# Patient Record
Sex: FEMALE | Race: BLACK OR AFRICAN AMERICAN | NOT HISPANIC OR LATINO | Employment: UNEMPLOYED | URBAN - METROPOLITAN AREA
[De-identification: names, ages, dates, MRNs, and addresses within clinical notes are randomized per-mention and may not be internally consistent; named-entity substitution may affect disease eponyms.]

---

## 2020-06-06 ENCOUNTER — HOSPITAL ENCOUNTER (EMERGENCY)
Facility: HOSPITAL | Age: 55
Discharge: HOME | End: 2020-06-06
Attending: EMERGENCY MEDICINE
Payer: COMMERCIAL

## 2020-06-06 VITALS
OXYGEN SATURATION: 98 % | TEMPERATURE: 98 F | BODY MASS INDEX: 31.07 KG/M2 | HEART RATE: 54 BPM | WEIGHT: 182 LBS | HEIGHT: 64 IN | DIASTOLIC BLOOD PRESSURE: 73 MMHG | RESPIRATION RATE: 16 BRPM | SYSTOLIC BLOOD PRESSURE: 124 MMHG

## 2020-06-06 DIAGNOSIS — M54.2 CHRONIC NECK AND BACK PAIN: ICD-10-CM

## 2020-06-06 DIAGNOSIS — G89.29 CHRONIC NECK AND BACK PAIN: ICD-10-CM

## 2020-06-06 DIAGNOSIS — M54.9 CHRONIC NECK AND BACK PAIN: ICD-10-CM

## 2020-06-06 DIAGNOSIS — R07.9 CHEST PAIN WITH LOW RISK FOR CARDIAC ETIOLOGY: Primary | ICD-10-CM

## 2020-06-06 DIAGNOSIS — R07.9 ACUTE CHEST PAIN: ICD-10-CM

## 2020-06-06 LAB
ALBUMIN SERPL BCP-MCNC: 4.8 G/DL (ref 3.5–5.2)
ALP SERPL-CCNC: 101 U/L (ref 55–135)
ALT SERPL W/O P-5'-P-CCNC: 18 U/L (ref 10–44)
ANION GAP SERPL CALC-SCNC: 10 MMOL/L (ref 8–16)
AST SERPL-CCNC: 16 U/L (ref 10–40)
BASOPHILS # BLD AUTO: 0.05 K/UL (ref 0–0.2)
BASOPHILS NFR BLD: 0.9 % (ref 0–1.9)
BILIRUB SERPL-MCNC: 0.6 MG/DL (ref 0.1–1)
BUN SERPL-MCNC: 9 MG/DL (ref 6–20)
CALCIUM SERPL-MCNC: 9.9 MG/DL (ref 8.7–10.5)
CHLORIDE SERPL-SCNC: 104 MMOL/L (ref 95–110)
CO2 SERPL-SCNC: 26 MMOL/L (ref 23–29)
CREAT SERPL-MCNC: 0.9 MG/DL (ref 0.5–1.4)
DIFFERENTIAL METHOD: ABNORMAL
EOSINOPHIL # BLD AUTO: 0.1 K/UL (ref 0–0.5)
EOSINOPHIL NFR BLD: 2.5 % (ref 0–8)
ERYTHROCYTE [DISTWIDTH] IN BLOOD BY AUTOMATED COUNT: 16.4 % (ref 11.5–14.5)
EST. GFR  (AFRICAN AMERICAN): >60 ML/MIN/1.73 M^2
EST. GFR  (NON AFRICAN AMERICAN): >60 ML/MIN/1.73 M^2
GLUCOSE SERPL-MCNC: 86 MG/DL (ref 70–110)
HCT VFR BLD AUTO: 43.9 % (ref 37–48.5)
HGB BLD-MCNC: 13.8 G/DL (ref 12–16)
IMM GRANULOCYTES # BLD AUTO: 0.01 K/UL (ref 0–0.04)
IMM GRANULOCYTES NFR BLD AUTO: 0.2 % (ref 0–0.5)
LYMPHOCYTES # BLD AUTO: 2.3 K/UL (ref 1–4.8)
LYMPHOCYTES NFR BLD: 43.2 % (ref 18–48)
MCH RBC QN AUTO: 24.6 PG (ref 27–31)
MCHC RBC AUTO-ENTMCNC: 31.4 G/DL (ref 32–36)
MCV RBC AUTO: 78 FL (ref 82–98)
MONOCYTES # BLD AUTO: 0.4 K/UL (ref 0.3–1)
MONOCYTES NFR BLD: 6.8 % (ref 4–15)
NEUTROPHILS # BLD AUTO: 2.5 K/UL (ref 1.8–7.7)
NEUTROPHILS NFR BLD: 46.4 % (ref 38–73)
NRBC BLD-RTO: 0 /100 WBC
PLATELET # BLD AUTO: 286 K/UL (ref 150–350)
PMV BLD AUTO: 10 FL (ref 9.2–12.9)
POTASSIUM SERPL-SCNC: 3.9 MMOL/L (ref 3.5–5.1)
PROT SERPL-MCNC: 8.2 G/DL (ref 6–8.4)
RBC # BLD AUTO: 5.6 M/UL (ref 4–5.4)
SODIUM SERPL-SCNC: 140 MMOL/L (ref 136–145)
TROPONIN I SERPL DL<=0.01 NG/ML-MCNC: <0.006 NG/ML (ref 0–0.03)
WBC # BLD AUTO: 5.28 K/UL (ref 3.9–12.7)

## 2020-06-06 PROCEDURE — 96374 THER/PROPH/DIAG INJ IV PUSH: CPT

## 2020-06-06 PROCEDURE — 85025 COMPLETE CBC W/AUTO DIFF WBC: CPT

## 2020-06-06 PROCEDURE — 99285 EMERGENCY DEPT VISIT HI MDM: CPT | Mod: 25

## 2020-06-06 PROCEDURE — 93010 EKG 12-LEAD: ICD-10-PCS | Mod: ,,, | Performed by: INTERNAL MEDICINE

## 2020-06-06 PROCEDURE — 93010 ELECTROCARDIOGRAM REPORT: CPT | Mod: ,,, | Performed by: INTERNAL MEDICINE

## 2020-06-06 PROCEDURE — 63600175 PHARM REV CODE 636 W HCPCS: Performed by: EMERGENCY MEDICINE

## 2020-06-06 PROCEDURE — 80053 COMPREHEN METABOLIC PANEL: CPT

## 2020-06-06 PROCEDURE — 25000003 PHARM REV CODE 250: Performed by: EMERGENCY MEDICINE

## 2020-06-06 PROCEDURE — 84484 ASSAY OF TROPONIN QUANT: CPT

## 2020-06-06 PROCEDURE — 93005 ELECTROCARDIOGRAM TRACING: CPT

## 2020-06-06 RX ORDER — PANTOPRAZOLE SODIUM 40 MG/1
80 TABLET, DELAYED RELEASE ORAL
Status: COMPLETED | OUTPATIENT
Start: 2020-06-06 | End: 2020-06-06

## 2020-06-06 RX ORDER — MAG HYDROX/ALUMINUM HYD/SIMETH 200-200-20
60 SUSPENSION, ORAL (FINAL DOSE FORM) ORAL
Status: COMPLETED | OUTPATIENT
Start: 2020-06-06 | End: 2020-06-06

## 2020-06-06 RX ORDER — PANTOPRAZOLE SODIUM 40 MG/1
TABLET, DELAYED RELEASE ORAL
Qty: 30 TABLET | Refills: 0 | Status: SHIPPED | OUTPATIENT
Start: 2020-06-06 | End: 2022-03-29 | Stop reason: ALTCHOICE

## 2020-06-06 RX ORDER — KETOROLAC TROMETHAMINE 30 MG/ML
30 INJECTION, SOLUTION INTRAMUSCULAR; INTRAVENOUS
Status: COMPLETED | OUTPATIENT
Start: 2020-06-06 | End: 2020-06-06

## 2020-06-06 RX ADMIN — ALUMINUM HYDROXIDE, MAGNESIUM HYDROXIDE, AND SIMETHICONE 60 ML: 200; 200; 20 SUSPENSION ORAL at 02:06

## 2020-06-06 RX ADMIN — PANTOPRAZOLE SODIUM 80 MG: 40 TABLET, DELAYED RELEASE ORAL at 02:06

## 2020-06-06 RX ADMIN — KETOROLAC TROMETHAMINE 30 MG: 30 INJECTION, SOLUTION INTRAMUSCULAR at 02:06

## 2020-06-06 NOTE — ED PROVIDER NOTES
"Encounter Date: 6/6/2020    SCRIBE #1 NOTE: I, Alissa Adler, am scribing for, and in the presence of,  Baldemar Barnes MD. I have scribed the following portions of the note - Other sections scribed: BEVERLY, JUSTIN.       History     Chief Complaint   Patient presents with    Chest Pain     Reports intermittent chest pain since Monday, worsened on Thursday. Reports midsternal chest pain that does not radiate, describes pain as a tightness. Unable to identify in aggravating factors, relieved with deep breathing. Denies jaw or neck pain. Reports mild shortness of breath. Denies pain worsened with exertion.     This is a 54 y.o female presents to the ED for an evaluation for acute onset, intermittent chest pain described as a tightness x 2 days.  Patient also reports of nausea, "hot flashes", increased chronic left leg pain, and a cough.  She reports she sometimes has relief of her chest pain with moving and twisting.  She denies any recent falls, trauma, or injuries.  She reports of a previous neck surgery approximately 4-5 years ago.  No prior tx. No alleviating factors.    The history is provided by the patient.     Review of patient's allergies indicates:  No Known Allergies  History reviewed. No pertinent past medical history.  Past Surgical History:   Procedure Laterality Date    HYSTERECTOMY      neck fusion       No family history on file.  Social History     Tobacco Use    Smoking status: Not on file   Substance Use Topics    Alcohol use: Not on file    Drug use: Not on file     Review of Systems   Constitutional: Negative for chills and fever.        (+) "hot flashes"   HENT: Negative for congestion, ear pain, rhinorrhea and sore throat.    Eyes: Negative for pain and visual disturbance.   Respiratory: Positive for cough. Negative for shortness of breath.    Cardiovascular: Positive for chest pain.   Gastrointestinal: Positive for nausea. Negative for abdominal pain, diarrhea and vomiting.   Genitourinary: " Negative for dysuria.   Musculoskeletal: Positive for myalgias. Negative for back pain and neck pain.   Skin: Negative for rash.   Neurological: Negative for weakness, numbness and headaches.       Physical Exam     Initial Vitals [06/06/20 1213]   BP Pulse Resp Temp SpO2   (!) 148/93 64 18 98.3 °F (36.8 °C) 98 %      MAP       --         Physical Exam  The patient was examined specifically for the following:   General:No significant distress, Good color, Warm and dry. Head and neck:Scalp atraumatic, Neck supple. Neurological:Appropriate conversation, Gross motor deficits. Eyes:Conjugate gaze, Clear corneas. ENT: No epistaxis. Cardiac: Regular rate and rhythm, Grossly normal heart tones. Pulmonary: Wheezing, Rales. Gastrointestinal: Abdominal tenderness, Abdominal distention. Musculoskeletal: Extremity deformity, Apparent pain with range of motion of the joints. Skin: Rash.   The findings on examination were normal.  There is no pain or swelling in the legs.  The lungs are clear.  The heart tones are normal.  The abdomen is soft.  Extremities are nontender.  There is no tachycardia.  There is no hypoxia.  ED Course   Procedures  Labs Reviewed   CBC W/ AUTO DIFFERENTIAL - Abnormal; Notable for the following components:       Result Value    RBC 5.60 (*)     Mean Corpuscular Volume 78 (*)     Mean Corpuscular Hemoglobin 24.6 (*)     Mean Corpuscular Hemoglobin Conc 31.4 (*)     RDW 16.4 (*)     All other components within normal limits   COMPREHENSIVE METABOLIC PANEL   TROPONIN I     EKG Readings: (Independently Interpreted)   This patient is in a normal sinus rhythm with a heart rate of 66.  There are nonspecific T-wave changes.  There is no evidence of acute myocardial infarction or malignant arrhythmia.       Imaging Results          X-Ray Chest AP Portable (Final result)  Result time 06/06/20 14:30:35    Final result by Marlon Mckinney MD (06/06/20 14:30:35)                 Impression:      No radiographic acute  intrathoracic process seen.      Electronically signed by: Marlon Mckinney MD  Date:    06/06/2020  Time:    14:30             Narrative:    EXAMINATION:  XR CHEST AP PORTABLE    CLINICAL HISTORY:  chest pain;    TECHNIQUE:  Single frontal view of the chest was performed.    COMPARISON:  None    FINDINGS:  The lungs are clear, with normal appearance of pulmonary vasculature and no pleural effusion or pneumothorax.    The cardiac silhouette is normal in size. The hilar and mediastinal contours are unremarkable.    Lower cervical ACDF hardware noted.  No acute osseous process seen.                              Medical decision making:  Given the above, this patient presents to the emergency room with chest pain.  It is sternal does been present for 2 days.  The patient's troponin is negative.  I doubt myocardial infarction.  EKG reveals minimal nonspecific changes only.  Chest x-ray fails to reveal pneumothorax pneumonia pleural effusion.  The patient is not tachycardic or short of breath.  I will treat with pantoprazole and Mylanta.         Additional MDM:     Well's Criteria Score:  -Clinical symptoms of DVT (leg swelling, pain with palpation) = 0.0  -Other diagnosis less likely than pulmonary embolism =            0.0  -Heart Rate >100 =   0.0  -Immobilization (= or > than 3 days) or surgery in the previous 4 weeks = 0.0  -Previous DVT/PE = 0.0  -Hemoptysis =          0.0  -Malignancy =           0.0  Well's Probability Score =    0      Heart Score:    History:          Slightly suspicious.  ECG:             Nonspecific repolarisation disturbance  Age:               45-65 years  Risk factors: 1-2 risk factors  Troponin:       Less than or equal to normal limit  Final Score: 3             Scribe Attestation:   Scribe #1: I performed the above scribed service and the documentation accurately describes the services I performed. I attest to the accuracy of the note.                          Clinical Impression:        ICD-10-CM ICD-9-CM   1. Chest pain with low risk for cardiac etiology R07.9 786.50   2. Acute chest pain R07.9 786.50   3. Chronic neck and back pain M54.2 723.1    M54.9 724.5    G89.29 338.29             ED Disposition Condition    Discharge Stable        ED Prescriptions     Medication Sig Dispense Start Date End Date Auth. Provider    pantoprazole (PROTONIX) 40 MG tablet Please take 1 tablet by mouth, every 12 hr while you have pain, then take 1 tablet every day. 30 tablet 6/6/2020  Baldemar Barnes MD        Follow-up Information     Follow up With Specialties Details Why Contact Info    Paras Jones MD Cardiology In 3 days  [address]  [address]  [place] [id number] [phone]                            I personally performed the services described in this documentation.  All medical record  entries made by the scribe are at my direction and in my presence.  Signed, Dr. Molly Barnes MD  06/06/20 1542       Baldemar Barnes MD  06/06/20 154

## 2020-06-06 NOTE — DISCHARGE INSTRUCTIONS
Pantoprazole as directed.  Please use Mylanta 30 mL by mouth every 4 hr while you have pain.  Please avoid caffeine carbonation alcohol cigarette citrus tomato Naprosyn aspirin ibuprofen.  Return immediately if you get worse or if new problems develop.  Please follow-up with cardiology early this week.

## 2020-06-06 NOTE — ED TRIAGE NOTES
Midsternal/epigastric pain pain x 5 days, comes and goes.  + nausea, hot flashes, chills.  Denies vomiting, diarrhea, or fever.    Has chronic back pain.

## 2020-06-08 ENCOUNTER — OFFICE VISIT (OUTPATIENT)
Dept: CARDIOLOGY | Facility: CLINIC | Age: 55
End: 2020-06-08
Payer: COMMERCIAL

## 2020-06-08 VITALS
DIASTOLIC BLOOD PRESSURE: 89 MMHG | WEIGHT: 183 LBS | BODY MASS INDEX: 31.24 KG/M2 | OXYGEN SATURATION: 98 % | SYSTOLIC BLOOD PRESSURE: 139 MMHG | HEART RATE: 70 BPM | HEIGHT: 64 IN

## 2020-06-08 DIAGNOSIS — R07.9 CHEST PAIN WITH LOW RISK FOR CARDIAC ETIOLOGY: ICD-10-CM

## 2020-06-08 DIAGNOSIS — R07.89 CHEST PAIN, ATYPICAL: ICD-10-CM

## 2020-06-08 PROCEDURE — 3008F PR BODY MASS INDEX (BMI) DOCUMENTED: ICD-10-PCS | Mod: CPTII,S$GLB,, | Performed by: INTERNAL MEDICINE

## 2020-06-08 PROCEDURE — 99204 OFFICE O/P NEW MOD 45 MIN: CPT | Mod: S$GLB,,, | Performed by: INTERNAL MEDICINE

## 2020-06-08 PROCEDURE — 99999 PR PBB SHADOW E&M-EST. PATIENT-LVL III: CPT | Mod: PBBFAC,,, | Performed by: INTERNAL MEDICINE

## 2020-06-08 PROCEDURE — 3008F BODY MASS INDEX DOCD: CPT | Mod: CPTII,S$GLB,, | Performed by: INTERNAL MEDICINE

## 2020-06-08 PROCEDURE — 99204 PR OFFICE/OUTPT VISIT, NEW, LEVL IV, 45-59 MIN: ICD-10-PCS | Mod: S$GLB,,, | Performed by: INTERNAL MEDICINE

## 2020-06-08 PROCEDURE — 99999 PR PBB SHADOW E&M-EST. PATIENT-LVL III: ICD-10-PCS | Mod: PBBFAC,,, | Performed by: INTERNAL MEDICINE

## 2020-06-08 NOTE — PROGRESS NOTES
"Subjective:    Patient ID:  Cherri Noonan is a 54 y.o. female who presents for evaluation of Hospital Follow Up      HPI     Went to the ER 6/6/20    This is a 54 y.o female presents to the ED for an evaluation for acute onset, intermittent chest pain described as a tightness x 2 days.  Patient also reports of nausea, "hot flashes", increased chronic left leg pain, and a cough.  She reports she sometimes has relief of her chest pain with moving and twisting.  She denies any recent falls, trauma, or injuries.  She reports of a previous neck surgery approximately 4-5 years ago.  No prior tx. No alleviating factors.    EKG 6/6/20 NSR NSSTT changes    6/8/20 Still with episodes of mainly sharp CP - not exertional  Denies prior CAD. Not a smoker  Issues with chronic pain and back pain    Review of Systems   Constitution: Negative for decreased appetite.   HENT: Negative for ear discharge.    Eyes: Negative for blurred vision.   Respiratory: Negative for hemoptysis.    Endocrine: Negative for polyphagia.   Hematologic/Lymphatic: Negative for adenopathy.   Skin: Negative for color change.   Musculoskeletal: Negative for joint swelling.   Genitourinary: Negative for bladder incontinence.   Neurological: Negative for brief paralysis.   Psychiatric/Behavioral: Negative for hallucinations.   Allergic/Immunologic: Negative for hives.        Objective:    Physical Exam   Constitutional: She is oriented to person, place, and time. She appears well-developed and well-nourished.   HENT:   Head: Normocephalic and atraumatic.   Eyes: Pupils are equal, round, and reactive to light. Conjunctivae are normal.   Neck: Normal range of motion. Neck supple.   Cardiovascular: Normal rate, normal heart sounds and intact distal pulses.   Pulmonary/Chest: Effort normal and breath sounds normal.   Abdominal: Soft. Bowel sounds are normal.   Musculoskeletal: Normal range of motion.   Neurological: She is alert and oriented to person, place, and " time.   Skin: Skin is warm and dry.         Assessment:       1. Chest pain with low risk for cardiac etiology    2. Chest pain, atypical         Plan:        Stress echo for CP and abnormal EKG

## 2020-06-08 NOTE — LETTER
June 8, 2020      Baldemar Barnes MD  [address] [address]           [facility]  [address] [facility]  [place] [address]  Phone: [phone]          Patient: Cherri Noonan   MR Number: [id number]   YOB: 1965   Date of Visit: 6/8/2020       Dear Dr. Baldemar Barnes:    Thank you for referring Cherri Noonan to me for evaluation. Attached you will find relevant portions of my assessment and plan of care.    If you have questions, please do not hesitate to call me. I look forward to following Cherri Noonan along with you.    Sincerely,    Paras Jones MD    Enclosure  CC:  No Recipients    If you would like to receive this communication electronically, please contact [email or web address] or [phone] to request more information on [facility] access.    For providers and/or their staff who would like to refer a patient to [facility], please contact us through our one-stop-shop provider referral line, [facility], at [phone].    If you feel you have received this communication in error or would no longer like to receive these types of communications, please e-mail [email or web address]

## 2020-06-11 ENCOUNTER — HOSPITAL ENCOUNTER (OUTPATIENT)
Dept: CARDIOLOGY | Facility: HOSPITAL | Age: 55
Discharge: HOME | End: 2020-06-11
Attending: INTERNAL MEDICINE
Payer: COMMERCIAL

## 2020-06-11 VITALS — WEIGHT: 182 LBS | HEIGHT: 64 IN | BODY MASS INDEX: 31.07 KG/M2

## 2020-06-11 DIAGNOSIS — R07.89 CHEST PAIN, ATYPICAL: ICD-10-CM

## 2020-06-11 DIAGNOSIS — R07.9 CHEST PAIN WITH LOW RISK FOR CARDIAC ETIOLOGY: ICD-10-CM

## 2020-06-11 LAB
AORTIC ROOT ANNULUS: 2.9 CM
AORTIC VALVE CUSP SEPERATION: 2.17 CM
ASCENDING AORTA: 2.62 CM
AV INDEX (PROSTH): 0.91
AV MEAN GRADIENT: 3 MMHG
AV PEAK GRADIENT: 6 MMHG
AV VALVE AREA: 3.3 CM2
AV VELOCITY RATIO: 0.77
BSA FOR ECHO PROCEDURE: 1.93 M2
CV ECHO LV RWT: 0.42 CM
CV STRESS BASE HR: 76 BPM
DIASTOLIC BLOOD PRESSURE: 56 MMHG
DOP CALC AO PEAK VEL: 1.2 M/S
DOP CALC AO VTI: 25.44 CM
DOP CALC LVOT AREA: 3.6 CM2
DOP CALC LVOT DIAMETER: 2.15 CM
DOP CALC LVOT PEAK VEL: 0.92 M/S
DOP CALC LVOT STROKE VOLUME: 84 CM3
DOP CALCLVOT PEAK VEL VTI: 23.15 CM
E WAVE DECELERATION TIME: 297.86 MSEC
E/A RATIO: 0.97
ECHO LV POSTERIOR WALL: 0.93 CM (ref 0.6–1.1)
FRACTIONAL SHORTENING: 29 % (ref 28–44)
INTERVENTRICULAR SEPTUM: 0.94 CM (ref 0.6–1.1)
IVRT: 89.97 MSEC
LA MAJOR: 4.31 CM
LA MINOR: 4.41 CM
LA WIDTH: 3.81 CM
LEFT ATRIUM SIZE: 3.11 CM
LEFT ATRIUM VOLUME INDEX: 23.4 ML/M2
LEFT ATRIUM VOLUME: 43.91 CM3
LEFT INTERNAL DIMENSION IN SYSTOLE: 3.13 CM (ref 2.1–4)
LEFT VENTRICLE DIASTOLIC VOLUME INDEX: 46.6 ML/M2
LEFT VENTRICLE DIASTOLIC VOLUME: 87.57 ML
LEFT VENTRICLE MASS INDEX: 72 G/M2
LEFT VENTRICLE SYSTOLIC VOLUME INDEX: 20.7 ML/M2
LEFT VENTRICLE SYSTOLIC VOLUME: 38.92 ML
LEFT VENTRICULAR INTERNAL DIMENSION IN DIASTOLE: 4.4 CM (ref 3.5–6)
LEFT VENTRICULAR MASS: 134.81 G
MV PEAK A VEL: 1.11 M/S
MV PEAK E VEL: 1.08 M/S
OHS CV CPX 1 MINUTE RECOVERY HEART RATE: 111 BPM
OHS CV CPX 85 PERCENT MAX PREDICTED HEART RATE MALE: 134
OHS CV CPX ESTIMATED METS: 9
OHS CV CPX MAX PREDICTED HEART RATE: 158
OHS CV CPX PATIENT IS FEMALE: 1
OHS CV CPX PATIENT IS MALE: 0
OHS CV CPX PEAK DIASTOLIC BLOOD PRESSURE: 73 MMHG
OHS CV CPX PEAK HEAR RATE: 157 BPM
OHS CV CPX PEAK RATE PRESSURE PRODUCT: NORMAL
OHS CV CPX PEAK SYSTOLIC BLOOD PRESSURE: 190 MMHG
OHS CV CPX PERCENT MAX PREDICTED HEART RATE ACHIEVED: 100
OHS CV CPX RATE PRESSURE PRODUCT PRESENTING: 8968
PISA TR MAX VEL: 1.95 M/S
PV PEAK VELOCITY: 0.85 CM/S
RA MAJOR: 3.96 CM
RA WIDTH: 2.87 CM
RIGHT VENTRICULAR END-DIASTOLIC DIMENSION: 2.22 CM
SINUS: 2.46 CM
STJ: 2.51 CM
STRESS ECHO POST EXERCISE DUR MIN: 7 MINUTES
STRESS ECHO POST EXERCISE DUR SEC: 6 SECONDS
SYSTOLIC BLOOD PRESSURE: 118 MMHG
TR MAX PG: 15 MMHG
TRICUSPID ANNULAR PLANE SYSTOLIC EXCURSION: 2.08 CM

## 2020-06-11 PROCEDURE — 93325 STRESS ECHO (CUPID ONLY): ICD-10-PCS | Mod: 26,,, | Performed by: INTERNAL MEDICINE

## 2020-06-11 PROCEDURE — 93351 STRESS ECHO (CUPID ONLY): ICD-10-PCS | Mod: 26,,, | Performed by: INTERNAL MEDICINE

## 2020-06-11 PROCEDURE — 93351 STRESS TTE COMPLETE: CPT | Mod: 26,,, | Performed by: INTERNAL MEDICINE

## 2020-06-11 PROCEDURE — 93351 STRESS TTE COMPLETE: CPT

## 2020-06-11 PROCEDURE — 93320 STRESS ECHO (CUPID ONLY): ICD-10-PCS | Mod: 26,,, | Performed by: INTERNAL MEDICINE

## 2020-06-11 PROCEDURE — 93325 DOPPLER ECHO COLOR FLOW MAPG: CPT | Mod: 26,,, | Performed by: INTERNAL MEDICINE

## 2020-06-11 PROCEDURE — 93320 DOPPLER ECHO COMPLETE: CPT | Mod: 26,,, | Performed by: INTERNAL MEDICINE

## 2020-06-25 ENCOUNTER — OFFICE VISIT (OUTPATIENT)
Dept: CARDIOLOGY | Facility: CLINIC | Age: 55
End: 2020-06-25
Payer: COMMERCIAL

## 2020-06-25 VITALS
OXYGEN SATURATION: 97 % | HEART RATE: 77 BPM | WEIGHT: 185.19 LBS | BODY MASS INDEX: 31.62 KG/M2 | HEIGHT: 64 IN | DIASTOLIC BLOOD PRESSURE: 89 MMHG | SYSTOLIC BLOOD PRESSURE: 131 MMHG

## 2020-06-25 DIAGNOSIS — R07.89 CHEST PAIN, ATYPICAL: Primary | ICD-10-CM

## 2020-06-25 PROCEDURE — 99999 PR PBB SHADOW E&M-EST. PATIENT-LVL III: ICD-10-PCS | Mod: PBBFAC,,, | Performed by: INTERNAL MEDICINE

## 2020-06-25 PROCEDURE — 99999 PR PBB SHADOW E&M-EST. PATIENT-LVL III: CPT | Mod: PBBFAC,,, | Performed by: INTERNAL MEDICINE

## 2020-06-25 PROCEDURE — 3008F BODY MASS INDEX DOCD: CPT | Mod: CPTII,S$GLB,, | Performed by: INTERNAL MEDICINE

## 2020-06-25 PROCEDURE — 3008F PR BODY MASS INDEX (BMI) DOCUMENTED: ICD-10-PCS | Mod: CPTII,S$GLB,, | Performed by: INTERNAL MEDICINE

## 2020-06-25 PROCEDURE — 99213 OFFICE O/P EST LOW 20 MIN: CPT | Mod: S$GLB,,, | Performed by: INTERNAL MEDICINE

## 2020-06-25 PROCEDURE — 99213 PR OFFICE/OUTPT VISIT, EST, LEVL III, 20-29 MIN: ICD-10-PCS | Mod: S$GLB,,, | Performed by: INTERNAL MEDICINE

## 2020-06-25 NOTE — PROGRESS NOTES
"Subjective:    Patient ID:  Cherri Noonan is a 54 y.o. female who presents for follow-up of Results      HPI     Went to the ER 6/6/20     This is a 54 y.o female presents to the ED for an evaluation for acute onset, intermittent chest pain described as a tightness x 2 days.  Patient also reports of nausea, "hot flashes", increased chronic left leg pain, and a cough.  She reports she sometimes has relief of her chest pain with moving and twisting.  She denies any recent falls, trauma, or injuries.  She reports of a previous neck surgery approximately 4-5 years ago.  No prior tx. No alleviating factors.     EKG 6/6/20 NSR NSSTT changes    Stress echo 6/11/20  · The stress echo portion of this study is negative for myocardial ischemia.  · The ECG portion of this study is negative for myocardial ischemia.  · The patient's exercise capacity was average.  · Normal left ventricular systolic function. The estimated ejection fraction is 60%.  · Normal LV diastolic function.  · Normal right ventricular systolic function.  · No pulmonary hypertension present.  · There were no arrhythmias during stress.  · The test was stopped because the patient experienced fatigue. The patient reached the end of the protocol.     6/8/20 Still with episodes of mainly sharp CP - not exertional  Denies prior CAD. Not a smoker  Issues with chronic pain and back pain    6/25/20 Still with sharp positional CP       Review of Systems   Constitution: Negative for decreased appetite.   HENT: Negative for ear discharge.    Eyes: Negative for blurred vision.   Respiratory: Negative for hemoptysis.    Endocrine: Negative for polyphagia.   Hematologic/Lymphatic: Negative for adenopathy.   Skin: Negative for color change.   Musculoskeletal: Negative for joint swelling.   Genitourinary: Negative for bladder incontinence.   Neurological: Negative for brief paralysis.   Psychiatric/Behavioral: Negative for hallucinations.   Allergic/Immunologic: Negative " for hives.        Objective:    Physical Exam   Constitutional: She is oriented to person, place, and time. She appears well-developed and well-nourished.   HENT:   Head: Normocephalic and atraumatic.   Eyes: Pupils are equal, round, and reactive to light. Conjunctivae are normal.   Neck: Normal range of motion. Neck supple.   Cardiovascular: Normal rate, normal heart sounds and intact distal pulses.   Pulmonary/Chest: Effort normal and breath sounds normal.   Abdominal: Soft. Bowel sounds are normal.   Musculoskeletal: Normal range of motion.   Neurological: She is alert and oriented to person, place, and time.   Skin: Skin is warm and dry.         Assessment:       1. Chest pain, atypical         Plan:       Suspect CP is musculoskeletal and not cardiac. Would try OTC anti-inflammatories  F/U prn

## 2021-04-15 ENCOUNTER — PATIENT MESSAGE (OUTPATIENT)
Dept: RESEARCH | Facility: HOSPITAL | Age: 56
End: 2021-04-15

## 2021-12-23 LAB — BCS RECOMMENDATION EXT: NORMAL

## 2022-03-29 ENCOUNTER — PATIENT MESSAGE (OUTPATIENT)
Dept: PHARMACY | Facility: CLINIC | Age: 57
End: 2022-03-29
Payer: COMMERCIAL

## 2022-03-29 ENCOUNTER — LAB VISIT (OUTPATIENT)
Dept: LAB | Facility: HOSPITAL | Age: 57
End: 2022-03-29
Payer: COMMERCIAL

## 2022-03-29 ENCOUNTER — OFFICE VISIT (OUTPATIENT)
Dept: INTERNAL MEDICINE | Facility: CLINIC | Age: 57
End: 2022-03-29
Payer: COMMERCIAL

## 2022-03-29 VITALS
HEART RATE: 75 BPM | HEIGHT: 64 IN | BODY MASS INDEX: 30.9 KG/M2 | WEIGHT: 181 LBS | RESPIRATION RATE: 18 BRPM | OXYGEN SATURATION: 99 % | DIASTOLIC BLOOD PRESSURE: 82 MMHG | SYSTOLIC BLOOD PRESSURE: 126 MMHG

## 2022-03-29 DIAGNOSIS — E78.49 OTHER HYPERLIPIDEMIA: ICD-10-CM

## 2022-03-29 DIAGNOSIS — Z11.4 ENCOUNTER FOR SCREENING FOR HIV: ICD-10-CM

## 2022-03-29 DIAGNOSIS — E55.9 VITAMIN D DEFICIENCY: ICD-10-CM

## 2022-03-29 DIAGNOSIS — Z12.4 SCREENING FOR CERVICAL CANCER: ICD-10-CM

## 2022-03-29 DIAGNOSIS — E66.9 CLASS 1 OBESITY WITH BODY MASS INDEX (BMI) OF 31.0 TO 31.9 IN ADULT, UNSPECIFIED OBESITY TYPE, UNSPECIFIED WHETHER SERIOUS COMORBIDITY PRESENT: ICD-10-CM

## 2022-03-29 DIAGNOSIS — Z00.00 ANNUAL PHYSICAL EXAM: ICD-10-CM

## 2022-03-29 DIAGNOSIS — Z11.59 NEED FOR HEPATITIS C SCREENING TEST: ICD-10-CM

## 2022-03-29 DIAGNOSIS — M79.2 PERIPHERAL NEUROPATHIC PAIN: ICD-10-CM

## 2022-03-29 DIAGNOSIS — J31.0 CHRONIC RHINITIS: ICD-10-CM

## 2022-03-29 DIAGNOSIS — Z12.31 SCREENING MAMMOGRAM, ENCOUNTER FOR: ICD-10-CM

## 2022-03-29 DIAGNOSIS — M54.12 CERVICAL RADICULOPATHY: ICD-10-CM

## 2022-03-29 DIAGNOSIS — Z00.00 ANNUAL PHYSICAL EXAM: Primary | ICD-10-CM

## 2022-03-29 PROBLEM — G47.9 SLEEP DISORDER: Status: ACTIVE | Noted: 2022-03-29

## 2022-03-29 PROBLEM — N95.2 ATROPHIC VAGINITIS: Status: ACTIVE | Noted: 2018-11-19

## 2022-03-29 PROBLEM — E66.811 CLASS 1 OBESITY WITH BODY MASS INDEX (BMI) OF 31.0 TO 31.9 IN ADULT: Status: ACTIVE | Noted: 2022-03-29

## 2022-03-29 LAB
25(OH)D3+25(OH)D2 SERPL-MCNC: 79 NG/ML (ref 30–96)
ALBUMIN SERPL BCP-MCNC: 4.5 G/DL (ref 3.5–5.2)
ALP SERPL-CCNC: 100 U/L (ref 55–135)
ALT SERPL W/O P-5'-P-CCNC: 23 U/L (ref 10–44)
ANION GAP SERPL CALC-SCNC: 10 MMOL/L (ref 8–16)
AST SERPL-CCNC: 17 U/L (ref 10–40)
BASOPHILS # BLD AUTO: 0.05 K/UL (ref 0–0.2)
BASOPHILS NFR BLD: 0.9 % (ref 0–1.9)
BILIRUB SERPL-MCNC: 0.5 MG/DL (ref 0.1–1)
BUN SERPL-MCNC: 11 MG/DL (ref 6–20)
CALCIUM SERPL-MCNC: 9.8 MG/DL (ref 8.7–10.5)
CHLORIDE SERPL-SCNC: 105 MMOL/L (ref 95–110)
CHOLEST SERPL-MCNC: 230 MG/DL (ref 120–199)
CHOLEST/HDLC SERPL: 3.7 {RATIO} (ref 2–5)
CO2 SERPL-SCNC: 27 MMOL/L (ref 23–29)
CREAT SERPL-MCNC: 0.7 MG/DL (ref 0.5–1.4)
DIFFERENTIAL METHOD: ABNORMAL
EOSINOPHIL # BLD AUTO: 0.1 K/UL (ref 0–0.5)
EOSINOPHIL NFR BLD: 1.6 % (ref 0–8)
ERYTHROCYTE [DISTWIDTH] IN BLOOD BY AUTOMATED COUNT: 17.4 % (ref 11.5–14.5)
EST. GFR  (AFRICAN AMERICAN): >60 ML/MIN/1.73 M^2
EST. GFR  (NON AFRICAN AMERICAN): >60 ML/MIN/1.73 M^2
GLUCOSE SERPL-MCNC: 81 MG/DL (ref 70–110)
HCT VFR BLD AUTO: 43.3 % (ref 37–48.5)
HDLC SERPL-MCNC: 62 MG/DL (ref 40–75)
HDLC SERPL: 27 % (ref 20–50)
HGB BLD-MCNC: 13.6 G/DL (ref 12–16)
IMM GRANULOCYTES # BLD AUTO: 0.01 K/UL (ref 0–0.04)
IMM GRANULOCYTES NFR BLD AUTO: 0.2 % (ref 0–0.5)
LDLC SERPL CALC-MCNC: 152 MG/DL (ref 63–159)
LYMPHOCYTES # BLD AUTO: 2.3 K/UL (ref 1–4.8)
LYMPHOCYTES NFR BLD: 41.5 % (ref 18–48)
MCH RBC QN AUTO: 24.8 PG (ref 27–31)
MCHC RBC AUTO-ENTMCNC: 31.4 G/DL (ref 32–36)
MCV RBC AUTO: 79 FL (ref 82–98)
MONOCYTES # BLD AUTO: 0.4 K/UL (ref 0.3–1)
MONOCYTES NFR BLD: 6.6 % (ref 4–15)
NEUTROPHILS # BLD AUTO: 2.8 K/UL (ref 1.8–7.7)
NEUTROPHILS NFR BLD: 49.2 % (ref 38–73)
NONHDLC SERPL-MCNC: 168 MG/DL
NRBC BLD-RTO: 0 /100 WBC
PLATELET # BLD AUTO: 287 K/UL (ref 150–450)
PMV BLD AUTO: 11.2 FL (ref 9.2–12.9)
POTASSIUM SERPL-SCNC: 4 MMOL/L (ref 3.5–5.1)
PROT SERPL-MCNC: 7.9 G/DL (ref 6–8.4)
RBC # BLD AUTO: 5.49 M/UL (ref 4–5.4)
SODIUM SERPL-SCNC: 142 MMOL/L (ref 136–145)
TRIGL SERPL-MCNC: 80 MG/DL (ref 30–150)
TSH SERPL DL<=0.005 MIU/L-ACNC: 0.86 UIU/ML (ref 0.4–4)
WBC # BLD AUTO: 5.61 K/UL (ref 3.9–12.7)

## 2022-03-29 PROCEDURE — 86803 HEPATITIS C AB TEST: CPT | Performed by: NURSE PRACTITIONER

## 2022-03-29 PROCEDURE — 82306 VITAMIN D 25 HYDROXY: CPT | Performed by: NURSE PRACTITIONER

## 2022-03-29 PROCEDURE — 99999 PR PBB SHADOW E&M-EST. PATIENT-LVL V: CPT | Mod: PBBFAC,,, | Performed by: NURSE PRACTITIONER

## 2022-03-29 PROCEDURE — 99386 PR PREVENTIVE VISIT,NEW,40-64: ICD-10-PCS | Mod: S$GLB,,, | Performed by: NURSE PRACTITIONER

## 2022-03-29 PROCEDURE — 3079F PR MOST RECENT DIASTOLIC BLOOD PRESSURE 80-89 MM HG: ICD-10-PCS | Mod: CPTII,S$GLB,, | Performed by: NURSE PRACTITIONER

## 2022-03-29 PROCEDURE — 3008F PR BODY MASS INDEX (BMI) DOCUMENTED: ICD-10-PCS | Mod: CPTII,S$GLB,, | Performed by: NURSE PRACTITIONER

## 2022-03-29 PROCEDURE — 1159F MED LIST DOCD IN RCRD: CPT | Mod: CPTII,S$GLB,, | Performed by: NURSE PRACTITIONER

## 2022-03-29 PROCEDURE — 80053 COMPREHEN METABOLIC PANEL: CPT | Performed by: NURSE PRACTITIONER

## 2022-03-29 PROCEDURE — 3008F BODY MASS INDEX DOCD: CPT | Mod: CPTII,S$GLB,, | Performed by: NURSE PRACTITIONER

## 2022-03-29 PROCEDURE — 83036 HEMOGLOBIN GLYCOSYLATED A1C: CPT | Performed by: NURSE PRACTITIONER

## 2022-03-29 PROCEDURE — 80061 LIPID PANEL: CPT | Performed by: NURSE PRACTITIONER

## 2022-03-29 PROCEDURE — 99386 PREV VISIT NEW AGE 40-64: CPT | Mod: S$GLB,,, | Performed by: NURSE PRACTITIONER

## 2022-03-29 PROCEDURE — 1159F PR MEDICATION LIST DOCUMENTED IN MEDICAL RECORD: ICD-10-PCS | Mod: CPTII,S$GLB,, | Performed by: NURSE PRACTITIONER

## 2022-03-29 PROCEDURE — 36415 COLL VENOUS BLD VENIPUNCTURE: CPT | Performed by: NURSE PRACTITIONER

## 2022-03-29 PROCEDURE — 87389 HIV-1 AG W/HIV-1&-2 AB AG IA: CPT | Performed by: NURSE PRACTITIONER

## 2022-03-29 PROCEDURE — 84443 ASSAY THYROID STIM HORMONE: CPT | Performed by: NURSE PRACTITIONER

## 2022-03-29 PROCEDURE — 99999 PR PBB SHADOW E&M-EST. PATIENT-LVL V: ICD-10-PCS | Mod: PBBFAC,,, | Performed by: NURSE PRACTITIONER

## 2022-03-29 PROCEDURE — 3074F SYST BP LT 130 MM HG: CPT | Mod: CPTII,S$GLB,, | Performed by: NURSE PRACTITIONER

## 2022-03-29 PROCEDURE — 3074F PR MOST RECENT SYSTOLIC BLOOD PRESSURE < 130 MM HG: ICD-10-PCS | Mod: CPTII,S$GLB,, | Performed by: NURSE PRACTITIONER

## 2022-03-29 PROCEDURE — 85025 COMPLETE CBC W/AUTO DIFF WBC: CPT | Performed by: NURSE PRACTITIONER

## 2022-03-29 PROCEDURE — 3079F DIAST BP 80-89 MM HG: CPT | Mod: CPTII,S$GLB,, | Performed by: NURSE PRACTITIONER

## 2022-03-29 RX ORDER — ERGOCALCIFEROL 1.25 MG/1
50000 CAPSULE ORAL
COMMUNITY

## 2022-03-29 RX ORDER — AMOXICILLIN 500 MG
CAPSULE ORAL DAILY
Status: ON HOLD | COMMUNITY
End: 2023-06-26 | Stop reason: SDUPTHER

## 2022-03-29 RX ORDER — GINSENG 250 MG
CAPSULE ORAL
COMMUNITY
End: 2024-02-29

## 2022-03-29 RX ORDER — FLUTICASONE PROPIONATE 50 MCG
1 SPRAY, SUSPENSION (ML) NASAL DAILY
COMMUNITY
Start: 2021-12-02 | End: 2023-05-08 | Stop reason: SDUPTHER

## 2022-03-29 RX ORDER — CICLOPIROX OLAMINE 7.7 MG/100ML
SUSPENSION TOPICAL 2 TIMES DAILY
COMMUNITY

## 2022-03-29 RX ORDER — ESTRADIOL 0.1 MG/G
0.5 CREAM VAGINAL
COMMUNITY
Start: 2021-12-17 | End: 2023-05-03 | Stop reason: SDUPTHER

## 2022-03-29 RX ORDER — OMEPRAZOLE 20 MG/1
20 CAPSULE, DELAYED RELEASE ORAL DAILY
COMMUNITY
Start: 2022-01-20 | End: 2023-05-03 | Stop reason: SDUPTHER

## 2022-03-29 RX ORDER — ATORVASTATIN CALCIUM 20 MG/1
20 TABLET, FILM COATED ORAL DAILY
COMMUNITY
Start: 2022-03-21 | End: 2023-05-03 | Stop reason: SDUPTHER

## 2022-03-29 RX ORDER — AZELASTINE HCL 205.5 UG/1
SPRAY NASAL
COMMUNITY

## 2022-03-29 RX ORDER — ZINC GLUCONATE 50 MG
50 TABLET ORAL DAILY
COMMUNITY
End: 2024-02-29

## 2022-03-29 RX ORDER — TIZANIDINE 4 MG/1
4 TABLET ORAL NIGHTLY PRN
COMMUNITY
Start: 2021-12-18 | End: 2022-10-18

## 2022-03-29 RX ORDER — POTASSIUM GLUCONATE 595(99)MG
TABLET, EXTENDED RELEASE ORAL ONCE
COMMUNITY
End: 2023-05-03 | Stop reason: SDUPTHER

## 2022-03-29 RX ORDER — MULTIVIT WITH MINERALS/HERBS
1 TABLET ORAL DAILY
COMMUNITY
End: 2024-02-29

## 2022-03-29 RX ORDER — MELOXICAM 15 MG/1
15 TABLET ORAL DAILY PRN
COMMUNITY
Start: 2022-01-19 | End: 2022-10-18

## 2022-03-29 NOTE — PROGRESS NOTES
Internal Medicine Annual Exam       CHIEF COMPLAINT     The patient, Cherri Noonan, who is a 56 y.o. female presents for an annual exam.    HPI   Cervical spine pain- followed by ortho/pain mgmt - Dr Etienne - was referred by him to an [facility] provider   C5-C7 cervical fusion 8/2015 - [facility]   10/2021- spinal cord stimulator placed   continues to have tingling in the left arm. Burning pain from left neck to the lower back   Tried: Elavil, Cymbalta, gabapentin, hydrocodone, flexiril and lyrica without relief.     H/o atypical chest pain - [facility] and f/u with cardiology 6/2020  EKG 6/6/20 NSR NSSTT changes   Stress echo 6/11/20  · The stress echo portion of this study is negative for myocardial ischemia.  · The ECG portion of this study is negative for myocardial ischemia.  · The patient's exercise capacity was average.  · Normal left ventricular systolic function. The estimated ejection fraction is 60%.  · Normal LV diastolic function.  · Normal right ventricular systolic function.  · No pulmonary hypertension present.  · There were no arrhythmias during stress.  · The test was stopped because the patient experienced fatigue. The patient reached the end of the protocol.      -  MMG- 12/23/2021- [place] [facility] [place]   c-scope -repeat in 10 years - performed +++++  Tdap- needs   Covid-UTD  Shingles- needs         Past Medical History:  No past medical history on file.    Past Surgical History:   Procedure Laterality Date    HYSTERECTOMY      neck fusion          No family history on file.     Social History     Socioeconomic History    Marital status:    Tobacco Use    Smoking status: Never Smoker        Social History     Tobacco Use   Smoking Status Never Smoker   Smokeless Tobacco Not on file        Allergies as of 03/29/2022    (No Known Allergies)          Home Medications:  Prior to Admission medications    Medication Sig Start Date End Date Taking? Authorizing Provider   pantoprazole  "(PROTONIX) 40 MG tablet Please take 1 tablet by mouth, every 12 hr while you have pain, then take 1 tablet every day. 6/6/20   Baldemar Barnes MD       Review of Systems:  Review of Systems   Constitutional: Negative for chills, fatigue, fever and unexpected weight change.   HENT: Negative for congestion, hearing loss, rhinorrhea and sinus pressure.    Eyes: Negative for pain, redness and visual disturbance.   Respiratory: Negative for cough, shortness of breath and wheezing.    Cardiovascular: Negative for chest pain and palpitations.   Gastrointestinal: Negative for abdominal distention, abdominal pain, constipation, diarrhea, nausea and vomiting.   Endocrine: Negative for polydipsia, polyphagia and polyuria.   Genitourinary: Negative for dysuria, frequency, urgency and vaginal discharge.   Musculoskeletal: Positive for arthralgias, myalgias, neck pain and neck stiffness. Negative for gait problem.   Skin: Negative for color change and rash.   Allergic/Immunologic: Negative for environmental allergies and immunocompromised state.   Neurological: Positive for numbness. Negative for dizziness, weakness, light-headedness and headaches.   Hematological: Negative for adenopathy. Does not bruise/bleed easily.   Psychiatric/Behavioral: Negative for confusion and sleep disturbance. The patient is not nervous/anxious.        Health Maintainence:   Immunizations:  Health Maintenance       Date Due Completion Date    Hepatitis C Screening Never done ---    HIV Screening Never done ---    TETANUS VACCINE Never done ---    Shingles Vaccine (1 of 2) Never done ---    Mammogram 12/18/2019 12/18/2018    Colorectal Cancer Screening 12/04/2020 12/4/2019    Influenza Vaccine (1) 09/01/2021 11/19/2020    Lipid Panel 12/02/2026 12/2/2021           PHYSICAL EXAM     /82 (BP Location: Right arm, Patient Position: Sitting, BP Method: Large (Manual))   Pulse 75   Resp 18   Ht 5' 4" (1.626 m)   Wt 82.1 kg (181 lb)   SpO2 99%   " BMI 31.07 kg/m²  Body mass index is 31.07 kg/m².    Physical Exam  Vitals reviewed.   Constitutional:       Appearance: She is well-developed.   HENT:      Head: Normocephalic.      Right Ear: External ear normal.      Left Ear: External ear normal.      Nose: Nose normal.      Mouth/Throat:      Pharynx: No oropharyngeal exudate.   Eyes:      Pupils: Pupils are equal, round, and reactive to light.   Neck:      Thyroid: No thyromegaly.      Vascular: No JVD.      Trachea: No tracheal deviation.   Cardiovascular:      Rate and Rhythm: Normal rate and regular rhythm.      Heart sounds: Normal heart sounds. No murmur heard.    No friction rub. No gallop.   Pulmonary:      Effort: Pulmonary effort is normal. No respiratory distress.      Breath sounds: Normal breath sounds. No wheezing or rales.   Abdominal:      General: Bowel sounds are normal. There is no distension.      Palpations: Abdomen is soft.      Tenderness: There is no abdominal tenderness.   Musculoskeletal:      Cervical back: Neck supple. Spasms, tenderness and bony tenderness present. No swelling, edema, deformity, erythema, signs of trauma, lacerations, rigidity, torticollis or crepitus. Pain with movement present. Decreased range of motion.        Back:    Lymphadenopathy:      Cervical: No cervical adenopathy.   Skin:     General: Skin is warm and dry.      Findings: No rash.   Neurological:      Mental Status: She is alert and oriented to person, place, and time.   Psychiatric:         Behavior: Behavior normal.         LABS     No results found for: LABA1C, HGBA1C  CMP  Sodium   Date Value Ref Range Status   06/06/2020 140 136 - 145 mmol/L Final     Potassium   Date Value Ref Range Status   06/06/2020 3.9 3.5 - 5.1 mmol/L Final     Chloride   Date Value Ref Range Status   06/06/2020 104 95 - 110 mmol/L Final     CO2   Date Value Ref Range Status   06/06/2020 26 23 - 29 mmol/L Final     Glucose   Date Value Ref Range Status   06/06/2020 86 70 - 110  mg/dL Final     BUN   Date Value Ref Range Status   06/06/2020 9 6 - 20 mg/dL Final     Creatinine   Date Value Ref Range Status   06/06/2020 0.9 0.5 - 1.4 mg/dL Final     Calcium   Date Value Ref Range Status   06/06/2020 9.9 8.7 - 10.5 mg/dL Final     Total Protein   Date Value Ref Range Status   06/06/2020 8.2 6.0 - 8.4 g/dL Final     Albumin   Date Value Ref Range Status   06/06/2020 4.8 3.5 - 5.2 g/dL Final     Total Bilirubin   Date Value Ref Range Status   06/06/2020 0.6 0.1 - 1.0 mg/dL Final     Comment:     For infants and newborns, interpretation of results should be based  on gestational age, weight and in agreement with clinical  observations.  Premature Infant recommended reference ranges:  Up to 24 hours.............<8.0 mg/dL  Up to 48 hours............<12.0 mg/dL  3-5 days..................<15.0 mg/dL  6-29 days.................<15.0 mg/dL       Alkaline Phosphatase   Date Value Ref Range Status   06/06/2020 101 55 - 135 U/L Final     AST   Date Value Ref Range Status   06/06/2020 16 10 - 40 U/L Final     ALT   Date Value Ref Range Status   06/06/2020 18 10 - 44 U/L Final     Anion Gap   Date Value Ref Range Status   06/06/2020 10 8 - 16 mmol/L Final     eGFR if    Date Value Ref Range Status   06/06/2020 >60 >60 mL/min/1.73 m^2 Final     eGFR if non    Date Value Ref Range Status   06/06/2020 >60 >60 mL/min/1.73 m^2 Final     Comment:     Calculation used to obtain the estimated glomerular filtration  rate (eGFR) is the CKD-EPI equation.        Lab Results   Component Value Date    WBC 5.28 06/06/2020    HGB 13.8 06/06/2020    HCT 43.9 06/06/2020    MCV 78 (L) 06/06/2020     06/06/2020     No results found for: CHOL  No results found for: HDL  No results found for: LDLCALC  No results found for: TRIG  No results found for: CHOLHDL  No results found for: TSH, Z8ANTFM, B1GZMCL, THYROIDAB    ASSESSMENT/PLAN     Cherri Noonan is a 56 y.o. female    Annual  physical exam- All age and gender related screenings discussed   -     CBC Auto Differential; Future; Expected date: 03/29/2022  -     Comprehensive Metabolic Panel; Future; Expected date: 03/29/2022  -     Lipid Panel; Future; Expected date: 03/29/2022  -     TSH; Future; Expected date: 03/29/2022  -     Vitamin D; Future; Expected date: 03/29/2022  -     Hemoglobin A1C; Future; Expected date: 03/29/2022    Cervical radiculopathy- stable, poorly controlled. Will refer to pain mgmt.   -     Ambulatory referral/consult to Pain Clinic; Future; Expected date: 04/05/2022    Peripheral neuropathic pain- stable, poorly controlled. Will refer to pain mgmt.   -     Ambulatory referral/consult to Pain Clinic; Future; Expected date: 04/05/2022    Other hyperlipidemia- stable.w ill cont lipitor 20mg   -     Lipid Panel; Future; Expected date: 03/29/2022    Vitamin D deficiency- stable. Will cont high dose vit D and check level   -     Vitamin D; Future; Expected date: 03/29/2022    Chronic rhinitis- stable. Will cont current meds.     Encounter for screening for HIV  -     HIV 1/2 Ag/Ab (4th Gen); Future; Expected date: 03/29/2022    Need for hepatitis C screening test  -     Hepatitis C Antibody; Future; Expected date: 03/29/2022    Screening mammogram, encounter for  -     Mammo Digital Screening Bilat w/ [place]; Future; Expected date: 03/29/2022    Screening for cervical cancer  -     Ambulatory referral/consult to Gynecology; Future; Expected date: 04/05/2022    Class 1 obesity with body mass index (BMI) of 31.0 to 31.9 in adult, unspecified obesity type, unspecified whether serious comorbidity present      Follow up with PCP  Mahsa FAULKNER, APRN, FNP-c   Department of Internal Medicine - [facility] [address]  9:50 AM

## 2022-03-30 LAB
ESTIMATED AVG GLUCOSE: 114 MG/DL (ref 68–131)
HBA1C MFR BLD: 5.6 % (ref 4–5.6)
HCV AB SERPL QL IA: NEGATIVE
HIV 1+2 AB+HIV1 P24 AG SERPL QL IA: NEGATIVE

## 2022-04-05 ENCOUNTER — PATIENT MESSAGE (OUTPATIENT)
Dept: PAIN MEDICINE | Facility: CLINIC | Age: 57
End: 2022-04-05
Payer: COMMERCIAL

## 2022-04-06 ENCOUNTER — OFFICE VISIT (OUTPATIENT)
Dept: PAIN MEDICINE | Facility: CLINIC | Age: 57
End: 2022-04-06
Attending: ANESTHESIOLOGY
Payer: COMMERCIAL

## 2022-04-06 ENCOUNTER — HOSPITAL ENCOUNTER (OUTPATIENT)
Dept: RADIOLOGY | Facility: OTHER | Age: 57
Discharge: HOME | End: 2022-04-06
Attending: STUDENT IN AN ORGANIZED HEALTH CARE EDUCATION/TRAINING PROGRAM
Payer: COMMERCIAL

## 2022-04-06 VITALS
DIASTOLIC BLOOD PRESSURE: 81 MMHG | BODY MASS INDEX: 31.27 KG/M2 | SYSTOLIC BLOOD PRESSURE: 126 MMHG | RESPIRATION RATE: 18 BRPM | TEMPERATURE: 98 F | HEART RATE: 64 BPM | WEIGHT: 183.19 LBS | HEIGHT: 64 IN

## 2022-04-06 DIAGNOSIS — M54.12 CERVICAL RADICULOPATHY: ICD-10-CM

## 2022-04-06 DIAGNOSIS — Z96.89 SPINAL CORD STIMULATOR STATUS: Primary | ICD-10-CM

## 2022-04-06 DIAGNOSIS — Z96.89 SPINAL CORD STIMULATOR STATUS: ICD-10-CM

## 2022-04-06 DIAGNOSIS — M79.2 PERIPHERAL NEUROPATHIC PAIN: ICD-10-CM

## 2022-04-06 PROCEDURE — 3044F HG A1C LEVEL LT 7.0%: CPT | Mod: CPTII,S$GLB,, | Performed by: ANESTHESIOLOGY

## 2022-04-06 PROCEDURE — 3008F PR BODY MASS INDEX (BMI) DOCUMENTED: ICD-10-PCS | Mod: CPTII,S$GLB,, | Performed by: ANESTHESIOLOGY

## 2022-04-06 PROCEDURE — 3074F SYST BP LT 130 MM HG: CPT | Mod: CPTII,S$GLB,, | Performed by: ANESTHESIOLOGY

## 2022-04-06 PROCEDURE — 1160F RVW MEDS BY RX/DR IN RCRD: CPT | Mod: CPTII,S$GLB,, | Performed by: ANESTHESIOLOGY

## 2022-04-06 PROCEDURE — 99205 PR OFFICE/OUTPT VISIT, NEW, LEVL V, 60-74 MIN: ICD-10-PCS | Mod: S$GLB,,, | Performed by: ANESTHESIOLOGY

## 2022-04-06 PROCEDURE — 3008F BODY MASS INDEX DOCD: CPT | Mod: CPTII,S$GLB,, | Performed by: ANESTHESIOLOGY

## 2022-04-06 PROCEDURE — 72052 X-RAY EXAM NECK SPINE 6/>VWS: CPT | Mod: 26,,, | Performed by: RADIOLOGY

## 2022-04-06 PROCEDURE — 3079F PR MOST RECENT DIASTOLIC BLOOD PRESSURE 80-89 MM HG: ICD-10-PCS | Mod: CPTII,S$GLB,, | Performed by: ANESTHESIOLOGY

## 2022-04-06 PROCEDURE — 1159F PR MEDICATION LIST DOCUMENTED IN MEDICAL RECORD: ICD-10-PCS | Mod: CPTII,S$GLB,, | Performed by: ANESTHESIOLOGY

## 2022-04-06 PROCEDURE — 99999 PR PBB SHADOW E&M-EST. PATIENT-LVL V: ICD-10-PCS | Mod: PBBFAC,,, | Performed by: ANESTHESIOLOGY

## 2022-04-06 PROCEDURE — 72052 XR CERVICAL SPINE 5 VIEW WITH FLEX AND EXT: ICD-10-PCS | Mod: 26,,, | Performed by: RADIOLOGY

## 2022-04-06 PROCEDURE — 1159F MED LIST DOCD IN RCRD: CPT | Mod: CPTII,S$GLB,, | Performed by: ANESTHESIOLOGY

## 2022-04-06 PROCEDURE — 3044F PR MOST RECENT HEMOGLOBIN A1C LEVEL <7.0%: ICD-10-PCS | Mod: CPTII,S$GLB,, | Performed by: ANESTHESIOLOGY

## 2022-04-06 PROCEDURE — 3074F PR MOST RECENT SYSTOLIC BLOOD PRESSURE < 130 MM HG: ICD-10-PCS | Mod: CPTII,S$GLB,, | Performed by: ANESTHESIOLOGY

## 2022-04-06 PROCEDURE — 72052 X-RAY EXAM NECK SPINE 6/>VWS: CPT | Mod: TC,FY

## 2022-04-06 PROCEDURE — 99205 OFFICE O/P NEW HI 60 MIN: CPT | Mod: S$GLB,,, | Performed by: ANESTHESIOLOGY

## 2022-04-06 PROCEDURE — 1160F PR REVIEW ALL MEDS BY PRESCRIBER/CLIN PHARMACIST DOCUMENTED: ICD-10-PCS | Mod: CPTII,S$GLB,, | Performed by: ANESTHESIOLOGY

## 2022-04-06 PROCEDURE — 3079F DIAST BP 80-89 MM HG: CPT | Mod: CPTII,S$GLB,, | Performed by: ANESTHESIOLOGY

## 2022-04-06 PROCEDURE — 99999 PR PBB SHADOW E&M-EST. PATIENT-LVL V: CPT | Mod: PBBFAC,,, | Performed by: ANESTHESIOLOGY

## 2022-04-06 RX ORDER — ZONISAMIDE 100 MG/1
CAPSULE ORAL
Qty: 120 CAPSULE | Refills: 2 | Status: SHIPPED | OUTPATIENT
Start: 2022-04-06 | End: 2022-06-06

## 2022-04-06 NOTE — PROGRESS NOTES
Subjective:      Patient ID: Cherri Noonan is a 56 y.o. female.    Chief Complaint: No chief complaint on file.    Referred by: Mahsa Arevalo,*     Initial HPI 4/6/2022  Cherri Noonan is a 57yo female with no significant PMHx here for evaluation of neck pain. Patient has had chronic neck pain for years and is s/p C5-C7 fusion for myelopathic symtpoms in 2015 by Dr. Peña. Her pain increased after the surgery and she has since been treated by [facility] with steroid injections and [employer] cervical SCS placed in Oct 2021. She has transferred care to [facility] due to insurance change. Today, she characterizes or localizes pain as originating from the neck and radiating down the left arm to the fingers with numbness/burning. It also extends down the left posterior thorax to the flank. She has tried PT, medications, and injections in the past with no or little relief. She has not had her SCS interrogated or reprogrammed since the surgery as she has had difficulty getting in contact with the device representative.      Pain Medications:  - Adjuvant Medications: Mobic (Meloxicam) and Zanaflex ( Tizanidine)    Previous medications:  Elavil, Cymbalta, gabapentin, lyrica, hydrocodone, flexiril - no relief    Opioid Contract: Not applicable     report:  Not applicable    Pain Procedures:   Cervical ESIs  10/2021: [employer] cervical SCS    Physical Therapy/Home Exercise: none recently    Imaging:   None available    Past Medical History:   Diagnosis Date    Cervical radiculopathy        Past Surgical History:   Procedure Laterality Date    CERVICAL FUSION  08/2015    C5-C7    HYSTERECTOMY      SPINAL CORD STIMULATOR IMPLANT  10/20/2021    TUBAL LIGATION         Review of patient's allergies indicates:  No Known Allergies    Current Outpatient Medications   Medication Sig Dispense Refill    atorvastatin (LIPITOR) 20 MG tablet Take 20 mg by mouth once daily.      azelastine  205.5 mcg (0.15 %) Spry azelastine 205.5 mcg (0.15 %) nasal spray      b complex vitamins tablet Take 1 tablet by mouth once daily.      ciclopirox (LOPROX) 0.77 % Susp Apply topically 2 (two) times daily.      ergocalciferol (ERGOCALCIFEROL) 50,000 unit Cap Take 50,000 Units by mouth every 7 days.      estradioL (ESTRACE) 0.01 % (0.1 mg/gram) vaginal cream Place 0.5 g vaginally.      fluticasone propionate (FLONASE) 50 mcg/actuation nasal spray 1 spray by Each Nostril route once daily.      ginseng 250 mg Cap Take by mouth.      meloxicam (MOBIC) 15 MG tablet Take 15 mg by mouth daily as needed.      multivitamin capsule Take 1 capsule by mouth once daily.      omega-3 fatty acids/fish oil (FISH OIL-OMEGA-3 FATTY ACIDS) 300-1,000 mg capsule Take by mouth once daily.      omeprazole (PRILOSEC) 20 MG capsule Take 20 mg by mouth once daily.      potassium gluconate 595 mg (99 mg) TbSR Take by mouth once.      tiZANidine (ZANAFLEX) 4 MG tablet Take 4 mg by mouth nightly as needed.      tumeric-ging-olive-oreg-capryl 100 mg-150 mg- 50 mg-150 mg Cap Take by mouth.      vitamin E 100 UNIT capsule Take 100 Units by mouth once daily.      zinc gluconate 50 mg tablet Take 50 mg by mouth once daily.       No current facility-administered medications for this visit.       Family History   Problem Relation Age of Onset    Hyperlipidemia Mother     Diabetes Mother     Heart disease Sister     Juvenile idiopathic arthritis Sister     Aneurysm Brother     COPD Brother        Social History     Socioeconomic History    Marital status:    Tobacco Use    Smoking status: Never Smoker    Smokeless tobacco: Never Used   Substance and Sexual Activity    Alcohol use: Never    Drug use: Never    Sexual activity: Yes     Partners: Male     Birth control/protection: None     Comment:  39+years           Review of Systems   Constitutional: Negative for fever and weight loss.   Cardiovascular: Negative  for chest pain and palpitations.   Respiratory: Negative for cough and shortness of breath.    Musculoskeletal: Positive for neck pain and stiffness. Negative for back pain and joint pain.   Gastrointestinal: Negative for abdominal pain and bowel incontinence.   Genitourinary: Negative for bladder incontinence and dysuria.   Neurological: Negative for focal weakness and headaches.           Objective:   There were no vitals taken for this visit.  Pain Disability Index Review:  No flowsheet data found.  Normocephalic.  Atraumatic.  Affect appropriate.  Breathing unlabored.  Extra ocular muscles intact.           General    Constitutional: She is oriented to person, place, and time. She appears well-developed and well-nourished.   HENT:   Head: Normocephalic and atraumatic.   Eyes: EOM are normal. Pupils are equal, round, and reactive to light.   Cardiovascular: Normal rate and regular rhythm.    Pulmonary/Chest: Effort normal. No respiratory distress.   Abdominal: Soft. Bowel sounds are normal. She exhibits no distension.   Neurological: She is alert and oriented to person, place, and time.   Psychiatric: She has a normal mood and affect. Her behavior is normal.     General Musculoskeletal Exam   Gait: normal     Back (L-Spine & T-Spine) / Neck (C-Spine) Exam     Tenderness   The patient is tender to palpation of the left trapezial.   The patient is experiencing no tenderness in the right right trapezial. Right paramedian tenderness of the Lower C-Spine. Left paramedian tenderness of the Lower C-Spine.     Neck (C-Spine) Range of Motion   Flexion:     Normal  Extension: ModerateLimited extension numerical scale: limited by pain.  Right Rotation: normalNeck rotation right: painful.  Left Rotation: normalNeck rotation left: painful.    Spinal Sensation   Left Side Sensation  C-Spine Level: normal    Neck (C-Spine) Tests   Spurling's Test   Left:  positive    Comments:  Muscle tightness over the left  trapezius      Muscle Strength   Left Upper Extremity  Biceps: 5/5   Deltoid:  5/5  Wrist extension: 5/5   Wrist flexion: 5/5   Finger Flexors:  5/5  Finger Extensors:  5/5    Reflexes     Left Side  Left Contreras's Sign:  Absent        Assessment:       Encounter Diagnoses   Name Primary?    Cervical radiculopathy     Peripheral neuropathic pain          Plan:   We discussed with the patient the assessment and recommendations. The following is the plan we agreed on:    - Obtain XR cervical 5 view with flex/ext to evaluate lead position  - Contacted [facility] [facility] for reprogramming/interogation  - Start Zonisamide ramp (1 tablet at bedtime x3 days, 2 tablets at bedtime x3 days, 3 tablets at bedtime x 3 days)  - RTC 1 month    Primo Araiza, PGY-5  [facility] Fellow          Diagnoses and all orders for this visit:    Cervical radiculopathy  -     Ambulatory referral/consult to Pain Clinic    Peripheral neuropathic pain  -     Ambulatory referral/consult to Pain Clinic       I have personally taken the history and examined this patient and agree with the fellow's note as stated above.

## 2022-04-11 ENCOUNTER — OFFICE VISIT (OUTPATIENT)
Dept: OBSTETRICS AND GYNECOLOGY | Facility: CLINIC | Age: 57
End: 2022-04-11
Payer: COMMERCIAL

## 2022-04-11 VITALS
WEIGHT: 180.69 LBS | DIASTOLIC BLOOD PRESSURE: 74 MMHG | BODY MASS INDEX: 31.01 KG/M2 | SYSTOLIC BLOOD PRESSURE: 124 MMHG

## 2022-04-11 DIAGNOSIS — Z01.419 WELL WOMAN EXAM WITH ROUTINE GYNECOLOGICAL EXAM: Primary | ICD-10-CM

## 2022-04-11 DIAGNOSIS — Z12.4 SCREENING FOR CERVICAL CANCER: ICD-10-CM

## 2022-04-11 PROCEDURE — 3074F PR MOST RECENT SYSTOLIC BLOOD PRESSURE < 130 MM HG: ICD-10-PCS | Mod: CPTII,S$GLB,, | Performed by: OBSTETRICS & GYNECOLOGY

## 2022-04-11 PROCEDURE — 99999 PR PBB SHADOW E&M-EST. PATIENT-LVL III: CPT | Mod: PBBFAC,,, | Performed by: OBSTETRICS & GYNECOLOGY

## 2022-04-11 PROCEDURE — 3078F PR MOST RECENT DIASTOLIC BLOOD PRESSURE < 80 MM HG: ICD-10-PCS | Mod: CPTII,S$GLB,, | Performed by: OBSTETRICS & GYNECOLOGY

## 2022-04-11 PROCEDURE — 3008F PR BODY MASS INDEX (BMI) DOCUMENTED: ICD-10-PCS | Mod: CPTII,S$GLB,, | Performed by: OBSTETRICS & GYNECOLOGY

## 2022-04-11 PROCEDURE — 1160F RVW MEDS BY RX/DR IN RCRD: CPT | Mod: CPTII,S$GLB,, | Performed by: OBSTETRICS & GYNECOLOGY

## 2022-04-11 PROCEDURE — 3078F DIAST BP <80 MM HG: CPT | Mod: CPTII,S$GLB,, | Performed by: OBSTETRICS & GYNECOLOGY

## 2022-04-11 PROCEDURE — 3008F BODY MASS INDEX DOCD: CPT | Mod: CPTII,S$GLB,, | Performed by: OBSTETRICS & GYNECOLOGY

## 2022-04-11 PROCEDURE — 3074F SYST BP LT 130 MM HG: CPT | Mod: CPTII,S$GLB,, | Performed by: OBSTETRICS & GYNECOLOGY

## 2022-04-11 PROCEDURE — 3044F HG A1C LEVEL LT 7.0%: CPT | Mod: CPTII,S$GLB,, | Performed by: OBSTETRICS & GYNECOLOGY

## 2022-04-11 PROCEDURE — 1159F MED LIST DOCD IN RCRD: CPT | Mod: CPTII,S$GLB,, | Performed by: OBSTETRICS & GYNECOLOGY

## 2022-04-11 PROCEDURE — 99999 PR PBB SHADOW E&M-EST. PATIENT-LVL III: ICD-10-PCS | Mod: PBBFAC,,, | Performed by: OBSTETRICS & GYNECOLOGY

## 2022-04-11 PROCEDURE — 3044F PR MOST RECENT HEMOGLOBIN A1C LEVEL <7.0%: ICD-10-PCS | Mod: CPTII,S$GLB,, | Performed by: OBSTETRICS & GYNECOLOGY

## 2022-04-11 PROCEDURE — 1159F PR MEDICATION LIST DOCUMENTED IN MEDICAL RECORD: ICD-10-PCS | Mod: CPTII,S$GLB,, | Performed by: OBSTETRICS & GYNECOLOGY

## 2022-04-11 PROCEDURE — 99386 PR PREVENTIVE VISIT,NEW,40-64: ICD-10-PCS | Mod: S$GLB,,, | Performed by: OBSTETRICS & GYNECOLOGY

## 2022-04-11 PROCEDURE — 1160F PR REVIEW ALL MEDS BY PRESCRIBER/CLIN PHARMACIST DOCUMENTED: ICD-10-PCS | Mod: CPTII,S$GLB,, | Performed by: OBSTETRICS & GYNECOLOGY

## 2022-04-11 PROCEDURE — 99386 PREV VISIT NEW AGE 40-64: CPT | Mod: S$GLB,,, | Performed by: OBSTETRICS & GYNECOLOGY

## 2022-04-11 NOTE — PROGRESS NOTES
[facility]  Ambulatory Clinic  Obstetrics & Gynecology    Visit Date:  2022    Chief Complaint:  Annual GYN exam    History of Present Illness:      Cherri Noonan is a 56 y.o. , new pt to me, here for a gynecologic exam.      Pt has no major complaints today.      Pt reports hysterectomy with ovarian conservation in 30's secondary to abnormal uterine bleeding.      Pt reports an uneventful transition into menopause and is not on hormone replacement therapy.      Pt denies h/o abnormal pap.    Pt denies active sexually transmitted infections.    Last mammo ~2021 benign.    Pt performs monthly self breast examination, non-smoker, uses seat belts, and denies abuse.     Pt denies vaginal bleeding, vaginal discharge, dyspareunia, pelvic pain, bloating, early satiety, unintentional weight loss, breast mass/skin changes, incontinence, GI or urinary complaints.      Otherwise, the pt is in her usual state of health.    Past History:  Gynecologic history as noted above.    Review of Systems:      GENERAL:  No fever, fatigue, excessive weight gain or loss  HEENT:  No headaches, hearing changes, visual disturbance  RESPIRATORY:  No cough, shortness of breath  CARDIOVASCULAR:  No chest pain, heart palpitations, leg swelling  BREAST:  No lump, pain, nipple discharge, skin changes  GASTROINTESTINAL:  No nausea, vomiting, constipation, diarrhea, abd pain, rectal bleeding   GENITOURINARY:  See HPI  ENDOCRINE:  No heat or cold intolerance  HEMATOLOGIC:  No easy bruisability or bleeding   LYMPHATICS:  No enlarged nodes  MUSCULOSKELETAL:  No acute joint pain or swelling  SKIN:  No rash, lesions, jaundice  NEUROLOGIC:  No dizziness, weakness, syncope  PSYCHIATRIC:  No significant mood changes, homicidal/suicidal ideations    Physical Exam:     /74   Wt 81.9 kg (180 lb 10.7 oz)   BMI 31.01 kg/m²   Pulse 60's, Resp rate 18     GENERAL:  No acute distress, well-nourished  HEENT:  Atraumatic,  anicteric, moist mucus membranes. Neck supple w/o masses.  BREAST:  Symmetric, nontender, no obvious masses, adenopathy, skin changes or nipple discharge.  LUNGS:  Clear normal respiratory effort  HEART:  Regular rate and rhythm  ABDOMEN:  Soft, non-tender, non-distended, no obvious organomegaly  EXT:  Symmetric w/o cramping, claudication, or edema. +2 distal pulses.  SKIN:  No rashes or bruising  PSYCH:  Mood and affect appropriate  NEURO:  Grossly intact bilaterally    GENITOURINARY:    VULVAR:  Female external genitalia w/o any obvious lesions. Normal urethral meatus. No gross lymphadenopathy.   VAGINA:  Mild, age appropriate vulvovaginal atrophy. Adequate support. No significant cystocele or rectocele. No obvious lesion. No discharge.  CERVIX:   Surgically absent. No cuff lesions or tenderness.     UTERUS:  Surgically absent.   ADNEXA:  No masses, non-tender   RECTAL:  Deferred. No obvious external lesions    Chaperone present for exam.    Assessment:     56 y.o.  with h/o hysterectomy with ovarian conservation:    1. Well woman gynecologic exam    Plan:    A gynecologic health assessment was performed with age appropriate counseling.    Cervical cancer screening - pap not clinically indicated due to hysterectomy for benign indications.    Screening mammogram up to date.    Encourage healthy lifestyle modifications, monthly self breast exams, Ca/Vit D, COVID vaccines, and colonoscopy.    F/u with PCP for health maintenance.    Return 1 year for gynecologic exam or sooner as needed.  All questions answered, pt voiced understanding.        Cali Sarmiento MD

## 2022-05-15 ENCOUNTER — OFFICE VISIT (OUTPATIENT)
Dept: URGENT CARE | Facility: CLINIC | Age: 57
End: 2022-05-15
Payer: COMMERCIAL

## 2022-05-15 VITALS
SYSTOLIC BLOOD PRESSURE: 150 MMHG | BODY MASS INDEX: 30.73 KG/M2 | HEART RATE: 60 BPM | WEIGHT: 180 LBS | HEIGHT: 64 IN | TEMPERATURE: 98 F | RESPIRATION RATE: 18 BRPM | OXYGEN SATURATION: 97 % | DIASTOLIC BLOOD PRESSURE: 93 MMHG

## 2022-05-15 DIAGNOSIS — M25.562 ACUTE PAIN OF LEFT KNEE: Primary | ICD-10-CM

## 2022-05-15 PROCEDURE — 3008F BODY MASS INDEX DOCD: CPT | Mod: CPTII,S$GLB,, | Performed by: NURSE PRACTITIONER

## 2022-05-15 PROCEDURE — 3008F PR BODY MASS INDEX (BMI) DOCUMENTED: ICD-10-PCS | Mod: CPTII,S$GLB,, | Performed by: NURSE PRACTITIONER

## 2022-05-15 PROCEDURE — 99203 OFFICE O/P NEW LOW 30 MIN: CPT | Mod: S$GLB,,, | Performed by: NURSE PRACTITIONER

## 2022-05-15 PROCEDURE — 3044F PR MOST RECENT HEMOGLOBIN A1C LEVEL <7.0%: ICD-10-PCS | Mod: CPTII,S$GLB,, | Performed by: NURSE PRACTITIONER

## 2022-05-15 PROCEDURE — 1159F MED LIST DOCD IN RCRD: CPT | Mod: CPTII,S$GLB,, | Performed by: NURSE PRACTITIONER

## 2022-05-15 PROCEDURE — 1159F PR MEDICATION LIST DOCUMENTED IN MEDICAL RECORD: ICD-10-PCS | Mod: CPTII,S$GLB,, | Performed by: NURSE PRACTITIONER

## 2022-05-15 PROCEDURE — 99203 PR OFFICE/OUTPT VISIT, NEW, LEVL III, 30-44 MIN: ICD-10-PCS | Mod: S$GLB,,, | Performed by: NURSE PRACTITIONER

## 2022-05-15 PROCEDURE — 3080F PR MOST RECENT DIASTOLIC BLOOD PRESSURE >= 90 MM HG: ICD-10-PCS | Mod: CPTII,S$GLB,, | Performed by: NURSE PRACTITIONER

## 2022-05-15 PROCEDURE — 3080F DIAST BP >= 90 MM HG: CPT | Mod: CPTII,S$GLB,, | Performed by: NURSE PRACTITIONER

## 2022-05-15 PROCEDURE — 3077F SYST BP >= 140 MM HG: CPT | Mod: CPTII,S$GLB,, | Performed by: NURSE PRACTITIONER

## 2022-05-15 PROCEDURE — 73562 X-RAY EXAM OF KNEE 3: CPT | Mod: LT,S$GLB,, | Performed by: RADIOLOGY

## 2022-05-15 PROCEDURE — 3044F HG A1C LEVEL LT 7.0%: CPT | Mod: CPTII,S$GLB,, | Performed by: NURSE PRACTITIONER

## 2022-05-15 PROCEDURE — 1160F PR REVIEW ALL MEDS BY PRESCRIBER/CLIN PHARMACIST DOCUMENTED: ICD-10-PCS | Mod: CPTII,S$GLB,, | Performed by: NURSE PRACTITIONER

## 2022-05-15 PROCEDURE — 3077F PR MOST RECENT SYSTOLIC BLOOD PRESSURE >= 140 MM HG: ICD-10-PCS | Mod: CPTII,S$GLB,, | Performed by: NURSE PRACTITIONER

## 2022-05-15 PROCEDURE — 1160F RVW MEDS BY RX/DR IN RCRD: CPT | Mod: CPTII,S$GLB,, | Performed by: NURSE PRACTITIONER

## 2022-05-15 PROCEDURE — 73562 XR KNEE 3 VIEW LEFT: ICD-10-PCS | Mod: LT,S$GLB,, | Performed by: RADIOLOGY

## 2022-05-15 RX ORDER — NAPROXEN 500 MG/1
500 TABLET ORAL 2 TIMES DAILY PRN
Qty: 30 TABLET | Refills: 0 | Status: ON HOLD | OUTPATIENT
Start: 2022-05-15 | End: 2023-06-26 | Stop reason: HOSPADM

## 2022-05-15 NOTE — PROGRESS NOTES
"Subjective:       Patient ID: Cherri Noonan is a 56 y.o. female.    Vitals:  height is 5' 4" (1.626 m) and weight is 81.6 kg (180 lb). Her temperature is 98.4 °F (36.9 °C). Her blood pressure is 150/93 (abnormal) and her pulse is 60. Her respiration is 18 and oxygen saturation is 97%.     Chief Complaint: Knee Pain    Pt states that she was dancing last night and fell injured her left knee . Pt states that she felt a pop in her left knee. Pt states that she iced, elevated and did warm soak . Pt is taking motrin .      Provider note begins below:  56-year-old female here today with complaints of left knee pain/injury that occurred last night.  Patient reports that she was dancing last night and squatted down, following, she felt a pop followed by pain in her medial left knee.  Patient bears weight but with pain.  Patient reports that she took 400 mg of ibuprofen at 7:00 a.m. this morning for the pain.  Denies any other injuries.    Knee Pain   The incident occurred 12 to 24 hours ago. The incident occurred at home. The injury mechanism was a fall. The pain is present in the left knee. The quality of the pain is described as aching. The pain is at a severity of 7/10. The pain is moderate. The pain has been constant since onset. She reports no foreign bodies present. The symptoms are aggravated by weight bearing. She has tried NSAIDs, ice, elevation and heat for the symptoms. The treatment provided no relief.       Constitution: Negative. Negative for chills, sweating and fatigue.   HENT: Negative.  Negative for ear pain, facial swelling, congestion and sore throat.    Neck: Negative for painful lymph nodes.   Cardiovascular: Negative.  Negative for chest trauma, chest pain and sob on exertion.   Eyes: Negative.  Negative for eye itching and eye pain.   Respiratory: Negative.  Negative for chest tightness, cough and asthma.    Gastrointestinal: Negative.  Negative for nausea, vomiting and diarrhea.   Endocrine: " negative. cold intolerance and excessive thirst.   Genitourinary: Negative.  Negative for dysuria, frequency, urgency and hematuria.   Musculoskeletal: Positive for pain and trauma. Negative for joint pain.   Skin: Negative.  Negative for rash, wound and hives.   Allergic/Immunologic: Negative.  Negative for eczema, asthma, hives and itching.   Neurological: Negative.  Negative for disorientation and altered mental status.   Hematologic/Lymphatic: Negative.  Negative for swollen lymph nodes.   Psychiatric/Behavioral: Negative.  Negative for altered mental status, disorientation and confusion.       Objective:      Physical Exam   Constitutional: She is oriented to person, place, and time. She appears well-developed. She is cooperative.  Non-toxic appearance. She does not appear ill. No distress.   HENT:   Head: Normocephalic and atraumatic. Head is without abrasion, without contusion and without laceration.   Ears:   Right Ear: Hearing, tympanic membrane, external ear and ear canal normal. No hemotympanum.   Left Ear: Hearing, tympanic membrane, external ear and ear canal normal. No hemotympanum.   Nose: Nose normal. No mucosal edema, rhinorrhea or nasal deformity. No epistaxis. Right sinus exhibits no maxillary sinus tenderness and no frontal sinus tenderness. Left sinus exhibits no maxillary sinus tenderness and no frontal sinus tenderness.   Mouth/Throat: Uvula is midline, oropharynx is clear and moist and mucous membranes are normal. No trismus in the jaw. Normal dentition. No uvula swelling. No posterior oropharyngeal erythema.   Eyes: Conjunctivae, EOM and lids are normal. Pupils are equal, round, and reactive to light. Right eye exhibits no discharge. Left eye exhibits no discharge. No scleral icterus.   Neck: Trachea normal and phonation normal. Neck supple. No tracheal deviation present. No neck rigidity present. No spinous process tenderness present. No muscular tenderness present.   Cardiovascular: Normal  rate, regular rhythm, normal heart sounds and normal pulses.   Pulmonary/Chest: Effort normal and breath sounds normal. No respiratory distress.   Abdominal: Normal appearance and bowel sounds are normal. She exhibits no distension, no pulsatile midline mass and no mass. Soft. There is no abdominal tenderness.   Musculoskeletal: Normal range of motion.         General: No deformity. Normal range of motion.      Left knee: Tenderness found. MCL tenderness noted.        Legs:    Neurological: She is alert and oriented to person, place, and time. She has normal strength. No cranial nerve deficit or sensory deficit. She exhibits normal muscle tone. She displays no seizure activity. Coordination normal. GCS eye subscore is 4. GCS verbal subscore is 5. GCS motor subscore is 6.   Skin: Skin is warm, dry, intact, not diaphoretic and not pale. Capillary refill takes less than 2 seconds. No abrasion, No burn, No bruising and No ecchymosis   Psychiatric: Her speech is normal and behavior is normal. Judgment and thought content normal.   Nursing note and vitals reviewed.         IMAGING-  XR KNEE 3 VIEW LEFT    Result Date: 5/15/2022  EXAMINATION: XR KNEE 3 VIEW LEFT CLINICAL HISTORY: Pain in left knee TECHNIQUE: AP, lateral, and Merchant views of the left knee were performed. COMPARISON: None FINDINGS: Three views left knee. There is bilateral medial compartmental narrowing.  No acute displaced fracture or dislocation of the knee.  No large knee joint effusion.  No radiopaque foreign body.     1. No acute displaced fracture or dislocation of the left knee. Electronically signed by: Jeb Matthew MD Date:    05/15/2022 Time:    13:12        Assessment:       1. Acute pain of left knee          Plan:       FOLLOWUP  Follow up if symptoms worsen or fail to improve, for PLEASE CONTACT PCP OR CONTACT THE EMERGENCY ROOM..     PATIENT INSTRUCTIONS  Patient Instructions   Follow up with Orthopedics as  referred.        INSTRUCTIONS:  - Rest.  - Drink plenty of fluids.  - Take Tylenol and/or Ibuprofen as directed as needed for fever/pain.  Do not take more than the recommended dose.  - follow up with your PCP within the next 1-2 weeks as needed.  - You must understand that you have received an Urgent Care treatment only and that you may be released before all of your medical problems are known or treated.   - You, the patient, will arrange for follow up care as instructed.   - If your condition worsens or fails to improve we recommend that you receive another evaluation at the ER immediately or contact your PCP to discuss your concerns.   - You can call [phone] or [phone] to help schedule an appointment with the appropriate provider.             THANK YOU FOR ALLOWING ME TO PARTICIPATE IN YOUR HEALTHCARE,     Cornelio Chua NP   Acute pain of left knee  -     XR KNEE 3 VIEW LEFT; Future; Expected date: 05/15/2022  -     IMMOBILIZER FOR HOME USE  -     naproxen (NAPROSYN) 500 MG tablet; Take 1 tablet (500 mg total) by mouth 2 (two) times daily as needed (pain).  Dispense: 30 tablet; Refill: 0  -     Ambulatory referral/consult to Orthopedics

## 2022-05-15 NOTE — PATIENT INSTRUCTIONS
Follow up with Orthopedics as referred.        INSTRUCTIONS:  - Rest.  - Drink plenty of fluids.  - Take Tylenol and/or Ibuprofen as directed as needed for fever/pain.  Do not take more than the recommended dose.  - follow up with your PCP within the next 1-2 weeks as needed.  - You must understand that you have received an Urgent Care treatment only and that you may be released before all of your medical problems are known or treated.   - You, the patient, will arrange for follow up care as instructed.   - If your condition worsens or fails to improve we recommend that you receive another evaluation at the ER immediately or contact your PCP to discuss your concerns.   - You can call [phone] or [phone] to help schedule an appointment with the appropriate provider.

## 2022-05-16 ENCOUNTER — OFFICE VISIT (OUTPATIENT)
Dept: ORTHOPEDICS | Facility: CLINIC | Age: 57
End: 2022-05-16
Payer: COMMERCIAL

## 2022-05-16 VITALS
BODY MASS INDEX: 30.73 KG/M2 | RESPIRATION RATE: 15 BRPM | DIASTOLIC BLOOD PRESSURE: 87 MMHG | HEIGHT: 64 IN | WEIGHT: 180 LBS | SYSTOLIC BLOOD PRESSURE: 132 MMHG | HEART RATE: 70 BPM | OXYGEN SATURATION: 99 %

## 2022-05-16 DIAGNOSIS — S83.242A TEAR OF MEDIAL MENISCUS OF LEFT KNEE, CURRENT, UNSPECIFIED TEAR TYPE, INITIAL ENCOUNTER: Primary | ICD-10-CM

## 2022-05-16 PROCEDURE — 20610 DRAIN/INJ JOINT/BURSA W/O US: CPT | Mod: LT,S$GLB,, | Performed by: ORTHOPAEDIC SURGERY

## 2022-05-16 PROCEDURE — 99999 PR PBB SHADOW E&M-EST. PATIENT-LVL IV: CPT | Mod: PBBFAC,,, | Performed by: ORTHOPAEDIC SURGERY

## 2022-05-16 PROCEDURE — 3075F PR MOST RECENT SYSTOLIC BLOOD PRESS GE 130-139MM HG: ICD-10-PCS | Mod: CPTII,S$GLB,, | Performed by: ORTHOPAEDIC SURGERY

## 2022-05-16 PROCEDURE — 3008F BODY MASS INDEX DOCD: CPT | Mod: CPTII,S$GLB,, | Performed by: ORTHOPAEDIC SURGERY

## 2022-05-16 PROCEDURE — 3079F DIAST BP 80-89 MM HG: CPT | Mod: CPTII,S$GLB,, | Performed by: ORTHOPAEDIC SURGERY

## 2022-05-16 PROCEDURE — 1159F MED LIST DOCD IN RCRD: CPT | Mod: CPTII,S$GLB,, | Performed by: ORTHOPAEDIC SURGERY

## 2022-05-16 PROCEDURE — 1159F PR MEDICATION LIST DOCUMENTED IN MEDICAL RECORD: ICD-10-PCS | Mod: CPTII,S$GLB,, | Performed by: ORTHOPAEDIC SURGERY

## 2022-05-16 PROCEDURE — 99999 PR PBB SHADOW E&M-EST. PATIENT-LVL IV: ICD-10-PCS | Mod: PBBFAC,,, | Performed by: ORTHOPAEDIC SURGERY

## 2022-05-16 PROCEDURE — 99203 PR OFFICE/OUTPT VISIT, NEW, LEVL III, 30-44 MIN: ICD-10-PCS | Mod: 25,S$GLB,, | Performed by: ORTHOPAEDIC SURGERY

## 2022-05-16 PROCEDURE — 20610 LARGE JOINT ASPIRATION/INJECTION: L KNEE: ICD-10-PCS | Mod: LT,S$GLB,, | Performed by: ORTHOPAEDIC SURGERY

## 2022-05-16 PROCEDURE — 3044F HG A1C LEVEL LT 7.0%: CPT | Mod: CPTII,S$GLB,, | Performed by: ORTHOPAEDIC SURGERY

## 2022-05-16 PROCEDURE — 3079F PR MOST RECENT DIASTOLIC BLOOD PRESSURE 80-89 MM HG: ICD-10-PCS | Mod: CPTII,S$GLB,, | Performed by: ORTHOPAEDIC SURGERY

## 2022-05-16 PROCEDURE — 3044F PR MOST RECENT HEMOGLOBIN A1C LEVEL <7.0%: ICD-10-PCS | Mod: CPTII,S$GLB,, | Performed by: ORTHOPAEDIC SURGERY

## 2022-05-16 PROCEDURE — 99203 OFFICE O/P NEW LOW 30 MIN: CPT | Mod: 25,S$GLB,, | Performed by: ORTHOPAEDIC SURGERY

## 2022-05-16 PROCEDURE — 3008F PR BODY MASS INDEX (BMI) DOCUMENTED: ICD-10-PCS | Mod: CPTII,S$GLB,, | Performed by: ORTHOPAEDIC SURGERY

## 2022-05-16 PROCEDURE — 3075F SYST BP GE 130 - 139MM HG: CPT | Mod: CPTII,S$GLB,, | Performed by: ORTHOPAEDIC SURGERY

## 2022-05-16 RX ORDER — TRIAMCINOLONE ACETONIDE 40 MG/ML
40 INJECTION, SUSPENSION INTRA-ARTICULAR; INTRAMUSCULAR
Status: DISCONTINUED | OUTPATIENT
Start: 2022-05-16 | End: 2022-05-16 | Stop reason: HOSPADM

## 2022-05-16 RX ADMIN — TRIAMCINOLONE ACETONIDE 40 MG: 40 INJECTION, SUSPENSION INTRA-ARTICULAR; INTRAMUSCULAR at 10:05

## 2022-05-16 NOTE — PROCEDURES
Large Joint Aspiration/Injection: L knee    Date/Time: 5/16/2022 10:00 AM  Performed by: Markus Valentino MD  Authorized by: Markus Valentino MD     Consent Done?:  Yes (Verbal)  Indications:  Arthritis  Site marked: the procedure site was marked    Timeout: prior to procedure the correct patient, procedure, and site was verified      Local anesthesia used?: Yes    Local anesthetic:  Topical anesthetic and lidocaine 1% without epinephrine    Details:  Needle Size:  22 G  Approach:  Anterolateral  Location:  Knee  Site:  L knee  Medications:  40 mg triamcinolone acetonide 40 mg/mL  Patient tolerance:  Patient tolerated the procedure well with no immediate complications

## 2022-05-16 NOTE — PROGRESS NOTES
NEW PATIENT ORTHOPAEDIC: Knee    PRIMARY CARE PHYSICIAN: Mahsa Arevalo NP   REFERRING PROVIDER: Cornelio Chua NP  [address] [place] [address]     ASSESSMENT & PLAN:    Impression:  Left Knee Medial meniscal tear     Follow Up Plan: PRN    Injection:     Cherri Noonan has physical exam evidence of above and wishes to pursue an injection. We discussed alternative non operative modalities including physical therapy, anti-inflammatories, bracing, maintenance of appropriate weight, rest, ambulatory devices. We discussed the risk, benefits, and expectations regarding injection. They have elected to proceed with injection. Should injections fail next step would be therapy. See procedure note for billing purposes.     Non operative care:    Cherri Noonan has physical exam evidence of above and wishes to pursue an non-operative care. I am recommending the following: injection.  Patient has acute knee pain after dancing this past weekend.  She reported a pop and some mild swelling with regard to her knee.  She has had difficulty with twisting maneuvers and weight-bearing on her knee.  Her pain is primarily medial based.  She has a complex history with regard to pain that she has been on multiple medications for and ultimately ended up getting a spinal cord stimulator.  She is not interested in more pain medicines today.  As a result I advised that she can do activity modification and give this some time or she can consider intra-articular injection.  She elected for the injection today.  See her back as needed in the future should her pain return.    The patient has been ordered:  Office Intraarticular injection    CONSULTS:   None    ACTIVE PROBLEM LIST  Patient Active Problem List   Diagnosis    Chest pain, atypical    Atrophic vaginitis    Chronic rhinitis    Cervical radiculopathy    Complex regional pain syndrome    Other hyperlipidemia    Vitamin D deficiency    Sleep  disorder    Peripheral neuropathic pain    Class 1 obesity with body mass index (BMI) of 31.0 to 31.9 in adult           SUBJECTIVE    CHIEF COMPLAINT: Knee Pain    HPI:   Cherri Noonan is a 56 y.o. female here for evaluation and management of left knee pain. There is a specific incident that brought about this pain, patient reports that she was dancing last two nights ago and squatted down, following, she felt a pop followed by pain in her medial left knee.  Patient bears weight but with pain. Pain now progressing to interfere with activities which include: walking and functional household ADL's    Currently the pain in the joint is rated at moderate with activity. The pain is constant and is located in the knee, at level of joint line and located medially. The pain is described as aching, stiffness and throbbing. Relieving factors include rest and prescription medication.     There is associated Popping.     Cherri Noonan has no additional complaints.     PROGRESSIVE SYMPTOMS:  Pain worsened by weight bearing    FUNCTIONAL STATUS:   Climb a flight of stairs or walk up a hill     PREVIOUS TREATMENTS:  Medical: RX NSAIDS  Physical Therapy: Activities Modified   Previous Orthopaedic Surgery: None    REVIEW OF SYSTEMS:  PAIN ASSESSMENT:  See HPI.  MUSCULOSKELETAL: See HPI.  OTHER 10 point review of systems is negative except as stated in HPI above    PAST MEDICAL HISTORY   has a past medical history of Cervical radiculopathy.     PAST SURGICAL HISTORY   has a past surgical history that includes Hysterectomy; Tubal ligation; Spinal cord stimulator implant (10/20/2021); and Cervical fusion (08/2015).     FAMILY HISTORY  family history includes Aneurysm in her brother; COPD in her brother; Diabetes in her mother; Heart disease in her sister; Hyperlipidemia in her mother; Juvenile idiopathic arthritis in her sister.     SOCIAL HISTORY   reports that she has never smoked. She has never used smokeless tobacco. She  reports that she does not drink alcohol and does not use drugs.     ALLERGIES   Review of patient's allergies indicates:   Allergen Reactions    Shingrix (pf) [varicella-zoster ge-as01b (pf)]      Got all side effects listed        MEDICATIONS  Current Outpatient Medications on File Prior to Visit   Medication Sig Dispense Refill    atorvastatin (LIPITOR) 20 MG tablet Take 20 mg by mouth once daily.      azelastine 205.5 mcg (0.15 %) Spry azelastine 205.5 mcg (0.15 %) nasal spray      b complex vitamins tablet Take 1 tablet by mouth once daily.      ciclopirox (LOPROX) 0.77 % Susp Apply topically 2 (two) times daily.      ergocalciferol (ERGOCALCIFEROL) 50,000 unit Cap Take 50,000 Units by mouth every 7 days.      estradioL (ESTRACE) 0.01 % (0.1 mg/gram) vaginal cream Place 0.5 g vaginally.      fluticasone propionate (FLONASE) 50 mcg/actuation nasal spray 1 spray by Each Nostril route once daily.      ginseng 250 mg Cap Take by mouth.      meloxicam (MOBIC) 15 MG tablet Take 15 mg by mouth daily as needed.      multivitamin capsule Take 1 capsule by mouth once daily.      naproxen (NAPROSYN) 500 MG tablet Take 1 tablet (500 mg total) by mouth 2 (two) times daily as needed (pain). 30 tablet 0    omega-3 fatty acids/fish oil (FISH OIL-OMEGA-3 FATTY ACIDS) 300-1,000 mg capsule Take by mouth once daily.      omeprazole (PRILOSEC) 20 MG capsule Take 20 mg by mouth once daily.      potassium gluconate 595 mg (99 mg) TbSR Take by mouth once.      tiZANidine (ZANAFLEX) 4 MG tablet Take 4 mg by mouth nightly as needed.      tumeric-ging-olive-oreg-capryl 100 mg-150 mg- 50 mg-150 mg Cap Take by mouth.      vitamin E 100 UNIT capsule Take 100 Units by mouth once daily.      zinc gluconate 50 mg tablet Take 50 mg by mouth once daily.      zonisamide (ZONEGRAN) 100 MG Cap one @ bedtime X3 days then 2 @ bedtime X3 days then 3 @ bedtime X3 days then 4 @ bedtime to follow.Stay at the most comfortable dose. 120  "capsule 2     No current facility-administered medications on file prior to visit.          PHYSICAL EXAM   height is 5' 4" (1.626 m) and weight is 81.6 kg (180 lb). Her blood pressure is 132/87 and her pulse is 70. Her respiration is 15 and oxygen saturation is 99%.   Body mass index is 30.9 kg/m².      All other systems deferred.  GENERAL:  No acute distress  HABITUS: Normal  GAIT: Antalgic  SKIN: Normal     KNEE EXAM:    left:   Effusion: Minimal joint effusion  TTP: yes over Medial Joint Line   no crepitus with passive knee ROM  Passive ROM: Extension 0, Flexion 130  No pain with manipulation of patella  Stable to varus/valgus stress. No increased laxity to anterior/posterior drawer testing  positive Josef's test  No pain with IR/ER rotation of the hip  5/5 strength in knee flexion and extension, ankle plantarflexion and dorsiflexion  Neurovascular Status: Sensation intact to light touch in Sural, Saphenous, SPN, DPN, Tibial nerve distribution  2+ pulse DP/PT, normal capillary refill, foot has normal warmth    DATA:  Diagnostic tests reviewed for today's visit:     The obtained knee radiographs appears normal for age. There is good alignment with no evidence of fracture, bony abnormalities or signficant degenerative changes.    "

## 2022-05-30 ENCOUNTER — TELEPHONE (OUTPATIENT)
Dept: PAIN MEDICINE | Facility: CLINIC | Age: 57
End: 2022-05-30
Payer: COMMERCIAL

## 2022-05-30 NOTE — TELEPHONE ENCOUNTER
This message is for patient in regards to his/her appointment 05/31/22 at 07:45a       [facility] Policy: For the safety of all patients and staff members.     Patient Visitor policy: During this visit we're asking all patients to only have one visitor over the age of 18yrs old to accompany to be seen by Dr. Archana Knott MD. If patient do not required assistance with their visit, we're asking all visitors to remain outside the waiting area.    Upon arriving to your schedule appointment; patients are required to wear a face mask, if patient do not have a face mask one will be provided entering the facility. If you have any questions or concerns please contact [phone]        Pt verbalized understanding and has confirmed appt

## 2022-05-31 ENCOUNTER — OFFICE VISIT (OUTPATIENT)
Dept: PAIN MEDICINE | Facility: CLINIC | Age: 57
End: 2022-05-31
Attending: ANESTHESIOLOGY
Payer: COMMERCIAL

## 2022-05-31 VITALS
HEIGHT: 64 IN | RESPIRATION RATE: 18 BRPM | WEIGHT: 175.94 LBS | SYSTOLIC BLOOD PRESSURE: 151 MMHG | BODY MASS INDEX: 30.04 KG/M2 | HEART RATE: 64 BPM | DIASTOLIC BLOOD PRESSURE: 90 MMHG

## 2022-05-31 DIAGNOSIS — M54.12 CERVICAL RADICULOPATHY: Primary | ICD-10-CM

## 2022-05-31 DIAGNOSIS — M96.1 POSTLAMINECTOMY SYNDROME, CERVICAL: ICD-10-CM

## 2022-05-31 DIAGNOSIS — M54.2 CERVICALGIA: ICD-10-CM

## 2022-05-31 PROCEDURE — 99214 OFFICE O/P EST MOD 30 MIN: CPT | Mod: S$GLB,,, | Performed by: ANESTHESIOLOGY

## 2022-05-31 PROCEDURE — 99999 PR PBB SHADOW E&M-EST. PATIENT-LVL V: CPT | Mod: PBBFAC,,, | Performed by: ANESTHESIOLOGY

## 2022-05-31 PROCEDURE — 3077F SYST BP >= 140 MM HG: CPT | Mod: CPTII,S$GLB,, | Performed by: ANESTHESIOLOGY

## 2022-05-31 PROCEDURE — 3080F DIAST BP >= 90 MM HG: CPT | Mod: CPTII,S$GLB,, | Performed by: ANESTHESIOLOGY

## 2022-05-31 PROCEDURE — 1159F PR MEDICATION LIST DOCUMENTED IN MEDICAL RECORD: ICD-10-PCS | Mod: CPTII,S$GLB,, | Performed by: ANESTHESIOLOGY

## 2022-05-31 PROCEDURE — 3044F PR MOST RECENT HEMOGLOBIN A1C LEVEL <7.0%: ICD-10-PCS | Mod: CPTII,S$GLB,, | Performed by: ANESTHESIOLOGY

## 2022-05-31 PROCEDURE — 3008F PR BODY MASS INDEX (BMI) DOCUMENTED: ICD-10-PCS | Mod: CPTII,S$GLB,, | Performed by: ANESTHESIOLOGY

## 2022-05-31 PROCEDURE — 99214 PR OFFICE/OUTPT VISIT, EST, LEVL IV, 30-39 MIN: ICD-10-PCS | Mod: S$GLB,,, | Performed by: ANESTHESIOLOGY

## 2022-05-31 PROCEDURE — 3008F BODY MASS INDEX DOCD: CPT | Mod: CPTII,S$GLB,, | Performed by: ANESTHESIOLOGY

## 2022-05-31 PROCEDURE — 1160F RVW MEDS BY RX/DR IN RCRD: CPT | Mod: CPTII,S$GLB,, | Performed by: ANESTHESIOLOGY

## 2022-05-31 PROCEDURE — 1159F MED LIST DOCD IN RCRD: CPT | Mod: CPTII,S$GLB,, | Performed by: ANESTHESIOLOGY

## 2022-05-31 PROCEDURE — 3077F PR MOST RECENT SYSTOLIC BLOOD PRESSURE >= 140 MM HG: ICD-10-PCS | Mod: CPTII,S$GLB,, | Performed by: ANESTHESIOLOGY

## 2022-05-31 PROCEDURE — 99999 PR PBB SHADOW E&M-EST. PATIENT-LVL V: ICD-10-PCS | Mod: PBBFAC,,, | Performed by: ANESTHESIOLOGY

## 2022-05-31 PROCEDURE — 3080F PR MOST RECENT DIASTOLIC BLOOD PRESSURE >= 90 MM HG: ICD-10-PCS | Mod: CPTII,S$GLB,, | Performed by: ANESTHESIOLOGY

## 2022-05-31 PROCEDURE — 1160F PR REVIEW ALL MEDS BY PRESCRIBER/CLIN PHARMACIST DOCUMENTED: ICD-10-PCS | Mod: CPTII,S$GLB,, | Performed by: ANESTHESIOLOGY

## 2022-05-31 PROCEDURE — 3044F HG A1C LEVEL LT 7.0%: CPT | Mod: CPTII,S$GLB,, | Performed by: ANESTHESIOLOGY

## 2022-05-31 NOTE — PROGRESS NOTES
Subjective:      Patient ID: Cherri Noonan is a 56 y.o. female.    Chief Complaint: No chief complaint on file.    Referred by: No ref. provider found     Interval history 5/31/2022  Cherri Noonan returns to clinic for follow-up of neck pain. Since the last visit, her pain has remained unchanged. Remains radicular with numbness/tingling in the distal left upper extremity and down the left back. She is taking zonisamide 400mg qHS without benefit. She met with [employer] rep who helped with reprogramming but she does not feel a difference. She states that she plans to meet with the rep again soon for further programming.     Initial HPI 4/6/2022  Cherri Noonan is a 57yo female with no significant PMHx here for evaluation of neck pain. Patient has had chronic neck pain for years and is s/p C5-C7 fusion for myelopathic symtpoms in 2015 by Dr. Peña. Her pain increased after the surgery and she has since been treated by [facility] with steroid injections and [employer] cervical SCS placed in Oct 2021. She has transferred care to [facility] due to insurance change. Today, she characterizes and localizes pain as originating from the neck and radiating down the left arm to the fingers with numbness/burning. It also extends down the left posterior thorax to the flank. She has tried PT, medications, and injections in the past with no or little relief. She has not had her SCS interrogated or reprogrammed since the surgery as she has had difficulty getting in contact with the device representative.       Pain Medications:  - Adjuvant Medications: Mobic (Meloxicam), Zonegran (Zonisamide), and Zanaflex ( Tizanidine)     Previous medications:  Elavil, Cymbalta, gabapentin, lyrica, hydrocodone, flexiril - no relief     Opioid Contract: Not applicable      report:  Not applicable     Pain Procedures:   Cervical ESIs  10/2021: [employer] cervical SCS  5/16/2022: Left knee CSI     Physical  Therapy/Home Exercise: none recently     Imaging:   XR cervical flex/ext 4/6/2022  FINDINGS:  ACDF C5-C7.  No evidence of complication.  Cervical alignment appears relatively well maintained.  There is no acute fracture or instability.  A spinal stimulator device is noted overlying the posterior epidural space spanning C2-C4.  Evaluation for facet arthropathy is limited due to the superimposed spinal stimulator device.     Impression:     Spinal stimulator device overlying the posterior epidural space in the proximal cervical spine.     ACDF C5-C7 without evidence of complication.  No instability.    Past Medical History:   Diagnosis Date    Cervical radiculopathy        Past Surgical History:   Procedure Laterality Date    CERVICAL FUSION  08/2015    C5-C7    HYSTERECTOMY      SPINAL CORD STIMULATOR IMPLANT  10/20/2021    TUBAL LIGATION         Review of patient's allergies indicates:   Allergen Reactions    Shingrix (pf) [varicella-zoster ge-as01b (pf)]      Got all side effects listed       Current Outpatient Medications   Medication Sig Dispense Refill    atorvastatin (LIPITOR) 20 MG tablet Take 20 mg by mouth once daily.      azelastine 205.5 mcg (0.15 %) Spry azelastine 205.5 mcg (0.15 %) nasal spray      b complex vitamins tablet Take 1 tablet by mouth once daily.      ciclopirox (LOPROX) 0.77 % Susp Apply topically 2 (two) times daily.      ergocalciferol (ERGOCALCIFEROL) 50,000 unit Cap Take 50,000 Units by mouth every 7 days.      estradioL (ESTRACE) 0.01 % (0.1 mg/gram) vaginal cream Place 0.5 g vaginally.      fluticasone propionate (FLONASE) 50 mcg/actuation nasal spray 1 spray by Each Nostril route once daily.      ginseng 250 mg Cap Take by mouth.      meloxicam (MOBIC) 15 MG tablet Take 15 mg by mouth daily as needed.      multivitamin capsule Take 1 capsule by mouth once daily.      naproxen (NAPROSYN) 500 MG tablet Take 1 tablet (500 mg total) by mouth 2 (two) times daily as needed  (pain). 30 tablet 0    omega-3 fatty acids/fish oil (FISH OIL-OMEGA-3 FATTY ACIDS) 300-1,000 mg capsule Take by mouth once daily.      omeprazole (PRILOSEC) 20 MG capsule Take 20 mg by mouth once daily.      potassium gluconate 595 mg (99 mg) TbSR Take by mouth once.      tiZANidine (ZANAFLEX) 4 MG tablet Take 4 mg by mouth nightly as needed.      tumeric-ging-olive-oreg-capryl 100 mg-150 mg- 50 mg-150 mg Cap Take by mouth.      vitamin E 100 UNIT capsule Take 100 Units by mouth once daily.      zinc gluconate 50 mg tablet Take 50 mg by mouth once daily.      zonisamide (ZONEGRAN) 100 MG Cap one @ bedtime X3 days then 2 @ bedtime X3 days then 3 @ bedtime X3 days then 4 @ bedtime to follow.Stay at the most comfortable dose. 120 capsule 2     No current facility-administered medications for this visit.       Family History   Problem Relation Age of Onset    Hyperlipidemia Mother     Diabetes Mother     Heart disease Sister     Juvenile idiopathic arthritis Sister     Aneurysm Brother     COPD Brother        Social History     Socioeconomic History    Marital status:    Tobacco Use    Smoking status: Never Smoker    Smokeless tobacco: Never Used   Substance and Sexual Activity    Alcohol use: Never    Drug use: Never    Sexual activity: Yes     Partners: Male     Birth control/protection: None     Comment:  39+years           Review of Systems   Constitutional: Negative for fever and weight loss.   Cardiovascular: Negative for chest pain and palpitations.   Respiratory: Negative for cough and shortness of breath.    Musculoskeletal: Positive for neck pain and stiffness. Negative for back pain and joint pain.   Gastrointestinal: Negative for abdominal pain and bowel incontinence.   Genitourinary: Negative for bladder incontinence and dysuria.   Neurological: Negative for focal weakness and headaches.           Objective:   BP (!) 151/90 (BP Location: Left arm, Patient Position: Sitting,  "BP Method: Small (Automatic))   Pulse 64   Resp 18   Ht 5' 4" (1.626 m)   Wt 79.8 kg (175 lb 14.8 oz)   BMI 30.20 kg/m²   Pain Disability Index Review:  Last 3 PDI Scores 5/31/2022 4/6/2022   Pain Disability Index (PDI) 28 40     Normocephalic.  Atraumatic.  Affect appropriate.  Breathing unlabored.  Extra ocular muscles intact.           General    Constitutional: She is oriented to person, place, and time. She appears well-developed and well-nourished.   HENT:   Head: Normocephalic and atraumatic.   Eyes: EOM are normal. Pupils are equal, round, and reactive to light.   Cardiovascular: Normal rate and regular rhythm.    Pulmonary/Chest: Effort normal. No respiratory distress.   Abdominal: Soft. Bowel sounds are normal. She exhibits no distension.   Neurological: She is alert and oriented to person, place, and time.   Psychiatric: She has a normal mood and affect. Her behavior is normal.     General Musculoskeletal Exam   Gait: normal     Back (L-Spine & T-Spine) / Neck (C-Spine) Exam     Tenderness   The patient is tender to palpation of the left trapezial.   The patient is experiencing no tenderness in the right right trapezial. Right paramedian tenderness of the Lower C-Spine. Left paramedian tenderness of the Lower C-Spine.     Neck (C-Spine) Range of Motion   Flexion:     Normal  Extension: ModerateLimited extension numerical scale: limited by pain.  Right Rotation: normalNeck rotation right: painful.  Left Rotation: normalNeck rotation left: painful.    Spinal Sensation   Left Side Sensation  C-Spine Level: normal    Neck (C-Spine) Tests   Spurling's Test   Left:  positive    Comments:  Muscle tightness over the left trapezius      Muscle Strength   Left Upper Extremity  Biceps: 5/5   Deltoid:  5/5  Wrist extension: 5/5   Wrist flexion: 5/5   Finger Flexors:  5/5  Finger Extensors:  5/5    Reflexes     Left Side  Left Contreras's Sign:  Absent        Assessment:       Encounter Diagnoses   Name Primary? "    Cervical radiculopathy Yes    Postlaminectomy syndrome, cervical     Cervicalgia          Plan:   We discussed with the patient the assessment and recommendations. The following is the plan we agreed on:    - Ordered CT cervical spine to evaluate foraminal patency  - Will consider cervical TFESI pending imaging  - Encouraged patient to reconnect with [facility] to optimize SCS  - May need surgical referral if the above do not provide relief  - RTC in 4 weeks with Dr. Knott to review imaging    Primo Araiza, PGY-5  [facility]        Diagnoses and all orders for this visit:    Cervical radiculopathy    Postlaminectomy syndrome, cervical    Cervicalgia  -     CT Cervical Spine Without Contrast; Future     I have personally taken the history and examined this patient and agree with the fellow's note as stated above.

## 2022-06-07 ENCOUNTER — HOSPITAL ENCOUNTER (OUTPATIENT)
Dept: RADIOLOGY | Facility: HOSPITAL | Age: 57
Discharge: HOME | End: 2022-06-07
Attending: ANESTHESIOLOGY
Payer: COMMERCIAL

## 2022-06-07 DIAGNOSIS — M54.2 CERVICALGIA: ICD-10-CM

## 2022-06-07 PROCEDURE — 72125 CT CERVICAL SPINE WITHOUT CONTRAST: ICD-10-PCS | Mod: 26,,, | Performed by: RADIOLOGY

## 2022-06-07 PROCEDURE — 72125 CT NECK SPINE W/O DYE: CPT | Mod: 26,,, | Performed by: RADIOLOGY

## 2022-06-07 PROCEDURE — 72125 CT NECK SPINE W/O DYE: CPT | Mod: TC

## 2022-06-08 ENCOUNTER — OFFICE VISIT (OUTPATIENT)
Dept: FAMILY MEDICINE | Facility: CLINIC | Age: 57
End: 2022-06-08
Payer: COMMERCIAL

## 2022-06-08 DIAGNOSIS — U07.1 COVID-19 VIRUS INFECTION: Primary | ICD-10-CM

## 2022-06-08 PROCEDURE — 99213 PR OFFICE/OUTPT VISIT, EST, LEVL III, 20-29 MIN: ICD-10-PCS | Mod: 95,,, | Performed by: STUDENT IN AN ORGANIZED HEALTH CARE EDUCATION/TRAINING PROGRAM

## 2022-06-08 PROCEDURE — 99213 OFFICE O/P EST LOW 20 MIN: CPT | Mod: 95,,, | Performed by: STUDENT IN AN ORGANIZED HEALTH CARE EDUCATION/TRAINING PROGRAM

## 2022-06-08 PROCEDURE — 3044F HG A1C LEVEL LT 7.0%: CPT | Mod: CPTII,95,, | Performed by: STUDENT IN AN ORGANIZED HEALTH CARE EDUCATION/TRAINING PROGRAM

## 2022-06-08 PROCEDURE — 3044F PR MOST RECENT HEMOGLOBIN A1C LEVEL <7.0%: ICD-10-PCS | Mod: CPTII,95,, | Performed by: STUDENT IN AN ORGANIZED HEALTH CARE EDUCATION/TRAINING PROGRAM

## 2022-06-08 NOTE — PROGRESS NOTES
06/08/2022    Cherri Noonan  [id number]    CC: COVID +    HPI    Took an at home COVID test because  has been having sinus like symptoms and she developed slight coughing   No chill or fevers  Her chronic pain is much worse than usual   Would like to take the anti-viral    Answers for HPI/ROS submitted by the patient on 6/8/2022  activity change: No  unexpected weight change: No  neck pain: No  hearing loss: No  rhinorrhea: No  trouble swallowing: No  eye discharge: No  visual disturbance: No  chest tightness: No  wheezing: No  chest pain: No  palpitations: No  blood in stool: No  constipation: No  vomiting: No  diarrhea: No  polydipsia: No  polyuria: No  difficulty urinating: No  hematuria: No  menstrual problem: No  dysuria: No  joint swelling: No  arthralgias: No  headaches: No  weakness: No  confusion: No  dysphoric mood: No      Social History     Socioeconomic History    Marital status:    Tobacco Use    Smoking status: Never Smoker    Smokeless tobacco: Never Used   Substance and Sexual Activity    Alcohol use: Never    Drug use: Never    Sexual activity: Yes     Partners: Male     Birth control/protection: None     Comment:  39+years           Current Outpatient Medications:     atorvastatin (LIPITOR) 20 MG tablet, Take 20 mg by mouth once daily., Disp: , Rfl:     azelastine 205.5 mcg (0.15 %) Spry, azelastine 205.5 mcg (0.15 %) nasal spray, Disp: , Rfl:     b complex vitamins tablet, Take 1 tablet by mouth once daily., Disp: , Rfl:     ciclopirox (LOPROX) 0.77 % Susp, Apply topically 2 (two) times daily., Disp: , Rfl:     ergocalciferol (ERGOCALCIFEROL) 50,000 unit Cap, Take 50,000 Units by mouth every 7 days., Disp: , Rfl:     estradioL (ESTRACE) 0.01 % (0.1 mg/gram) vaginal cream, Place 0.5 g vaginally., Disp: , Rfl:     fluticasone propionate (FLONASE) 50 mcg/actuation nasal spray, 1 spray by Each Nostril route once daily., Disp: , Rfl:     ginseng 250 mg Cap, Take by  mouth., Disp: , Rfl:     meloxicam (MOBIC) 15 MG tablet, Take 15 mg by mouth daily as needed., Disp: , Rfl:     multivitamin capsule, Take 1 capsule by mouth once daily., Disp: , Rfl:     naproxen (NAPROSYN) 500 MG tablet, Take 1 tablet (500 mg total) by mouth 2 (two) times daily as needed (pain)., Disp: 30 tablet, Rfl: 0    nirmatrelvir-ritonavir 150 mg x 2- 100 mg copackaged tablets (EUA), Take 3 tablets by mouth 2 (two) times daily for 5 days. Each dose contains 2 nirmatrelvir (pink tablets) and 1 ritonavir (white tablet). Take all 3 tablets together, Disp: 30 tablet, Rfl: 0    omega-3 fatty acids/fish oil (FISH OIL-OMEGA-3 FATTY ACIDS) 300-1,000 mg capsule, Take by mouth once daily., Disp: , Rfl:     omeprazole (PRILOSEC) 20 MG capsule, Take 20 mg by mouth once daily., Disp: , Rfl:     potassium gluconate 595 mg (99 mg) TbSR, Take by mouth once., Disp: , Rfl:     tiZANidine (ZANAFLEX) 4 MG tablet, Take 4 mg by mouth nightly as needed., Disp: , Rfl:     tumeric-ging-olive-oreg-capryl 100 mg-150 mg- 50 mg-150 mg Cap, Take by mouth., Disp: , Rfl:     vitamin E 100 UNIT capsule, Take 100 Units by mouth once daily., Disp: , Rfl:     zinc gluconate 50 mg tablet, Take 50 mg by mouth once daily., Disp: , Rfl:     zonisamide (ZONEGRAN) 100 MG Cap, ONE AT BEDTIME X3 DAYS THEN 2 AT BEDTIME X3 DAYS THEN 3 AT BEDTIME X3 DAYS THEN 4 AT BEDTIME TO FOLLOW.STAY AT THE MOST COMFORTABLE DOSE., Disp: 120 capsule, Rfl: 1      Physical Exam    Vitals unable to obtain    Gen: well appearing  Resp: speaks in full sentences. Non labored breathing  Cv: non-diaphoretic    1. COVID-19 virus infection  - nirmatrelvir-ritonavir 150 mg x 2- 100 mg copackaged tablets (EUA); Take 3 tablets by mouth 2 (two) times daily for 5 days. Each dose contains 2 nirmatrelvir (pink tablets) and 1 ritonavir (white tablet). Take all 3 tablets together  Dispense: 30 tablet; Refill: 0    Discussed to hold statin for 2 weeks      RTC as needed      Leyda King MD  Family Medicine

## 2022-06-13 ENCOUNTER — TELEPHONE (OUTPATIENT)
Dept: INTERNAL MEDICINE | Facility: CLINIC | Age: 57
End: 2022-06-13
Payer: COMMERCIAL

## 2022-06-13 NOTE — TELEPHONE ENCOUNTER
Spoke with pt pt states she received a vaccine form [facility] but is unsure the name, pt said she did call back to complain about the vaccine due to the worse pain ever in life therefor after getting vaccinated, pt has questions before getting the shingle vaccine, pt said she was told by her daughter, who is a nurse, that there was two ot maybe 3 from's of the vaccine. Shingrix or Zostavzx or if theres  any other one besides the above 2. Please, advise. Thanks.

## 2022-06-14 NOTE — TELEPHONE ENCOUNTER
Pt reports body aches and pains 2/2 the first Shingles shot. Advised to avoid the 2nd shot and placed it on her allergy list.     Also reports positive covid test and treated with paxlovid.     Reports she is feeling better.

## 2022-06-20 ENCOUNTER — OFFICE VISIT (OUTPATIENT)
Dept: ORTHOPEDICS | Facility: CLINIC | Age: 57
End: 2022-06-20
Payer: COMMERCIAL

## 2022-06-20 ENCOUNTER — PATIENT OUTREACH (OUTPATIENT)
Dept: ADMINISTRATIVE | Facility: HOSPITAL | Age: 57
End: 2022-06-20
Payer: COMMERCIAL

## 2022-06-20 VITALS
SYSTOLIC BLOOD PRESSURE: 138 MMHG | RESPIRATION RATE: 15 BRPM | HEIGHT: 64 IN | DIASTOLIC BLOOD PRESSURE: 78 MMHG | HEART RATE: 67 BPM | WEIGHT: 175 LBS | BODY MASS INDEX: 29.88 KG/M2 | OXYGEN SATURATION: 99 %

## 2022-06-20 DIAGNOSIS — M70.50 PES ANSERINE BURSITIS: Primary | ICD-10-CM

## 2022-06-20 PROCEDURE — 99213 OFFICE O/P EST LOW 20 MIN: CPT | Mod: 25,S$GLB,, | Performed by: ORTHOPAEDIC SURGERY

## 2022-06-20 PROCEDURE — 99999 PR PBB SHADOW E&M-EST. PATIENT-LVL IV: CPT | Mod: PBBFAC,,, | Performed by: ORTHOPAEDIC SURGERY

## 2022-06-20 PROCEDURE — 3075F PR MOST RECENT SYSTOLIC BLOOD PRESS GE 130-139MM HG: ICD-10-PCS | Mod: CPTII,S$GLB,, | Performed by: ORTHOPAEDIC SURGERY

## 2022-06-20 PROCEDURE — 3078F PR MOST RECENT DIASTOLIC BLOOD PRESSURE < 80 MM HG: ICD-10-PCS | Mod: CPTII,S$GLB,, | Performed by: ORTHOPAEDIC SURGERY

## 2022-06-20 PROCEDURE — 3008F BODY MASS INDEX DOCD: CPT | Mod: CPTII,S$GLB,, | Performed by: ORTHOPAEDIC SURGERY

## 2022-06-20 PROCEDURE — 20610 LARGE JOINT ASPIRATION/INJECTION: L ANSERINE BURSA: ICD-10-PCS | Mod: LT,S$GLB,, | Performed by: ORTHOPAEDIC SURGERY

## 2022-06-20 PROCEDURE — 20610 DRAIN/INJ JOINT/BURSA W/O US: CPT | Mod: LT,S$GLB,, | Performed by: ORTHOPAEDIC SURGERY

## 2022-06-20 PROCEDURE — 1159F PR MEDICATION LIST DOCUMENTED IN MEDICAL RECORD: ICD-10-PCS | Mod: CPTII,S$GLB,, | Performed by: ORTHOPAEDIC SURGERY

## 2022-06-20 PROCEDURE — 3008F PR BODY MASS INDEX (BMI) DOCUMENTED: ICD-10-PCS | Mod: CPTII,S$GLB,, | Performed by: ORTHOPAEDIC SURGERY

## 2022-06-20 PROCEDURE — 3044F HG A1C LEVEL LT 7.0%: CPT | Mod: CPTII,S$GLB,, | Performed by: ORTHOPAEDIC SURGERY

## 2022-06-20 PROCEDURE — 3078F DIAST BP <80 MM HG: CPT | Mod: CPTII,S$GLB,, | Performed by: ORTHOPAEDIC SURGERY

## 2022-06-20 PROCEDURE — 99999 PR PBB SHADOW E&M-EST. PATIENT-LVL IV: ICD-10-PCS | Mod: PBBFAC,,, | Performed by: ORTHOPAEDIC SURGERY

## 2022-06-20 PROCEDURE — 1159F MED LIST DOCD IN RCRD: CPT | Mod: CPTII,S$GLB,, | Performed by: ORTHOPAEDIC SURGERY

## 2022-06-20 PROCEDURE — 3075F SYST BP GE 130 - 139MM HG: CPT | Mod: CPTII,S$GLB,, | Performed by: ORTHOPAEDIC SURGERY

## 2022-06-20 PROCEDURE — 99213 PR OFFICE/OUTPT VISIT, EST, LEVL III, 20-29 MIN: ICD-10-PCS | Mod: 25,S$GLB,, | Performed by: ORTHOPAEDIC SURGERY

## 2022-06-20 PROCEDURE — 3044F PR MOST RECENT HEMOGLOBIN A1C LEVEL <7.0%: ICD-10-PCS | Mod: CPTII,S$GLB,, | Performed by: ORTHOPAEDIC SURGERY

## 2022-06-20 RX ORDER — TRIAMCINOLONE ACETONIDE 40 MG/ML
40 INJECTION, SUSPENSION INTRA-ARTICULAR; INTRAMUSCULAR
Status: DISCONTINUED | OUTPATIENT
Start: 2022-06-20 | End: 2022-06-20 | Stop reason: HOSPADM

## 2022-06-20 RX ADMIN — TRIAMCINOLONE ACETONIDE 40 MG: 40 INJECTION, SUSPENSION INTRA-ARTICULAR; INTRAMUSCULAR at 07:06

## 2022-06-20 NOTE — PROCEDURES
Large Joint Aspiration/Injection: L anserine bursa    Date/Time: 6/20/2022 7:20 AM  Performed by: Markus Valentino MD  Authorized by: Markus Valentino MD     Consent Done?:  Yes (Verbal)  Indications:  Arthritis  Site marked: the procedure site was marked    Timeout: prior to procedure the correct patient, procedure, and site was verified    Prep: patient was prepped and draped in usual sterile fashion      Local anesthesia used?: Yes    Local anesthetic:  Topical anesthetic and lidocaine 1% without epinephrine    Details:  Needle Size:  22 G  Location:  Knee  Site:  L anserine bursa  Medications:  40 mg triamcinolone acetonide 40 mg/mL  Patient tolerance:  Patient tolerated the procedure well with no immediate complications

## 2022-06-20 NOTE — PROGRESS NOTES
Follow up PATIENT ORTHOPAEDIC: Knee    PRIMARY CARE PHYSICIAN: Mahsa Arevalo NP   REFERRING PROVIDER: No referring provider defined for this encounter.     ASSESSMENT & PLAN:    Impression:  Left Knee Medial meniscal tear     Follow Up Plan: PRN    Injection:     Cherri Noonan has physical exam evidence of above and wishes to pursue an injection. We discussed alternative non operative modalities including physical therapy, anti-inflammatories, bracing, maintenance of appropriate weight, rest, ambulatory devices. We discussed the risk, benefits, and expectations regarding injection. They have elected to proceed with injection. Should injections fail next step would be MRI. See procedure note for billing purposes.     Non operative care:    Cherri Noonan has physical exam evidence of above and wishes to pursue an non-operative care. I am recommending the following: injection.  Patient pain more consistent with pes bursitis today. Will try pes bursa injection. Will try Voltaren gel.     To review previous:   Patient has acute knee pain after dancing this past weekend.  She reported a pop and some mild swelling with regard to her knee.  She has had difficulty with twisting maneuvers and weight-bearing on her knee.  Her pain is primarily medial based.  She has a complex history with regard to pain that she has been on multiple medications for and ultimately ended up getting a spinal cord stimulator.  She is not interested in more pain medicines today.  She elected for intraarticular injection previously.    The patient has been ordered:  Office Intraarticular injection    CONSULTS:   None    ACTIVE PROBLEM LIST  Patient Active Problem List   Diagnosis    Chest pain, atypical    Atrophic vaginitis    Chronic rhinitis    Cervical radiculopathy    Complex regional pain syndrome    Other hyperlipidemia    Vitamin D deficiency    Sleep disorder    Peripheral neuropathic pain    Class 1 obesity with  body mass index (BMI) of 31.0 to 31.9 in adult           SUBJECTIVE    CHIEF COMPLAINT: Knee Pain    HPI:   Cherri Noonan is a 56 y.o. female here for evaluation and management of left knee pain. There is a specific incident that brought about this pain, patient reports that she was dancing last two nights ago and squatted down, following, she felt a pop followed by pain in her medial left knee.  Patient bears weight but with pain. Pain now progressing to interfere with activities which include: walking and functional household ADL's    Currently the pain in the joint is rated at moderate with activity. The pain is constant and is located in the knee, at level of joint line and located medially. The pain is described as aching, stiffness and throbbing. Relieving factors include rest and prescription medication.     There is associated Popping.     Cherri Noonan has no additional complaints.     Interval History 6/20/22: Patient with on going medial based knee pain. No significant improvement in pain. Continues to be active. Has trouble with touch and lying on side with pillow is aggravating.     PROGRESSIVE SYMPTOMS:  Pain worsened by weight bearing    FUNCTIONAL STATUS:   Climb a flight of stairs or walk up a hill     PREVIOUS TREATMENTS:  Medical: RX NSAIDS  Physical Therapy: Activities Modified   Previous Orthopaedic Surgery: None    REVIEW OF SYSTEMS:  PAIN ASSESSMENT:  See HPI.  MUSCULOSKELETAL: See HPI.  OTHER 10 point review of systems is negative except as stated in HPI above    PAST MEDICAL HISTORY   has a past medical history of Cervical radiculopathy.     PAST SURGICAL HISTORY   has a past surgical history that includes Hysterectomy; Tubal ligation; Spinal cord stimulator implant (10/20/2021); and Cervical fusion (08/2015).     FAMILY HISTORY  family history includes Aneurysm in her brother; COPD in her brother; Diabetes in her mother; Heart disease in her sister; Hyperlipidemia in her mother;  Juvenile idiopathic arthritis in her sister.     SOCIAL HISTORY   reports that she has never smoked. She has never used smokeless tobacco. She reports that she does not drink alcohol and does not use drugs.     ALLERGIES   Review of patient's allergies indicates:   Allergen Reactions    Shingrix (pf) [varicella-zoster ge-as01b (pf)]      Got all side effects listed        MEDICATIONS  Current Outpatient Medications on File Prior to Visit   Medication Sig Dispense Refill    atorvastatin (LIPITOR) 20 MG tablet Take 20 mg by mouth once daily.      azelastine 205.5 mcg (0.15 %) Spry azelastine 205.5 mcg (0.15 %) nasal spray      b complex vitamins tablet Take 1 tablet by mouth once daily.      ciclopirox (LOPROX) 0.77 % Susp Apply topically 2 (two) times daily.      ergocalciferol (ERGOCALCIFEROL) 50,000 unit Cap Take 50,000 Units by mouth every 7 days.      estradioL (ESTRACE) 0.01 % (0.1 mg/gram) vaginal cream Place 0.5 g vaginally.      fluticasone propionate (FLONASE) 50 mcg/actuation nasal spray 1 spray by Each Nostril route once daily.      ginseng 250 mg Cap Take by mouth.      meloxicam (MOBIC) 15 MG tablet Take 15 mg by mouth daily as needed.      multivitamin capsule Take 1 capsule by mouth once daily.      naproxen (NAPROSYN) 500 MG tablet Take 1 tablet (500 mg total) by mouth 2 (two) times daily as needed (pain). 30 tablet 0    omega-3 fatty acids/fish oil (FISH OIL-OMEGA-3 FATTY ACIDS) 300-1,000 mg capsule Take by mouth once daily.      omeprazole (PRILOSEC) 20 MG capsule Take 20 mg by mouth once daily.      potassium gluconate 595 mg (99 mg) TbSR Take by mouth once.      tiZANidine (ZANAFLEX) 4 MG tablet Take 4 mg by mouth nightly as needed.      tumeric-ging-olive-oreg-capryl 100 mg-150 mg- 50 mg-150 mg Cap Take by mouth.      vitamin E 100 UNIT capsule Take 100 Units by mouth once daily.      zinc gluconate 50 mg tablet Take 50 mg by mouth once daily.      zonisamide (ZONEGRAN) 100 MG  "Cap ONE AT BEDTIME X3 DAYS THEN 2 AT BEDTIME X3 DAYS THEN 3 AT BEDTIME X3 DAYS THEN 4 AT BEDTIME TO FOLLOW.STAY AT THE MOST COMFORTABLE DOSE. 120 capsule 1     No current facility-administered medications on file prior to visit.          PHYSICAL EXAM   height is 5' 4" (1.626 m) and weight is 79.4 kg (175 lb). Her blood pressure is 138/78 and her pulse is 67. Her respiration is 15 and oxygen saturation is 99%.   Body mass index is 30.04 kg/m².      All other systems deferred.  GENERAL:  No acute distress  HABITUS: Normal  GAIT: Antalgic  SKIN: Normal     KNEE EXAM:    left:   Effusion: No joint effusion  TTP: yes over pes bursa   no crepitus with passive knee ROM  Passive ROM: Extension 0, Flexion 130  No pain with manipulation of patella  Stable to varus/valgus stress. No increased laxity to anterior/posterior drawer testing  positive Josef's test  No pain with IR/ER rotation of the hip  5/5 strength in knee flexion and extension, ankle plantarflexion and dorsiflexion  Neurovascular Status: Sensation intact to light touch in Sural, Saphenous, SPN, DPN, Tibial nerve distribution  2+ pulse DP/PT, normal capillary refill, foot has normal warmth    DATA:  Diagnostic tests reviewed for today's visit:     The obtained knee radiographs appears normal for age. There is good alignment with no evidence of fracture, bony abnormalities or signficant degenerative changes.    "

## 2022-06-20 NOTE — PROGRESS NOTES
Health Maintenance Due   Topic Date Due    TETANUS VACCINE  Never done    COVID-19 Vaccine (3 - Booster for Domenico series) 04/02/2022     Triggered LINKS & reconciled immunizations. Updated Care Everywhere. Imported outside mammography results from Care Everywhere into . Imported outside colonoscopy results into . Chart review completed.

## 2022-07-05 ENCOUNTER — HOSPITAL ENCOUNTER (OUTPATIENT)
Dept: RADIOLOGY | Facility: OTHER | Age: 57
Discharge: HOME | End: 2022-07-05
Attending: ANESTHESIOLOGY
Payer: COMMERCIAL

## 2022-07-05 ENCOUNTER — OFFICE VISIT (OUTPATIENT)
Dept: PAIN MEDICINE | Facility: CLINIC | Age: 57
End: 2022-07-05
Attending: ANESTHESIOLOGY
Payer: COMMERCIAL

## 2022-07-05 VITALS
WEIGHT: 169.06 LBS | DIASTOLIC BLOOD PRESSURE: 90 MMHG | TEMPERATURE: 99 F | SYSTOLIC BLOOD PRESSURE: 131 MMHG | HEIGHT: 64 IN | HEART RATE: 62 BPM | BODY MASS INDEX: 28.86 KG/M2

## 2022-07-05 DIAGNOSIS — M54.6 MIDLINE THORACIC BACK PAIN, UNSPECIFIED CHRONICITY: Primary | ICD-10-CM

## 2022-07-05 DIAGNOSIS — M47.816 SPONDYLOSIS OF LUMBAR REGION WITHOUT MYELOPATHY OR RADICULOPATHY: ICD-10-CM

## 2022-07-05 DIAGNOSIS — M54.6 MIDLINE THORACIC BACK PAIN, UNSPECIFIED CHRONICITY: ICD-10-CM

## 2022-07-05 PROCEDURE — 3080F DIAST BP >= 90 MM HG: CPT | Mod: CPTII,S$GLB,, | Performed by: ANESTHESIOLOGY

## 2022-07-05 PROCEDURE — 3008F BODY MASS INDEX DOCD: CPT | Mod: CPTII,S$GLB,, | Performed by: ANESTHESIOLOGY

## 2022-07-05 PROCEDURE — 99999 PR PBB SHADOW E&M-EST. PATIENT-LVL V: ICD-10-PCS | Mod: PBBFAC,,, | Performed by: ANESTHESIOLOGY

## 2022-07-05 PROCEDURE — 72070 XR THORACIC SPINE AP LATERAL: ICD-10-PCS | Mod: 26,,, | Performed by: RADIOLOGY

## 2022-07-05 PROCEDURE — 1159F MED LIST DOCD IN RCRD: CPT | Mod: CPTII,S$GLB,, | Performed by: ANESTHESIOLOGY

## 2022-07-05 PROCEDURE — 3044F PR MOST RECENT HEMOGLOBIN A1C LEVEL <7.0%: ICD-10-PCS | Mod: CPTII,S$GLB,, | Performed by: ANESTHESIOLOGY

## 2022-07-05 PROCEDURE — 3075F PR MOST RECENT SYSTOLIC BLOOD PRESS GE 130-139MM HG: ICD-10-PCS | Mod: CPTII,S$GLB,, | Performed by: ANESTHESIOLOGY

## 2022-07-05 PROCEDURE — 3008F PR BODY MASS INDEX (BMI) DOCUMENTED: ICD-10-PCS | Mod: CPTII,S$GLB,, | Performed by: ANESTHESIOLOGY

## 2022-07-05 PROCEDURE — 3075F SYST BP GE 130 - 139MM HG: CPT | Mod: CPTII,S$GLB,, | Performed by: ANESTHESIOLOGY

## 2022-07-05 PROCEDURE — 72120 XR LUMBAR SPINE FLEXION AND EXTENSION ONLY: ICD-10-PCS | Mod: 26,,, | Performed by: RADIOLOGY

## 2022-07-05 PROCEDURE — 99213 PR OFFICE/OUTPT VISIT, EST, LEVL III, 20-29 MIN: ICD-10-PCS | Mod: S$GLB,,, | Performed by: ANESTHESIOLOGY

## 2022-07-05 PROCEDURE — 3044F HG A1C LEVEL LT 7.0%: CPT | Mod: CPTII,S$GLB,, | Performed by: ANESTHESIOLOGY

## 2022-07-05 PROCEDURE — 1160F PR REVIEW ALL MEDS BY PRESCRIBER/CLIN PHARMACIST DOCUMENTED: ICD-10-PCS | Mod: CPTII,S$GLB,, | Performed by: ANESTHESIOLOGY

## 2022-07-05 PROCEDURE — 3080F PR MOST RECENT DIASTOLIC BLOOD PRESSURE >= 90 MM HG: ICD-10-PCS | Mod: CPTII,S$GLB,, | Performed by: ANESTHESIOLOGY

## 2022-07-05 PROCEDURE — 99999 PR PBB SHADOW E&M-EST. PATIENT-LVL V: CPT | Mod: PBBFAC,,, | Performed by: ANESTHESIOLOGY

## 2022-07-05 PROCEDURE — 1159F PR MEDICATION LIST DOCUMENTED IN MEDICAL RECORD: ICD-10-PCS | Mod: CPTII,S$GLB,, | Performed by: ANESTHESIOLOGY

## 2022-07-05 PROCEDURE — 72070 X-RAY EXAM THORAC SPINE 2VWS: CPT | Mod: TC,FY

## 2022-07-05 PROCEDURE — 72070 X-RAY EXAM THORAC SPINE 2VWS: CPT | Mod: 26,,, | Performed by: RADIOLOGY

## 2022-07-05 PROCEDURE — 1160F RVW MEDS BY RX/DR IN RCRD: CPT | Mod: CPTII,S$GLB,, | Performed by: ANESTHESIOLOGY

## 2022-07-05 PROCEDURE — 72120 X-RAY BEND ONLY L-S SPINE: CPT | Mod: TC,FY

## 2022-07-05 PROCEDURE — 72120 X-RAY BEND ONLY L-S SPINE: CPT | Mod: 26,,, | Performed by: RADIOLOGY

## 2022-07-05 PROCEDURE — 99213 OFFICE O/P EST LOW 20 MIN: CPT | Mod: S$GLB,,, | Performed by: ANESTHESIOLOGY

## 2022-07-05 NOTE — PROGRESS NOTES
Subjective:      Patient ID: Cherri Noonan is a 56 y.o. female.    Chief Complaint: No chief complaint on file.    Referred by: No ref. provider found     HPI  She returns for follow-up.  She had a CT scan of the cervical spine.  She had reprogramming of her spinal cord stimulator and told very well for the neck pain and radicular upper extremity pain.  She still has the discomfort along her spine in the thoracic and lumbar spine.  No radicular component.  No specific aggravating factors but it may be worse with activities.  She said overall, she feels much better since the reprogramming and since she started zonisamide.  She lost some weight.  She was able to do a lot of activities moving back into her house yesterday with some soreness but she would not have been able to do that otherwise.  No new symptomatology otherwise      Past Medical History:   Diagnosis Date    Cervical radiculopathy        Past Surgical History:   Procedure Laterality Date    CERVICAL FUSION  08/2015    C5-C7    HYSTERECTOMY      SPINAL CORD STIMULATOR IMPLANT  10/20/2021    TUBAL LIGATION         Review of patient's allergies indicates:   Allergen Reactions    Shingrix (pf) [varicella-zoster ge-as01b (pf)]      Got all side effects listed       Current Outpatient Medications   Medication Sig Dispense Refill    atorvastatin (LIPITOR) 20 MG tablet Take 20 mg by mouth once daily.      azelastine 205.5 mcg (0.15 %) Spry azelastine 205.5 mcg (0.15 %) nasal spray      b complex vitamins tablet Take 1 tablet by mouth once daily.      ciclopirox (LOPROX) 0.77 % Susp Apply topically 2 (two) times daily.      ergocalciferol (ERGOCALCIFEROL) 50,000 unit Cap Take 50,000 Units by mouth every 7 days.      estradioL (ESTRACE) 0.01 % (0.1 mg/gram) vaginal cream Place 0.5 g vaginally.      fluticasone propionate (FLONASE) 50 mcg/actuation nasal spray 1 spray by Each Nostril route once daily.      ginseng 250 mg Cap Take by mouth.       "meloxicam (MOBIC) 15 MG tablet Take 15 mg by mouth daily as needed.      multivitamin capsule Take 1 capsule by mouth once daily.      naproxen (NAPROSYN) 500 MG tablet Take 1 tablet (500 mg total) by mouth 2 (two) times daily as needed (pain). 30 tablet 0    omega-3 fatty acids/fish oil (FISH OIL-OMEGA-3 FATTY ACIDS) 300-1,000 mg capsule Take by mouth once daily.      omeprazole (PRILOSEC) 20 MG capsule Take 20 mg by mouth once daily.      potassium gluconate 595 mg (99 mg) TbSR Take by mouth once.      tiZANidine (ZANAFLEX) 4 MG tablet Take 4 mg by mouth nightly as needed.      tumeric-ging-olive-oreg-capryl 100 mg-150 mg- 50 mg-150 mg Cap Take by mouth.      vitamin E 100 UNIT capsule Take 100 Units by mouth once daily.      zinc gluconate 50 mg tablet Take 50 mg by mouth once daily.      zonisamide (ZONEGRAN) 100 MG Cap ONE AT BEDTIME X3 DAYS THEN 2 AT BEDTIME X3 DAYS THEN 3 AT BEDTIME X3 DAYS THEN 4 AT BEDTIME TO FOLLOW.STAY AT THE MOST COMFORTABLE DOSE. 120 capsule 1     No current facility-administered medications for this visit.       Family History   Problem Relation Age of Onset    Hyperlipidemia Mother     Diabetes Mother     Heart disease Sister     Juvenile idiopathic arthritis Sister     Aneurysm Brother     COPD Brother        Social History     Socioeconomic History    Marital status:    Tobacco Use    Smoking status: Never Smoker    Smokeless tobacco: Never Used   Substance and Sexual Activity    Alcohol use: Never    Drug use: Never    Sexual activity: Yes     Partners: Male     Birth control/protection: None     Comment:  39+years           ROS        Objective:   BP (!) 131/90 (BP Location: Left arm, Patient Position: Sitting, BP Method: Medium (Automatic))   Pulse 62   Temp 98.5 °F (36.9 °C)   Ht 5' 4" (1.626 m)   Wt 76.7 kg (169 lb 1.5 oz)   BMI 29.02 kg/m²   Pain Disability Index Review:  Last 3 PDI Scores 7/5/2022 5/31/2022 4/6/2022   Pain Disability " Index (PDI) 35 28 40     Normocephalic.  Atraumatic.  Affect appropriate.  Breathing unlabored.  Extra ocular muscles intact.           Ortho/SPM Exam    lumbar flexion, hyperextension, facet loading, range of motion of the thoracic spine all negative.  There might be some myofascial pain around the paraspinals and/or quadratus lumborum.  No edema detected on exam.  Intact dorsalis pedis pulse.  Assessment:       Encounter Diagnoses   Name Primary?    Midline thoracic back pain, unspecified chronicity Yes    Spondylosis of lumbar region without myelopathy or radiculopathy          Plan:   We discussed with the patient the assessment and recommendations. The following is the plan we agreed on:  1. Radiograph of the thoracolumbar spine with flexion extension.  2. Healthy back.  3. Return as needed.  Otherwise, follow-up 6 weeks.  Consider trigger point injections in the paraspinals.        Diagnoses and all orders for this visit:    Midline thoracic back pain, unspecified chronicity  -     Cancel: X-Ray Thoracolumbar Spine AP Lateral; Future  -     X-Ray Lumbar Spine Flexion And Extension Only; Future  -     Ambulatory referral/consult to [facility]; Future    Spondylosis of lumbar region without myelopathy or radiculopathy  -     Cancel: X-Ray Thoracolumbar Spine AP Lateral; Future  -     X-Ray Lumbar Spine Flexion And Extension Only; Future  -     Ambulatory referral/consult to [facility]; Future

## 2022-08-09 ENCOUNTER — OFFICE VISIT (OUTPATIENT)
Dept: FAMILY MEDICINE | Facility: CLINIC | Age: 57
End: 2022-08-09
Payer: COMMERCIAL

## 2022-08-09 VITALS
DIASTOLIC BLOOD PRESSURE: 76 MMHG | BODY MASS INDEX: 27.91 KG/M2 | HEART RATE: 61 BPM | WEIGHT: 163.5 LBS | SYSTOLIC BLOOD PRESSURE: 118 MMHG | OXYGEN SATURATION: 99 % | TEMPERATURE: 99 F | HEIGHT: 64 IN

## 2022-08-09 DIAGNOSIS — R10.2 PELVIC PRESSURE IN FEMALE: ICD-10-CM

## 2022-08-09 DIAGNOSIS — N30.01 ACUTE CYSTITIS WITH HEMATURIA: Primary | ICD-10-CM

## 2022-08-09 DIAGNOSIS — R30.0 DYSURIA: ICD-10-CM

## 2022-08-09 PROCEDURE — 87077 CULTURE AEROBIC IDENTIFY: CPT | Performed by: NURSE PRACTITIONER

## 2022-08-09 PROCEDURE — 99213 OFFICE O/P EST LOW 20 MIN: CPT | Mod: S$GLB,,, | Performed by: NURSE PRACTITIONER

## 2022-08-09 PROCEDURE — 87186 SC STD MICRODIL/AGAR DIL: CPT | Performed by: NURSE PRACTITIONER

## 2022-08-09 PROCEDURE — 1159F MED LIST DOCD IN RCRD: CPT | Mod: CPTII,S$GLB,, | Performed by: NURSE PRACTITIONER

## 2022-08-09 PROCEDURE — 1159F PR MEDICATION LIST DOCUMENTED IN MEDICAL RECORD: ICD-10-PCS | Mod: CPTII,S$GLB,, | Performed by: NURSE PRACTITIONER

## 2022-08-09 PROCEDURE — 3078F DIAST BP <80 MM HG: CPT | Mod: CPTII,S$GLB,, | Performed by: NURSE PRACTITIONER

## 2022-08-09 PROCEDURE — 3078F PR MOST RECENT DIASTOLIC BLOOD PRESSURE < 80 MM HG: ICD-10-PCS | Mod: CPTII,S$GLB,, | Performed by: NURSE PRACTITIONER

## 2022-08-09 PROCEDURE — 99999 PR PBB SHADOW E&M-EST. PATIENT-LVL V: CPT | Mod: PBBFAC,,, | Performed by: NURSE PRACTITIONER

## 2022-08-09 PROCEDURE — 1160F PR REVIEW ALL MEDS BY PRESCRIBER/CLIN PHARMACIST DOCUMENTED: ICD-10-PCS | Mod: CPTII,S$GLB,, | Performed by: NURSE PRACTITIONER

## 2022-08-09 PROCEDURE — 1160F RVW MEDS BY RX/DR IN RCRD: CPT | Mod: CPTII,S$GLB,, | Performed by: NURSE PRACTITIONER

## 2022-08-09 PROCEDURE — 3074F SYST BP LT 130 MM HG: CPT | Mod: CPTII,S$GLB,, | Performed by: NURSE PRACTITIONER

## 2022-08-09 PROCEDURE — 3008F PR BODY MASS INDEX (BMI) DOCUMENTED: ICD-10-PCS | Mod: CPTII,S$GLB,, | Performed by: NURSE PRACTITIONER

## 2022-08-09 PROCEDURE — 87086 URINE CULTURE/COLONY COUNT: CPT | Performed by: NURSE PRACTITIONER

## 2022-08-09 PROCEDURE — 99213 PR OFFICE/OUTPT VISIT, EST, LEVL III, 20-29 MIN: ICD-10-PCS | Mod: S$GLB,,, | Performed by: NURSE PRACTITIONER

## 2022-08-09 PROCEDURE — 3044F HG A1C LEVEL LT 7.0%: CPT | Mod: CPTII,S$GLB,, | Performed by: NURSE PRACTITIONER

## 2022-08-09 PROCEDURE — 3074F PR MOST RECENT SYSTOLIC BLOOD PRESSURE < 130 MM HG: ICD-10-PCS | Mod: CPTII,S$GLB,, | Performed by: NURSE PRACTITIONER

## 2022-08-09 PROCEDURE — 3044F PR MOST RECENT HEMOGLOBIN A1C LEVEL <7.0%: ICD-10-PCS | Mod: CPTII,S$GLB,, | Performed by: NURSE PRACTITIONER

## 2022-08-09 PROCEDURE — 99999 PR PBB SHADOW E&M-EST. PATIENT-LVL V: ICD-10-PCS | Mod: PBBFAC,,, | Performed by: NURSE PRACTITIONER

## 2022-08-09 PROCEDURE — 3008F BODY MASS INDEX DOCD: CPT | Mod: CPTII,S$GLB,, | Performed by: NURSE PRACTITIONER

## 2022-08-09 PROCEDURE — 87088 URINE BACTERIA CULTURE: CPT | Performed by: NURSE PRACTITIONER

## 2022-08-09 RX ORDER — CEPHALEXIN 500 MG/1
500 CAPSULE ORAL 2 TIMES DAILY
Qty: 20 CAPSULE | Refills: 0 | Status: SHIPPED | OUTPATIENT
Start: 2022-08-09 | End: 2022-10-18

## 2022-08-09 RX ORDER — PHENAZOPYRIDINE HYDROCHLORIDE 200 MG/1
200 TABLET, FILM COATED ORAL 3 TIMES DAILY
Qty: 9 TABLET | Refills: 0 | Status: CANCELLED | OUTPATIENT
Start: 2022-08-09 | End: 2022-08-19

## 2022-08-09 NOTE — PROGRESS NOTES
Chief Complaint  Chief Complaint   Patient presents with    Urinary Tract Infection     Burning, abdominal pressure       HPI    HPI   Ms. Cherri Noonan is a 56 y.o. female with medical problems as listed below. The patient presents to clinic with suspected UTI. The patient states that she has been having burning and abdominal pressure. Also has flank pain but suffers with chronic back pain. The patient states that she has been having Cloudy urine with strong smell.    She states that she Use monistat with no relief.     She has been making an effort to improve physical activity and change her diet. and 2 years this month that she started walking, she has been loosing inches and weight.     Went from a size 14 to a 10.     PAST MEDICAL HISTORY:  Past Medical History:   Diagnosis Date    Cervical radiculopathy        PAST SURGICAL HISTORY:  Past Surgical History:   Procedure Laterality Date    CERVICAL FUSION  08/2015    C5-C7    HYSTERECTOMY      SPINAL CORD STIMULATOR IMPLANT  10/20/2021    TUBAL LIGATION         SOCIAL HISTORY:  Social History     Socioeconomic History    Marital status:    Tobacco Use    Smoking status: Never Smoker    Smokeless tobacco: Never Used   Substance and Sexual Activity    Alcohol use: Never    Drug use: Never    Sexual activity: Yes     Partners: Male     Birth control/protection: None     Comment:  39+years       FAMILY HISTORY:  Family History   Problem Relation Age of Onset    Hyperlipidemia Mother     Diabetes Mother     Heart disease Sister     Juvenile idiopathic arthritis Sister     Aneurysm Brother     COPD Brother        ALLERGIES AND MEDICATIONS: updated and reviewed.  Review of patient's allergies indicates:   Allergen Reactions    Shingrix (pf) [varicella-zoster ge-as01b (pf)]      Got all side effects listed     Current Outpatient Medications   Medication Sig Dispense Refill    atorvastatin (LIPITOR) 20 MG tablet Take 20 mg by mouth  once daily.      azelastine 205.5 mcg (0.15 %) Spry azelastine 205.5 mcg (0.15 %) nasal spray      b complex vitamins tablet Take 1 tablet by mouth once daily.      ciclopirox (LOPROX) 0.77 % Susp Apply topically 2 (two) times daily.      ergocalciferol (ERGOCALCIFEROL) 50,000 unit Cap Take 50,000 Units by mouth every 7 days.      estradioL (ESTRACE) 0.01 % (0.1 mg/gram) vaginal cream Place 0.5 g vaginally.      fluticasone propionate (FLONASE) 50 mcg/actuation nasal spray 1 spray by Each Nostril route once daily.      ginseng 250 mg Cap Take by mouth.      meloxicam (MOBIC) 15 MG tablet Take 15 mg by mouth daily as needed.      multivitamin capsule Take 1 capsule by mouth once daily.      naproxen (NAPROSYN) 500 MG tablet Take 1 tablet (500 mg total) by mouth 2 (two) times daily as needed (pain). 30 tablet 0    omega-3 fatty acids/fish oil (FISH OIL-OMEGA-3 FATTY ACIDS) 300-1,000 mg capsule Take by mouth once daily.      omeprazole (PRILOSEC) 20 MG capsule Take 20 mg by mouth once daily.      potassium gluconate 595 mg (99 mg) TbSR Take by mouth once.      tiZANidine (ZANAFLEX) 4 MG tablet Take 4 mg by mouth nightly as needed.      tumeric-ging-olive-oreg-capryl 100 mg-150 mg- 50 mg-150 mg Cap Take by mouth.      vitamin E 100 UNIT capsule Take 100 Units by mouth once daily.      zinc gluconate 50 mg tablet Take 50 mg by mouth once daily.      zonisamide (ZONEGRAN) 100 MG Cap ONE AT BEDTIME X3 DAYS THEN 2 AT BEDTIME X3 DAYS THEN 3 AT BEDTIME X3 DAYS THEN 4 AT BEDTIME TO FOLLOW.STAY AT THE MOST COMFORTABLE DOSE. 120 capsule 1    cephALEXin (KEFLEX) 500 MG capsule Take 1 capsule (500 mg total) by mouth 2 (two) times daily. 20 capsule 0     No current facility-administered medications for this visit.       Patient Care Team:  Mahsa Arevalo NP as PCP - General (Family Medicine)    ROS  Review of Systems   Constitutional: Negative for chills, fatigue, fever and unexpected weight change.  "  HENT: Negative for congestion, ear discharge, ear pain and postnasal drip.    Eyes: Negative for photophobia, pain and visual disturbance.   Respiratory: Negative for cough, shortness of breath and wheezing.    Cardiovascular: Negative for chest pain, palpitations and leg swelling.   Gastrointestinal: Negative for abdominal pain, constipation, diarrhea, nausea and vomiting.   Genitourinary: Positive for dysuria, flank pain, frequency and vaginal pain. Negative for urgency and vaginal discharge.   Musculoskeletal: Negative for back pain, joint swelling and neck stiffness.   Skin: Negative for rash.   Neurological: Negative for weakness and headaches.   Psychiatric/Behavioral: Negative for dysphoric mood and sleep disturbance. The patient is not nervous/anxious.        Physical Exam  Vitals:    08/09/22 1119   BP: 118/76   Pulse: 61   Temp: 98.6 °F (37 °C)   TempSrc: Oral   SpO2: 99%   Weight: 74.2 kg (163 lb 7.5 oz)   Height: 5' 4" (1.626 m)    Body mass index is 28.06 kg/m².  Weight: 74.2 kg (163 lb 7.5 oz)   Height: 5' 4" (162.6 cm)     Physical Exam  Constitutional:       Appearance: She is well-developed.   HENT:      Nose: Nose normal.   Eyes:      Conjunctiva/sclera: Conjunctivae normal.   Neck:      Thyroid: No thyromegaly.   Cardiovascular:      Rate and Rhythm: Normal rate.   Pulmonary:      Effort: Pulmonary effort is normal.   Abdominal:      Palpations: There is no hepatomegaly or splenomegaly.      Tenderness: There is right CVA tenderness and left CVA tenderness.   Musculoskeletal:         General: Normal range of motion.      Cervical back: Normal range of motion.   Skin:     General: Skin is warm and dry.   Neurological:      Mental Status: She is alert and oriented to person, place, and time. Mental status is at baseline.       Health Maintenance       Date Due Completion Date    TETANUS VACCINE Never done ---    COVID-19 Vaccine (3 - Booster for Domenico series) 04/02/2022 12/2/2021    Shingles " Vaccine (2 of 2) 06/14/2023 (Originally 5/24/2022) 3/29/2022    Influenza Vaccine (1) 09/01/2022 11/19/2020    Mammogram 12/23/2022 12/23/2021    Colorectal Cancer Screening 10/13/2026 12/17/2021    Lipid Panel 03/29/2027 3/29/2022      Health maintenance reviewed at this time.     Assessment & Plan  Acute cystitis with hematuria  -     cephALEXin (KEFLEX) 500 MG capsule; Take 1 capsule (500 mg total) by mouth 2 (two) times daily.  Dispense: 20 capsule; Refill: 0    Dysuria  -     POCT URINE DIPSTICK WITHOUT MICROSCOPE  -     Urine culture    Pelvic pressure in female  -     POCT URINE DIPSTICK WITHOUT MICROSCOPE  -     Urine culture    +leukocytes, blood and nitrites   Will cover with Keflex and send for a culture.      Follow-up: Follow up if symptoms worsen or fail to improve.

## 2022-08-12 ENCOUNTER — TELEPHONE (OUTPATIENT)
Dept: FAMILY MEDICINE | Facility: CLINIC | Age: 57
End: 2022-08-12
Payer: COMMERCIAL

## 2022-08-12 ENCOUNTER — CLINICAL SUPPORT (OUTPATIENT)
Dept: REHABILITATION | Facility: OTHER | Age: 57
End: 2022-08-12
Attending: ANESTHESIOLOGY
Payer: COMMERCIAL

## 2022-08-12 DIAGNOSIS — R29.898 DECREASED STRENGTH OF TRUNK AND BACK: ICD-10-CM

## 2022-08-12 DIAGNOSIS — M54.6 MIDLINE THORACIC BACK PAIN, UNSPECIFIED CHRONICITY: ICD-10-CM

## 2022-08-12 DIAGNOSIS — M47.816 SPONDYLOSIS OF LUMBAR REGION WITHOUT MYELOPATHY OR RADICULOPATHY: ICD-10-CM

## 2022-08-12 DIAGNOSIS — R29.3 POOR POSTURE: ICD-10-CM

## 2022-08-12 DIAGNOSIS — M25.69 DECREASED RANGE OF MOTION OF TRUNK AND BACK: ICD-10-CM

## 2022-08-12 DIAGNOSIS — R29.898 WEAKNESS OF BOTH LOWER EXTREMITIES: ICD-10-CM

## 2022-08-12 LAB — BACTERIA UR CULT: ABNORMAL

## 2022-08-12 PROCEDURE — 97161 PT EVAL LOW COMPLEX 20 MIN: CPT

## 2022-08-12 PROCEDURE — 97110 THERAPEUTIC EXERCISES: CPT

## 2022-08-12 NOTE — PLAN OF CARE
[facility] - PHYSICAL THERAPY LUMBAR EVALUATION     [facility]  [facility] Number: [id number]    Therapy Diagnosis:   Encounter Diagnoses   Name Primary?    Midline thoracic back pain, unspecified chronicity     Spondylosis of lumbar region without myelopathy or radiculopathy     Decreased range of motion of trunk and back     Decreased strength of trunk and back     Poor posture     Weakness of both lower extremities      Physician: Archana Knott MD    Physician Orders: PT Eval and Treat   Medical Diagnosis from Referral:   M54.6 (ICD-10-CM) - Midline thoracic back pain, unspecified chronicity   M47.816 (ICD-10-CM) - Spondylosis of lumbar region without myelopathy or radiculopathy     Evaluation Date: 8/12/2022  Authorization Period Expiration: 7/5/2023  Plan of Care Expiration: 11/12/2022  Reassessment Due: 10/12/2022  Visit # / Visits authorized: 1/ 1 (pending)    **Will Transfer to [facility] for followups**    Time In: 8:10 am  Time Out: 9:30 am  Total Billable Time: 80 minutes    Precautions: Standard spinal cord stimulator    Pattern of pain determined: pattern 1      Subjective   Date of onset: 8+ years   History of current condition - Cherri reports: she has pain all the way up and down the left side of her spine from her neck into the thoracic spine, and worst in the lumbosacral area. There is also pain in the L UE, but it has been alleviated somewhat with her spinal cord stimulator. 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 She used to only able to walk a couple blocks on her street before the pain would prevent her from going further, but now she is able to walk up to a mile. Aggravating factors include lying flat on her back, riding a bike, bending, lifting, twisting, mopping, sitting, standing, walking, getting in and out of the car, and  stairs. Alleviated with spinal cord stimulator, TENS unit, epidural. She also reports in may of this year she hurt her L knee while dancing and it still bother her.     Per MD: She returns for follow-up.  She had a CT scan of the cervical spine.  She had reprogramming of her spinal cord stimulator and told very well for the neck pain and radicular upper extremity pain.  She still has the discomfort along her spine in the thoracic and lumbar spine.  No radicular component.  No specific aggravating factors but it may be worse with activities.  She said overall, she feels much better since the reprogramming and since she started zonisamide.  She lost some weight.  She was able to do a lot of activities moving back into her house yesterday with some soreness but she would not have been able to do that otherwise.  No new symptomatology otherwise     Medical History:   Past Medical History:   Diagnosis Date    Cervical radiculopathy        Surgical History:   Cherri Noonan  has a past surgical history that includes Hysterectomy; Tubal ligation; Spinal cord stimulator implant (10/20/2021); and Cervical fusion (08/2015).    Medications:   Cherri has a current medication list which includes the following prescription(s): atorvastatin, azelastine, b complex vitamins, cephalexin, ciclopirox, ergocalciferol, estradiol, fluticasone propionate, ginseng, meloxicam, multivitamin, naproxen, fish oil-omega-3 fatty acids, omeprazole, potassium gluconate, tizanidine, tumeric-ging-olive-oreg-capryl, vitamin e, zinc gluconate, and zonisamide.    Allergies:   Review of patient's allergies indicates:   Allergen Reactions    Shingrix (pf) [varicella-zoster [id number] (pf)]      Got all side effects listed        Imaging: XR LUMBAR SPINE FLEXION AND EXTENSION ONLY     CLINICAL HISTORY:  Pain in thoracic spine     TECHNIQUE:  Flexion and extension radiographs of the lumbar spine obtained in the lateral  projection.     COMPARISON:  None     FINDINGS:  Lumbar vertebral body heights are maintained.  Spina bifida occulta L5.     Mild disc space narrowing L5-S1.     Advanced facet arthropathy L4-5 and L5-S1.     Grade 1 anterolisthesis L4-5 with no significant change with flexion or extension.     Impression:     Please see above.    XR THORACIC SPINE AP LATERAL     CLINICAL HISTORY:  Pain in thoracic spine     TECHNIQUE:  AP and lateral views of the thoracic spine were performed.     COMPARISON:  None     FINDINGS:  Vertebral body heights are maintained.  Disc space narrowing and endplate changes midthoracic spine.  AP alignment is anatomic.     Leads extend cephalad beyond the field of view.     Impression:     No acute osseous abnormality seen.    Prior Therapy: 4 or 5 times with min relief  Prior Treatment: injections, spinal cord stimulator, C5-C7 fusion,  Social History: single story home, lives with their spouse  Occupation: Not currently working  Leisure: Walking, volunteering for her mission/[employer]    Prior Level of Function: independent  Current Level of Function: increased pain with all activity and ADL's, most notably lying flat on her back, riding a bike, bending, lifting, twisting, mopping, sitting, standing, walking, getting in and out of the car, and stairs  DME owned/used: none        Pain:  Current 8/10, worst 10/10, best 5/10   Location: left arms, back , neck , shoulder , torso  and trunk, and L leg  Description: Aching, Burning and Tingling  Aggravating Factors: lying flat on her back, riding a bike, bending, lifting, twisting, mopping, sitting, standing, walking, getting in and out of the car, and stairs  Easing Factors: spinal cord stimulator, TENS unit, epidural  Disturbed Sleep: yes        Pattern of pain questions:  1.  Where is your pain the worst? L low back  2.  Is your pain constant or intermittent? constant  3.  Does bending forward make your typical pain worse? yes  4.  Since the start  "of your back pain, has there been a change in your bowel or bladder? Yes constipation within the last few weeks  5.  What can't you do now that you use to be able to do? Go to movies, riding back, mopping       Pts goals: "to get moving again"      Red Flag Screening:   Cough  Sneeze  Strain: (--)  Bladder/ bowel: (+)  Falls: (--)  Night pain: (--)  Unexplained weight loss: (--)  General health: good    OBJECTIVE     Postural examination/scapula alignment: Rounded shoulder, Head forward, Slouched posture, Increased kyphosis and Decreased lordosis  Joint integrity: Hard end feel  Skin integrity: intact  Edema: none  Sitting: poor  Standing: poor  Correction of posture: better with lumbar roll    MOVEMENT LOSS    ROM Loss   Flexion minimal loss   Extension moderate loss   Side glide Right minimal loss   Side glide Left minimal loss   Rotation Right moderate loss   Rotation Left moderate loss     Lower Extremity Strength  Right LE  Left LE    Hip flexion: 4/5 Hip flexion: 4-/5   Hip extension:  4-/5 Hip extension: 3+/5   Hip abduction: 4-/5 Hip abduction: 3+/5   Hip adduction:  3+/5 Hip adduction:  3+/5   Hip External Rotation 4/5 Hip External Rotation 4-/5   Hip Internal rotation   4/5 Hip Internal rotation 4-/5   Knee Flexion 4+/5 Knee Flexion 4-/5   Knee Extension 4+/5 Knee Extension 4/5   Ankle dorsiflexion: 4/5 Ankle dorsiflexion: 4/5   Ankle plantarflexion: 4/5 Ankle plantarflexion: 4/5       GAIT:  Assistive Device used: none  Level of Assistance: independent  Patient displays the following gait deviations:  unsteady gait and antalgic gait.     Special Tests:   Test Name  Test Result   Prone Instability Test (--)   SI Joint Provocation Test (+)   Straight Leg Raise (--)   Neural Tension Test (--)   Crossed Straight Leg Raise (+)   Walking on toes (--)   Walking on heels  (+)       NEUROLOGICAL SCREENING     Sensory deficit: intact to light touch     Reflexes:    Left Right   Patella Tendon absent absent " "  Achilles Tendon absent absent   Babinski  (--) (--)   Clonus (--) (--)     REPEATED TEST MOVEMENTS:    Baseline symptoms:  Repeated Flexion in Standing worse   Repeated Extension in Standing worse   Repeated Flexion in lying better   Repeated Extension in lying  no worse  no better       STATIC TESTS and other movements:   Baseline symptoms:  Prone lie no worse  no better   Prone lie on elbows no worse  no better   Sustained prone with  using mat no worse  no better   Sustained flexion no worse  no better   Sitting slouched  worse   Sitting erect better   Standing slouched no effect   Standing erect  better   Lying prone in extension  no effect   Long sitting   no effect       Baseline Isometric Testing on Med X equipment: Testing administered by PT  Date of testin2022  ROM 0-42 deg   Max Peak Torque 97    Min Peak Torque 38    Flex/Ext Ratio 2.3:1   % below normative data 53     Starting weight 48#    Limitation/Restriction for FOTO lumbar Survey    Therapist reviewed FOTO scores for Cherri Noonan on 2022.   FOTO documents entered into [employer] - see Media section.    Limitation Score: 51%    Goal: 41%             Treatment   Treatment Time In: 9:00  Treatment Time Out: 9:30  Total Treatment time separate from Evaluation: 30 minutes      Cherri received therapeutic exercises to develop/improve posture, lumbar/cervical ROM, strength and muscular endurance for 30 minutes including the following exercises:     LTR's x10  DKTC 10x5"  EIL x10  PPT 15x5"  Bridging x10  Open books x10  Thoracic ext x10  SOC 10x5"    [facility] - Short 2022   Visit Number 1   VAS Pain Rating 8   Lumbar Stretches - Slouch 10   Extension in Lying 10   Flexion in Lying 10   Lumbar Extension - Seat Pad 1   Femur Restraint 6   Top Dead Center 24   Counterweight 241   Lumbar Flexion 42   Lumbar Extension 0   Lumbar Peak Torque 97   Lumbar Weight 45   Repetitions 5         Written Home Exercises Provided: " yes.  Exercises were reviewed and Cherri was able to demonstrate them prior to the end of the session.  Cherri demonstrated good  understanding of the education provided.     See EMR under Patient Instructions for exercises provided 8/12/2022.      Education provided:   - Patient received education regarding proper posture and body mechanics.  Patient was given top [facility] Visit 1 handouts which discuss what to expect in therapy, the purpose and opportunity for health coaching, the program,  wellness when discharged from therapy, back education and care specifically for posture seated, standing, lifting correctly, components of exercise, importance of nutrition and hydration, and importance of sleep.   Information on lumbar rolls provided.  - Sonido roll tried, recommended, and purchase information was provided.    - Patient received a handout regarding anticipated muscular soreness following the isometric test and strategies for management were reviewed with patient including stretching, using ice and scheduled rest.   - Patient received education on the Healthy Back program, purpose of the isometric test, progression of back strengthening as well as wellness approach and systemic strengthening.  Details of the program were discussed.  Reviewed that patient should feel support/pressure from med ex restraints but no pain or discomfort and patient expressed understanding.  Med x dynamic exercise and baseline IM test performed with instructions to guide the patient safely through the isometric testing.  Patient informed to perform isometric test correctly, and safely, building best force they safely can and not pushing through pain.  Patient demonstrated understanding of information      Cherri received cold pack for 10 minutes to low back in [place].    Assessment   Cherri is a 56 y.o. female referred to [facility] with a medical diagnosis of M54.6 (ICD-10-CM) - Midline thoracic back pain,  unspecified chronicity, and M47.816 (ICD-10-CM) - Spondylosis of lumbar region without myelopathy or radiculopathy. Pt presents with signs and symptoms consistent with diagnosis including decreased range of motion of lumbar spine, weakness of trunk and back, bilateral lower extremity weakness, poor posture, decreased joint mobility of lumbar spine, decreased soft tissue flexibility and mobility, and decreased functional mobility tolerance. These deficits are limiting patient in full participation in ADL's and leisure activities such as lying flat on her back, riding a bike, bending, lifting, twisting, mopping, sitting, standing, walking, getting in and out of the car, and stairs. Pt is 53% below normative values with medx lumbar isometric strength testing. Pt will transfer to [place] for her healthy back followups per her request      Pain Pattern: 1      Pt prognosis is Fair.   Pt will benefit from skilled outpatient Physical Therapy to address the deficits stated above and in the chart below, provide pt/family education, and to maximize pt's level of independence. Based on the above history and physical examination an active physical therapy program is recommended.  Pt will continue to benefit from skilled outpatient physical therapy to address the deficits listed below in the chart, provide pt/family education and to maximize pt's level of independence in the home and community environment. .       Plan of care discussed with patient: Yes  Pt's spiritual, cultural and educational needs considered and patient is agreeable to the plan of care and goals as stated below:     Anticipated Barriers for therapy: chronicity of condition, cervical symptoms    PT Evaluation Completed? Yes    Medical necessity is demonstrated by the following problem list.    Pt presents with the following impairments:     History  Co-morbidities and personal factors that may impact the plan of care Co-morbidities:   advanced age,  difficulty sleeping, high BMI and complex regional pain syndrome, cervical radiculopathy, peripheral neuropathy, spinal cord stimulator    Personal Factors:   no deficits     low   Examination  Body Structures and Functions, activity limitations and participation restrictions that may impact the plan of care Body Regions:   neck  back  lower extremities  upper extremities  trunk    Body Systems:    gross symmetry  ROM  strength  gross coordinated movement  balance  gait  transfers  transitions  motor control    Participation Restrictions:   See above    Activity limitations:   Learning and applying knowledge  no deficits    General Tasks and Commands  no deficits    Communication  no deficits    Mobility  lifting and carrying objects  walking    Self care  no deficits    Domestic Life  shopping  cooking  doing house work (cleaning house, washing dishes, laundry)  assisting others    Interactions/Relationships  no deficits    Life Areas  no deficits    Community and Social Life  no deficits         low   Clinical Presentation stable and uncomplicated low   Decision Making/ Complexity Score: low       GOALS: Pt is in agreement with the following goals.    Short term goals:  6 weeks or 10 visits   1.  Pt will demonstrate increased lumbar ROM by at least 6 degrees from the initial ROM value with improvements noted in functional ROM and ability to perform ADLs.  (approp and ongoing)  2.  Pt will demonstrate increased MedX average isometric strength value  by 15% from initial test resulting in improved ability to perform bending, lifting, and carrying activities safely, confidently.  (approp and ongoing)  3.  Patient report a reduction in worst pain score by 1-2 points for improved tolerance for walking.  (approp and ongoing)  4.  Pt able to perform HEP correctly with minimal cueing or supervision from therapist to encourage independent management of symptoms. (approp and ongoing)      Long term goals: 10 weeks or 20  "visits   1. Pt will demonstrate increased lumbar ROM by at least 9 degrees from initial ROM value, resulting in improved ability to perform functional fwd bending while standing and sitting. (approp and ongoing)  2. Pt will demonstrate increased MedX average isometric strength value  by 30% from initial test resulting in improved ability to perform bending, lifting, and carrying activities safely, confidently.  (approp and ongoing)  3. Pt to demonstrate ability to independently control and reduce their pain through posture positioning and mechanical movements throughout a typical day.  (approp and ongoing)  4.  Pt will demonstrate reduced pain and improved functional outcomes as reported on the FOTO by reaching a limitation score of < or = 41% or less in order to demonstrate subjective improvement in pt's condition.    (approp and ongoing)  5. Pt will demonstrate independence with the HEP at discharge  (approp and ongoing)  6. Pt will be able to walk for 30 minutes without increased pain.  (approp and ongoing)      Plan   Outpatient physical therapy 2x week for 10 weeks or 20 visits to include the following:   - Patient education  - Therapeutic exercise  - Manual therapy  - Performance testing   - Neuromuscular Re-education  - Therapeutic activity   - Modalities    Pt may be seen by PTA as part of the rehabilitation team.     Therapist: Josué Araiza, PT  8/12/2022    "I certify the need for these services furnished under this plan of treatment and while under my care."    ____________________________________  Physician/Referring Practitioner    _______________  Date of Signature            "

## 2022-08-12 NOTE — PROGRESS NOTES
Please call patient with their attached lab results.     Her culture came back and she is on the correct antibiotic.     Thanks  Ivana

## 2022-08-12 NOTE — TELEPHONE ENCOUNTER
----- Message from Ivana Cardenas NP sent at 8/12/2022  7:46 AM CDT -----  Please call patient with their attached lab results.     Her culture came back and she is on the correct antibiotic.     Thanks  Ivana

## 2022-08-16 ENCOUNTER — TELEPHONE (OUTPATIENT)
Dept: PAIN MEDICINE | Facility: CLINIC | Age: 57
End: 2022-08-16
Payer: COMMERCIAL

## 2022-08-16 NOTE — TELEPHONE ENCOUNTER
This message is for patient in regards to his/her appointment 08/16/22 at 8:00a   With  FRANCHESCA Ward.      [facility] Policy: For the safety of all patients and staff members.     Patient Visitor policy: During this visit we're informing all patients that face mask are required.     If you have any questions or concerns please contact [phone]      Pt verbalized understanding and has confirmed appt

## 2022-08-17 ENCOUNTER — CLINICAL SUPPORT (OUTPATIENT)
Dept: REHABILITATION | Facility: HOSPITAL | Age: 57
End: 2022-08-17
Payer: COMMERCIAL

## 2022-08-17 ENCOUNTER — IMMUNIZATION (OUTPATIENT)
Dept: INTERNAL MEDICINE | Facility: CLINIC | Age: 57
End: 2022-08-17
Payer: COMMERCIAL

## 2022-08-17 ENCOUNTER — OFFICE VISIT (OUTPATIENT)
Dept: PAIN MEDICINE | Facility: CLINIC | Age: 57
End: 2022-08-17
Payer: COMMERCIAL

## 2022-08-17 VITALS
WEIGHT: 163.13 LBS | SYSTOLIC BLOOD PRESSURE: 133 MMHG | HEIGHT: 64 IN | TEMPERATURE: 97 F | BODY MASS INDEX: 27.85 KG/M2 | DIASTOLIC BLOOD PRESSURE: 84 MMHG | HEART RATE: 68 BPM | RESPIRATION RATE: 18 BRPM

## 2022-08-17 DIAGNOSIS — R29.3 POOR POSTURE: ICD-10-CM

## 2022-08-17 DIAGNOSIS — M54.6 MIDLINE THORACIC BACK PAIN, UNSPECIFIED CHRONICITY: ICD-10-CM

## 2022-08-17 DIAGNOSIS — R29.898 DECREASED STRENGTH OF TRUNK AND BACK: ICD-10-CM

## 2022-08-17 DIAGNOSIS — Z96.89 SPINAL CORD STIMULATOR STATUS: ICD-10-CM

## 2022-08-17 DIAGNOSIS — R29.898 WEAKNESS OF BOTH LOWER EXTREMITIES: ICD-10-CM

## 2022-08-17 DIAGNOSIS — Z23 NEED FOR VACCINATION: Primary | ICD-10-CM

## 2022-08-17 DIAGNOSIS — M96.1 POSTLAMINECTOMY SYNDROME, CERVICAL: Primary | ICD-10-CM

## 2022-08-17 DIAGNOSIS — M25.69 DECREASED RANGE OF MOTION OF TRUNK AND BACK: Primary | ICD-10-CM

## 2022-08-17 PROCEDURE — 99214 PR OFFICE/OUTPT VISIT, EST, LEVL IV, 30-39 MIN: ICD-10-PCS | Mod: S$GLB,,, | Performed by: NURSE PRACTITIONER

## 2022-08-17 PROCEDURE — 99999 PR PBB SHADOW E&M-EST. PATIENT-LVL V: CPT | Mod: PBBFAC,,, | Performed by: NURSE PRACTITIONER

## 2022-08-17 PROCEDURE — 91305 COVID-19, MRNA, LNP-S, PF, 30 MCG/0.3 ML DOSE VACCINE (PFIZER): CPT | Mod: PBBFAC | Performed by: INTERNAL MEDICINE

## 2022-08-17 PROCEDURE — 3079F DIAST BP 80-89 MM HG: CPT | Mod: CPTII,S$GLB,, | Performed by: NURSE PRACTITIONER

## 2022-08-17 PROCEDURE — 3079F PR MOST RECENT DIASTOLIC BLOOD PRESSURE 80-89 MM HG: ICD-10-PCS | Mod: CPTII,S$GLB,, | Performed by: NURSE PRACTITIONER

## 2022-08-17 PROCEDURE — 1159F PR MEDICATION LIST DOCUMENTED IN MEDICAL RECORD: ICD-10-PCS | Mod: CPTII,S$GLB,, | Performed by: NURSE PRACTITIONER

## 2022-08-17 PROCEDURE — 3044F PR MOST RECENT HEMOGLOBIN A1C LEVEL <7.0%: ICD-10-PCS | Mod: CPTII,S$GLB,, | Performed by: NURSE PRACTITIONER

## 2022-08-17 PROCEDURE — 1159F MED LIST DOCD IN RCRD: CPT | Mod: CPTII,S$GLB,, | Performed by: NURSE PRACTITIONER

## 2022-08-17 PROCEDURE — 3008F BODY MASS INDEX DOCD: CPT | Mod: CPTII,S$GLB,, | Performed by: NURSE PRACTITIONER

## 2022-08-17 PROCEDURE — 97110 THERAPEUTIC EXERCISES: CPT | Mod: PN

## 2022-08-17 PROCEDURE — 3075F PR MOST RECENT SYSTOLIC BLOOD PRESS GE 130-139MM HG: ICD-10-PCS | Mod: CPTII,S$GLB,, | Performed by: NURSE PRACTITIONER

## 2022-08-17 PROCEDURE — 99214 OFFICE O/P EST MOD 30 MIN: CPT | Mod: S$GLB,,, | Performed by: NURSE PRACTITIONER

## 2022-08-17 PROCEDURE — 3075F SYST BP GE 130 - 139MM HG: CPT | Mod: CPTII,S$GLB,, | Performed by: NURSE PRACTITIONER

## 2022-08-17 PROCEDURE — 3044F HG A1C LEVEL LT 7.0%: CPT | Mod: CPTII,S$GLB,, | Performed by: NURSE PRACTITIONER

## 2022-08-17 PROCEDURE — 99999 PR PBB SHADOW E&M-EST. PATIENT-LVL V: ICD-10-PCS | Mod: PBBFAC,,, | Performed by: NURSE PRACTITIONER

## 2022-08-17 PROCEDURE — 3008F PR BODY MASS INDEX (BMI) DOCUMENTED: ICD-10-PCS | Mod: CPTII,S$GLB,, | Performed by: NURSE PRACTITIONER

## 2022-08-17 NOTE — PROGRESS NOTES
Subjective:      Patient ID: Cherri Noonan is a 56 y.o. female.    Chief Complaint: Mid-back Pain    Referred by: No ref. provider found     Interval History 8/17/2022:  Mrs Noonan presents for follow up of mid and lower back pain. She just had evaluation from [facility] and is eager to continue. Thoracolumbar back pain worsened by activity recently by HEP in conjunction with walking and unrelieved by ICE. She still has intermittent L medial knee pain. Zonegran 400mg has provided some benefit. Denies any new areas of pain or neurological changes. She will be getting TENS pads online. She has had benefit from SCS reprogramming.     HPI  She returns for follow-up.  She had a CT scan of the cervical spine.  She had reprogramming of her spinal cord stimulator and told very well for the neck pain and radicular upper extremity pain.  She still has the discomfort along her spine in the thoracic and lumbar spine.  No radicular component.  No specific aggravating factors but it may be worse with activities.  She said overall, she feels much better since the reprogramming and since she started zonisamide.  She lost some weight.  She was able to do a lot of activities moving back into her house yesterday with some soreness but she would not have been able to do that otherwise.  No new symptomatology otherwise      Past Medical History:   Diagnosis Date    Cervical radiculopathy        Past Surgical History:   Procedure Laterality Date    CERVICAL FUSION  08/2015    C5-C7    HYSTERECTOMY      SPINAL CORD STIMULATOR IMPLANT  10/20/2021    TUBAL LIGATION         Review of patient's allergies indicates:   Allergen Reactions    Shingrix (pf) [varicella-zoster [id number] (pf)]      Got all side effects listed       Current Outpatient Medications   Medication Sig Dispense Refill    atorvastatin (LIPITOR) 20 MG tablet Take 20 mg by mouth once daily.      azelastine 205.5 mcg (0.15 %) Spry azelastine 205.5 mcg (0.15 %) nasal  spray      b complex vitamins tablet Take 1 tablet by mouth once daily.      cephALEXin (KEFLEX) 500 MG capsule Take 1 capsule (500 mg total) by mouth 2 (two) times daily. 20 capsule 0    ciclopirox (LOPROX) 0.77 % Susp Apply topically 2 (two) times daily.      ergocalciferol (ERGOCALCIFEROL) 50,000 unit Cap Take 50,000 Units by mouth every 7 days.      estradioL (ESTRACE) 0.01 % (0.1 mg/gram) vaginal cream Place 0.5 g vaginally.      fluticasone propionate (FLONASE) 50 mcg/actuation nasal spray 1 spray by Each Nostril route once daily.      ginseng 250 mg Cap Take by mouth.      meloxicam (MOBIC) 15 MG tablet Take 15 mg by mouth daily as needed.      multivitamin capsule Take 1 capsule by mouth once daily.      naproxen (NAPROSYN) 500 MG tablet Take 1 tablet (500 mg total) by mouth 2 (two) times daily as needed (pain). 30 tablet 0    omega-3 fatty acids/fish oil (FISH OIL-OMEGA-3 FATTY ACIDS) 300-1,000 mg capsule Take by mouth once daily.      omeprazole (PRILOSEC) 20 MG capsule Take 20 mg by mouth once daily.      potassium gluconate 595 mg (99 mg) TbSR Take by mouth once.      tumeric-ging-olive-oreg-capryl 100 mg-150 mg- 50 mg-150 mg Cap Take by mouth.      vitamin E 100 UNIT capsule Take 100 Units by mouth once daily.      zinc gluconate 50 mg tablet Take 50 mg by mouth once daily.      zonisamide (ZONEGRAN) 100 MG Cap ONE AT BEDTIME X3 DAYS THEN 2 AT BEDTIME X3 DAYS THEN 3 AT BEDTIME X3 DAYS THEN 4 AT BEDTIME TO FOLLOW.STAY AT THE MOST COMFORTABLE DOSE. 120 capsule 1    tiZANidine (ZANAFLEX) 4 MG tablet Take 4 mg by mouth nightly as needed.       No current facility-administered medications for this visit.       Family History   Problem Relation Age of Onset    Hyperlipidemia Mother     Diabetes Mother     Heart disease Sister     Juvenile idiopathic arthritis Sister     Aneurysm Brother     COPD Brother        Social History     Socioeconomic History    Marital status:   "  Tobacco Use    Smoking status: Never Smoker    Smokeless tobacco: Never Used   Substance and Sexual Activity    Alcohol use: Never    Drug use: Never    Sexual activity: Yes     Partners: Male     Birth control/protection: None     Comment:  39+years           ROS        Objective:   /84   Pulse 68   Temp 97.4 °F (36.3 °C)   Resp 18   Ht 5' 4" (1.626 m)   Wt 74 kg (163 lb 2.3 oz)   BMI 28.00 kg/m²   Pain Disability Index Review:  Last 3 PDI Scores 8/17/2022 7/5/2022 5/31/2022   Pain Disability Index (PDI) 49 35 28     GEN:  Well developed, well nourished.  No acute distress.   HEENT:  No trauma.  Mucous membranes moist.  Nares patent bilaterally.  PSYCH: Normal affect. Thought content appropriate.  CHEST:  Breathing symmetric.  No audible wheezing.  ABD: Soft, non-distended.  SKIN:  Warm, pink, dry.  No rash on exposed areas.    EXT:  No cyanosis, clubbing, or edema.  No color change or changes in nail or hair growth.  NEURO/MUSCULOSKELETAL:  Fully alert, oriented, and appropriate. Speech normal yas. No cranial nerve deficits.   Gait: Normal.  No focal motor deficits.     Assessment:       Encounter Diagnoses   Name Primary?    Postlaminectomy syndrome, cervical Yes    Midline thoracic back pain, unspecified chronicity     Spinal cord stimulator status          Plan:   We discussed with the patient the assessment and recommendations. The following is the plan we agreed on:  - Prior records reviewed  - Imaging reviewed  - Overall doing well  - Will be obtaining TENS pads online and use   - She just started [facility]   - continue zonegran   - Continue to keep in contact with Ra[facility]all[facility] rep  - RTC 8 weeks to re-evaluate after completing [facility]    FRANCHESCA Ward  08/17/2022         Cherri was seen today for mid-back pain.    Diagnoses and all orders for this visit:    Postlaminectomy syndrome, cervical    Midline thoracic back pain, unspecified " chronicity    Spinal cord stimulator status       I spent a total of 30 minutes on the day of the visit.  This includes face to face time and non-face to face time preparing to see the patient by reviewing previous labs/imaging, obtaining and/or reviewing separately obtained history, documenting clinical information in the electronic or other health record, independently interpreting results and communicating results to the patient/family/caregiver.

## 2022-08-17 NOTE — PROGRESS NOTES
"[facility]      Name: Cherri Noonan  Clinic Number: [id number]    Therapy Diagnosis:   Encounter Diagnoses   Name Primary?    Decreased range of motion of trunk and back Yes    Decreased strength of trunk and back     Poor posture     Weakness of both lower extremities      Physician: Archana Knott MD    Visit Date: 2022    Physician Orders: PT Eval and Treat   Medical Diagnosis from Referral:   M54.6 (ICD-10-CM) - Midline thoracic back pain, unspecified chronicity   M47.816 (ICD-10-CM) - Spondylosis of lumbar region without myelopathy or radiculopathy      Evaluation Date: 2022  Authorization Period Expiration: 2023  Plan of Care Expiration: 2022  Reassessment Due: 2022  Visit # / Visits authorized:       Time In: 3:15 pm  Time Out: 4:10 pm  Total Billable Time: 50 minutes    Precautions: Standard spinal cord stimulator    Pattern of pain determined: pattern 1    Subjective   Cherri reports she had a lot of pain a few days after the evaluation, she has the normal burning sensation in her back right now, same as it was.      Patient reports tolerating previous visit with increased soreness  Patient reports their pain to be 7/10 on a 0-10 scale with 0 being no pain and 10 being the worst pain imaginable.  Pain Location: bilateral back, L LE     Occupation: Not currently working  Leisure: Walking, volunteering for her mission/[employer]      Pt goals: "to get moving again"    Objective        Baseline Isometric Testing on Med X equipment: Testing administered by PT  Date of testin2022  ROM 0-42 deg   Max Peak Torque 97    Min Peak Torque 38    Flex/Ext Ratio 2.3:1   % below normative data 53      Starting weight 48#     Limitation/Restriction for FOTO lumbar Survey     Therapist reviewed FOTO scores for Cherri Noonan on 2022.   FOTO documents entered into [employer] - see Media section.     Limitation Score: 51%     Goal: 41%    " "         Treatment    Pt was instructed in and performed the following:     Cherri received therapeutic exercises to develop/improved posture, cardiovascular endurance, muscular endurance, lumbar/cervical ROM, strength and muscular endurance for 50 minutes including the following exercises:     LTR 10x5"  DKTC 10x5"  EIL 10x3"  PPT 15x5"  Bridging x10  SOC 10x5"    [facility] - Short 8/19/2022   Visit Number 2   VAS Pain Rating 7   Lumbar Stretches - Slouch 10   Extension in Lying 10   Flexion in Lying 10   Lumbar Extension - Seat Pad -   Femur Restraint -   Top Dead Center -   Counterweight -   Lumbar Flexion -   Lumbar Extension -   Lumbar Peak Torque -   Lumbar Weight 48   Repetitions 15   Rating of Perceived Exertion 3         Peripheral muscle strengthening which included 1 set of 15-20 repetitions at a slow, controlled 10-13 second per rep pace focused on strengthening supporting musculature for improved body mechanics and functional mobility.  Pt and therapist focused on proper form during treatment to ensure optimal strengthening of each targeted muscle group.  Machines were utilized including torso rotation, leg extension, leg curl, chest press, upright row. Tricep extension, bicep curl, leg press, and hip abduction added visit 3    Cherri received the following manual therapy techniques: none were applied to the: n/a for 0 minutes.         Home Exercises Provided and Patient Education Provided   Home exercises include: LTR, DKTC EIL, PPT, bridging, SOC  Cardio program: visit 5  Lifting education date: visit 11  Lumbar roll: not yet acquired    Education provided:   - HEP, POC    Written Home Exercises Provided: Patient instructed to cont prior HEP.  Exercises were reviewed and Cherri was able to demonstrate them prior to the end of the session.  Cherri demonstrated good  understanding of the education provided.     See EMR under Patient Instructions for exercises provided prior " visit.          Assessment   Pt presents to second [facility] visit reporting continued complaints of pain, was able to demo HEP with Mod VC for form. Pt was able to start strengthening and endurance training on the lumbar MedX at 50% of max peak torque according to the initial visit isometric test. Pt was able to complete 15 reps, with 3/10 RPE. Pt was also able to complete half of the peripheral strengthening exercises without increased discomfort and will complete the complete circuit next visit as tolerated.      Patient is making good progress towards established goals.  Pt will continue to benefit from skilled outpatient physical therapy to address the deficits stated in the impairment chart, provide pt/family education and to maximize pt's level of independence in the home and community environment.     Anticipated Barriers for therapy: chronicity of condition, cervical symptoms  Pt's spiritual, cultural and educational needs considered and pt agreeable to plan of care and goals as stated below:            GOALS: Pt is in agreement with the following goals.     Short term goals:  6 weeks or 10 visits   1.  Pt will demonstrate increased lumbar ROM by at least 6 degrees from the initial ROM value with improvements noted in functional ROM and ability to perform ADLs.  (approp and ongoing)  2.  Pt will demonstrate increased MedX average isometric strength value  by 15% from initial test resulting in improved ability to perform bending, lifting, and carrying activities safely, confidently.  (approp and ongoing)  3.  Patient report a reduction in worst pain score by 1-2 points for improved tolerance for walking.  (approp and ongoing)  4.  Pt able to perform HEP correctly with minimal cueing or supervision from therapist to encourage independent management of symptoms. (approp and ongoing)        Long term goals: 10 weeks or 20 visits   1. Pt will demonstrate increased lumbar ROM by at least 9 degrees from initial  ROM value, resulting in improved ability to perform functional fwd bending while standing and sitting. (approp and ongoing)  2. Pt will demonstrate increased MedX average isometric strength value  by 30% from initial test resulting in improved ability to perform bending, lifting, and carrying activities safely, confidently.  (approp and ongoing)  3. Pt to demonstrate ability to independently control and reduce their pain through posture positioning and mechanical movements throughout a typical day.  (approp and ongoing)  4.  Pt will demonstrate reduced pain and improved functional outcomes as reported on the FOTO by reaching a limitation score of < or = 41% or less in order to demonstrate subjective improvement in pt's condition.    (approp and ongoing)  5. Pt will demonstrate independence with the HEP at discharge  (approp and ongoing)  6. Pt will be able to walk for 30 minutes without increased pain.  (approp and ongoing)       Plan   Continue with established Plan of Care towards established PT goals.     Josué Araiza, PT  8/17/2022

## 2022-09-22 ENCOUNTER — CLINICAL SUPPORT (OUTPATIENT)
Dept: REHABILITATION | Facility: HOSPITAL | Age: 57
End: 2022-09-22
Payer: COMMERCIAL

## 2022-09-22 DIAGNOSIS — R29.898 WEAKNESS OF BOTH LOWER EXTREMITIES: ICD-10-CM

## 2022-09-22 DIAGNOSIS — M25.69 DECREASED RANGE OF MOTION OF TRUNK AND BACK: Primary | ICD-10-CM

## 2022-09-22 DIAGNOSIS — R29.3 POOR POSTURE: ICD-10-CM

## 2022-09-22 DIAGNOSIS — R29.898 DECREASED STRENGTH OF TRUNK AND BACK: ICD-10-CM

## 2022-09-22 PROCEDURE — 97110 THERAPEUTIC EXERCISES: CPT | Mod: PN | Performed by: PHYSICAL MEDICINE & REHABILITATION

## 2022-09-22 NOTE — PROGRESS NOTES
"[facility]      Name: Cherri Noonan  Clinic Number: [id number]    Therapy Diagnosis:   Encounter Diagnoses   Name Primary?    Decreased range of motion of trunk and back Yes    Decreased strength of trunk and back     Poor posture     Weakness of both lower extremities        Physician: Archana Knott MD    Visit Date: 2022    Physician Orders: PT Eval and Treat   Medical Diagnosis from Referral:   M54.6 (ICD-10-CM) - Midline thoracic back pain, unspecified chronicity   M47.816 (ICD-10-CM) - Spondylosis of lumbar region without myelopathy or radiculopathy      Evaluation Date: 2022  Authorization Period Expiration: 2023  Plan of Care Expiration: 2022  Reassessment Due: 10/22/22  Visit # / Visits authorized: 3/20      Time In: 1:45 pm  Time Out: 2:55  pm  Total Billable Time: 55 minutes    Precautions: Standard spinal cord stimulator    Pattern of pain determined: pattern 1    Subjective   Cherri reports she had a lot of pain a few days.  She assisted her son with moving in [place].  Her pain is always there, 8/10 today.  7/10 after visit.  She has lumbar roll.  She does her stretches.        Patient reports tolerating previous visit with increased soreness  Patient reports their pain to be 7/10 on a 0-10 scale with 0 being no pain and 10 being the worst pain imaginable.  Pain Location: bilateral back, L LE     Occupation: Not currently working  Leisure: Walking, volunteering for her mission/[employer]      Pt goals: "to get moving again"    Objective        Baseline Isometric Testing on Med X equipment: Testing administered by PT  Date of testin2022  ROM 0-42 deg   Max Peak Torque 97    Min Peak Torque 38    Flex/Ext Ratio 2.3:1   % below normative data 53      MOVEMENT LOSS     ROM Loss initial 22   Flexion minimal loss Min loss   Extension moderate loss Min loss   Side glide Right minimal loss Min loss   Side glide Left minimal loss Min loss " "  Rotation Right moderate loss Min loss   Rotation Left moderate loss Min loss       Limitation/Restriction for FOTO lumbar Survey     Therapist reviewed FOTO scores for Cherri Noonan on 8/12/2022.   FOTO documents entered into [employer] - see Media section.     Limitation Score: 51%     Goal: 41%             Treatment    Pt was instructed in and performed the following:     Cherri received therapeutic exercises to develop/improved posture, cardiovascular endurance, muscular endurance, lumbar/cervical ROM, strength and muscular endurance for 58 minutes including the following exercises:     Gentle ext in standing 10 reps  LTR 10x5"  DKTC 10x5"  EIL 10x3"  PPT 15x5"  Bridging x10  SOC 10x5"    [facility] 9/22/2022   Visit Number 3   VAS Pain Rating 8   Treadmill Time (in min.) 5   Speed 2   Lumbar Stretches - Slouch Overcorrection 10   Extension in Lying 10   Flexion in Lying 10   Lumbar Extension Seat Pad -   Femur Restraint -   Top Dead Center -   Counterweight -   Lumbar Flexion -   Lumbar Extension -   Lumbar Peak Torque -   Min Torque -   Test Percent Below Normative Data -   Lumbar Weight 48   Repetitions 20   Rating of Perceived Exertion 4   Ice - Z Lie (in min.) 5      Peripheral muscle strengthening which included 1 set of 15-20 repetitions at a slow, controlled 10-13 second per rep pace focused on strengthening supporting musculature for improved body mechanics and functional mobility.  Pt and therapist focused on proper form during treatment to ensure optimal strengthening of each targeted muscle group.  Machines were utilized including torso rotation, leg extension, leg curl, chest press, upright row. Tricep extension, bicep curl, leg press, and hip abduction and add    Cherri received the following manual therapy techniques: none were applied to the: n/a for 0 minutes.         Home Exercises Provided and Patient Education Provided   Home exercises include: LTR, DKTC EIL, PPT, bridging, " SOC  Cardio program: visit 5  Lifting education date: visit 11  Lumbar roll: she is using    Education provided:   - HEP, POC    Written Home Exercises Provided: Patient instructed to cont prior HEP.  Exercises were reviewed and Cherri was able to demonstrate them prior to the end of the session.  Cherri demonstrated good  understanding of the education provided.     See EMR under Patient Instructions for exercises provided prior visit.          Assessment   Pt presents to second [facility] visit reporting continued complaints of pain, was able to demo HEP with Mod VC for form. Pt was able to continue  strengthening and endurance training on the lumbar and peripheral machines with slightly reduced overall symptoms in back after visit and peripheral machines tolerated well.  Her constant symptoms were slightly reduced with exercise.    Patient is making good progress towards established goals.  Pt will continue to benefit from skilled outpatient physical therapy to address the deficits stated in the impairment chart, provide pt/family education and to maximize pt's level of independence in the home and community environment.     Anticipated Barriers for therapy: chronicity of condition, cervical symptoms  Pt's spiritual, cultural and educational needs considered and pt agreeable to plan of care and goals as stated below:            GOALS: Pt is in agreement with the following goals.     Short term goals:  6 weeks or 10 visits   1.  Pt will demonstrate increased lumbar ROM by at least 6 degrees from the initial ROM value with improvements noted in functional ROM and ability to perform ADLs.  (approp and ongoing)  2.  Pt will demonstrate increased MedX average isometric strength value  by 15% from initial test resulting in improved ability to perform bending, lifting, and carrying activities safely, confidently.  (approp and ongoing)  3.  Patient report a reduction in worst pain score by 1-2 points for improved  tolerance for walking.  (approp and ongoing)  4.  Pt able to perform HEP correctly with minimal cueing or supervision from therapist to encourage independent management of symptoms. (approp and ongoing)        Long term goals: 10 weeks or 20 visits   1. Pt will demonstrate increased lumbar ROM by at least 9 degrees from initial ROM value, resulting in improved ability to perform functional fwd bending while standing and sitting. (approp and ongoing)  2. Pt will demonstrate increased MedX average isometric strength value  by 30% from initial test resulting in improved ability to perform bending, lifting, and carrying activities safely, confidently.  (approp and ongoing)  3. Pt to demonstrate ability to independently control and reduce their pain through posture positioning and mechanical movements throughout a typical day.  (approp and ongoing)  4.  Pt will demonstrate reduced pain and improved functional outcomes as reported on the FOTO by reaching a limitation score of < or = 41% or less in order to demonstrate subjective improvement in pt's condition.    (approp and ongoing)  5. Pt will demonstrate independence with the HEP at discharge  (approp and ongoing)  6. Pt will be able to walk for 30 minutes without increased pain.  (approp and ongoing)       Plan   Continue with established Plan of Care towards established PT goals.     Kae Son, PT  9/22/2022

## 2022-09-28 ENCOUNTER — OFFICE VISIT (OUTPATIENT)
Dept: FAMILY MEDICINE | Facility: CLINIC | Age: 57
End: 2022-09-28
Payer: COMMERCIAL

## 2022-09-28 ENCOUNTER — HOSPITAL ENCOUNTER (OUTPATIENT)
Dept: RADIOLOGY | Facility: HOSPITAL | Age: 57
Discharge: HOME | End: 2022-09-28
Attending: STUDENT IN AN ORGANIZED HEALTH CARE EDUCATION/TRAINING PROGRAM
Payer: COMMERCIAL

## 2022-09-28 VITALS
SYSTOLIC BLOOD PRESSURE: 118 MMHG | HEIGHT: 64 IN | OXYGEN SATURATION: 99 % | TEMPERATURE: 99 F | DIASTOLIC BLOOD PRESSURE: 66 MMHG | BODY MASS INDEX: 27.27 KG/M2 | WEIGHT: 159.75 LBS | HEART RATE: 74 BPM

## 2022-09-28 DIAGNOSIS — Z23 FLU VACCINE NEED: Primary | ICD-10-CM

## 2022-09-28 DIAGNOSIS — R22.1 NECK MASS: ICD-10-CM

## 2022-09-28 DIAGNOSIS — Z23 NEED FOR TD VACCINE: ICD-10-CM

## 2022-09-28 PROCEDURE — 3078F PR MOST RECENT DIASTOLIC BLOOD PRESSURE < 80 MM HG: ICD-10-PCS | Mod: CPTII,S$GLB,, | Performed by: STUDENT IN AN ORGANIZED HEALTH CARE EDUCATION/TRAINING PROGRAM

## 2022-09-28 PROCEDURE — 76536 US EXAM OF HEAD AND NECK: CPT | Mod: TC

## 2022-09-28 PROCEDURE — 90686 IIV4 VACC NO PRSV 0.5 ML IM: CPT | Mod: S$GLB,,, | Performed by: STUDENT IN AN ORGANIZED HEALTH CARE EDUCATION/TRAINING PROGRAM

## 2022-09-28 PROCEDURE — 3044F PR MOST RECENT HEMOGLOBIN A1C LEVEL <7.0%: ICD-10-PCS | Mod: CPTII,S$GLB,, | Performed by: STUDENT IN AN ORGANIZED HEALTH CARE EDUCATION/TRAINING PROGRAM

## 2022-09-28 PROCEDURE — 99213 OFFICE O/P EST LOW 20 MIN: CPT | Mod: 25,S$GLB,, | Performed by: STUDENT IN AN ORGANIZED HEALTH CARE EDUCATION/TRAINING PROGRAM

## 2022-09-28 PROCEDURE — 1159F PR MEDICATION LIST DOCUMENTED IN MEDICAL RECORD: ICD-10-PCS | Mod: CPTII,S$GLB,, | Performed by: STUDENT IN AN ORGANIZED HEALTH CARE EDUCATION/TRAINING PROGRAM

## 2022-09-28 PROCEDURE — 90686 FLU VACCINE (QUAD) GREATER THAN OR EQUAL TO 3YO PRESERVATIVE FREE IM: ICD-10-PCS | Mod: S$GLB,,, | Performed by: STUDENT IN AN ORGANIZED HEALTH CARE EDUCATION/TRAINING PROGRAM

## 2022-09-28 PROCEDURE — 3078F DIAST BP <80 MM HG: CPT | Mod: CPTII,S$GLB,, | Performed by: STUDENT IN AN ORGANIZED HEALTH CARE EDUCATION/TRAINING PROGRAM

## 2022-09-28 PROCEDURE — 90714 TD VACCINE GREATER THAN OR EQUAL TO 7YO PRESERVATIVE FREE IM: ICD-10-PCS | Mod: S$GLB,,, | Performed by: STUDENT IN AN ORGANIZED HEALTH CARE EDUCATION/TRAINING PROGRAM

## 2022-09-28 PROCEDURE — 90471 FLU VACCINE (QUAD) GREATER THAN OR EQUAL TO 3YO PRESERVATIVE FREE IM: ICD-10-PCS | Mod: S$GLB,,, | Performed by: STUDENT IN AN ORGANIZED HEALTH CARE EDUCATION/TRAINING PROGRAM

## 2022-09-28 PROCEDURE — 3044F HG A1C LEVEL LT 7.0%: CPT | Mod: CPTII,S$GLB,, | Performed by: STUDENT IN AN ORGANIZED HEALTH CARE EDUCATION/TRAINING PROGRAM

## 2022-09-28 PROCEDURE — 90472 IMMUNIZATION ADMIN EACH ADD: CPT | Mod: S$GLB,,, | Performed by: STUDENT IN AN ORGANIZED HEALTH CARE EDUCATION/TRAINING PROGRAM

## 2022-09-28 PROCEDURE — 99999 PR PBB SHADOW E&M-EST. PATIENT-LVL V: ICD-10-PCS | Mod: PBBFAC,,, | Performed by: STUDENT IN AN ORGANIZED HEALTH CARE EDUCATION/TRAINING PROGRAM

## 2022-09-28 PROCEDURE — 90472 TD VACCINE GREATER THAN OR EQUAL TO 7YO PRESERVATIVE FREE IM: ICD-10-PCS | Mod: S$GLB,,, | Performed by: STUDENT IN AN ORGANIZED HEALTH CARE EDUCATION/TRAINING PROGRAM

## 2022-09-28 PROCEDURE — 3008F PR BODY MASS INDEX (BMI) DOCUMENTED: ICD-10-PCS | Mod: CPTII,S$GLB,, | Performed by: STUDENT IN AN ORGANIZED HEALTH CARE EDUCATION/TRAINING PROGRAM

## 2022-09-28 PROCEDURE — 90714 TD VACC NO PRESV 7 YRS+ IM: CPT | Mod: S$GLB,,, | Performed by: STUDENT IN AN ORGANIZED HEALTH CARE EDUCATION/TRAINING PROGRAM

## 2022-09-28 PROCEDURE — 76536 US EXAM OF HEAD AND NECK: CPT | Mod: 26,,, | Performed by: RADIOLOGY

## 2022-09-28 PROCEDURE — 1159F MED LIST DOCD IN RCRD: CPT | Mod: CPTII,S$GLB,, | Performed by: STUDENT IN AN ORGANIZED HEALTH CARE EDUCATION/TRAINING PROGRAM

## 2022-09-28 PROCEDURE — 99999 PR PBB SHADOW E&M-EST. PATIENT-LVL V: CPT | Mod: PBBFAC,,, | Performed by: STUDENT IN AN ORGANIZED HEALTH CARE EDUCATION/TRAINING PROGRAM

## 2022-09-28 PROCEDURE — 99213 PR OFFICE/OUTPT VISIT, EST, LEVL III, 20-29 MIN: ICD-10-PCS | Mod: 25,S$GLB,, | Performed by: STUDENT IN AN ORGANIZED HEALTH CARE EDUCATION/TRAINING PROGRAM

## 2022-09-28 PROCEDURE — 3074F PR MOST RECENT SYSTOLIC BLOOD PRESSURE < 130 MM HG: ICD-10-PCS | Mod: CPTII,S$GLB,, | Performed by: STUDENT IN AN ORGANIZED HEALTH CARE EDUCATION/TRAINING PROGRAM

## 2022-09-28 PROCEDURE — 3074F SYST BP LT 130 MM HG: CPT | Mod: CPTII,S$GLB,, | Performed by: STUDENT IN AN ORGANIZED HEALTH CARE EDUCATION/TRAINING PROGRAM

## 2022-09-28 PROCEDURE — 90471 IMMUNIZATION ADMIN: CPT | Mod: S$GLB,,, | Performed by: STUDENT IN AN ORGANIZED HEALTH CARE EDUCATION/TRAINING PROGRAM

## 2022-09-28 PROCEDURE — 3008F BODY MASS INDEX DOCD: CPT | Mod: CPTII,S$GLB,, | Performed by: STUDENT IN AN ORGANIZED HEALTH CARE EDUCATION/TRAINING PROGRAM

## 2022-09-28 PROCEDURE — 76536 US SOFT TISSUE HEAD NECK THYROID: ICD-10-PCS | Mod: 26,,, | Performed by: RADIOLOGY

## 2022-09-28 NOTE — PROGRESS NOTES
09/28/2022    Cherri Noonan  [id number]    CC: lump on neck    HPI  Noticed lump on neck in the last few days. Not associated with trauma. Really bothersome to patient    Answers submitted by the patient for this visit:  Review of Systems Questionnaire (Submitted on 9/28/2022)  activity change: No  unexpected weight change: No  neck pain: No  hearing loss: No  rhinorrhea: No  trouble swallowing: No  eye discharge: No  visual disturbance: No  chest tightness: No  wheezing: No  chest pain: No  palpitations: No  blood in stool: No  constipation: No  vomiting: No  diarrhea: No  polydipsia: No  polyuria: No  difficulty urinating: No  hematuria: No  menstrual problem: No  dysuria: No  joint swelling: No  arthralgias: No  headaches: No  weakness: No  confusion: No  dysphoric mood: No    Social History     Socioeconomic History    Marital status:    Tobacco Use    Smoking status: Never    Smokeless tobacco: Never   Substance and Sexual Activity    Alcohol use: Never    Drug use: Never    Sexual activity: Yes     Partners: Male     Birth control/protection: None     Comment:  38+years           Current Outpatient Medications:     atorvastatin (LIPITOR) 20 MG tablet, Take 20 mg by mouth once daily., Disp: , Rfl:     azelastine 205.5 mcg (0.15 %) Spry, azelastine 205.5 mcg (0.15 %) nasal spray, Disp: , Rfl:     b complex vitamins tablet, Take 1 tablet by mouth once daily., Disp: , Rfl:     cephALEXin (KEFLEX) 500 MG capsule, Take 1 capsule (500 mg total) by mouth 2 (two) times daily., Disp: 20 capsule, Rfl: 0    ciclopirox (LOPROX) 0.77 % Susp, Apply topically 2 (two) times daily., Disp: , Rfl:     ergocalciferol (ERGOCALCIFEROL) 50,000 unit Cap, Take 50,000 Units by mouth every 7 days., Disp: , Rfl:     estradioL (ESTRACE) 0.01 % (0.1 mg/gram) vaginal cream, Place 0.5 g vaginally., Disp: , Rfl:     fluticasone propionate (FLONASE) 50 mcg/actuation nasal spray, 1 spray by Each Nostril route once daily., Disp: ,  Rfl:     ginseng 250 mg Cap, Take by mouth., Disp: , Rfl:     meloxicam (MOBIC) 15 MG tablet, Take 15 mg by mouth daily as needed., Disp: , Rfl:     multivitamin capsule, Take 1 capsule by mouth once daily., Disp: , Rfl:     naproxen (NAPROSYN) 500 MG tablet, Take 1 tablet (500 mg total) by mouth 2 (two) times daily as needed (pain)., Disp: 30 tablet, Rfl: 0    omega-3 fatty acids/fish oil (FISH OIL-OMEGA-3 FATTY ACIDS) 300-1,000 mg capsule, Take by mouth once daily., Disp: , Rfl:     omeprazole (PRILOSEC) 20 MG capsule, Take 20 mg by mouth once daily., Disp: , Rfl:     potassium gluconate 595 mg (99 mg) TbSR, Take by mouth once., Disp: , Rfl:     tiZANidine (ZANAFLEX) 4 MG tablet, Take 4 mg by mouth nightly as needed., Disp: , Rfl:     tumeric-ging-olive-oreg-capryl 100 mg-150 mg- 50 mg-150 mg Cap, Take by mouth., Disp: , Rfl:     vitamin E 100 UNIT capsule, Take 100 Units by mouth once daily., Disp: , Rfl:     zinc gluconate 50 mg tablet, Take 50 mg by mouth once daily., Disp: , Rfl:     zonisamide (ZONEGRAN) 100 MG Cap, TAKE 1 CAPSULE BY MOUTH AT BEDTIME X3 DAYS THEN 2 AT BEDTIME X3 DAYS THEN 3 AT BEDTIME X3 DAYS THEN 4 AT BEDTIME TO FOLLOW. STAY AT THE MOST COMFORTABLE DOSE., Disp: 120 capsule, Rfl: 1      Physical Exam  Vitals:    09/28/22 0930   BP: 118/66   Pulse: 74   Temp: 98.7 °F (37.1 °C)       Physical Exam  Chest:          Comments: 2cm soft mobile mass nontender to touch        Gen: well appearing, NAD      1. Need for Td vaccine  - (In Office Administered) Td Vaccine - Preservative Free    2. Flu vaccine need  - Influenza - Quadrivalent *Preferred* (6 months+) (PF)    3. Neck mass  - US Soft Tissue Head Neck Thyroid; Future  Feels consistent with a lipoma    Leyda King MD  Family Medicine

## 2022-09-28 NOTE — PROGRESS NOTES
Patient given flu and td vaccines via injection. 0 complaints of, tolerated well. VIS given & advised to wait in lobby 15 mins for monitoring. Verbalized understanding.

## 2022-09-29 ENCOUNTER — CLINICAL SUPPORT (OUTPATIENT)
Dept: REHABILITATION | Facility: HOSPITAL | Age: 57
End: 2022-09-29
Payer: COMMERCIAL

## 2022-09-29 ENCOUNTER — TELEPHONE (OUTPATIENT)
Dept: FAMILY MEDICINE | Facility: CLINIC | Age: 57
End: 2022-09-29
Payer: COMMERCIAL

## 2022-09-29 DIAGNOSIS — R29.3 POOR POSTURE: ICD-10-CM

## 2022-09-29 DIAGNOSIS — M25.69 DECREASED RANGE OF MOTION OF TRUNK AND BACK: Primary | ICD-10-CM

## 2022-09-29 DIAGNOSIS — R29.898 DECREASED STRENGTH OF TRUNK AND BACK: ICD-10-CM

## 2022-09-29 DIAGNOSIS — R29.898 WEAKNESS OF BOTH LOWER EXTREMITIES: ICD-10-CM

## 2022-09-29 PROCEDURE — 97110 THERAPEUTIC EXERCISES: CPT | Mod: PN | Performed by: PHYSICAL MEDICINE & REHABILITATION

## 2022-09-29 NOTE — PROGRESS NOTES
"[facility]      Name: Cherri Noonan  Clinic Number: [id number]    Therapy Diagnosis:   Encounter Diagnoses   Name Primary?    Decreased range of motion of trunk and back Yes    Decreased strength of trunk and back     Poor posture     Weakness of both lower extremities        Physician: Archana Knott MD    Visit Date: 2022    Physician Orders: PT Eval and Treat   Medical Diagnosis from Referral:   M54.6 (ICD-10-CM) - Midline thoracic back pain, unspecified chronicity   M47.816 (ICD-10-CM) - Spondylosis of lumbar region without myelopathy or radiculopathy      Evaluation Date: 2022  Authorization Period Expiration: 2023  Plan of Care Expiration: 2022  Reassessment Due: 10/22/22  Visit # / Visits authorized:       Time In: 2:00 pm  Time Out: 3:55  pm  Total Billable Time: 55 minutes    Precautions: Standard spinal cord stimulator    Pattern of pain determined: pattern 1    Subjective   Cherri reports she feels good today.  She has walked and prayed every morning with a  group.  She plans to keep up the walking outside.  Cardio information given today as she is already on board with this.  Her goal is 4-5/week.  She has lumbar roll.  She does her stretches.        Patient reports tolerating previous visit with increased soreness  Patient reports their pain to be 0/10 on a 0-10 scale with 0 being no pain and 10 being the worst pain imaginable.  Pain Location: bilateral back, L LE     Occupation: Not currently working  Leisure: Walking, volunteering for her mission/[employer]      Pt goals: "to get moving again"    Objective        Baseline Isometric Testing on Med X equipment: Testing administered by PT  Date of testin2022  ROM 0-42 deg   Max Peak Torque 97    Min Peak Torque 38    Flex/Ext Ratio 2.3:1   % below normative data 53      MOVEMENT LOSS     ROM Loss initial 22   Flexion minimal loss Min loss   Extension moderate loss Min loss   Side glide " "Right minimal loss Min loss   Side glide Left minimal loss Min loss   Rotation Right moderate loss Min loss   Rotation Left moderate loss Min loss       Limitation/Restriction for FOTO lumbar Survey     Therapist reviewed FOTO scores for Cherri Noonan on 8/12/2022.   FOTO documents entered into [employer] - see Media section.     Limitation Score: 51%     Goal: 41%             Treatment    Pt was instructed in and performed the following:     Cherri received therapeutic exercises to develop/improved posture, cardiovascular endurance, muscular endurance, lumbar/cervical ROM, strength and muscular endurance for 58 minutes including the following exercises:     Gentle ext in standing 10 reps  LTR 10x5"  DKTC 10x5"  EIL 10x3"  PPT 15x5"  Bridging x10 with red theraband in hands pull down  SOC 10x5"  Open books 10 reps  Clams 10 reps bilat    Cardio info given    [facility] 9/29/2022   Visit Number 4   VAS Pain Rating 0   Treadmill Time (in min.) 8   Speed 2   Lumbar Stretches - Slouch Overcorrection 10   Extension in Lying 10   Flexion in Lying 10   Lumbar Extension Seat Pad -   Femur Restraint -   Top Dead Center -   Counterweight -   Lumbar Flexion -   Lumbar Extension -   Lumbar Peak Torque -   Min Torque -   Test Percent Below Normative Data -   Lumbar Weight 52   Repetitions 20   Rating of Perceived Exertion 4   Ice - Z Lie (in min.) 5      Peripheral muscle strengthening which included 1 set of 15-20 repetitions at a slow, controlled 10-13 second per rep pace focused on strengthening supporting musculature for improved body mechanics and functional mobility.  Pt and therapist focused on proper form during treatment to ensure optimal strengthening of each targeted muscle group.  Machines were utilized including torso rotation, leg extension, leg curl, chest press, upright row. Tricep extension, bicep curl, leg press, and hip abduction and add    Cherri received the following manual therapy techniques: none " were applied to the: n/a for 0 minutes.         Home Exercises Provided and Patient Education Provided   Home exercises include: LTR, DKTC EIL, PPT, bridging, SOC  Cardio program: visit 4 given 9/29/22 with goal walking outside 4/week  Lifting education date: visit 11  Lumbar roll: she is using    Education provided:   - HEP, POC  -encouraged continued walking program and cardio info given    Written Home Exercises Provided: Patient instructed to cont prior HEP.  Walking program given  Exercises were reviewed and Cherri was able to demonstrate them prior to the end of the session.  Cherri demonstrated good  understanding of the education provided.     See EMR under Patient Instructions for exercises provided prior visit.          Assessment   Pt presents healthy back visit reporting feeling good.  She is walking regularly and cardio information given to support her goals today.  She had no pain and tolerated visit well.  Great demonstration of  HEP showing she is motivated and consistent.  Pt was able to continue  strengthening and endurance training on the lumbar and peripheral machines with no overall symptoms in back after visit and peripheral machines tolerated well.  She tolerated increase in medx weight well today.    Patient is making good progress towards established goals.  Pt will continue to benefit from skilled outpatient physical therapy to address the deficits stated in the impairment chart, provide pt/family education and to maximize pt's level of independence in the home and community environment.     Anticipated Barriers for therapy: chronicity of condition, cervical symptoms  Pt's spiritual, cultural and educational needs considered and pt agreeable to plan of care and goals as stated below:            GOALS: Pt is in agreement with the following goals.     Short term goals:  6 weeks or 10 visits   1.  Pt will demonstrate increased lumbar ROM by at least 6 degrees from the initial ROM value with  improvements noted in functional ROM and ability to perform ADLs.  (approp and ongoing)  2.  Pt will demonstrate increased MedX average isometric strength value  by 15% from initial test resulting in improved ability to perform bending, lifting, and carrying activities safely, confidently.  (approp and ongoing)  3.  Patient report a reduction in worst pain score by 1-2 points for improved tolerance for walking.  (approp and ongoing)  4.  Pt able to perform HEP correctly with minimal cueing or supervision from therapist to encourage independent management of symptoms. (approp and ongoing)        Long term goals: 10 weeks or 20 visits   1. Pt will demonstrate increased lumbar ROM by at least 9 degrees from initial ROM value, resulting in improved ability to perform functional fwd bending while standing and sitting. (approp and ongoing)  2. Pt will demonstrate increased MedX average isometric strength value  by 30% from initial test resulting in improved ability to perform bending, lifting, and carrying activities safely, confidently.  (approp and ongoing)  3. Pt to demonstrate ability to independently control and reduce their pain through posture positioning and mechanical movements throughout a typical day.  (approp and ongoing)  4.  Pt will demonstrate reduced pain and improved functional outcomes as reported on the FOTO by reaching a limitation score of < or = 41% or less in order to demonstrate subjective improvement in pt's condition.    (approp and ongoing)  5. Pt will demonstrate independence with the HEP at discharge  (approp and ongoing)  6. Pt will be able to walk for 30 minutes without increased pain.  (approp and ongoing)       Plan   Continue with established Plan of Care towards established PT goals.     Kae Son, PT  9/29/2022

## 2022-09-29 NOTE — TELEPHONE ENCOUNTER
----- Message from Leyda King MD sent at 9/29/2022  7:22 AM CDT -----  Please let pt know that the ultrasound did not detect any abnormalities.

## 2022-10-06 ENCOUNTER — CLINICAL SUPPORT (OUTPATIENT)
Dept: REHABILITATION | Facility: HOSPITAL | Age: 57
End: 2022-10-06
Payer: COMMERCIAL

## 2022-10-06 DIAGNOSIS — M25.69 DECREASED RANGE OF MOTION OF TRUNK AND BACK: Primary | ICD-10-CM

## 2022-10-06 DIAGNOSIS — R29.3 POOR POSTURE: ICD-10-CM

## 2022-10-06 DIAGNOSIS — R29.898 WEAKNESS OF BOTH LOWER EXTREMITIES: ICD-10-CM

## 2022-10-06 DIAGNOSIS — R29.898 DECREASED STRENGTH OF TRUNK AND BACK: ICD-10-CM

## 2022-10-06 PROCEDURE — 97110 THERAPEUTIC EXERCISES: CPT | Mod: PN | Performed by: PHYSICAL MEDICINE & REHABILITATION

## 2022-10-06 NOTE — PROGRESS NOTES
"[facility]      Name: Cherri Noonan  Clinic Number: [id number]    Therapy Diagnosis:   Encounter Diagnoses   Name Primary?    Decreased range of motion of trunk and back Yes    Decreased strength of trunk and back     Poor posture     Weakness of both lower extremities          Physician: Archana Knott MD    Visit Date: 10/6/2022    Physician Orders: PT Eval and Treat   Medical Diagnosis from Referral:   M54.6 (ICD-10-CM) - Midline thoracic back pain, unspecified chronicity   M47.816 (ICD-10-CM) - Spondylosis of lumbar region without myelopathy or radiculopathy      Evaluation Date: 2022  Authorization Period Expiration: 2023  Plan of Care Expiration: 2022  Reassessment Due: 22  Visit # / Visits authorized:       Time In: 1:00 pm  Time Out: 1:55  pm  Total Billable Time: 55 minutes    Precautions: Standard spinal cord stimulator    Pattern of pain determined: pattern 1    Subjective   Cherri reports she feels good today.  She has walked and prayed every morning with a  group.  She walked 5 miles today. She plans to keep up the walking outside.  She has lost 20 lbs, and has been working hard on her wellbeing. She has lumbar roll.  She does her stretches.   She knows about health coaching and to let us know if she wants to utilize this.     Patient reports tolerating previous visit with increased soreness  Patient reports their pain to be 0/10 on a 0-10 scale with 0 being no pain and 10 being the worst pain imaginable.  Pain Location: bilateral back, L LE     Occupation: Not currently working  Leisure: Walking, volunteering for her mission/[employer]      Pt goals: "to get moving again"    Objective        Baseline Isometric Testing on Med X equipment: Testing administered by PT  Date of testin2022  ROM 0-42 deg   Max Peak Torque 97    Min Peak Torque 38    Flex/Ext Ratio 2.3:1   % below normative data 53      MOVEMENT LOSS     ROM Loss initial 10/6/22 " "  Flexion minimal loss Min loss   Extension moderate loss Min loss   Side glide Right minimal loss Min loss   Side glide Left minimal loss Min loss   Rotation Right moderate loss Min loss   Rotation Left moderate loss Min loss       Limitation/Restriction for FOTO lumbar Survey     Therapist reviewed FOTO scores for Cherri Noonan on 8/12/2022.   FOTO documents entered into [employer] - see Media section.     Limitation Score: 51% limited  Visit 5- 56 % limited, however patient reporting improved walking (5 miles and function)     Goal: 41%             Treatment    Pt was instructed in and performed the following:     Cherri received therapeutic exercises to develop/improved posture, cardiovascular endurance, muscular endurance, lumbar/cervical ROM, strength and muscular endurance for 58 minutes including the following exercises:     Gentle ext in standing 10 reps  LTR 10x5"  DKTC 10x5"  EIL 10x3"  PPT 15x5"  Bridging x10 with red theraband in hands pull down and ball squeeze  Resisting therapist moving ball between knees 10 reps with core engaged  SOC 10x5"  Open books 10 reps  Clams 10 reps bilat    [facility] 10/6/2022   Visit Number 5   VAS Pain Rating 0   Treadmill Time (in min.) 8   Speed 1.6   Lumbar Stretches - Slouch Overcorrection 10   Extension in Lying 10   Flexion in Lying 10   Lumbar Extension Seat Pad -   Femur Restraint -   Top Dead Center -   Counterweight -   Lumbar Flexion -   Lumbar Extension -   Lumbar Peak Torque -   Min Torque -   Test Percent Below Normative Data -   Lumbar Weight 55   Repetitions 20   Rating of Perceived Exertion 4   Ice - Z Lie (in min.) 5      Peripheral muscle strengthening which included 1 set of 15-20 repetitions at a slow, controlled 10-13 second per rep pace focused on strengthening supporting musculature for improved body mechanics and functional mobility.  Pt and therapist focused on proper form during treatment to ensure optimal strengthening of each targeted " muscle group.  Machines were utilized including torso rotation, leg extension, leg curl, chest press, upright row. Tricep extension, bicep curl, leg press, and hip abduction and add    Cherri received the following manual therapy techniques: none were applied to the: n/a for 0 minutes.         Home Exercises Provided and Patient Education Provided   Home exercises include: LTR, DKTC EIL, PPT, bridging, SOC  Cardio program: visit 4 given 9/29/22 with goal walking outside 4/week  Lifting education date: visit 11  Lumbar roll: she is using    Education provided:   - HEP, POC  -encouraged continued walking program and cardio info given last visit  -discussion about positive self talk when exercising    Written Home Exercises Provided: Patient instructed to cont prior HEP.  Walking program given  Exercises were reviewed and Cherri was able to demonstrate them prior to the end of the session.  Cherri demonstrated good  understanding of the education provided.     See EMR under Patient Instructions for exercises provided prior visit.          Assessment   Pt presents healthy back visit reporting feeling good.  She is walking regularly and she walked 5 miles today.  She knows about health coaching and knows to let us know if she is interested, but she has making very good wellness choices on her own and feels motivated to do so.   She had no pain and tolerated visit well.  Great demonstration of  HEP showing she is motivated and consistent.  Pt was able to continue  strengthening and endurance training on the lumbar and peripheral machines with no overall symptoms in back after visit and peripheral machines tolerated well.  She tolerated increase in medx weight well today.  Foto score shows reduction in function but she reports improvement in function, and demonstrated this with range and weights tolerated as well.    Patient is making good progress towards established goals.  Pt will continue to benefit from skilled  outpatient physical therapy to address the deficits stated in the impairment chart, provide pt/family education and to maximize pt's level of independence in the home and community environment.     Anticipated Barriers for therapy: chronicity of condition, cervical symptoms  Pt's spiritual, cultural and educational needs considered and pt agreeable to plan of care and goals as stated below:            GOALS: Pt is in agreement with the following goals.     Short term goals:  6 weeks or 10 visits   1.  Pt will demonstrate increased lumbar ROM by at least 6 degrees from the initial ROM value with improvements noted in functional ROM and ability to perform ADLs.  (approp and ongoing)  2.  Pt will demonstrate increased MedX average isometric strength value  by 15% from initial test resulting in improved ability to perform bending, lifting, and carrying activities safely, confidently.  (approp and ongoing)  3.  Patient report a reduction in worst pain score by 1-2 points for improved tolerance for walking.  (approp and ongoing)  4.  Pt able to perform HEP correctly with minimal cueing or supervision from therapist to encourage independent management of symptoms. (approp and ongoing)        Long term goals: 10 weeks or 20 visits   1. Pt will demonstrate increased lumbar ROM by at least 9 degrees from initial ROM value, resulting in improved ability to perform functional fwd bending while standing and sitting. (approp and ongoing)  2. Pt will demonstrate increased MedX average isometric strength value  by 30% from initial test resulting in improved ability to perform bending, lifting, and carrying activities safely, confidently.  (approp and ongoing)  3. Pt to demonstrate ability to independently control and reduce their pain through posture positioning and mechanical movements throughout a typical day.  (approp and ongoing)  4.  Pt will demonstrate reduced pain and improved functional outcomes as reported on the FOTO by  reaching a limitation score of < or = 41% or less in order to demonstrate subjective improvement in pt's condition.    (approp and ongoing)  5. Pt will demonstrate independence with the HEP at discharge  (approp and ongoing)  6. Pt will be able to walk for 30 minutes without increased pain.  (approp and ongoing)       Plan   Continue with established Plan of Care towards established PT goals.     Kae Son, PT  10/6/2022

## 2022-10-10 ENCOUNTER — CLINICAL SUPPORT (OUTPATIENT)
Dept: REHABILITATION | Facility: HOSPITAL | Age: 57
End: 2022-10-10
Payer: COMMERCIAL

## 2022-10-10 DIAGNOSIS — R29.898 DECREASED STRENGTH OF TRUNK AND BACK: ICD-10-CM

## 2022-10-10 DIAGNOSIS — R29.898 WEAKNESS OF BOTH LOWER EXTREMITIES: ICD-10-CM

## 2022-10-10 DIAGNOSIS — R29.3 POOR POSTURE: ICD-10-CM

## 2022-10-10 DIAGNOSIS — M25.69 DECREASED RANGE OF MOTION OF TRUNK AND BACK: Primary | ICD-10-CM

## 2022-10-10 PROCEDURE — 97110 THERAPEUTIC EXERCISES: CPT | Mod: PN

## 2022-10-10 NOTE — PROGRESS NOTES
"[facility]      Name: Cherri Noonan  Clinic Number: [id number]    Therapy Diagnosis:   Encounter Diagnoses   Name Primary?    Decreased range of motion of trunk and back Yes    Decreased strength of trunk and back     Poor posture     Weakness of both lower extremities          Physician: Archana Knott MD    Visit Date: 10/10/2022    Physician Orders: PT Eval and Treat   Medical Diagnosis from Referral:   M54.6 (ICD-10-CM) - Midline thoracic back pain, unspecified chronicity   M47.816 (ICD-10-CM) - Spondylosis of lumbar region without myelopathy or radiculopathy      Evaluation Date: 2022  Authorization Period Expiration: 2023  Plan of Care Expiration: 2022  Reassessment Due: 22  Visit # / Visits authorized:       Time In: 1:30 pm  Time Out: 2:50  pm  Total Billable Time: 60 minutes    Precautions: Standard spinal cord stimulator    Pattern of pain determined: pattern 1    Subjective   Cherri reports she woke up with pain and is currently having pain. She is still feeling overall optimistic. She cleaned up a rental property over the weekend and sorted some clothes today. She is good as long as she is moving but sitting still causes the shooting pain in the back.        Patient reports tolerating previous visit with increased soreness  Patient reports their pain to be 0/10 on a 0-10 scale with 0 being no pain and 10 being the worst pain imaginable.  Pain Location: bilateral back, L LE     Occupation: Not currently working  Leisure: Walking, volunteering for her mission/[employer]      Pt goals: "to get moving again"    Objective        Baseline Isometric Testing on Med X equipment: Testing administered by PT  Date of testin2022  ROM 0-42 deg   Max Peak Torque 97    Min Peak Torque 38    Flex/Ext Ratio 2.3:1   % below normative data 53      MOVEMENT LOSS     ROM Loss initial 10/6/22   Flexion minimal loss Min loss   Extension moderate loss Min loss " "  Side glide Right minimal loss Min loss   Side glide Left minimal loss Min loss   Rotation Right moderate loss Min loss   Rotation Left moderate loss Min loss       Limitation/Restriction for FOTO lumbar Survey     Therapist reviewed FOTO scores for Cherri Noonan on 8/12/2022.   FOTO documents entered into [employer] - see Media section.     Limitation Score: 51% limited  Visit 5- 56 % limited, however patient reporting improved walking (5 miles and function)     Goal: 41%             Treatment    Pt was instructed in and performed the following:     Cherri received therapeutic exercises to develop/improved posture, cardiovascular endurance, muscular endurance, lumbar/cervical ROM, strength and muscular endurance for 58 minutes including the following exercises:     Gentle ext in standing 10 reps  LTR 10x5"  DKTC 10x5"  EIL 10x3"  PPT 15x5"  Bridging x10 with red theraband in hands pull down and ball squeeze  Resisting therapist moving ball between knees 10 reps with core engaged  SOC 10x5"  Open books 10 reps  Clams 10 reps bilat    [facility] 10/10/2022   Visit Number 6   VAS Pain Rating 9   Treadmill Time (in min.) 10   Speed 1.5   Lumbar Stretches - Slouch Overcorrection 10   Extension in Lying 10   Flexion in Lying 10   Lumbar Extension Seat Pad -   Femur Restraint -   Top Dead Center -   Counterweight -   Lumbar Flexion -   Lumbar Extension -   Lumbar Peak Torque -   Min Torque -   Test Percent Below Normative Data -   Lumbar Weight 58   Repetitions 15   Rating of Perceived Exertion 3   Ice - Z Lie (in min.) 5        Peripheral muscle strengthening which included 1 set of 15-20 repetitions at a slow, controlled 10-13 second per rep pace focused on strengthening supporting musculature for improved body mechanics and functional mobility.  Pt and therapist focused on proper form during treatment to ensure optimal strengthening of each targeted muscle group.  Machines were utilized including torso " rotation, leg extension, leg curl, chest press, upright row. Tricep extension, bicep curl, leg press, and hip abduction and add    Cherri received the following manual therapy techniques: none were applied to the: n/a for 0 minutes.         Home Exercises Provided and Patient Education Provided   Home exercises include: LTR, DKTC EIL, PPT, bridging, SOC  Cardio program: visit 4 given 9/29/22 with goal walking outside 4/week  Lifting education date: visit 11  Lumbar roll: she is using    Education provided:   - HEP, POC  -encouraged continued walking program and cardio info given last visit  -discussion about positive self talk when exercising    Written Home Exercises Provided: Patient instructed to cont prior HEP.  Walking program given  Exercises were reviewed and Cherri was able to demonstrate them prior to the end of the session.  Cherri demonstrated good  understanding of the education provided.     See EMR under Patient Instructions for exercises provided prior visit.          Assessment   Pt presents healthy back visit reporting feeling good but having some pain. She was able to complete warm up and exercises with no verbal complaints of pain. Resumed stretches and she has good recall of exercises with some challenge with core stability exercises. Increased resistance on lumbar medx and she was able to complete 15 repetitions at 3/10 RPE and peripheral machines with moderate challenge.     Patient is making excellent progress towards established goals.  Pt will continue to benefit from skilled outpatient physical therapy to address the deficits stated in the impairment chart, provide pt/family education and to maximize pt's level of independence in the home and community environment.     Anticipated Barriers for therapy: chronicity of condition, cervical symptoms  Pt's spiritual, cultural and educational needs considered and pt agreeable to plan of care and goals as stated below:            GOALS: Pt is in  agreement with the following goals.     Short term goals:  6 weeks or 10 visits   1.  Pt will demonstrate increased lumbar ROM by at least 6 degrees from the initial ROM value with improvements noted in functional ROM and ability to perform ADLs.  (approp and ongoing)  2.  Pt will demonstrate increased MedX average isometric strength value  by 15% from initial test resulting in improved ability to perform bending, lifting, and carrying activities safely, confidently.  (approp and ongoing)  3.  Patient report a reduction in worst pain score by 1-2 points for improved tolerance for walking.  (approp and ongoing)  4.  Pt able to perform HEP correctly with minimal cueing or supervision from therapist to encourage independent management of symptoms. (approp and ongoing)        Long term goals: 10 weeks or 20 visits   1. Pt will demonstrate increased lumbar ROM by at least 9 degrees from initial ROM value, resulting in improved ability to perform functional fwd bending while standing and sitting. (approp and ongoing)  2. Pt will demonstrate increased MedX average isometric strength value  by 30% from initial test resulting in improved ability to perform bending, lifting, and carrying activities safely, confidently.  (approp and ongoing)  3. Pt to demonstrate ability to independently control and reduce their pain through posture positioning and mechanical movements throughout a typical day.  (approp and ongoing)  4.  Pt will demonstrate reduced pain and improved functional outcomes as reported on the FOTO by reaching a limitation score of < or = 41% or less in order to demonstrate subjective improvement in pt's condition.    (approp and ongoing)  5. Pt will demonstrate independence with the HEP at discharge  (approp and ongoing)  6. Pt will be able to walk for 30 minutes without increased pain.  (approp and ongoing)       Plan   Continue with established Plan of Care towards established PT goals.     Filipe De La Cruz,  PT  10/10/2022

## 2022-10-13 ENCOUNTER — CLINICAL SUPPORT (OUTPATIENT)
Dept: REHABILITATION | Facility: HOSPITAL | Age: 57
End: 2022-10-13
Payer: COMMERCIAL

## 2022-10-13 DIAGNOSIS — R29.3 POOR POSTURE: ICD-10-CM

## 2022-10-13 DIAGNOSIS — R29.898 WEAKNESS OF BOTH LOWER EXTREMITIES: ICD-10-CM

## 2022-10-13 DIAGNOSIS — R29.898 DECREASED STRENGTH OF TRUNK AND BACK: ICD-10-CM

## 2022-10-13 DIAGNOSIS — M25.69 DECREASED RANGE OF MOTION OF TRUNK AND BACK: Primary | ICD-10-CM

## 2022-10-13 PROCEDURE — 97110 THERAPEUTIC EXERCISES: CPT | Mod: PN,CQ

## 2022-10-13 NOTE — PROGRESS NOTES
"[facility]      Name: Cherri Noonan  Clinic Number: [id number]    Therapy Diagnosis:   Encounter Diagnoses   Name Primary?    Decreased range of motion of trunk and back Yes    Decreased strength of trunk and back     Poor posture     Weakness of both lower extremities          Physician: Archana Knott MD    Visit Date: 10/13/2022    Physician Orders: PT Eval and Treat   Medical Diagnosis from Referral:   M54.6 (ICD-10-CM) - Midline thoracic back pain, unspecified chronicity   M47.816 (ICD-10-CM) - Spondylosis of lumbar region without myelopathy or radiculopathy      Evaluation Date: 2022  Authorization Period Expiration: 2023  Plan of Care Expiration: 2022  Reassessment Due: 22  Visit # / Visits authorized:       Time In: 1030AM  Time Out: 1145AM  Total Billable Time: 75 minutes    Precautions: Standard spinal cord stimulator    Pattern of pain determined: pattern 1    Subjective   Cherri reports she's doing well today in spite of being sore from previous visit. She's driving to [place] tomorrow, so she doesn't want to increase any weight today.     Patient reports tolerating previous visit with increased soreness  Patient reports their pain to be 3/10 on a 0-10 scale with 0 being no pain and 10 being the worst pain imaginable.  Pain Location: bilateral back, L LE     Occupation: Not currently working  Leisure: Walking, volunteering for her mission/[employer]      Pt goals: "to get moving again"    Objective        Baseline Isometric Testing on Med X equipment: Testing administered by PT  Date of testin2022  ROM 0-42 deg   Max Peak Torque 97    Min Peak Torque 38    Flex/Ext Ratio 2.3:1   % below normative data 53      MOVEMENT LOSS     ROM Loss initial 10/6/22   Flexion minimal loss Min loss   Extension moderate loss Min loss   Side glide Right minimal loss Min loss   Side glide Left minimal loss Min loss   Rotation Right moderate loss Min loss " "  Rotation Left moderate loss Min loss       Limitation/Restriction for FOTO lumbar Survey     Therapist reviewed FOTO scores for Cherri Noonan on 8/12/2022.   FOTO documents entered into [employer] - see Media section.     Limitation Score: 51% limited  Visit 5- 56 % limited, however patient reporting improved walking (5 miles and function)     Goal: 41%             Treatment    Pt was instructed in and performed the following:     Cherri received therapeutic exercises to develop/improved posture, cardiovascular endurance, muscular endurance, lumbar/cervical ROM, strength and muscular endurance for 58 minutes including the following exercises:     EIL 10x3"  SOC 10x5"  Open books 10 reps/ea  Sidelying Clams 10 reps bilat  DKTC 10x5"  LTR 10x5"  PPT 15x5"  [facility] - Short 10/13/2022   Visit Number 7   VAS Pain Rating 3   Treadmill Time (in min.) 10   Speed 1.5   Lumbar Stretches - Slouch 10   Extension in Lying 10   Flexion in Lying 10   Lumbar Extension - Seat Pad -   Femur Restraint -   Top Dead Center -   Counterweight -   Lumbar Flexion -   Lumbar Extension -   Lumbar Peak Torque -   Lumbar Weight 58   Repetitions 20   Rating of Perceived Exertion 4         Peripheral muscle strengthening which included 1 set of 15-20 repetitions at a slow, controlled 10-13 second per rep pace focused on strengthening supporting musculature for improved body mechanics and functional mobility.  Pt and therapist focused on proper form during treatment to ensure optimal strengthening of each targeted muscle group.  Machines were utilized including torso rotation, leg extension, leg curl, chest press, upright row. Tricep extension, bicep curl, leg press, and hip abduction and add      Home Exercises Provided and Patient Education Provided   Home exercises include: LTR, DKTC EIL, PPT, bridging, SOC  Cardio program: visit 4 given 9/29/22 with goal walking outside 4/week  Lifting education date: visit 11  Lumbar roll: she is " using    Education provided:   - HEP, POC  -encouraged continued walking program and cardio info given last visit  -discussion about positive self talk when exercising    Written Home Exercises Provided: Patient instructed to cont prior HEP.  Walking program given  Exercises were reviewed and Cherri was able to demonstrate them prior to the end of the session.  Cherri demonstrated good  understanding of the education provided.     See EMR under Patient Instructions for exercises provided prior visit.          Assessment     Pt presents healthy back visit reporting feeling sore. Continued with therEx from previous session, and she was able to complete warm up and exercises with no verbal complaints of pain. Maintained resistance on lumbar medx and she was able to complete 20 repetitions at 4/10 RPE and peripheral machines with moderate challenge.    Patient is making excellent progress towards established goals.  Pt will continue to benefit from skilled outpatient physical therapy to address the deficits stated in the impairment chart, provide pt/family education and to maximize pt's level of independence in the home and community environment.     Anticipated Barriers for therapy: chronicity of condition, cervical symptoms  Pt's spiritual, cultural and educational needs considered and pt agreeable to plan of care and goals as stated below:       GOALS: Pt is in agreement with the following goals.     Short term goals:  6 weeks or 10 visits   1.  Pt will demonstrate increased lumbar ROM by at least 6 degrees from the initial ROM value with improvements noted in functional ROM and ability to perform ADLs.  (approp and ongoing)  2.  Pt will demonstrate increased MedX average isometric strength value  by 15% from initial test resulting in improved ability to perform bending, lifting, and carrying activities safely, confidently.  (approp and ongoing)  3.  Patient report a reduction in worst pain score by 1-2 points for  improved tolerance for walking.  (approp and ongoing)  4.  Pt able to perform HEP correctly with minimal cueing or supervision from therapist to encourage independent management of symptoms. (approp and ongoing)        Long term goals: 10 weeks or 20 visits   1. Pt will demonstrate increased lumbar ROM by at least 9 degrees from initial ROM value, resulting in improved ability to perform functional fwd bending while standing and sitting. (approp and ongoing)  2. Pt will demonstrate increased MedX average isometric strength value  by 30% from initial test resulting in improved ability to perform bending, lifting, and carrying activities safely, confidently.  (approp and ongoing)  3. Pt to demonstrate ability to independently control and reduce their pain through posture positioning and mechanical movements throughout a typical day.  (approp and ongoing)  4.  Pt will demonstrate reduced pain and improved functional outcomes as reported on the FOTO by reaching a limitation score of < or = 41% or less in order to demonstrate subjective improvement in pt's condition.    (approp and ongoing)  5. Pt will demonstrate independence with the HEP at discharge  (approp and ongoing)  6. Pt will be able to walk for 30 minutes without increased pain.  (approp and ongoing)       Plan   Continue with established Plan of Care towards established PT goals.     Penny Crockett, PTA  10/13/2022

## 2022-10-18 ENCOUNTER — TELEPHONE (OUTPATIENT)
Dept: PAIN MEDICINE | Facility: CLINIC | Age: 57
End: 2022-10-18
Payer: COMMERCIAL

## 2022-10-18 ENCOUNTER — CLINICAL SUPPORT (OUTPATIENT)
Dept: REHABILITATION | Facility: HOSPITAL | Age: 57
End: 2022-10-18
Payer: COMMERCIAL

## 2022-10-18 ENCOUNTER — OFFICE VISIT (OUTPATIENT)
Dept: PAIN MEDICINE | Facility: CLINIC | Age: 57
End: 2022-10-18
Payer: COMMERCIAL

## 2022-10-18 DIAGNOSIS — M54.12 CERVICAL RADICULOPATHY: ICD-10-CM

## 2022-10-18 DIAGNOSIS — M47.817 LUMBOSACRAL SPONDYLOSIS WITHOUT MYELOPATHY: Primary | ICD-10-CM

## 2022-10-18 DIAGNOSIS — R29.898 DECREASED STRENGTH OF TRUNK AND BACK: ICD-10-CM

## 2022-10-18 DIAGNOSIS — R29.3 POOR POSTURE: ICD-10-CM

## 2022-10-18 DIAGNOSIS — R29.898 WEAKNESS OF BOTH LOWER EXTREMITIES: ICD-10-CM

## 2022-10-18 DIAGNOSIS — M79.2 PERIPHERAL NEUROPATHIC PAIN: ICD-10-CM

## 2022-10-18 DIAGNOSIS — M25.69 DECREASED RANGE OF MOTION OF TRUNK AND BACK: Primary | ICD-10-CM

## 2022-10-18 PROCEDURE — 99214 PR OFFICE/OUTPT VISIT, EST, LEVL IV, 30-39 MIN: ICD-10-PCS | Mod: 95,,, | Performed by: NURSE PRACTITIONER

## 2022-10-18 PROCEDURE — 3044F HG A1C LEVEL LT 7.0%: CPT | Mod: CPTII,95,, | Performed by: NURSE PRACTITIONER

## 2022-10-18 PROCEDURE — 3044F PR MOST RECENT HEMOGLOBIN A1C LEVEL <7.0%: ICD-10-PCS | Mod: CPTII,95,, | Performed by: NURSE PRACTITIONER

## 2022-10-18 PROCEDURE — 1159F MED LIST DOCD IN RCRD: CPT | Mod: CPTII,95,, | Performed by: NURSE PRACTITIONER

## 2022-10-18 PROCEDURE — 99214 OFFICE O/P EST MOD 30 MIN: CPT | Mod: 95,,, | Performed by: NURSE PRACTITIONER

## 2022-10-18 PROCEDURE — 1160F RVW MEDS BY RX/DR IN RCRD: CPT | Mod: CPTII,95,, | Performed by: NURSE PRACTITIONER

## 2022-10-18 PROCEDURE — 1160F PR REVIEW ALL MEDS BY PRESCRIBER/CLIN PHARMACIST DOCUMENTED: ICD-10-PCS | Mod: CPTII,95,, | Performed by: NURSE PRACTITIONER

## 2022-10-18 PROCEDURE — 1159F PR MEDICATION LIST DOCUMENTED IN MEDICAL RECORD: ICD-10-PCS | Mod: CPTII,95,, | Performed by: NURSE PRACTITIONER

## 2022-10-18 PROCEDURE — 97110 THERAPEUTIC EXERCISES: CPT | Mod: PN | Performed by: PHYSICAL MEDICINE & REHABILITATION

## 2022-10-18 RX ORDER — ZONISAMIDE 100 MG/1
400 CAPSULE ORAL DAILY
Qty: 360 CAPSULE | Refills: 0 | Status: SHIPPED | OUTPATIENT
Start: 2022-10-18 | End: 2023-01-26

## 2022-10-18 NOTE — PROGRESS NOTES
Subjective:   Chronic Pain-Tele-Medicine-Established Note (Follow up visit)        The patient location is: Home  The chief complaint leading to consultation is: pain  Visit type: Virtual visit with synchronous audio and video  Total time spent with patient: 25 min  Each patient to whom he or she provides medical services by telemedicine is:  (1) informed of the relationship between the physician and patient and the respective role of any other health care provider with respect to management of the patient; and (2) notified that he or she may decline to receive medical services by telemedicine and may withdraw from such care at any time.       Patient ID: Cherri Noonan is a 57 y.o. female.    Chief Complaint: No chief complaint on file.    Referred by: No ref. provider found     Interval History 10/18/2022:  Mrs Noonan presents for follow up virtually. She is doing PT and finds this strengthening her body but not taking away pain. Her pain is worse with driving and sitting for long periods of time to lower lumbar. Heat eases pain somewhat. She has no radicular complaint. She continues to keep in contact with [facility] rep. There is no new neurological changes, no focal voicing of any s/s concerning for cauda equina. She is currently taking zonegran 400mg qd with benefit and denies SE of medication.     Interval History 8/17/2022:  Mrs Noonan presents for follow up of mid and lower back pain. She just had evaluation from [facility] back and is eager to continue. Thoracolumbar back pain worsened by activity recently by HEP in conjunction with walking and unrelieved by ICE. She still has intermittent L medial knee pain. Zonegran 400mg has provided some benefit. Denies any new areas of pain or neurological changes. She will be getting TENS pads online. She has had benefit from SCS reprogramming.     HPI  She returns for follow-up.  She had a CT scan of the cervical spine.  She had reprogramming of her spinal cord  stimulator and told very well for the neck pain and radicular upper extremity pain.  She still has the discomfort along her spine in the thoracic and lumbar spine.  No radicular component.  No specific aggravating factors but it may be worse with activities.  She said overall, she feels much better since the reprogramming and since she started zonisamide.  She lost some weight.  She was able to do a lot of activities moving back into her house yesterday with some soreness but she would not have been able to do that otherwise.  No new symptomatology otherwise      Past Medical History:   Diagnosis Date    Cervical radiculopathy        Past Surgical History:   Procedure Laterality Date    CERVICAL FUSION  08/2015    C5-C7    HYSTERECTOMY      SPINAL CORD STIMULATOR IMPLANT  10/20/2021    TUBAL LIGATION         Review of patient's allergies indicates:   Allergen Reactions    Shingrix (pf) [varicella-zoster [id number] (pf)]      Got all side effects listed       Current Outpatient Medications   Medication Sig Dispense Refill    atorvastatin (LIPITOR) 20 MG tablet Take 20 mg by mouth once daily.      azelastine 205.5 mcg (0.15 %) Spry azelastine 205.5 mcg (0.15 %) nasal spray      b complex vitamins tablet Take 1 tablet by mouth once daily.      ciclopirox (LOPROX) 0.77 % Susp Apply topically 2 (two) times daily.      ergocalciferol (ERGOCALCIFEROL) 50,000 unit Cap Take 50,000 Units by mouth every 7 days.      estradioL (ESTRACE) 0.01 % (0.1 mg/gram) vaginal cream Place 0.5 g vaginally.      fluticasone propionate (FLONASE) 50 mcg/actuation nasal spray 1 spray by Each Nostril route once daily.      ginseng 250 mg Cap Take by mouth.      multivitamin capsule Take 1 capsule by mouth once daily.      naproxen (NAPROSYN) 500 MG tablet Take 1 tablet (500 mg total) by mouth 2 (two) times daily as needed (pain). 30 tablet 0    omega-3 fatty acids/fish oil (FISH OIL-OMEGA-3 FATTY ACIDS) 300-1,000 mg capsule Take by mouth once  daily.      omeprazole (PRILOSEC) 20 MG capsule Take 20 mg by mouth once daily.      potassium gluconate 595 mg (99 mg) TbSR Take by mouth once.      tumeric-ging-olive-oreg-capryl 100 mg-150 mg- 50 mg-150 mg Cap Take by mouth.      vitamin E 100 UNIT capsule Take 100 Units by mouth once daily.      zinc gluconate 50 mg tablet Take 50 mg by mouth once daily.      zonisamide (ZONEGRAN) 100 MG Cap Take 4 capsules (400 mg total) by mouth once daily. 360 capsule 0     No current facility-administered medications for this visit.       Family History   Problem Relation Age of Onset    Hyperlipidemia Mother     Diabetes Mother     Heart disease Sister     Juvenile idiopathic arthritis Sister     Aneurysm Brother     COPD Brother        Social History     Socioeconomic History    Marital status:    Tobacco Use    Smoking status: Never    Smokeless tobacco: Never   Substance and Sexual Activity    Alcohol use: Never    Drug use: Never    Sexual activity: Yes     Partners: Male     Birth control/protection: None     Comment:  39+years           ROS        Objective:   There were no vitals taken for this visit.  Pain Disability Index Review:  Last 3 PDI Scores 8/17/2022 7/5/2022 5/31/2022   Pain Disability Index (PDI) 49 [phone]     PHYSICAL EXAMINATION:  Voice normal.  Normal affect without evidence of distress.       Assessment:       Encounter Diagnoses   Name Primary?    Lumbosacral spondylosis without myelopathy Yes    Cervical radiculopathy     Peripheral neuropathic pain            Plan:   We discussed with the patient the assessment and recommendations. The following is the plan we agreed on:  - Prior records reviewed  - Imaging reviewed  - PT doing well for her but pain persists  - Will address facetogenic pain with B L3,4,5 MBB  - Discussed if diagnostic proceed with 2nd MBB and RFA if indicated  - Refill Zonegran 400mg QD   - Continue [facility]   - Continue to keep in contact with Rahel Magall[facility]  rep  - RTC after procedures     FRANCHESCA Ward  10/18/2022         Diagnoses and all orders for this visit:    Lumbosacral spondylosis without myelopathy  -     Procedure Order to Pain Management; Future    Cervical radiculopathy  -     zonisamide (ZONEGRAN) 100 MG Cap; Take 4 capsules (400 mg total) by mouth once daily.    Peripheral neuropathic pain  -     zonisamide (ZONEGRAN) 100 MG Cap; Take 4 capsules (400 mg total) by mouth once daily.       I spent a total of 30 minutes on the day of the visit.  This includes face to face time and non-face to face time preparing to see the patient by reviewing previous labs/imaging, obtaining and/or reviewing separately obtained history, documenting clinical information in the electronic or other health record, independently interpreting results and communicating results to the patient/family/caregiver.

## 2022-10-18 NOTE — TELEPHONE ENCOUNTER
Patient states to disregard message.    She was able to speak with Jeb Rashid    Verbalized understanding

## 2022-10-18 NOTE — PROGRESS NOTES
"[facility]      Name: Cherri Noonan  Clinic Number: [id number]    Therapy Diagnosis:   Encounter Diagnoses   Name Primary?    Decreased range of motion of trunk and back Yes    Decreased strength of trunk and back     Poor posture     Weakness of both lower extremities            Physician: Archana Knott MD    Visit Date: 10/18/2022    Physician Orders: PT Eval and Treat   Medical Diagnosis from Referral:   M54.6 (ICD-10-CM) - Midline thoracic back pain, unspecified chronicity   M47.816 (ICD-10-CM) - Spondylosis of lumbar region without myelopathy or radiculopathy      Evaluation Date: 8/12/2022  Authorization Period Expiration: 7/5/2023  Plan of Care Expiration: 11/12/2022  Reassessment Due: 11/18/22  Visit # / Visits authorized: 8/20      Time In: 1136AM  Time Out: 1231 PM  Total Billable Time: 56 minutes    Precautions: Standard spinal cord stimulator    Pattern of pain determined: pattern 1    Subjective   Cherri reports she's doing well today.  She went to [place] for her birthday.  She didn't use the lumbar roll or do her stretches sitting for board games and she found it irritated her back.  She feels she can't go to a movie because she had discomfort with sitting.  We discussed using lumbar roll, stretching (slouch correct) and trying a movie.  Discussed finding gloria with activities even if she has some soreness, and potentially trying a movie with her lumbar roll and her stretches.  She reports this is helpful information for her, to think about trying activities and not fear discomfort and use strategies taught.  She is walking 4/week.    Patient reports tolerating previous visit with increased soreness  Patient reports their pain to be 8/10 on a 0-10 scale with 0 being no pain and 10 being the worst pain imaginable.  Pain Location: bilateral back, L LE     Occupation: Not currently working  Leisure: Walking, volunteering for her mission/[employer]      Pt goals: "to get " "moving again"    Objective        Baseline Isometric Testing on Med X equipment: Testing administered by PT  Date of testin2022  ROM 0-42 deg   Max Peak Torque 97    Min Peak Torque 38    Flex/Ext Ratio 2.3:1   % below normative data 53      MOVEMENT LOSS     ROM Loss initial 10/18/22   Flexion minimal loss Min loss   Extension moderate loss Min loss   Side glide Right minimal loss Min loss   Side glide Left minimal loss Min loss   Rotation Right moderate loss Min loss   Rotation Left moderate loss Min loss       Limitation/Restriction for FOTO lumbar Survey     Therapist reviewed FOTO scores for Cherri Noonan on 2022.   FOTO documents entered into [employer] - see Media section.     Limitation Score: 51% limited  Visit 5- 56 % limited, however patient reporting improved walking (5 miles and function)     Goal: 41%             Treatment    Pt was instructed in and performed the following:     Cherri received therapeutic exercises to develop/improved posture, cardiovascular endurance, muscular endurance, lumbar/cervical ROM, strength and muscular endurance for 58 minutes including the following exercises:     EIL 10x3"  SOC 10x5"  Open books 10 reps/ea  Sidelying Clams 10 reps bilat yellow band  DKTC 10x5"  LTR 10x5"  PPT 15x5"  Bridging glut squeeze 10 x    [facility] 10/18/2022   Visit Number 8   VAS Pain Rating 8   Treadmill Time (in min.) 3   Speed 1.5   Lumbar Stretches - Slouch Overcorrection 10   Extension in Lying 10   Flexion in Lying 10   Lumbar Extension Seat Pad -   Femur Restraint -   Top Dead Center -   Counterweight -   Lumbar Flexion -   Lumbar Extension -   Lumbar Peak Torque -   Min Torque -   Test Percent Below Normative Data -   Lumbar Weight 60   Repetitions 16   Rating of Perceived Exertion 5   Ice - Z Lie (in min.) 5        Peripheral muscle strengthening which included 1 set of 15-20 repetitions at a slow, controlled 10-13 second per rep pace focused on strengthening " supporting musculature for improved body mechanics and functional mobility.  Pt and therapist focused on proper form during treatment to ensure optimal strengthening of each targeted muscle group.  Machines were utilized including torso rotation, leg extension, leg curl, chest press, upright row. Tricep extension, bicep curl, leg press, and hip abduction and add      Home Exercises Provided and Patient Education Provided   Home exercises include: LTR, DKTC EIL, PPT, bridging, SOC  Cardio program: visit 4 given 9/29/22 with goal walking outside 4/week  Lifting education date: visit 11  Lumbar roll: she is using  Coaching offered; she is not interested 10/18/22    Education provided:   - HEP, POC  -encouraged continued walking program and cardio info given previously and she is compliant  -discussion about positive self talk when exercising  -discussed using healthy judgement and not letting fear of pain interfere with finding gloria in activities like movies or playing board games.  Discussed using strategies to manage symptoms like roll, slouch correct, and stretching back after sitting    Written Home Exercises Provided: Patient instructed to cont prior HEP.  Walking program given  Exercises were reviewed and Cherri was able to demonstrate them prior to the end of the session.  Cherri demonstrated good  understanding of the education provided.     See EMR under Patient Instructions for exercises provided prior visit.          Assessment     Pt presents healthy back visit reporting feeling sore after trip to [place].  Reviewed using lumbar roll, stretching in sitting and standing, and continuing to enjoy activities using strategies reviewed.  Continued with therEx from previous session, and she was able to complete warm up and exercises with no verbal complaints of pain. Increased  resistance on lumbar medx and she was able to complete 16 repetitions at 4/10 RPE and peripheral machines with moderate  challenge.    Patient is making excellent progress towards established goals.  Pt will continue to benefit from skilled outpatient physical therapy to address the deficits stated in the impairment chart, provide pt/family education and to maximize pt's level of independence in the home and community environment.     Anticipated Barriers for therapy: chronicity of condition, cervical symptoms  Pt's spiritual, cultural and educational needs considered and pt agreeable to plan of care and goals as stated below:       GOALS: Pt is in agreement with the following goals.     Short term goals:  6 weeks or 10 visits   1.  Pt will demonstrate increased lumbar ROM by at least 6 degrees from the initial ROM value with improvements noted in functional ROM and ability to perform ADLs.  (approp and ongoing)  2.  Pt will demonstrate increased MedX average isometric strength value  by 15% from initial test resulting in improved ability to perform bending, lifting, and carrying activities safely, confidently.  (approp and ongoing)  3.  Patient report a reduction in worst pain score by 1-2 points for improved tolerance for walking.  (approp and ongoing)  4.  Pt able to perform HEP correctly with minimal cueing or supervision from therapist to encourage independent management of symptoms. (approp and ongoing)        Long term goals: 10 weeks or 20 visits   1. Pt will demonstrate increased lumbar ROM by at least 9 degrees from initial ROM value, resulting in improved ability to perform functional fwd bending while standing and sitting. (approp and ongoing)  2. Pt will demonstrate increased MedX average isometric strength value  by 30% from initial test resulting in improved ability to perform bending, lifting, and carrying activities safely, confidently.  (approp and ongoing)  3. Pt to demonstrate ability to independently control and reduce their pain through posture positioning and mechanical movements throughout a typical day.   (approp and ongoing)  4.  Pt will demonstrate reduced pain and improved functional outcomes as reported on the FOTO by reaching a limitation score of < or = 41% or less in order to demonstrate subjective improvement in pt's condition.    (approp and ongoing)  5. Pt will demonstrate independence with the HEP at discharge  (approp and ongoing)  6. Pt will be able to walk for 30 minutes without increased pain.  (approp and ongoing)       Plan   Continue with established Plan of Care towards established PT goals.     Kae Son, PT  10/18/2022

## 2022-10-18 NOTE — TELEPHONE ENCOUNTER
----- Message from Desire Mackey sent at 10/18/2022  8:23 AM CDT -----  Name of Who is Calling: LAZARO ALVES [[id number]]              What is the request in detail: Pt is having trouble logging into virtual visit              Can the clinic reply by [facility]: No              What Number to Call Back if not in [facility]: [phone]

## 2022-10-20 ENCOUNTER — CLINICAL SUPPORT (OUTPATIENT)
Dept: REHABILITATION | Facility: HOSPITAL | Age: 57
End: 2022-10-20
Payer: COMMERCIAL

## 2022-10-20 DIAGNOSIS — R29.898 DECREASED STRENGTH OF TRUNK AND BACK: ICD-10-CM

## 2022-10-20 DIAGNOSIS — R29.3 POOR POSTURE: ICD-10-CM

## 2022-10-20 DIAGNOSIS — R29.898 WEAKNESS OF BOTH LOWER EXTREMITIES: ICD-10-CM

## 2022-10-20 DIAGNOSIS — M25.69 DECREASED RANGE OF MOTION OF TRUNK AND BACK: Primary | ICD-10-CM

## 2022-10-20 PROCEDURE — 97110 THERAPEUTIC EXERCISES: CPT | Mod: PN,CQ

## 2022-10-20 NOTE — PROGRESS NOTES
"[facility]      Name: Cherri Noonan  Clinic Number: [id number]    Therapy Diagnosis:   Encounter Diagnoses   Name Primary?    Decreased range of motion of trunk and back Yes    Decreased strength of trunk and back     Poor posture     Weakness of both lower extremities            Physician: Archana Knott MD    Visit Date: 10/20/2022    Physician Orders: PT Eval and Treat   Medical Diagnosis from Referral:   M54.6 (ICD-10-CM) - Midline thoracic back pain, unspecified chronicity   M47.816 (ICD-10-CM) - Spondylosis of lumbar region without myelopathy or radiculopathy      Evaluation Date: 2022  Authorization Period Expiration: 2023  Plan of Care Expiration: 2022  Reassessment Due: 22  Visit # / Visits authorized:       Time In: 0738AM  Time Out: 0902AM  Total Billable Time: 90 minutes    Precautions: Standard spinal cord stimulator    Pattern of pain determined: pattern 1    Subjective   Cherri reports she's feeling much, much better than the other day.    Patient reports tolerating previous visit with increased soreness  Patient reports their pain to be 5/10 on a 0-10 scale with 0 being no pain and 10 being the worst pain imaginable.  Pain Location: bilateral back, L LE     Occupation: Not currently working  Leisure: Walking, volunteering for her mission/[employer]      Pt goals: "to get moving again"    Objective        Baseline Isometric Testing on Med X equipment: Testing administered by PT  Date of testin2022  ROM 0-42 deg   Max Peak Torque 97    Min Peak Torque 38    Flex/Ext Ratio 2.3:1   % below normative data 53      MOVEMENT LOSS     ROM Loss initial 10/18/22   Flexion minimal loss Min loss   Extension moderate loss Min loss   Side glide Right minimal loss Min loss   Side glide Left minimal loss Min loss   Rotation Right moderate loss Min loss   Rotation Left moderate loss Min loss       Limitation/Restriction for FOTO lumbar Survey   " "  Therapist reviewed FOTO scores for Cherri Noonan on 8/12/2022.   FOTO documents entered into [employer] - see Media section.     Limitation Score: 51% limited  Visit 5- 56 % limited, however patient reporting improved walking (5 miles and function)     Goal: 41%             Treatment    Pt was instructed in and performed the following:     Cherri received therapeutic exercises to develop/improved posture, cardiovascular endurance, muscular endurance, lumbar/cervical ROM, strength and muscular endurance for 90 minutes including the following exercises:     EIL 10x3"  Open books 10 reps/ea  Sidelying Clams 10 reps bilat yellow band  DKTC 10x5"  LTR 10x5"  PPT 20x5"  Bridging w/ glute squeeze 10x  SOC 10x5"  [facility] 10/20/2022   Visit Number 9   VAS Pain Rating 5   Treadmill Time (in min.) 10   Speed 1.5   Lumbar Stretches - Slouch 10   Extension in Lying 10   Flexion in Lying 10   Lumbar Extension - Seat Pad -   Femur Restraint -   Top Dead Center -   Counterweight -   Lumbar Flexion -   Lumbar Extension -   Lumbar Peak Torque -   Lumbar Weight 60   Repetitions 18   Rating of Perceived Exertion 3       Peripheral muscle strengthening which included 1 set of 15-20 repetitions at a slow, controlled 10-13 second per rep pace focused on strengthening supporting musculature for improved body mechanics and functional mobility.  Pt and therapist focused on proper form during treatment to ensure optimal strengthening of each targeted muscle group.  Machines were utilized including torso rotation, leg extension, leg curl, chest press, upright row. Tricep extension, bicep curl, leg press, and hip abduction and add      Home Exercises Provided and Patient Education Provided   Home exercises include: LTR, DKTC EIL, PPT, bridging, SOC  Cardio program: visit 4 given 9/29/22 with goal walking outside 4/week  Lifting education date: visit 11  Lumbar roll: she is using  Coaching offered; she is not interested " 10/18/22    Education provided:   - HEP, POC  -encouraged continued walking program and cardio info given previously and she is compliant  -discussion about positive self talk when exercising  -discussed using healthy judgement and not letting fear of pain interfere with finding gloria in activities like movies or playing board games.  Discussed using strategies to manage symptoms like roll, slouch correct, and stretching back after sitting    Written Home Exercises Provided: Patient instructed to cont prior HEP.  Walking program given  Exercises were reviewed and Cherri was able to demonstrate them prior to the end of the session.  Cherri demonstrated good  understanding of the education provided.     See EMR under Patient Instructions for exercises provided prior visit.      Assessment     Cherri tolerated the above tx session well with minimal c/o pain. Pt presents reporting decreased symptoms compared to previous visit. Continued with therEx for improved posture, mobility and core strength, and there were no adverse effects. Maintained resistance on lumbar medx and she was able to complete 18 repetitions at 3/10 RPE and peripheral machines with moderate challenge.    Patient is making excellent progress towards established goals.  Pt will continue to benefit from skilled outpatient physical therapy to address the deficits stated in the impairment chart, provide pt/family education and to maximize pt's level of independence in the home and community environment.     Anticipated Barriers for therapy: chronicity of condition, cervical symptoms  Pt's spiritual, cultural and educational needs considered and pt agreeable to plan of care and goals as stated below:       GOALS: Pt is in agreement with the following goals.     Short term goals:  6 weeks or 10 visits   1.  Pt will demonstrate increased lumbar ROM by at least 6 degrees from the initial ROM value with improvements noted in functional ROM and ability to  perform ADLs.  (approp and ongoing)  2.  Pt will demonstrate increased MedX average isometric strength value  by 15% from initial test resulting in improved ability to perform bending, lifting, and carrying activities safely, confidently.  (approp and ongoing)  3.  Patient report a reduction in worst pain score by 1-2 points for improved tolerance for walking.  (approp and ongoing)  4.  Pt able to perform HEP correctly with minimal cueing or supervision from therapist to encourage independent management of symptoms. (approp and ongoing)        Long term goals: 10 weeks or 20 visits   1. Pt will demonstrate increased lumbar ROM by at least 9 degrees from initial ROM value, resulting in improved ability to perform functional fwd bending while standing and sitting. (approp and ongoing)  2. Pt will demonstrate increased MedX average isometric strength value  by 30% from initial test resulting in improved ability to perform bending, lifting, and carrying activities safely, confidently.  (approp and ongoing)  3. Pt to demonstrate ability to independently control and reduce their pain through posture positioning and mechanical movements throughout a typical day.  (approp and ongoing)  4.  Pt will demonstrate reduced pain and improved functional outcomes as reported on the FOTO by reaching a limitation score of < or = 41% or less in order to demonstrate subjective improvement in pt's condition.    (approp and ongoing)  5. Pt will demonstrate independence with the HEP at discharge  (approp and ongoing)  6. Pt will be able to walk for 30 minutes without increased pain.  (approp and ongoing)       Plan   Continue with established Plan of Care towards established PT goals within [facility] program.    Penny Crockett, PTA  10/20/2022

## 2022-10-24 ENCOUNTER — CLINICAL SUPPORT (OUTPATIENT)
Dept: REHABILITATION | Facility: HOSPITAL | Age: 57
End: 2022-10-24
Payer: COMMERCIAL

## 2022-10-24 DIAGNOSIS — R29.898 WEAKNESS OF BOTH LOWER EXTREMITIES: ICD-10-CM

## 2022-10-24 DIAGNOSIS — R29.3 POOR POSTURE: ICD-10-CM

## 2022-10-24 DIAGNOSIS — R29.898 DECREASED STRENGTH OF TRUNK AND BACK: ICD-10-CM

## 2022-10-24 DIAGNOSIS — M25.69 DECREASED RANGE OF MOTION OF TRUNK AND BACK: Primary | ICD-10-CM

## 2022-10-24 PROCEDURE — 97110 THERAPEUTIC EXERCISES: CPT | Mod: PN

## 2022-10-24 NOTE — PROGRESS NOTES
"  [facility]      Name: Cherri Noonan  Clinic Number: [id number]    Therapy Diagnosis:   Encounter Diagnoses   Name Primary?    Decreased range of motion of trunk and back Yes    Decreased strength of trunk and back     Poor posture     Weakness of both lower extremities      Physician: Archana Knott MD    Visit Date: 10/24/2022    Physician Orders: PT Eval and Treat   Medical Diagnosis from Referral:   M54.6 (ICD-10-CM) - Midline thoracic back pain, unspecified chronicity   M47.816 (ICD-10-CM) - Spondylosis of lumbar region without myelopathy or radiculopathy      Evaluation Date: 2022  Authorization Period Expiration: 2023  Plan of Care Expiration: 2022  Reassessment Due: 22  Visit # / Visits authorized: 10/20      Time In: 11:35  Time Out: 12:40  Total Billable Time: 60 minutes    Precautions: Standard spinal cord stimulator    Pattern of pain determined: pattern 1    Subjective   Cherri reports she walked 3.5 miles today and is happy with the program and her progress    Patient reports tolerating previous visit with increased soreness  Patient reports their pain to be 5/10 on a 0-10 scale with 0 being no pain and 10 being the worst pain imaginable.  Pain Location: bilateral back, L LE     Occupation: Not currently working  Leisure: Walking, volunteering for her mission/[employer]      Pt goals: "to get moving again"    Objective        Baseline Isometric Testing on Med X equipment: Testing administered by PT  Date of testin2022  ROM 0-42 deg   Max Peak Torque 97    Min Peak Torque 38    Flex/Ext Ratio 2.3:1   % below normative data 53      Midpoint Isometric Testing on Med X equipment: Testing administered by PT  Date of testing: 10/24/2022    ROM 0-54 deg   Max Peak Torque 109   Min Peak Torque 41   Flex/Ext Ratio 2.6:1   % below normative data 44%   % increase in initial visit 16%     MOVEMENT LOSS     ROM Loss initial 10/18/22   Flexion minimal " "loss Min loss   Extension moderate loss Min loss   Side glide Right minimal loss Min loss   Side glide Left minimal loss Min loss   Rotation Right moderate loss Min loss   Rotation Left moderate loss Min loss       Limitation/Restriction for FOTO lumbar Survey     Therapist reviewed FOTO scores for Cherri Noonan on 8/12/2022.   FOTO documents entered into [employer] - see Media section.     Limitation Score: 51% limited  Visit 5- 56 % limited, however patient reporting improved walking (5 miles and function)     Visit 10: 33%  Goal: 41%             Treatment    Pt was instructed in and performed the following:     Cherri received therapeutic exercises to develop/improved posture, cardiovascular endurance, muscular endurance, lumbar/cervical ROM, strength and muscular endurance for 90 minutes including the following exercises:     EIL 10x3"  Open books 10 reps/ea  Sidelying Clams 10 reps bilat yellow band  DKTC 10x5"  LTR 10x5"  Bridging w/ glute squeeze 10x  Cat/Camel 10x  Bird/Dog 2x5 (not alternating)      [facility] 10/24/2022   Visit Number 10   VAS Pain Rating -   Treadmill Time (in min.) -   Speed -   Lumbar Stretches - Slouch Overcorrection -   Extension in Lying 10   Flexion in Lying 10   Lumbar Extension Seat Pad -   Femur Restraint -   Top Dead Center -   Counterweight -   Lumbar Flexion 54   Lumbar Extension 0   Lumbar Peak Torque 109   Min Torque 41   Test Percent Below Normative Data 44   Test Percent Gain in Strength from Initial  16   Lumbar Weight -   Repetitions -   Rating of Perceived Exertion -   Ice - Z Lie (in min.) 5         Peripheral muscle strengthening which included 1 set of 15-20 repetitions at a slow, controlled 10-13 second per rep pace focused on strengthening supporting musculature for improved body mechanics and functional mobility.  Pt and therapist focused on proper form during treatment to ensure optimal strengthening of each targeted muscle group.  Machines were utilized " including torso rotation, leg extension, leg curl, chest press, upright row. Tricep extension, bicep curl, leg press, and hip abduction and add      Home Exercises Provided and Patient Education Provided   Home exercises include: LTR, DKTC EIL, PPT, bridging, SOC  Cardio program: visit 4 given 9/29/22 with goal walking outside 4/week  Lifting education date: visit 11  Lumbar roll: she is using  Coaching offered; she is not interested 10/18/22    Education provided:   - HEP, POC  -encouraged continued walking program and cardio info given previously and she is compliant  -discussion about positive self talk when exercising  -discussed using healthy judgement and not letting fear of pain interfere with finding gloria in activities like movies or playing board games.  Discussed using strategies to manage symptoms like roll, slouch correct, and stretching back after sitting    Written Home Exercises Provided: Patient instructed to cont prior HEP.  Walking program given  Exercises were reviewed and Cherri was able to demonstrate them prior to the end of the session.  Cherri demonstrated good  understanding of the education provided.     See EMR under Patient Instructions for exercises provided prior visit.      Assessment     Patient has attended 10 visits at [facility] which included MD evaluation, PT evaluation with isometric testing, and physical therapy treatment including HEP instruction, education, aerobic work, dynamic strengthening on med ex equipment for the spine, and whole body strengthening on med ex equipment with increasing weight loads.  Patient  is demonstrating increased ability to reduce symptoms, improved posture, increased  ROM, and increased strength on med ex test by 16%  average.      Patient is making excellent progress towards established goals.  Pt will continue to benefit from skilled outpatient physical therapy to address the deficits stated in the impairment chart, provide pt/family  education and to maximize pt's level of independence in the home and community environment.     Anticipated Barriers for therapy: chronicity of condition, cervical symptoms  Pt's spiritual, cultural and educational needs considered and pt agreeable to plan of care and goals as stated below:       GOALS: Pt is in agreement with the following goals.     Short term goals:  6 weeks or 10 visits   1.  Pt will demonstrate increased lumbar ROM by at least 6 degrees from the initial ROM value with improvements noted in functional ROM and ability to perform ADLs.  (MET 10/24/2022)  2.  Pt will demonstrate increased MedX average isometric strength value  by 15% from initial test resulting in improved ability to perform bending, lifting, and carrying activities safely, confidently.  (MET 10/24/2022)  3.  Patient report a reduction in worst pain score by 1-2 points for improved tolerance for walking.  (partially met)  4.  Pt able to perform HEP correctly with minimal cueing or supervision from therapist to encourage independent management of symptoms. (MET 10/24/2022)        Long term goals: 10 weeks or 20 visits   1. Pt will demonstrate increased lumbar ROM by at least 9 degrees from initial ROM value, resulting in improved ability to perform functional fwd bending while standing and sitting. (approp and ongoing)  2. Pt will demonstrate increased MedX average isometric strength value  by 30% from initial test resulting in improved ability to perform bending, lifting, and carrying activities safely, confidently.  (approp and ongoing)  3. Pt to demonstrate ability to independently control and reduce their pain through posture positioning and mechanical movements throughout a typical day.  (approp and ongoing)  4.  Pt will demonstrate reduced pain and improved functional outcomes as reported on the FOTO by reaching a limitation score of < or = 41% or less in order to demonstrate subjective improvement in pt's condition.     (approp and ongoing)  5. Pt will demonstrate independence with the HEP at discharge  (approp and ongoing)  6. Pt will be able to walk for 30 minutes without increased pain.  (approp and ongoing)       Plan   Continue with established Plan of Care towards established PT goals within HB program.    Filipe De La Cruz, PT  10/24/2022

## 2022-10-27 ENCOUNTER — CLINICAL SUPPORT (OUTPATIENT)
Dept: REHABILITATION | Facility: HOSPITAL | Age: 57
End: 2022-10-27
Payer: COMMERCIAL

## 2022-10-27 DIAGNOSIS — R29.898 DECREASED STRENGTH OF TRUNK AND BACK: ICD-10-CM

## 2022-10-27 DIAGNOSIS — R29.898 WEAKNESS OF BOTH LOWER EXTREMITIES: ICD-10-CM

## 2022-10-27 DIAGNOSIS — R29.3 POOR POSTURE: ICD-10-CM

## 2022-10-27 DIAGNOSIS — M25.69 DECREASED RANGE OF MOTION OF TRUNK AND BACK: Primary | ICD-10-CM

## 2022-10-27 PROCEDURE — 97110 THERAPEUTIC EXERCISES: CPT | Mod: PN

## 2022-10-27 NOTE — PROGRESS NOTES
"  [facility]      Name: Cherri Noonan  Clinic Number: [id number]    Therapy Diagnosis:   Encounter Diagnoses   Name Primary?    Decreased range of motion of trunk and back Yes    Decreased strength of trunk and back     Poor posture     Weakness of both lower extremities      Physician: Archana Knott MD    Visit Date: 10/27/2022    Physician Orders: PT Eval and Treat   Medical Diagnosis from Referral:   M54.6 (ICD-10-CM) - Midline thoracic back pain, unspecified chronicity   M47.816 (ICD-10-CM) - Spondylosis of lumbar region without myelopathy or radiculopathy      Evaluation Date: 2022  Authorization Period Expiration: 2023  Plan of Care Expiration: 2022  Reassessment Due: 22  Visit # / Visits authorized:       Time In: 1000  Time Out: 1100  Total Billable Time: 60 minutes    Precautions: Standard spinal cord stimulator    Pattern of pain determined: pattern 1    Subjective   Cherri reports no new issues today. She is doing well and feels good after re-test.     Patient reports tolerating previous visit with increased soreness  Patient reports their pain to be 4/10 on a 0-10 scale with 0 being no pain and 10 being the worst pain imaginable.  Pain Location: bilateral back, L LE     Occupation: Not currently working  Leisure: Walking, volunteering for her mission/[employer]      Pt goals: "to get moving again"    Objective        Baseline Isometric Testing on Med X equipment: Testing administered by PT  Date of testin2022  ROM 0-42 deg   Max Peak Torque 97    Min Peak Torque 38    Flex/Ext Ratio 2.3:1   % below normative data 53      Midpoint Isometric Testing on Med X equipment: Testing administered by PT  Date of testing: 10/24/2022    ROM 0-54 deg   Max Peak Torque 109   Min Peak Torque 41   Flex/Ext Ratio 2.6:1   % below normative data 44%   % increase in initial visit 16%     MOVEMENT LOSS     ROM Loss initial 10/18/22   Flexion minimal loss Min " "loss   Extension moderate loss Min loss   Side glide Right minimal loss Min loss   Side glide Left minimal loss Min loss   Rotation Right moderate loss Min loss   Rotation Left moderate loss Min loss       Limitation/Restriction for FOTO lumbar Survey     Therapist reviewed FOTO scores for Cherri Noonan on 8/12/2022.   FOTO documents entered into [employer] - see Media section.     Limitation Score: 51% limited  Visit 5- 56 % limited, however patient reporting improved walking (5 miles and function)     Visit 10: 33%  Goal: 41%             Treatment    Pt was instructed in and performed the following:     Cherri received therapeutic exercises to develop/improved posture, cardiovascular endurance, muscular endurance, lumbar/cervical ROM, strength and muscular endurance for 55 minutes including the following exercises:     EIL 10x3"  Open books 10 reps/ea  Sidelying Clams 10 reps bilat yellow band  DKTC 10x5"  LTR 10x5"  Bridging w/ glute squeeze 10x  +TrA activation with ball x12 ea 3" hold  Cat/Camel 10x  Bird/Dog 2x5 (not alternating)      Peripheral muscle strengthening which included 1 set of 15-20 repetitions at a slow, controlled 10-13 second per rep pace focused on strengthening supporting musculature for improved body mechanics and functional mobility.  Pt and therapist focused on proper form during treatment to ensure optimal strengthening of each targeted muscle group.  Machines were utilized including torso rotation, leg extension, leg curl, chest press, upright row. Tricep extension, bicep curl, leg press, and hip abduction and add    [facility] 10/27/2022   Visit Number 11   VAS Pain Rating 4   Treadmill Time (in min.) -   Speed -   Lumbar Stretches - Slouch Overcorrection 10   Extension in Lying 10   Flexion in Lying 10   Lumbar Extension Seat Pad -   Femur Restraint -   Top Dead Center -   Counterweight -   Lumbar Flexion -   Lumbar Extension -   Lumbar Peak Torque -   Min Torque -   Test " Percent Below Normative Data -   Test Percent Gain in Strength from Initial  -   Lumbar Weight 60   Repetitions 20   Rating of Perceived Exertion 4   Ice - Z Lie (in min.) 5        Home Exercises Provided and Patient Education Provided   Home exercises include: LTR, DKTC EIL, PPT, bridging, SOC  Cardio program: visit 4 given 9/29/22 with goal walking outside 4/week  Lifting education date: next  Lumbar roll: she is using  Coaching offered; she is not interested 10/18/22    Education provided:   - HEP, POC  -encouraged continued walking program and cardio info given previously and she is compliant  -discussion about positive self talk when exercising  -discussed using healthy judgement and not letting fear of pain interfere with finding gloria in activities like movies or playing board games.  Discussed using strategies to manage symptoms like roll, slouch correct, and stretching back after sitting    Written Home Exercises Provided: Patient instructed to cont prior HEP.  Walking program given  Exercises were reviewed and Cherri was able to demonstrate them prior to the end of the session.  Cherri demonstrated good  understanding of the education provided.     See EMR under Patient Instructions for exercises provided prior visit.      Assessment     Cherri tolerated treatment well today. Continues to report improvements and is feeling well. Continued prior exercises and added TrA activation to help improve core strength and stability. Will work on lifting handout next visit. Patient able to perform 20 reps at 60ft/lbs resistance on MedX machine with appropriate exertion. Will increase resistance next visit. Continues to tolerate peripheral exercises well. Will continue to progress as able.        Patient is making excellent progress towards established goals.  Pt will continue to benefit from skilled outpatient physical therapy to address the deficits stated in the impairment chart, provide pt/family education and to  maximize pt's level of independence in the home and community environment.     Anticipated Barriers for therapy: chronicity of condition, cervical symptoms  Pt's spiritual, cultural and educational needs considered and pt agreeable to plan of care and goals as stated below:       GOALS: Pt is in agreement with the following goals.     Short term goals:  6 weeks or 10 visits   1.  Pt will demonstrate increased lumbar ROM by at least 6 degrees from the initial ROM value with improvements noted in functional ROM and ability to perform ADLs.  (MET 10/24/2022)  2.  Pt will demonstrate increased MedX average isometric strength value  by 15% from initial test resulting in improved ability to perform bending, lifting, and carrying activities safely, confidently.  (MET 10/24/2022)  3.  Patient report a reduction in worst pain score by 1-2 points for improved tolerance for walking.  (partially met)  4.  Pt able to perform HEP correctly with minimal cueing or supervision from therapist to encourage independent management of symptoms. (MET 10/24/2022)        Long term goals: 10 weeks or 20 visits   1. Pt will demonstrate increased lumbar ROM by at least 9 degrees from initial ROM value, resulting in improved ability to perform functional fwd bending while standing and sitting. (approp and ongoing)  2. Pt will demonstrate increased MedX average isometric strength value  by 30% from initial test resulting in improved ability to perform bending, lifting, and carrying activities safely, confidently.  (approp and ongoing)  3. Pt to demonstrate ability to independently control and reduce their pain through posture positioning and mechanical movements throughout a typical day.  (approp and ongoing)  4.  Pt will demonstrate reduced pain and improved functional outcomes as reported on the FOTO by reaching a limitation score of < or = 41% or less in order to demonstrate subjective improvement in pt's condition.    (approp and  ongoing)  5. Pt will demonstrate independence with the HEP at discharge  (approp and ongoing)  6. Pt will be able to walk for 30 minutes without increased pain.  (approp and ongoing)       Plan   Continue with established Plan of Care towards established PT goals within HB program.    Elton Luz, PT  10/27/2022

## 2022-10-28 ENCOUNTER — PATIENT MESSAGE (OUTPATIENT)
Dept: PAIN MEDICINE | Facility: OTHER | Age: 57
End: 2022-10-28
Payer: COMMERCIAL

## 2022-10-28 ENCOUNTER — TELEPHONE (OUTPATIENT)
Dept: PAIN MEDICINE | Facility: CLINIC | Age: 57
End: 2022-10-28
Payer: COMMERCIAL

## 2022-10-28 NOTE — TELEPHONE ENCOUNTER
----- Message from Tae Diaz sent at 10/28/2022  9:37 AM CDT -----  Name of Who is Calling: LAZARO ALVES [[id number]]            What is the request in detail: Patient is requesting call back to infrom that  she did have my regular dental cleaning, however she had to replace a filling. This was unexpected. Will this cause a problem? I got the filling and cleaning yesterday 10/27 . Procedure 10/31      Do they card fees for using credit cards?        Can the clinic reply by [facility]: yes              What [facility] to [facility] Back [facility]: [phone]

## 2022-10-31 ENCOUNTER — HOSPITAL ENCOUNTER (OUTPATIENT)
Facility: OTHER | Age: 57
Discharge: HOME | End: 2022-10-31
Attending: ANESTHESIOLOGY | Admitting: ANESTHESIOLOGY
Payer: COMMERCIAL

## 2022-10-31 VITALS
OXYGEN SATURATION: 100 % | RESPIRATION RATE: 16 BRPM | DIASTOLIC BLOOD PRESSURE: 77 MMHG | BODY MASS INDEX: 26.46 KG/M2 | TEMPERATURE: 98 F | WEIGHT: 155 LBS | HEIGHT: 64 IN | SYSTOLIC BLOOD PRESSURE: 140 MMHG | HEART RATE: 63 BPM

## 2022-10-31 DIAGNOSIS — M47.819 SPONDYLOSIS WITHOUT MYELOPATHY: Primary | ICD-10-CM

## 2022-10-31 DIAGNOSIS — G89.29 CHRONIC PAIN: ICD-10-CM

## 2022-10-31 DIAGNOSIS — M47.9 OSTEOARTHRITIS OF SPINE, UNSPECIFIED SPINAL OSTEOARTHRITIS COMPLICATION STATUS, UNSPECIFIED SPINAL REGION: ICD-10-CM

## 2022-10-31 PROCEDURE — 64494 PR INJ DX/THER AGNT PARAVERT FACET JOINT,IMG GUIDE,LUMBAR/SAC, 2ND LEVEL: ICD-10-PCS | Mod: 50,KX,, | Performed by: ANESTHESIOLOGY

## 2022-10-31 PROCEDURE — 25000003 PHARM REV CODE 250: Performed by: ANESTHESIOLOGY

## 2022-10-31 PROCEDURE — 64494 INJ PARAVERT F JNT L/S 2 LEV: CPT | Mod: 50,KX,, | Performed by: ANESTHESIOLOGY

## 2022-10-31 PROCEDURE — 64493 INJ PARAVERT F JNT L/S 1 LEV: CPT | Mod: 50,KX | Performed by: ANESTHESIOLOGY

## 2022-10-31 PROCEDURE — 64493 INJ PARAVERT F JNT L/S 1 LEV: CPT | Mod: 50,KX,, | Performed by: ANESTHESIOLOGY

## 2022-10-31 PROCEDURE — 64493 PR INJ DX/THER AGNT PARAVERT FACET JOINT,IMG GUIDE,LUMBAR/SAC,1ST LVL: ICD-10-PCS | Mod: 50,KX,, | Performed by: ANESTHESIOLOGY

## 2022-10-31 PROCEDURE — 64494 INJ PARAVERT F JNT L/S 2 LEV: CPT | Mod: 50,KX | Performed by: ANESTHESIOLOGY

## 2022-10-31 RX ORDER — LIDOCAINE HYDROCHLORIDE 20 MG/ML
INJECTION, SOLUTION INFILTRATION; PERINEURAL
Status: DISCONTINUED | OUTPATIENT
Start: 2022-10-31 | End: 2022-10-31 | Stop reason: HOSPADM

## 2022-10-31 RX ORDER — ALPRAZOLAM 0.5 MG/1
0.5 TABLET ORAL ONCE
Status: COMPLETED | OUTPATIENT
Start: 2022-10-31 | End: 2022-10-31

## 2022-10-31 RX ORDER — BUPIVACAINE HYDROCHLORIDE 2.5 MG/ML
INJECTION, SOLUTION EPIDURAL; INFILTRATION; INTRACAUDAL
Status: DISCONTINUED | OUTPATIENT
Start: 2022-10-31 | End: 2022-10-31 | Stop reason: HOSPADM

## 2022-10-31 RX ORDER — SODIUM CHLORIDE 9 MG/ML
500 INJECTION, SOLUTION INTRAVENOUS CONTINUOUS
Status: DISCONTINUED | OUTPATIENT
Start: 2022-10-31 | End: 2022-10-31 | Stop reason: HOSPADM

## 2022-10-31 RX ADMIN — ALPRAZOLAM 0.5 MG: 0.5 TABLET ORAL at 11:10

## 2022-10-31 NOTE — PLAN OF CARE
Pt tolerated procedure well. Pt reports 0/10 pain after procedure. Assisted pt up for first time. Steady on feet. All discharge instructions given.

## 2022-10-31 NOTE — H&P
HPI  Patient presenting for Procedure(s) (LRB):  BLOCK, NERVE, BILATERAL L3-L4-L5 MEDIAL BRANCH (Bilateral) for chronic low back pain    Patient on Anti-coagulation No    No health changes since previous encounter    Past Medical History:   Diagnosis Date    Cervical radiculopathy      Past Surgical History:   Procedure Laterality Date    CERVICAL FUSION  08/2015    C5-C7    HYSTERECTOMY      SPINAL CORD STIMULATOR IMPLANT  10/20/2021    TUBAL LIGATION       Review of patient's allergies indicates:   Allergen Reactions    Shingrix (pf) [varicella-zoster [id number] (pf)]      Got all side effects listed      No current facility-administered medications for this encounter.       PMHx, PSHx, Allergies, Medications reviewed in epic    ROS negative except pain complaints in HPI    OBJECTIVE:    There were no vitals taken for this visit.    PHYSICAL EXAMINATION:    GENERAL: Well appearing, in no acute distress, alert and oriented x3.  PSYCH:  Mood and affect appropriate.  SKIN: Skin color, texture, turgor normal, no rashes or lesions which will impact the procedure.  CV: RRR with palpation of the radial artery.  PULM: No evidence of respiratory difficulty, symmetric chest rise. Clear to auscultation.  NEURO: Cranial nerves grossly intact.    Plan:    Proceed with procedure as planned Procedure(s) (LRB):  BLOCK, NERVE, BILATERAL L3-L4-L5 MEDIAL BRANCH (Bilateral)    Vish Jameson  10/31/2022

## 2022-10-31 NOTE — DISCHARGE INSTRUCTIONS

## 2022-10-31 NOTE — OP NOTE
Diagnostic Lumbar Medial Branch Block Under Fluoroscopy    The procedure, risks, benefits, and options were discussed with the patient. There are no contraindications to the procedure. The patent expressed understanding and agreed to the procedure. Informed written consent was obtained prior to the start of the procedure and can be found in the patient's chart.    PATIENT NAME: Cherri Noonan   MRN: [id number]     DATE OF PROCEDURE: 10/31/2022                                           PROCEDURE:  Diagnostic Bilateral L3, L4, and L5 Lumbar Medial Branch Block under Fluoroscopy    PRE-OP DIAGNOSIS: Lumbosacral spondylosis without myelopathy [M47.817] Lumbar spondylosis [M47.816]    POST-OP DIAGNOSIS: Same    PHYSICIAN: Archana Knott MD    ASSISTANTS: Vish Jameson MD  PM&R     MEDICATIONS INJECTED:  Bupivicaine 0.25%    LOCAL ANESTHETIC INJECTED:   Xylocaine 2%    SEDATION: None    ESTIMATED BLOOD LOSS:  None    COMPLICATIONS:  None.    INTERVAL HISTORY: Patient has clinical and imaging findings suggestive of facet mediated pain.    TECHNIQUE: Time-out was performed to identify the patient and procedure to be performed. With the patient laying in a prone position, the surgical area was prepped and draped in the usual sterile fashion using ChloraPrep and fenestrated drape. The levels were determined under fluoroscopic guidance. Skin anesthesia was achieved by injecting Lidocaine 2% over the injection sites. A 22 gauge, 3.5 inch needle was introduced into the medial branch nerves at the junctions of the superior articular process and the transverse processes of the targeted sites using AP, lateral and/or contralateral oblique fluoroscopic imaging. After negative aspiration for blood or CSF was confirmed, 1 mL of the anesthetic listed above was then slowly injected at each site. The needles were removed and bleeding was nil. A sterile dressing was applied. No specimens collected. The patient tolerated the procedure well.    PAIN BEFORE THE PROCEDURE: 5-8/10    PAIN AFTER THE PROCEDURE: 0/10   The patient was monitored after the procedure in the recovery area. They were given post-procedure and discharge instructions to follow at home. The patient was discharged in a stable condition.        Archana Knott MD

## 2022-10-31 NOTE — DISCHARGE SUMMARY
Discharge Note  Short Stay      SUMMARY     Admit Date: 10/31/2022    Attending Physician: Archana Knott      Discharge Physician: Archana Knott      Discharge Date: 10/31/2022 11:58 AM    Procedure(s) (LRB):  BLOCK, NERVE, BILATERAL L3-L4-L5 MEDIAL BRANCH (Bilateral)    Final Diagnosis: Lumbosacral spondylosis without myelopathy [M47.817]    Disposition: Home or self care    Patient Instructions:   Current Discharge Medication List        CONTINUE these medications which have NOT CHANGED    Details   atorvastatin (LIPITOR) 20 MG tablet Take 20 mg by mouth once daily.      azelastine 205.5 mcg (0.15 %) Spry azelastine 205.5 mcg (0.15 %) nasal spray      b complex vitamins tablet Take 1 tablet by mouth once daily.      ciclopirox (LOPROX) 0.77 % Susp Apply topically 2 (two) times daily.      ergocalciferol (ERGOCALCIFEROL) 50,000 unit Cap Take 50,000 Units by mouth every 7 days.      estradioL (ESTRACE) 0.01 % (0.1 mg/gram) vaginal cream Place 0.5 g vaginally.      fluticasone propionate (FLONASE) 50 mcg/actuation nasal spray 1 spray by Each Nostril route once daily.      ginseng 250 mg Cap Take by mouth.      multivitamin capsule Take 1 capsule by mouth once daily.      naproxen (NAPROSYN) 500 MG tablet Take 1 tablet (500 mg total) by mouth 2 (two) times daily as needed (pain).  Qty: 30 tablet, Refills: 0    Associated Diagnoses: Acute pain of left knee      omega-3 fatty acids/fish oil (FISH OIL-OMEGA-3 FATTY ACIDS) 300-1,000 mg capsule Take by mouth once daily.      omeprazole (PRILOSEC) 20 MG capsule Take 20 mg by mouth once daily.      potassium gluconate 595 mg (99 mg) TbSR Take by mouth once.      tumeric-ging-olive-oreg-capryl 100 mg-150 mg- 50 mg-150 mg Cap Take by mouth.      vitamin E 100 UNIT capsule Take 100 Units by mouth once daily.      zinc gluconate 50 mg tablet Take 50 mg by mouth once daily.      zonisamide (ZONEGRAN) 100 MG Cap Take 4 capsules (400 mg total) by mouth once daily.  Qty: 360 capsule,  Refills: 0    Associated Diagnoses: Cervical radiculopathy; Peripheral neuropathic pain                 Discharge Diagnosis: Lumbosacral spondylosis without myelopathy [M47.817]  Condition on Discharge: Stable with no complications to procedure   Diet on Discharge: Same as before.  Activity: as per instruction sheet.  Discharge to: Home with a responsible adult.  Follow up: 2-4 weeks       Please call my office or pager at [phone] if experienced any weakness or loss of sensation, fever > 101.5, pain uncontrolled with oral medications, persistent nausea/vomiting/or diarrhea, redness or drainage from the incisions, or any other worrisome concerns. If physician on call was not reached or could not communicate with our office for any reason please go to the nearest emergency department

## 2022-11-01 NOTE — PROGRESS NOTES
"  [facility]      Name: Cherri Noonan  Clinic Number: [id number]    Therapy Diagnosis:   Encounter Diagnoses   Name Primary?    Decreased range of motion of trunk and back Yes    Decreased strength of trunk and back     Poor posture     Weakness of both lower extremities        Physician: Archana Knott MD    Visit Date: 2022    Physician Orders: PT Eval and Treat   Medical Diagnosis from Referral:   M54.6 (ICD-10-CM) - Midline thoracic back pain, unspecified chronicity   M47.816 (ICD-10-CM) - Spondylosis of lumbar region without myelopathy or radiculopathy      Evaluation Date: 2022  Authorization Period Expiration: 2023  Plan of Care Expiration: 2022  Reassessment Due: 22  Visit # / Visits authorized:       Time In: 1030AM  Time Out: 1145AM  Total Billable Time: 75 minutes (1:1 PTA)    Precautions: Standard spinal cord stimulator    Pattern of pain determined: pattern 1    Subjective   Cherri She has been telling the PT that her knee has been hurting and it's a problem she hasn't had before. The knee causes her to need to soak after epsom salt. She has pain in knee joint and she has problems bending that knee. She recently walked 7.2 miles but her leg was hurting before that. Pt reports pain in her knee is a 6/10.     Patient reports tolerating previous visit with increased soreness  Patient reports their pain to be 4/10 on a 0-10 scale with 0 being no pain and 10 being the worst pain imaginable.  Pain Location: bilateral back, L LE     Occupation: Not currently working  Leisure: Walking, volunteering for her mission/[employer]      Pt goals: "to get moving again"    Objective        Baseline Isometric Testing on Med X equipment: Testing administered by PT  Date of testin2022  ROM 0-42 deg   Max Peak Torque 97    Min Peak Torque 38    Flex/Ext Ratio 2.3:1   % below normative data 53      Midpoint Isometric Testing on Med X equipment: Testing " "administered by PT  Date of testing: 10/24/2022    ROM 0-54 deg   Max Peak Torque 109   Min Peak Torque 41   Flex/Ext Ratio 2.6:1   % below normative data 44%   % increase in initial visit 16%     MOVEMENT LOSS     ROM Loss initial 10/18/22   Flexion minimal loss Min loss   Extension moderate loss Min loss   Side glide Right minimal loss Min loss   Side glide Left minimal loss Min loss   Rotation Right moderate loss Min loss   Rotation Left moderate loss Min loss       Limitation/Restriction for FOTO lumbar Survey     Therapist reviewed FOTO scores for Cherri Noonan on 8/12/2022.   FOTO documents entered into [employer] - see Media section.     Limitation Score: 51% limited  Visit 5- 56 % limited, however patient reporting improved walking (5 miles and function)     Visit 10: 33%  Goal: 41%             Treatment    Pt was instructed in and performed the following:     Cherri received therapeutic exercises to develop/improved posture, cardiovascular endurance, muscular endurance, lumbar/cervical ROM, strength and muscular endurance for 75 minutes including the following exercises:     EIL 10x3"  DKTC 10x5"  LTR 10x5"  Bridging w/ glute squeeze 10x  TrA activation with ball x12 ea 3" hold  +Quad stretch 3 x 30" R  +HSS 2 x 30"R  +LAQ 3#  +STS x 10    [facility] 11/2/2022   Visit Number 12   VAS Pain Rating 4   Treadmill Time (in min.) 7   Speed -   Lumbar Stretches - Slouch Overcorrection -   Extension in Lying 10   Flexion in Lying 10   Lumbar Extension Seat Pad -   Femur Restraint -   Top Dead Center -   Counterweight -   Lumbar Flexion -   Lumbar Extension -   Lumbar Peak Torque -   Min Torque -   Test Percent Below Normative Data -   Test Percent Gain in Strength from Initial  -   Lumbar Weight 66   Repetitions 16   Rating of Perceived Exertion 4   Ice - Z Lie (in min.) 10         Peripheral muscle strengthening which included 1 set of 15-20 repetitions at a slow, controlled 10-13 second per rep pace " focused on strengthening supporting musculature for improved body mechanics and functional mobility.  Pt and therapist focused on proper form during treatment to ensure optimal strengthening of each targeted muscle group.  Machines were utilized including torso rotation, chest press, upright row. Tricep extension, bicep curl, and hip abduction and adduction.          Home Exercises Provided and Patient Education Provided   Home exercises include: LTR, DKTC EIL, PPT, bridging, SOC  Cardio program: visit 4 given 9/29/22 with goal walking outside 4/week  Lifting education date: next  Lumbar roll: she is using  Coaching offered; she is not interested 10/18/22    Education provided:   - HEP, POC  -encouraged continued walking program and cardio info given previously and she is compliant  -discussion about positive self talk when exercising  -discussed using healthy judgement and not letting fear of pain interfere with finding gloria in activities like movies or playing board games.  Discussed using strategies to manage symptoms like roll, slouch correct, and stretching back after sitting    Written Home Exercises Provided: Patient instructed to cont prior HEP.  Walking program given  Exercises were reviewed and Cherri was able to demonstrate them prior to the end of the session.  Cherri demonstrated good  understanding of the education provided.     See EMR under Patient Instructions for exercises provided prior visit.    Assessment     Cherri tolerated treatment well today. Pt reports to session with back pain unchanged from previous session though pt reports knee pain that has been bothering her following her sessions here. Matrix knee extension, knee flexion and leg press were deferred today. Sit to stand and LAQ were introduced to supplement those machines with good tolerance to STS though LAQ caused pain symptoms in knee joint so it was discontinued. Pt reported improved knee pain following session. MedX was  performed at 66ft lbs with 16 reps performed at an exertion rating of 4/10. Will continue to monitor knee pain symptoms and adjust as necessary.       Patient is making excellent progress towards established goals.  Pt will continue to benefit from skilled outpatient physical therapy to address the deficits stated in the impairment chart, provide pt/family education and to maximize pt's level of independence in the home and community environment.     Anticipated Barriers for therapy: chronicity of condition, cervical symptoms  Pt's spiritual, cultural and educational needs considered and pt agreeable to plan of care and goals as stated below:       GOALS: Pt is in agreement with the following goals.     Short term goals:  6 weeks or 10 visits   1.  Pt will demonstrate increased lumbar ROM by at least 6 degrees from the initial ROM value with improvements noted in functional ROM and ability to perform ADLs.  (MET 10/24/2022)  2.  Pt will demonstrate increased MedX average isometric strength value  by 15% from initial test resulting in improved ability to perform bending, lifting, and carrying activities safely, confidently.  (MET 10/24/2022)  3.  Patient report a reduction in worst pain score by 1-2 points for improved tolerance for walking.  (partially met)  4.  Pt able to perform HEP correctly with minimal cueing or supervision from therapist to encourage independent management of symptoms. (MET 10/24/2022)        Long term goals: 10 weeks or 20 visits   1. Pt will demonstrate increased lumbar ROM by at least 9 degrees from initial ROM value, resulting in improved ability to perform functional fwd bending while standing and sitting. (approp and ongoing)  2. Pt will demonstrate increased MedX average isometric strength value  by 30% from initial test resulting in improved ability to perform bending, lifting, and carrying activities safely, confidently.  (approp and ongoing)  3. Pt to demonstrate ability to  independently control and reduce their pain through posture positioning and mechanical movements throughout a typical day.  (approp and ongoing)  4.  Pt will demonstrate reduced pain and improved functional outcomes as reported on the FOTO by reaching a limitation score of < or = 41% or less in order to demonstrate subjective improvement in pt's condition.    (approp and ongoing)  5. Pt will demonstrate independence with the HEP at discharge  (approp and ongoing)  6. Pt will be able to walk for 30 minutes without increased pain.  (approp and ongoing)       Plan   Continue with established Plan of Care towards established PT goals within HB program.    Waylon Seay, PTA  11/02/2022

## 2022-11-02 ENCOUNTER — CLINICAL SUPPORT (OUTPATIENT)
Dept: REHABILITATION | Facility: HOSPITAL | Age: 57
End: 2022-11-02
Payer: COMMERCIAL

## 2022-11-02 DIAGNOSIS — R29.3 POOR POSTURE: ICD-10-CM

## 2022-11-02 DIAGNOSIS — M25.69 DECREASED RANGE OF MOTION OF TRUNK AND BACK: Primary | ICD-10-CM

## 2022-11-02 DIAGNOSIS — R29.898 DECREASED STRENGTH OF TRUNK AND BACK: ICD-10-CM

## 2022-11-02 DIAGNOSIS — R29.898 WEAKNESS OF BOTH LOWER EXTREMITIES: ICD-10-CM

## 2022-11-02 PROCEDURE — 97110 THERAPEUTIC EXERCISES: CPT | Mod: PN,CQ

## 2022-11-07 ENCOUNTER — CLINICAL SUPPORT (OUTPATIENT)
Dept: REHABILITATION | Facility: HOSPITAL | Age: 57
End: 2022-11-07
Payer: COMMERCIAL

## 2022-11-07 DIAGNOSIS — R29.898 DECREASED STRENGTH OF TRUNK AND BACK: ICD-10-CM

## 2022-11-07 DIAGNOSIS — M25.69 DECREASED RANGE OF MOTION OF TRUNK AND BACK: Primary | ICD-10-CM

## 2022-11-07 DIAGNOSIS — R29.3 POOR POSTURE: ICD-10-CM

## 2022-11-07 DIAGNOSIS — R29.898 WEAKNESS OF BOTH LOWER EXTREMITIES: ICD-10-CM

## 2022-11-07 PROCEDURE — 97110 THERAPEUTIC EXERCISES: CPT | Mod: PN

## 2022-11-07 NOTE — PROGRESS NOTES
" [facility]      Name: Cherri Noonan  Clinic Number: [id number]    Therapy Diagnosis:   Encounter Diagnoses   Name Primary?    Decreased range of motion of trunk and back Yes    Decreased strength of trunk and back     Poor posture     Weakness of both lower extremities        Physician: Archana Knott MD    Visit Date: 2022    Physician Orders: PT Eval and Treat   Medical Diagnosis from Referral:   M54.6 (ICD-10-CM) - Midline thoracic back pain, unspecified chronicity   M47.816 (ICD-10-CM) - Spondylosis of lumbar region without myelopathy or radiculopathy      Evaluation Date: 2022  Authorization Period Expiration: 2023  Plan of Care Expiration: 2022  (sent 2022)  Reassessment Due: 22  Visit # / Visits authorized:       Time In: 1050 AM  Time Out: 1200 AM  Total Billable Time: 70 min    Precautions: Standard spinal cord stimulator    Pattern of pain determined: pattern 1    Subjective   Cherri She had an injection last Monday and it went well. She was able to sit in the movies and able to sit without pain. She is having less pain in the knee after exercise modification and Tylenol. She was feeling a lot better with modification and stretches and feels like the knee much much better.    Patient reports tolerating previous visit with increased soreness  Patient reports their pain to be 7/10 on a 0-10 scale with 0 being no pain and 10 being the worst pain imaginable.  Pain Location: bilateral back, L LE     Occupation: Not currently working  Leisure: Walking, volunteering for her mission/[employer]      Pt goals: "to get moving again"    Objective        Baseline Isometric Testing on Med X equipment: Testing administered by PT  Date of testin2022  ROM 0-42 deg   Max Peak Torque 97    Min Peak Torque 38    Flex/Ext Ratio 2.3:1   % below normative data 53      Midpoint Isometric Testing on Med X equipment: Testing administered by PT  Date of " "testing: 10/24/2022    ROM 0-54 deg   Max Peak Torque 109   Min Peak Torque 41   Flex/Ext Ratio 2.6:1   % below normative data 44%   % increase in initial visit 16%     MOVEMENT LOSS     ROM Loss initial 10/18/22   Flexion minimal loss Min loss   Extension moderate loss Min loss   Side glide Right minimal loss Min loss   Side glide Left minimal loss Min loss   Rotation Right moderate loss Min loss   Rotation Left moderate loss Min loss       Limitation/Restriction for FOTO lumbar Survey     Therapist reviewed FOTO scores for Cherri Noonan on 8/12/2022.   FOTO documents entered into [employer] - see Media section.     Limitation Score: 51% limited  Visit 5- 56 % limited, however patient reporting improved walking (5 miles and function)     Visit 10: 33%  Goal: 41%             Treatment    Pt was instructed in and performed the following:     Cherri received therapeutic exercises to develop/improved posture, cardiovascular endurance, muscular endurance, lumbar/cervical ROM, strength and muscular endurance for 75 minutes including the following exercises:     EIL 10x3"  DKTC 10x5"  LTR 10x5"  Bridging w/ glute squeeze 10x  +HSS 2 x 30" Bilat  +LAQ 3# 15  +Seated Ham curl GTB 15x  +STS x 15    .    [facility] 11/7/2022   Visit Number 13   VAS Pain Rating 7   Treadmill Time (in min.) -   Speed -   Lumbar Stretches - Slouch Overcorrection -   Extension in Lying 10   Flexion in Lying 10   Lumbar Extension Seat Pad -   Femur Restraint -   Top Dead Center -   Counterweight -   Lumbar Flexion -   Lumbar Extension -   Lumbar Peak Torque -   Min Torque -   Test Percent Below Normative Data -   Test Percent Gain in Strength from Initial  -   Lumbar Weight 66   Repetitions 18   Rating of Perceived Exertion 4   Ice - Z Lie (in min.) 10           Peripheral muscle strengthening which included 1 set of 15-20 repetitions at a slow, controlled 10-13 second per rep pace focused on strengthening supporting musculature for " improved body mechanics and functional mobility.  Pt and therapist focused on proper form during treatment to ensure optimal strengthening of each targeted muscle group.  Machines were utilized including torso rotation, chest press, upright row. Tricep extension, bicep curl, and hip abduction and adduction.          Home Exercises Provided and Patient Education Provided   Home exercises include: LTR, DKTC EIL, PPT, bridging, SOC  Cardio program: visit 4 given 9/29/22 with goal walking outside 4/week  Lifting education date: next  Lumbar roll: she is using  Coaching offered; she is not interested 10/18/22    Education provided:   - HEP, POC  -encouraged continued walking program and cardio info given previously and she is compliant  -discussion about positive self talk when exercising  -discussed using healthy judgement and not letting fear of pain interfere with finding gloria in activities like movies or playing board games.  Discussed using strategies to manage symptoms like roll, slouch correct, and stretching back after sitting    Written Home Exercises Provided: Patient instructed to cont prior HEP.  Walking program given  Exercises were reviewed and Cherri was able to demonstrate them prior to the end of the session.  Cherri demonstrated good  understanding of the education provided.     See EMR under Patient Instructions for exercises provided prior visit.    Assessment     Cherri was able to complete treatment today with a continuation in modification in LE exercises instead of LE exercises on peripheral machines. Did not get to complete warm up and all HEP exercises due to late start to therapy. Pt has good recall of exercise and requires mod cues for exercise progression. Maintained resistance on the lumbar medx and she was able to complete 18 repetitions at 4/10 RPE and completing peripheral strength with 4/10 Rpe overall. Plan to progress as tolerated.       Patient is making excellent progress towards  established goals.  Pt will continue to benefit from skilled outpatient physical therapy to address the deficits stated in the impairment chart, provide pt/family education and to maximize pt's level of independence in the home and community environment.     Anticipated Barriers for therapy: chronicity of condition, cervical symptoms  Pt's spiritual, cultural and educational needs considered and pt agreeable to plan of care and goals as stated below:       GOALS: Pt is in agreement with the following goals.     Short term goals:  6 weeks or 10 visits   1.  Pt will demonstrate increased lumbar ROM by at least 6 degrees from the initial ROM value with improvements noted in functional ROM and ability to perform ADLs.  (MET 10/24/2022)  2.  Pt will demonstrate increased MedX average isometric strength value  by 15% from initial test resulting in improved ability to perform bending, lifting, and carrying activities safely, confidently.  (MET 10/24/2022)  3.  Patient report a reduction in worst pain score by 1-2 points for improved tolerance for walking.  (partially met)  4.  Pt able to perform HEP correctly with minimal cueing or supervision from therapist to encourage independent management of symptoms. (MET 10/24/2022)        Long term goals: 10 weeks or 20 visits   1. Pt will demonstrate increased lumbar ROM by at least 9 degrees from initial ROM value, resulting in improved ability to perform functional fwd bending while standing and sitting. (approp and ongoing)  2. Pt will demonstrate increased MedX average isometric strength value  by 30% from initial test resulting in improved ability to perform bending, lifting, and carrying activities safely, confidently.  (approp and ongoing)  3. Pt to demonstrate ability to independently control and reduce their pain through posture positioning and mechanical movements throughout a typical day.  (approp and ongoing)  4.  Pt will demonstrate reduced pain and improved  functional outcomes as reported on the FOTO by reaching a limitation score of < or = 41% or less in order to demonstrate subjective improvement in pt's condition.    (approp and ongoing)  5. Pt will demonstrate independence with the HEP at discharge  (approp and ongoing)  6. Pt will be able to walk for 30 minutes without increased pain.  (approp and ongoing)       Plan   Continue with established Plan of Care towards established PT goals within HB program.    Filipe De La Cruz, PT  11/07/2022

## 2022-11-07 NOTE — PLAN OF CARE
"  [facility]      Name: Cherri Noonan  Clinic Number: [id number]    Therapy Diagnosis:   Encounter Diagnoses   Name Primary?    Decreased range of motion of trunk and back Yes    Decreased strength of trunk and back     Poor posture     Weakness of both lower extremities        Physician: Archana Knott MD    Visit Date: 2022    Physician Orders: PT Eval and Treat   Medical Diagnosis from Referral:   M54.6 (ICD-10-CM) - Midline thoracic back pain, unspecified chronicity   M47.816 (ICD-10-CM) - Spondylosis of lumbar region without myelopathy or radiculopathy      Evaluation Date: 2022  Authorization Period Expiration: 2023  Plan of Care Expiration: 2022  Reassessment Due: 22  Visit # / Visits authorized:       Time In: 1050 AM  Time Out: 1200 AM  Total Billable Time: 70 min    Precautions: Standard spinal cord stimulator    Pattern of pain determined: pattern 1    Subjective   Cherri She had an injection last Monday and it went well. She was able to sit in the movies and able to sit without pain. She is having less pain in the knee after exercise modification and Tylenol. She was feeling a lot better with modification and stretches and feels like the knee much much better.    Patient reports tolerating previous visit with increased soreness  Patient reports their pain to be 7/10 on a 0-10 scale with 0 being no pain and 10 being the worst pain imaginable.  Pain Location: bilateral back, L LE     Occupation: Not currently working  Leisure: Walking, volunteering for her mission/[employer]      Pt goals: "to get moving again"    Objective        Baseline Isometric Testing on Med X equipment: Testing administered by PT  Date of testin2022  ROM 0-42 deg   Max Peak Torque 97    Min Peak Torque 38    Flex/Ext Ratio 2.3:1   % below normative data 53      Midpoint Isometric Testing on Med X equipment: Testing administered by PT  Date of testing: " 10/24/2022    ROM 0-54 deg   Max Peak Torque 109   Min Peak Torque 41   Flex/Ext Ratio 2.6:1   % below normative data 44%   % increase in initial visit 16%     MOVEMENT LOSS     ROM Loss initial 10/18/22   Flexion minimal loss Min loss   Extension moderate loss Min loss   Side glide Right minimal loss Min loss   Side glide Left minimal loss Min loss   Rotation Right moderate loss Min loss   Rotation Left moderate loss Min loss       Limitation/Restriction for FOTO lumbar Survey     Therapist reviewed FOTO scores for Cherri Noonan on 8/12/2022.   FOTO documents entered into [employer] - see Media section.     Limitation Score: 51% limited  Visit 5- 56 % limited, however patient reporting improved walking (5 miles and function)     Visit 10: 33%  Goal: 41%           Assessment     Cherri was able to complete treatment today with a continuation in modification in LE exercises instead of LE exercises on peripheral machines. Did not get to complete warm up and all HEP exercises due to late start to therapy. Pt has good recall of exercise and requires mod cues for exercise progression. Maintained resistance on the lumbar medx and she was able to complete 18 repetitions at 4/10 RPE and completing peripheral strength with 4/10 Rpe overall. Plan to progress as tolerated.       Patient is making excellent progress towards established goals.  Pt will continue to benefit from skilled outpatient physical therapy to address the deficits stated in the impairment chart, provide pt/family education and to maximize pt's level of independence in the home and community environment.     Anticipated Barriers for therapy: chronicity of condition, cervical symptoms  Pt's spiritual, cultural and educational needs considered and pt agreeable to plan of care and goals as stated below:       GOALS: Pt is in agreement with the following goals.     Short term goals:  6 weeks or 10 visits   1.  Pt will demonstrate increased lumbar ROM by at least 6  degrees from the initial ROM value with improvements noted in functional ROM and ability to perform ADLs.  (MET 10/24/2022)  2.  Pt will demonstrate increased MedX average isometric strength value  by 15% from initial test resulting in improved ability to perform bending, lifting, and carrying activities safely, confidently.  (MET 10/24/2022)  3.  Patient report a reduction in worst pain score by 1-2 points for improved tolerance for walking.  (partially met)  4.  Pt able to perform HEP correctly with minimal cueing or supervision from therapist to encourage independent management of symptoms. (MET 10/24/2022)        Long term goals: 10 weeks or 20 visits   1. Pt will demonstrate increased lumbar ROM by at least 9 degrees from initial ROM value, resulting in improved ability to perform functional fwd bending while standing and sitting. (approp and ongoing)  2. Pt will demonstrate increased MedX average isometric strength value  by 30% from initial test resulting in improved ability to perform bending, lifting, and carrying activities safely, confidently.  (approp and ongoing)  3. Pt to demonstrate ability to independently control and reduce their pain through posture positioning and mechanical movements throughout a typical day.  (approp and ongoing)  4.  Pt will demonstrate reduced pain and improved functional outcomes as reported on the FOTO by reaching a limitation score of < or = 41% or less in order to demonstrate subjective improvement in pt's condition.    (approp and ongoing)  5. Pt will demonstrate independence with the HEP at discharge  (approp and ongoing)  6. Pt will be able to walk for 30 minutes without increased pain.  (approp and ongoing)       Plan   Continue with established Plan of Care towards established PT goals within HB program.    Filipe De La Cruz, PT  11/07/2022

## 2022-11-09 ENCOUNTER — PATIENT MESSAGE (OUTPATIENT)
Dept: PAIN MEDICINE | Facility: CLINIC | Age: 57
End: 2022-11-09
Payer: COMMERCIAL

## 2022-11-09 ENCOUNTER — CLINICAL SUPPORT (OUTPATIENT)
Dept: REHABILITATION | Facility: HOSPITAL | Age: 57
End: 2022-11-09
Payer: COMMERCIAL

## 2022-11-09 DIAGNOSIS — M25.69 DECREASED RANGE OF MOTION OF TRUNK AND BACK: Primary | ICD-10-CM

## 2022-11-09 DIAGNOSIS — R29.898 DECREASED STRENGTH OF TRUNK AND BACK: ICD-10-CM

## 2022-11-09 DIAGNOSIS — R29.898 WEAKNESS OF BOTH LOWER EXTREMITIES: ICD-10-CM

## 2022-11-09 DIAGNOSIS — R29.3 POOR POSTURE: ICD-10-CM

## 2022-11-09 PROCEDURE — 97110 THERAPEUTIC EXERCISES: CPT | Mod: PN,CQ

## 2022-11-09 NOTE — PROGRESS NOTES
" [facility]      Name: Cherri Noonan  Clinic Number: [id number]    Therapy Diagnosis:   Encounter Diagnoses   Name Primary?    Decreased range of motion of trunk and back Yes    Decreased strength of trunk and back     Poor posture     Weakness of both lower extremities          Physician: Archana Knott MD    Visit Date: 2022    Physician Orders: PT Eval and Treat   Medical Diagnosis from Referral:   M54.6 (ICD-10-CM) - Midline thoracic back pain, unspecified chronicity   M47.816 (ICD-10-CM) - Spondylosis of lumbar region without myelopathy or radiculopathy      Evaluation Date: 2022  Authorization Period Expiration: 2023  Plan of Care Expiration: 2022  (sent 2022)  Reassessment Due: 22  Visit # / Visits authorized:       Time In: 1230PM  Time Out: 130PM  Total Billable Time: 60 min (1:1 PTA)    Precautions: Standard spinal cord stimulator    Pattern of pain determined: pattern 1    Subjective   Cherri is doing good. She doesn't really notice any pain. She has cut her walks down to 4 miles but she walked 5 miles the other day. The modifications have been helping. She has been encouraging her neighbors to walk with her. She is going to the movies and she plans to use the slouch correct exercise but will not be using lumbar roll.     Patient reports tolerating previous visit with increased soreness  Patient reports their pain to be none given/10 on a 0-10 scale with 0 being no pain and 10 being the worst pain imaginable.  Pain Location: bilateral back, L LE     Occupation: Not currently working  Leisure: Walking, volunteering for her mission/[employer]      Pt goals: "to get moving again"    Objective        Baseline Isometric Testing on Med X equipment: Testing administered by PT  Date of testin2022  ROM 0-42 deg   Max Peak Torque 97    Min Peak Torque 38    Flex/Ext Ratio 2.3:1   % below normative data 53      Midpoint Isometric Testing on " "Med X equipment: Testing administered by PT  Date of testing: 10/24/2022    ROM 0-54 deg   Max Peak Torque 109   Min Peak Torque 41   Flex/Ext Ratio 2.6:1   % below normative data 44%   % increase in initial visit 16%     MOVEMENT LOSS     ROM Loss initial 10/18/22   Flexion minimal loss Min loss   Extension moderate loss Min loss   Side glide Right minimal loss Min loss   Side glide Left minimal loss Min loss   Rotation Right moderate loss Min loss   Rotation Left moderate loss Min loss       Limitation/Restriction for FOTO lumbar Survey     Therapist reviewed FOTO scores for Cherri Noonan on 8/12/2022.   FOTO documents entered into [employer] - see Media section.     Limitation Score: 51% limited  Visit 5- 56 % limited, however patient reporting improved walking (5 miles and function)     Visit 10: 33%  Goal: 41%             Treatment    Pt was instructed in and performed the following:     Cherri received therapeutic exercises to develop/improved posture, cardiovascular endurance, muscular endurance, lumbar/cervical ROM, strength and muscular endurance for 60 minutes including the following exercises:     EIL 10x3"  DKTC 10x5"  LTR 10x5"  Bridging w/ glute squeeze 10x  HSS 2 x 30" Bilat  LAQ 3# 15  Seated Ham curl GTB 15x  STS x 15    [facility] 11/9/2022   Visit Number 14   VAS Pain Rating 0   Treadmill Time (in min.) 7   Speed -   Lumbar Stretches - Slouch Overcorrection -   Extension in Lying 10   Flexion in Lying 10   Lumbar Extension Seat Pad -   Femur Restraint -   Top Dead Center -   Counterweight -   Lumbar Flexion -   Lumbar Extension -   Lumbar Peak Torque -   Min Torque -   Test Percent Below Normative Data -   Test Percent Gain in Strength from Initial  -   Lumbar Weight 66   Repetitions 20   Rating of Perceived Exertion 4   Ice - Z Lie (in min.) 10           Peripheral muscle strengthening which included 1 set of 15-20 repetitions at a slow, controlled 10-13 second per rep pace focused on " strengthening supporting musculature for improved body mechanics and functional mobility.  Pt and therapist focused on proper form during treatment to ensure optimal strengthening of each targeted muscle group.  Machines were utilized including torso rotation, chest press, upright row. Tricep extension, bicep curl, and hip abduction and adduction.          Home Exercises Provided and Patient Education Provided   Home exercises include: LTR, DKTC EIL, PPT, bridging, SOC  Cardio program: visit 4 given 9/29/22 with goal walking outside 4/week  Lifting education date: next  Lumbar roll: she is using  Coaching offered; she is not interested 10/18/22    Education provided:   - HEP, POC  -encouraged continued walking program and cardio info given previously and she is compliant  -discussion about positive self talk when exercising  -discussed using healthy judgement and not letting fear of pain interfere with finding gloria in activities like movies or playing board games.  Discussed using strategies to manage symptoms like roll, slouch correct, and stretching back after sitting    Written Home Exercises Provided: Patient instructed to cont prior HEP.  Walking program given  Exercises were reviewed and Cherri was able to demonstrate them prior to the end of the session.  Cherri demonstrated good  understanding of the education provided.     See EMR under Patient Instructions for exercises provided prior visit.    Assessment     Cherri reports to session without c/o pain. Pt was able to complete treatment today with a continuation in modification in LE exercises instead of LE exercises on peripheral machines. Continued emphasis on HEP. No exacerbation of knee pain reported today. MedX was performed at 66ft lbs with 20 reps performed at an exertion rating of 4/10. No issues with Matrix strengthening.         Patient is making excellent progress towards established goals.  Pt will continue to benefit from skilled outpatient  physical therapy to address the deficits stated in the impairment chart, provide pt/family education and to maximize pt's level of independence in the home and community environment.     Anticipated Barriers for therapy: chronicity of condition, cervical symptoms  Pt's spiritual, cultural and educational needs considered and pt agreeable to plan of care and goals as stated below:       GOALS: Pt is in agreement with the following goals.     Short term goals:  6 weeks or 10 visits   1.  Pt will demonstrate increased lumbar ROM by at least 6 degrees from the initial ROM value with improvements noted in functional ROM and ability to perform ADLs.  (MET 10/24/2022)  2.  Pt will demonstrate increased MedX average isometric strength value  by 15% from initial test resulting in improved ability to perform bending, lifting, and carrying activities safely, confidently.  (MET 10/24/2022)  3.  Patient report a reduction in worst pain score by 1-2 points for improved tolerance for walking.  (partially met)  4.  Pt able to perform HEP correctly with minimal cueing or supervision from therapist to encourage independent management of symptoms. (MET 10/24/2022)        Long term goals: 10 weeks or 20 visits   1. Pt will demonstrate increased lumbar ROM by at least 9 degrees from initial ROM value, resulting in improved ability to perform functional fwd bending while standing and sitting. (approp and ongoing)  2. Pt will demonstrate increased MedX average isometric strength value  by 30% from initial test resulting in improved ability to perform bending, lifting, and carrying activities safely, confidently.  (approp and ongoing)  3. Pt to demonstrate ability to independently control and reduce their pain through posture positioning and mechanical movements throughout a typical day.  (approp and ongoing)  4.  Pt will demonstrate reduced pain and improved functional outcomes as reported on the FOTO by reaching a limitation score of <  or = 41% or less in order to demonstrate subjective improvement in pt's condition.    (approp and ongoing)  5. Pt will demonstrate independence with the HEP at discharge  (approp and ongoing)  6. Pt will be able to walk for 30 minutes without increased pain.  (approp and ongoing)       Plan   Continue with established Plan of Care towards established PT goals within HB program.    Waylon Seay, PTA  11/09/2022

## 2022-11-10 ENCOUNTER — TELEPHONE (OUTPATIENT)
Dept: PAIN MEDICINE | Facility: OTHER | Age: 57
End: 2022-11-10
Payer: COMMERCIAL

## 2022-11-10 ENCOUNTER — PATIENT MESSAGE (OUTPATIENT)
Dept: PAIN MEDICINE | Facility: OTHER | Age: 57
End: 2022-11-10
Payer: COMMERCIAL

## 2022-11-10 ENCOUNTER — OFFICE VISIT (OUTPATIENT)
Dept: PAIN MEDICINE | Facility: CLINIC | Age: 57
End: 2022-11-10
Payer: COMMERCIAL

## 2022-11-10 VITALS
HEART RATE: 67 BPM | SYSTOLIC BLOOD PRESSURE: 130 MMHG | HEIGHT: 64 IN | TEMPERATURE: 98 F | RESPIRATION RATE: 18 BRPM | WEIGHT: 154.31 LBS | BODY MASS INDEX: 26.34 KG/M2 | DIASTOLIC BLOOD PRESSURE: 82 MMHG

## 2022-11-10 DIAGNOSIS — M47.819 SPONDYLOSIS WITHOUT MYELOPATHY: Primary | ICD-10-CM

## 2022-11-10 PROCEDURE — 1160F PR REVIEW ALL MEDS BY PRESCRIBER/CLIN PHARMACIST DOCUMENTED: ICD-10-PCS | Mod: CPTII,S$GLB,, | Performed by: NURSE PRACTITIONER

## 2022-11-10 PROCEDURE — 3079F PR MOST RECENT DIASTOLIC BLOOD PRESSURE 80-89 MM HG: ICD-10-PCS | Mod: CPTII,S$GLB,, | Performed by: NURSE PRACTITIONER

## 2022-11-10 PROCEDURE — 3008F PR BODY MASS INDEX (BMI) DOCUMENTED: ICD-10-PCS | Mod: CPTII,S$GLB,, | Performed by: NURSE PRACTITIONER

## 2022-11-10 PROCEDURE — 3044F PR MOST RECENT HEMOGLOBIN A1C LEVEL <7.0%: ICD-10-PCS | Mod: CPTII,S$GLB,, | Performed by: NURSE PRACTITIONER

## 2022-11-10 PROCEDURE — 1159F MED LIST DOCD IN RCRD: CPT | Mod: CPTII,S$GLB,, | Performed by: NURSE PRACTITIONER

## 2022-11-10 PROCEDURE — 99214 PR OFFICE/OUTPT VISIT, EST, LEVL IV, 30-39 MIN: ICD-10-PCS | Mod: S$GLB,,, | Performed by: NURSE PRACTITIONER

## 2022-11-10 PROCEDURE — 3075F SYST BP GE 130 - 139MM HG: CPT | Mod: CPTII,S$GLB,, | Performed by: NURSE PRACTITIONER

## 2022-11-10 PROCEDURE — 1160F RVW MEDS BY RX/DR IN RCRD: CPT | Mod: CPTII,S$GLB,, | Performed by: NURSE PRACTITIONER

## 2022-11-10 PROCEDURE — 3079F DIAST BP 80-89 MM HG: CPT | Mod: CPTII,S$GLB,, | Performed by: NURSE PRACTITIONER

## 2022-11-10 PROCEDURE — 3044F HG A1C LEVEL LT 7.0%: CPT | Mod: CPTII,S$GLB,, | Performed by: NURSE PRACTITIONER

## 2022-11-10 PROCEDURE — 99999 PR PBB SHADOW E&M-EST. PATIENT-LVL IV: ICD-10-PCS | Mod: PBBFAC,,, | Performed by: NURSE PRACTITIONER

## 2022-11-10 PROCEDURE — 99214 OFFICE O/P EST MOD 30 MIN: CPT | Mod: S$GLB,,, | Performed by: NURSE PRACTITIONER

## 2022-11-10 PROCEDURE — 1159F PR MEDICATION LIST DOCUMENTED IN MEDICAL RECORD: ICD-10-PCS | Mod: CPTII,S$GLB,, | Performed by: NURSE PRACTITIONER

## 2022-11-10 PROCEDURE — 99999 PR PBB SHADOW E&M-EST. PATIENT-LVL IV: CPT | Mod: PBBFAC,,, | Performed by: NURSE PRACTITIONER

## 2022-11-10 PROCEDURE — 3075F PR MOST RECENT SYSTOLIC BLOOD PRESS GE 130-139MM HG: ICD-10-PCS | Mod: CPTII,S$GLB,, | Performed by: NURSE PRACTITIONER

## 2022-11-10 PROCEDURE — 3008F BODY MASS INDEX DOCD: CPT | Mod: CPTII,S$GLB,, | Performed by: NURSE PRACTITIONER

## 2022-11-10 NOTE — PROGRESS NOTES
Subjective:   Chronic Pain-Tele-Medicine-Established Note (Follow up visit)        The patient location is: Home  The chief complaint leading to consultation is: pain  Visit type: Virtual visit with synchronous audio and video  Total time spent with patient: 25 min  Each patient to whom he or she provides medical services by telemedicine is:  (1) informed of the relationship between the physician and patient and the respective role of any other health care provider with respect to management of the patient; and (2) notified that he or she may decline to receive medical services by telemedicine and may withdraw from such care at any time.       Patient ID: Cherri Noonan is a 57 y.o. female.    Chief Complaint: Low-back Pain    Referred by: No ref. provider found     Interval History 11/10/2022:  Mrs Noonan presents for follow up of chronic pain. She is s/p B L3,4,5 MBB. She states 100% relief and improved functioning for <24hrs and able to walk long distances. She continues to be active in PT and using [employer] [facility]. He is ready to proceed with 2nd MBB and RFA if indicated     Interval History 10/18/2022:  Mrs Noonan presents for follow up virtually. She is doing PT and finds this strengthening her body but not taking away pain. Her pain is worse with driving and sitting for long periods of time to lower lumbar. Heat eases pain somewhat. She has no radicular complaint. She continues to keep in contact with [employer] [facility] rep. There is no new neurological changes, no focal voicing of any s/s concerning for cauda equina. She is currently taking zonegran 400mg qd with benefit and denies SE of medication.     Interval History 8/17/2022:  Mrs Noonan presents for follow up of mid and lower back pain. She just had evaluation from [facility] and is eager to continue. Thoracolumbar back pain worsened by activity recently by HEP in conjunction with walking and unrelieved by ICE. She still has intermittent L medial knee pain.  Zonegran 400mg has provided some benefit. Denies any new areas of pain or neurological changes. She will be getting TENS pads online. She has had benefit from SCS reprogramming.     HPI  She returns for follow-up.  She had a CT scan of the cervical spine.  She had reprogramming of her spinal cord stimulator and told very well for the neck pain and radicular upper extremity pain.  She still has the discomfort along her spine in the thoracic and lumbar spine.  No radicular component.  No specific aggravating factors but it may be worse with activities.  She said overall, she feels much better since the reprogramming and since she started zonisamide.  She lost some weight.  She was able to do a lot of activities moving back into her house yesterday with some soreness but she would not have been able to do that otherwise.  No new symptomatology otherwise      Past Medical History:   Diagnosis Date    Cervical radiculopathy        Past Surgical History:   Procedure Laterality Date    CERVICAL FUSION  08/2015    C5-C7    HYSTERECTOMY      INJECTION OF ANESTHETIC AGENT AROUND NERVE Bilateral 10/31/2022    Procedure: BLOCK, NERVE, BILATERAL L3-L4-L5 MEDIAL BRANCH;  Surgeon: Archana Knott MD;  Location: [facility], [place], operated by [facility];  Service: Pain Management;  Laterality: Bilateral;    SPINAL CORD STIMULATOR IMPLANT  10/20/2021    TUBAL LIGATION         Review of patient's allergies indicates:   Allergen Reactions    Shingrix (pf) [varicella-zoster ge-as01b (pf)]      Got all side effects listed       Current Outpatient Medications   Medication Sig Dispense Refill    atorvastatin (LIPITOR) 20 MG tablet Take 20 mg by mouth once daily.      azelastine 205.5 mcg (0.15 %) Spry azelastine 205.5 mcg (0.15 %) nasal spray      b complex vitamins tablet Take 1 tablet by mouth once daily.      ciclopirox (LOPROX) 0.77 % Susp Apply topically 2 (two) times daily.      ergocalciferol (ERGOCALCIFEROL) 50,000 unit Cap Take 50,000 Units by mouth every 7  "days.      estradioL (ESTRACE) 0.01 % (0.1 mg/gram) vaginal cream Place 0.5 g vaginally.      fluticasone propionate (FLONASE) 50 mcg/actuation nasal spray 1 spray by Each Nostril route once daily.      ginseng 250 mg Cap Take by mouth.      multivitamin capsule Take 1 capsule by mouth once daily.      naproxen (NAPROSYN) 500 MG tablet Take 1 tablet (500 mg total) by mouth 2 (two) times daily as needed (pain). 30 tablet 0    omega-3 fatty acids/fish oil (FISH OIL-OMEGA-3 FATTY ACIDS) 300-1,000 mg capsule Take by mouth once daily.      omeprazole (PRILOSEC) 20 MG capsule Take 20 mg by mouth once daily.      potassium gluconate 595 mg (99 mg) TbSR Take by mouth once.      tumeric-ging-olive-oreg-capryl 100 mg-150 mg- 50 mg-150 mg Cap Take by mouth.      vitamin E 100 UNIT capsule Take 100 Units by mouth once daily.      zinc gluconate 50 mg tablet Take 50 mg by mouth once daily.      zonisamide (ZONEGRAN) 100 MG Cap Take 4 capsules (400 mg total) by mouth once daily. 360 capsule 0     No current facility-administered medications for this visit.       Family History   Problem Relation Age of Onset    Hyperlipidemia Mother     Diabetes Mother     Heart disease Sister     Juvenile idiopathic arthritis Sister     Aneurysm Brother     COPD Brother        Social History     Socioeconomic History    Marital status:    Tobacco Use    Smoking status: Never    Smokeless tobacco: Never   Substance and Sexual Activity    Alcohol use: Never    Drug use: Never    Sexual activity: Yes     Partners: Male     Birth control/protection: None     Comment:  39+years           ROS        Objective:   /82   Pulse 67   Temp 98 °F (36.7 °C) (Oral)   Resp 18   Ht 5' 4" (1.626 m)   Wt 70 kg (154 lb 5.2 oz)   BMI 26.49 kg/m²   Pain Disability Index Review:  Last 3 PDI Scores 11/10/2022 8/17/2022 7/5/2022   Pain Disability Index (PDI) 15 49 35     PHYSICAL EXAMINATION:  Voice normal.  Normal affect without evidence of " distress.   Lumbar: +facet loading bilaterally to lower lumbar     Assessment:       Encounter Diagnosis   Name Primary?    Spondylosis without myelopathy Yes             Plan:   We discussed with the patient the assessment and recommendations. The following is the plan we agreed on:  - Prior records reviewed  - Imaging reviewed  - PT doing well for her but pain persists  - s/p B L3,4,5 MBB with improved functioning and 100% relief for <24hr then pain returned, limiting functioning and pain >6/10   - Continue Zonegran 400mg QD   - Continue [facility]   - Continue to keep in contact with Rahel Magallon [facility] rep  - RTC after procedures, can call with pain diary to proceed with RFA if indicated.      FRANCHESCA Ward  11/10/2022         Cherri was seen today for low-back pain.    Diagnoses and all orders for this visit:    Spondylosis without myelopathy  -     Procedure Order to Pain Management; Future         I spent a total of 30 minutes on the day of the visit.  This includes face to face time and non-face to face time preparing to see the patient by reviewing previous labs/imaging, obtaining and/or reviewing separately obtained history, documenting clinical information in the electronic or other health record, independently interpreting results and communicating results to the patient/family/caregiver.

## 2022-11-10 NOTE — H&P (VIEW-ONLY)
Subjective:   Chronic Pain-Tele-Medicine-Established Note (Follow up visit)        The patient location is: Home  The chief complaint leading to consultation is: pain  Visit type: Virtual visit with synchronous audio and video  Total time spent with patient: 25 min  Each patient to whom he or she provides medical services by telemedicine is:  (1) informed of the relationship between the physician and patient and the respective role of any other health care provider with respect to management of the patient; and (2) notified that he or she may decline to receive medical services by telemedicine and may withdraw from such care at any time.       Patient ID: Cherri Noonan is a 57 y.o. female.    Chief Complaint: Low-back Pain    Referred by: No ref. provider found     Interval History 11/10/2022:  Mrs Noonan presents for follow up of chronic pain. She is s/p B L3,4,5 MBB. She states 100% relief and improved functioning for <24hrs and able to walk long distances. She continues to be active in PT and using [employer] [facility]. He is ready to proceed with 2nd MBB and RFA if indicated     Interval History 10/18/2022:  Mrs Noonan presents for follow up virtually. She is doing PT and finds this strengthening her body but not taking away pain. Her pain is worse with driving and sitting for long periods of time to lower lumbar. Heat eases pain somewhat. She has no radicular complaint. She continues to keep in contact with [employer] rep. There is no new neurological changes, no focal voicing of any s/s concerning for cauda equina. She is currently taking zonegran 400mg qd with benefit and denies SE of medication.     Interval History 8/17/2022:  Mrs Noonan presents for follow up of mid and lower back pain. She just had evaluation from [facility] and is eager to continue. Thoracolumbar back pain worsened by activity recently by HEP in conjunction with walking and unrelieved by ICE. She still has intermittent L medial knee pain.  Zonegran 400mg has provided some benefit. Denies any new areas of pain or neurological changes. She will be getting TENS pads online. She has had benefit from SCS reprogramming.     HPI  She returns for follow-up.  She had a CT scan of the cervical spine.  She had reprogramming of her spinal cord stimulator and told very well for the neck pain and radicular upper extremity pain.  She still has the discomfort along her spine in the thoracic and lumbar spine.  No radicular component.  No specific aggravating factors but it may be worse with activities.  She said overall, she feels much better since the reprogramming and since she started zonisamide.  She lost some weight.  She was able to do a lot of activities moving back into her house yesterday with some soreness but she would not have been able to do that otherwise.  No new symptomatology otherwise      Past Medical History:   Diagnosis Date    Cervical radiculopathy        Past Surgical History:   Procedure Laterality Date    CERVICAL FUSION  08/2015    C5-C7    HYSTERECTOMY      INJECTION OF ANESTHETIC AGENT AROUND NERVE Bilateral 10/31/2022    Procedure: BLOCK, NERVE, BILATERAL L3-L4-L5 MEDIAL BRANCH;  Surgeon: Archana Knott MD;  Location: [facility] [place] [facility];  Service: Pain Management;  Laterality: Bilateral;    SPINAL CORD STIMULATOR IMPLANT  10/20/2021    TUBAL LIGATION         Review of patient's allergies indicates:   Allergen Reactions    Shingrix (pf) [varicella-zoster ge-as01b (pf)]      Got all side effects listed       Current Outpatient Medications   Medication Sig Dispense Refill    atorvastatin (LIPITOR) 20 MG tablet Take 20 mg by mouth once daily.      azelastine 205.5 mcg (0.15 %) Spry azelastine 205.5 mcg (0.15 %) nasal spray      b complex vitamins tablet Take 1 tablet by mouth once daily.      ciclopirox (LOPROX) 0.77 % Susp Apply topically 2 (two) times daily.      ergocalciferol (ERGOCALCIFEROL) 50,000 unit Cap Take 50,000 Units by mouth every 7  "days.      estradioL (ESTRACE) 0.01 % (0.1 mg/gram) vaginal cream Place 0.5 g vaginally.      fluticasone propionate (FLONASE) 50 mcg/actuation nasal spray 1 spray by Each Nostril route once daily.      ginseng 250 mg Cap Take by mouth.      multivitamin capsule Take 1 capsule by mouth once daily.      naproxen (NAPROSYN) 500 MG tablet Take 1 tablet (500 mg total) by mouth 2 (two) times daily as needed (pain). 30 tablet 0    omega-3 fatty acids/fish oil (FISH OIL-OMEGA-3 FATTY ACIDS) 300-1,000 mg capsule Take by mouth once daily.      omeprazole (PRILOSEC) 20 MG capsule Take 20 mg by mouth once daily.      potassium gluconate 595 mg (99 mg) TbSR Take by mouth once.      tumeric-ging-olive-oreg-capryl 100 mg-150 mg- 50 mg-150 mg Cap Take by mouth.      vitamin E 100 UNIT capsule Take 100 Units by mouth once daily.      zinc gluconate 50 mg tablet Take 50 mg by mouth once daily.      zonisamide (ZONEGRAN) 100 MG Cap Take 4 capsules (400 mg total) by mouth once daily. 360 capsule 0     No current facility-administered medications for this visit.       Family History   Problem Relation Age of Onset    Hyperlipidemia Mother     Diabetes Mother     Heart disease Sister     Juvenile idiopathic arthritis Sister     Aneurysm Brother     COPD Brother        Social History     Socioeconomic History    Marital status:    Tobacco Use    Smoking status: Never    Smokeless tobacco: Never   Substance and Sexual Activity    Alcohol use: Never    Drug use: Never    Sexual activity: Yes     Partners: Male     Birth control/protection: None     Comment:  39+years           ROS        Objective:   /82   Pulse 67   Temp 98 °F (36.7 °C) (Oral)   Resp 18   Ht 5' 4" (1.626 m)   Wt 70 kg (154 lb 5.2 oz)   BMI 26.49 kg/m²   Pain Disability Index Review:  Last 3 PDI Scores 11/10/2022 8/17/2022 7/5/2022   Pain Disability Index (PDI) 15 49 35     PHYSICAL EXAMINATION:  Voice normal.  Normal affect without evidence of " distress.   Lumbar: +facet loading bilaterally to lower lumbar     Assessment:       Encounter Diagnosis   Name Primary?    Spondylosis without myelopathy Yes             Plan:   We discussed with the patient the assessment and recommendations. The following is the plan we agreed on:  - Prior records reviewed  - Imaging reviewed  - PT doing well for her but pain persists  - s/p B L3,4,5 MBB with improved functioning and 100% relief for <24hr then pain returned, limiting functioning and pain >6/10   - Continue Zonegran 400mg QD   - Continue [facility]   - Continue to keep in contact with [facility]all[facility] rep  - RTC after procedures, can call with pain diary to proceed with RFA if indicated.      FRANCHESCA Ward  11/10/2022         Cherri was seen today for low-back pain.    Diagnoses and all orders for this visit:    Spondylosis without myelopathy  -     Procedure Order to Pain Management; Future         I spent a total of 30 minutes on the day of the visit.  This includes face to face time and non-face to face time preparing to see the patient by reviewing previous labs/imaging, obtaining and/or reviewing separately obtained history, documenting clinical information in the electronic or other health record, independently interpreting results and communicating results to the patient/family/caregiver.

## 2022-11-10 NOTE — H&P (VIEW-ONLY)
Subjective:   Chronic Pain-Tele-Medicine-Established Note (Follow up visit)        The patient location is: Home  The chief complaint leading to consultation is: pain  Visit type: Virtual visit with synchronous audio and video  Total time spent with patient: 25 min  Each patient to whom he or she provides medical services by telemedicine is:  (1) informed of the relationship between the physician and patient and the respective role of any other health care provider with respect to management of the patient; and (2) notified that he or she may decline to receive medical services by telemedicine and may withdraw from such care at any time.       Patient ID: Cherri Noonan is a 57 y.o. female.    Chief Complaint: Low-back Pain    Referred by: No ref. provider found     Interval History 11/10/2022:  Mrs Noonan presents for follow up of chronic pain. She is s/p B L3,4,5 MBB. She states 100% relief and improved functioning for <24hrs and able to walk long distances. She continues to be active in PT and using [facility]. He is ready to proceed with 2nd MBB and RFA if indicated     Interval History 10/18/2022:  Mrs Noonan presents for follow up virtually. She is doing PT and finds this strengthening her body but not taking away pain. Her pain is worse with driving and sitting for long periods of time to lower lumbar. Heat eases pain somewhat. She has no radicular complaint. She continues to keep in contact with [facility] rep. There is no new neurological changes, no focal voicing of any s/s concerning for cauda equina. She is currently taking zonegran 400mg qd with benefit and denies SE of medication.     Interval History 8/17/2022:  Mrs Noonan presents for follow up of mid and lower back pain. She just had evaluation from [facility] and is eager to continue. Thoracolumbar back pain worsened by activity recently by HEP in conjunction with walking and unrelieved by ICE. She still has intermittent L medial knee pain.  Zonegran 400mg has provided some benefit. Denies any new areas of pain or neurological changes. She will be getting TENS pads online. She has had benefit from SCS reprogramming.     HPI  She returns for follow-up.  She had a CT scan of the cervical spine.  She had reprogramming of her spinal cord stimulator and told very well for the neck pain and radicular upper extremity pain.  She still has the discomfort along her spine in the thoracic and lumbar spine.  No radicular component.  No specific aggravating factors but it may be worse with activities.  She said overall, she feels much better since the reprogramming and since she started zonisamide.  She lost some weight.  She was able to do a lot of activities moving back into her house yesterday with some soreness but she would not have been able to do that otherwise.  No new symptomatology otherwise      Past Medical History:   Diagnosis Date    Cervical radiculopathy        Past Surgical History:   Procedure Laterality Date    CERVICAL FUSION  08/2015    C5-C7    HYSTERECTOMY      INJECTION OF ANESTHETIC AGENT AROUND NERVE Bilateral 10/31/2022    Procedure: BLOCK, NERVE, BILATERAL L3-L4-L5 MEDIAL BRANCH;  Surgeon: Archana Knott MD;  Location: [facility] [place];  Service: Pain Management;  Laterality: Bilateral;    SPINAL CORD STIMULATOR IMPLANT  10/20/2021    TUBAL LIGATION         Review of patient's allergies indicates:   Allergen Reactions    Shingrix (pf) [varicella-zoster [id number] (pf)]      Got all side effects listed       Current Outpatient Medications   Medication Sig Dispense Refill    atorvastatin (LIPITOR) 20 MG tablet Take 20 mg by mouth once daily.      azelastine 205.5 mcg (0.15 %) Spry azelastine 205.5 mcg (0.15 %) nasal spray      b complex vitamins tablet Take 1 tablet by mouth once daily.      ciclopirox (LOPROX) 0.77 % Susp Apply topically 2 (two) times daily.      ergocalciferol (ERGOCALCIFEROL) 50,000 unit Cap Take 50,000 Units by mouth every 7  "days.      estradioL (ESTRACE) 0.01 % (0.1 mg/gram) vaginal cream Place 0.5 g vaginally.      fluticasone propionate (FLONASE) 50 mcg/actuation nasal spray 1 spray by Each Nostril route once daily.      ginseng 250 mg Cap Take by mouth.      multivitamin capsule Take 1 capsule by mouth once daily.      naproxen (NAPROSYN) 500 MG tablet Take 1 tablet (500 mg total) by mouth 2 (two) times daily as needed (pain). 30 tablet 0    omega-3 fatty acids/fish oil (FISH OIL-OMEGA-3 FATTY ACIDS) 300-1,000 mg capsule Take by mouth once daily.      omeprazole (PRILOSEC) 20 MG capsule Take 20 mg by mouth once daily.      potassium gluconate 595 mg (99 mg) TbSR Take by mouth once.      tumeric-ging-olive-oreg-capryl 100 mg-150 mg- 50 mg-150 mg Cap Take by mouth.      vitamin E 100 UNIT capsule Take 100 Units by mouth once daily.      zinc gluconate 50 mg tablet Take 50 mg by mouth once daily.      zonisamide (ZONEGRAN) 100 MG Cap Take 4 capsules (400 mg total) by mouth once daily. 360 capsule 0     No current facility-administered medications for this visit.       Family History   Problem Relation Age of Onset    Hyperlipidemia Mother     Diabetes Mother     Heart disease Sister     Juvenile idiopathic arthritis Sister     Aneurysm Brother     COPD Brother        Social History     Socioeconomic History    Marital status:    Tobacco Use    Smoking status: Never    Smokeless tobacco: Never   Substance and Sexual Activity    Alcohol use: Never    Drug use: Never    Sexual activity: Yes     Partners: Male     Birth control/protection: None     Comment:  39+years           ROS        Objective:   /82   Pulse 67   Temp 98 °F (36.7 °C) (Oral)   Resp 18   Ht 5' 4" (1.626 m)   Wt 70 kg (154 lb 5.2 oz)   BMI 26.49 kg/m²   Pain Disability Index Review:  Last 3 PDI Scores 11/10/2022 8/17/2022 7/5/2022   Pain Disability Index (PDI) 15 49 35     PHYSICAL EXAMINATION:  Voice normal.  Normal affect without evidence of " distress.   Lumbar: +facet loading bilaterally to lower lumbar     Assessment:       Encounter Diagnosis   Name Primary?    Spondylosis without myelopathy Yes             Plan:   We discussed with the patient the assessment and recommendations. The following is the plan we agreed on:  - Prior records reviewed  - Imaging reviewed  - PT doing well for her but pain persists  - s/p B L3,4,5 MBB with improved functioning and 100% relief for <24hr then pain returned, limiting functioning and pain >6/10   - Continue Zonegran 400mg QD   - Continue [facility]   - Continue to keep in contact with [facility] rep  - RTC after procedures, can call with pain diary to proceed with RFA if indicated.      FRANCHESCA Ward  11/10/2022         Cherri was seen today for low-back pain.    Diagnoses and all orders for this visit:    Spondylosis without myelopathy  -     Procedure Order to Pain Management; Future         I spent a total of 30 minutes on the day of the visit.  This includes face to face time and non-face to face time preparing to see the patient by reviewing previous labs/imaging, obtaining and/or reviewing separately obtained history, documenting clinical information in the electronic or other health record, independently interpreting results and communicating results to the patient/family/caregiver.

## 2022-11-14 ENCOUNTER — CLINICAL SUPPORT (OUTPATIENT)
Dept: REHABILITATION | Facility: HOSPITAL | Age: 57
End: 2022-11-14
Payer: COMMERCIAL

## 2022-11-14 DIAGNOSIS — R29.898 WEAKNESS OF BOTH LOWER EXTREMITIES: ICD-10-CM

## 2022-11-14 DIAGNOSIS — R29.3 POOR POSTURE: ICD-10-CM

## 2022-11-14 DIAGNOSIS — M25.69 DECREASED RANGE OF MOTION OF TRUNK AND BACK: Primary | ICD-10-CM

## 2022-11-14 DIAGNOSIS — R29.898 DECREASED STRENGTH OF TRUNK AND BACK: ICD-10-CM

## 2022-11-14 PROCEDURE — 97110 THERAPEUTIC EXERCISES: CPT | Mod: PN

## 2022-11-14 NOTE — PROGRESS NOTES
" [facility]      Name: Cherri Noonan  Clinic Number: [id number]    Therapy Diagnosis:   Encounter Diagnoses   Name Primary?    Decreased range of motion of trunk and back Yes    Decreased strength of trunk and back     Poor posture     Weakness of both lower extremities          Physician: Archana Knott MD    Visit Date: 2022    Physician Orders: PT Eval and Treat   Medical Diagnosis from Referral:   M54.6 (ICD-10-CM) - Midline thoracic back pain, unspecified chronicity   M47.816 (ICD-10-CM) - Spondylosis of lumbar region without myelopathy or radiculopathy      Evaluation Date: 2022  Authorization Period Expiration: 2023  Plan of Care Expiration: 2022  (sent 2022)  Reassessment Due: 22  Visit # / Visits authorized: 15/20      Time In: 10:40   Time Out: 11:55  Total Billable Time: 75 min   Precautions: Standard spinal cord stimulator    Pattern of pain determined: pattern 1    Subjective   Cherri has been keeping up with her exercises some, she had a death in the family. She is continuing to be encouraged and using the tools that she learned in therapy.      Patient reports tolerating previous visit with increased soreness  Patient reports their pain to be none given/10 on a 0-10 scale with 0 being no pain and 10 being the worst pain imaginable.  Pain Location: bilateral back, L LE     Occupation: Not currently working  Leisure: Walking, volunteering for her mission/[employer]      Pt goals: "to get moving again"    Objective        Baseline Isometric Testing on Med X equipment: Testing administered by PT  Date of testin2022  ROM 0-42 deg   Max Peak Torque 97    Min Peak Torque 38    Flex/Ext Ratio 2.3:1   % below normative data 53      Midpoint Isometric Testing on Med X equipment: Testing administered by PT  Date of testing: 10/24/2022    ROM 0-54 deg   Max Peak Torque 109   Min Peak Torque 41   Flex/Ext Ratio 2.6:1   % below normative data " "44%   % increase in initial visit 16%     MOVEMENT LOSS     ROM Loss initial 10/18/22 11/14/2022   Flexion minimal loss Min loss Min loss   Extension moderate loss Min loss Min loss   Side glide Right minimal loss Min loss Min loss   Side glide Left minimal loss Min loss Min loss   Rotation Right moderate loss Min loss Min loss   Rotation Left moderate loss Min loss Min loss       Limitation/Restriction for FOTO lumbar Survey     Therapist reviewed FOTO scores for Cherri Noonan on 8/12/2022.   FOTO documents entered into [employer] - see Media section.     Limitation Score: 51% limited  Visit 5- 56 % limited, however patient reporting improved walking (5 miles and function)     Visit 10: 33%  Goal: 41%             Treatment    Pt was instructed in and performed the following:     Cherri received therapeutic exercises to develop/improved posture, cardiovascular endurance, muscular endurance, lumbar/cervical ROM, strength and muscular endurance for 60 minutes including the following exercises:     EIL 10x3"  DKTC 10x5"  LTR 10x5"  Bridging w/ glute squeeze 10x  Bridge with march 5x  HSS 2 x 30" Bilat  LAQ 3# 18  Seated Ham curl GTB 15x  STS x 15 5#    [facility] 11/14/2022   Visit Number 15   VAS Pain Rating -   Treadmill Time (in min.) 5   Speed -   Lumbar Stretches - Slouch Overcorrection -   Extension in Lying 10   Flexion in Lying 10   Lumbar Extension Seat Pad -   Femur Restraint -   Top Dead Center -   Counterweight -   Lumbar Flexion -   Lumbar Extension -   Lumbar Peak Torque -   Min Torque -   Test Percent Below Normative Data -   Test Percent Gain in Strength from Initial  -   Lumbar Weight 69   Repetitions 15   Rating of Perceived Exertion 3   Ice - Z Lie (in min.) 10             Peripheral muscle strengthening which included 1 set of 15-20 repetitions at a slow, controlled 10-13 second per rep pace focused on strengthening supporting musculature for improved body mechanics and functional mobility.  " Pt and therapist focused on proper form during treatment to ensure optimal strengthening of each targeted muscle group.  Machines were utilized including torso rotation, chest press, upright row. Tricep extension, bicep curl, and hip abduction and adduction.          Home Exercises Provided and Patient Education Provided   Home exercises include: LTR, DKTC EIL, PPT, bridging, SOC  Cardio program: visit 4 given 9/29/22 with goal walking outside 4/week  Lifting education date: next  Lumbar roll: she is using  Coaching offered; she is not interested 10/18/22    Education provided:   - HEP, POC  -encouraged continued walking program and cardio info given previously and she is compliant  -discussion about positive self talk when exercising  -discussed using healthy judgement and not letting fear of pain interfere with finding gloria in activities like movies or playing board games.  Discussed using strategies to manage symptoms like roll, slouch correct, and stretching back after sitting    Written Home Exercises Provided: Patient instructed to cont prior HEP.  Walking program given  Exercises were reviewed and Cherri was able to demonstrate them prior to the end of the session.  Cherri demonstrated good  understanding of the education provided.     See EMR under Patient Instructions for exercises provided prior visit.    Assessment     Cherri reports to session without c/o pain. She was able to resume stretches with good recall, and progressed bridges to bridge with march. MedX was performed at 69 ft lbs with 15 reps performed at an exertion rating of 3/10. She was able to complete Matrix strengthening with some challenge but no complaints of pain. Continuing to modify exercises for R knee, pt required some cushion under the knee when preforming prone lumbar extension.        Patient is making excellent progress towards established goals.  Pt will continue to benefit from skilled outpatient physical therapy to address  the deficits stated in the impairment chart, provide pt/family education and to maximize pt's level of independence in the home and community environment.     Anticipated Barriers for therapy: chronicity of condition, cervical symptoms  Pt's spiritual, cultural and educational needs considered and pt agreeable to plan of care and goals as stated below:       GOALS: Pt is in agreement with the following goals.     Short term goals:  6 weeks or 10 visits   1.  Pt will demonstrate increased lumbar ROM by at least 6 degrees from the initial ROM value with improvements noted in functional ROM and ability to perform ADLs.  (MET 10/24/2022)  2.  Pt will demonstrate increased MedX average isometric strength value  by 15% from initial test resulting in improved ability to perform bending, lifting, and carrying activities safely, confidently.  (MET 10/24/2022)  3.  Patient report a reduction in worst pain score by 1-2 points for improved tolerance for walking.  (partially met)  4.  Pt able to perform HEP correctly with minimal cueing or supervision from therapist to encourage independent management of symptoms. (MET 10/24/2022)        Long term goals: 10 weeks or 20 visits   1. Pt will demonstrate increased lumbar ROM by at least 9 degrees from initial ROM value, resulting in improved ability to perform functional fwd bending while standing and sitting. (approp and ongoing)  2. Pt will demonstrate increased MedX average isometric strength value  by 30% from initial test resulting in improved ability to perform bending, lifting, and carrying activities safely, confidently.  (approp and ongoing)  3. Pt to demonstrate ability to independently control and reduce their pain through posture positioning and mechanical movements throughout a typical day.  (approp and ongoing)  4.  Pt will demonstrate reduced pain and improved functional outcomes as reported on the FOTO by reaching a limitation score of < or = 41% or less in order to  demonstrate subjective improvement in pt's condition.    (approp and ongoing)  5. Pt will demonstrate independence with the HEP at discharge  (approp and ongoing)  6. Pt will be able to walk for 30 minutes without increased pain.  (approp and ongoing)       Plan   Continue with established Plan of Care towards established PT goals within HB program.    Filipe De La Cruz, PT  11/14/2022

## 2022-11-15 NOTE — PROGRESS NOTES
"  [facility]      Name: Cherri Noonan  Clinic Number: [id number]    Therapy Diagnosis:   Encounter Diagnoses   Name Primary?    Decreased range of motion of trunk and back Yes    Decreased strength of trunk and back     Poor posture     Weakness of both lower extremities            Physician: Archana Knott MD    Visit Date: 2022    Physician Orders: PT Eval and Treat   Medical Diagnosis from Referral:   M54.6 (ICD-10-CM) - Midline thoracic back pain, unspecified chronicity   M47.816 (ICD-10-CM) - Spondylosis of lumbar region without myelopathy or radiculopathy      Evaluation Date: 2022  Authorization Period Expiration: 2023  Plan of Care Expiration: 2022  (sent 2022)  Reassessment Due: 22  Visit # / Visits authorized:       Time In: 840AM  Time Out: 940AM  Total Billable Time: 40 min   Precautions: Standard spinal cord stimulator    Pattern of pain determined: pattern 1    Subjective   Cherri is trying to go to the clinic next door and see about her knee. She has still andrade walking but a little less so as not to make the knee pain worse. She is only walking 2.8 miles. The back has been hurting. It's that burning, tingling feeling today.       Patient reports tolerating previous visit fine.   Patient reports their pain to be 5/10 on a 0-10 scale with 0 being no pain and 10 being the worst pain imaginable.  Pain Location: bilateral back, L LE     Occupation: Not currently working  Leisure: Walking, volunteering for her mission/[employer]      Pt goals: "to get moving again"    Objective        Baseline Isometric Testing on Med X equipment: Testing administered by PT  Date of testin2022  ROM 0-42 deg   Max Peak Torque 97    Min Peak Torque 38    Flex/Ext Ratio 2.3:1   % below normative data 53      Midpoint Isometric Testing on Med X equipment: Testing administered by PT  Date of testing: 10/24/2022    ROM 0-54 deg   Max Peak Torque 109 " "  Min Peak Torque 41   Flex/Ext Ratio 2.6:1   % below normative data 44%   % increase in initial visit 16%     MOVEMENT LOSS     ROM Loss initial 10/18/22 11/14/2022   Flexion minimal loss Min loss Min loss   Extension moderate loss Min loss Min loss   Side glide Right minimal loss Min loss Min loss   Side glide Left minimal loss Min loss Min loss   Rotation Right moderate loss Min loss Min loss   Rotation Left moderate loss Min loss Min loss       Limitation/Restriction for FOTO lumbar Survey     Therapist reviewed FOTO scores for Cherri Noonan on 8/12/2022.   FOTO documents entered into [employer] - see Media section.     Limitation Score: 51% limited  Visit 5- 56 % limited, however patient reporting improved walking (5 miles and function)     Visit 10: 33%  Goal: 41%             Treatment    Pt was instructed in and performed the following:     Cherri received therapeutic exercises to develop/improved posture, cardiovascular endurance, muscular endurance, lumbar/cervical ROM, strength and muscular endurance for 60 minutes including the following exercises:     EIL 10x3"  DKTC 10x5"  LTR 10x5"  Bridging w/ glute squeeze 10x  Bridge with march 5x  HSS 2 x 30" Bilat  LAQ 3# 20  Seated Ham curl GTB 15x  STS x 15 5#    [facility] 11/16/2022   Visit Number 16   VAS Pain Rating 5   Treadmill Time (in min.) 5   Speed -   Lumbar Stretches - Slouch Overcorrection -   Extension in Lying -   Flexion in Lying 10   Lumbar Extension Seat Pad -   Femur Restraint -   Top Dead Center -   Counterweight -   Lumbar Flexion -   Lumbar Extension -   Lumbar Peak Torque -   Min Torque -   Test Percent Below Normative Data -   Test Percent Gain in Strength from Initial  -   Lumbar Weight 69   Repetitions 18   Rating of Perceived Exertion 4   Ice - Z Lie (in min.) 10         Peripheral muscle strengthening which included 1 set of 15-20 repetitions at a slow, controlled 10-13 second per rep pace focused on strengthening supporting " musculature for improved body mechanics and functional mobility.  Pt and therapist focused on proper form during treatment to ensure optimal strengthening of each targeted muscle group.  Machines were utilized including torso rotation, chest press, upright row. Tricep extension, bicep curl, and hip abduction and adduction.          Home Exercises Provided and Patient Education Provided   Home exercises include: LTR, DKTC EIL, PPT, bridging, SOC  Cardio program: visit 4 given 9/29/22 with goal walking outside 4/week  Lifting education date: next  Lumbar roll: she is using  Coaching offered; she is not interested 10/18/22    Education provided:   - HEP, POC  -encouraged continued walking program and cardio info given previously and she is compliant  -discussion about positive self talk when exercising  -discussed using healthy judgement and not letting fear of pain interfere with finding gloria in activities like movies or playing board games.  Discussed using strategies to manage symptoms like roll, slouch correct, and stretching back after sitting    Written Home Exercises Provided: Patient instructed to cont prior HEP.  Walking program given  Exercises were reviewed and Cherri was able to demonstrate them prior to the end of the session.  Cherri demonstrated good  understanding of the education provided.     See EMR under Patient Instructions for exercises provided prior visit.    Assessment     Cherri reports to session without c/o pain. Continued previous progressions and modifications. No complaints of pain throughout session. MedX was performed at 69ft lbs with 18 reps performed at an exertion rating of 4/10. No issues reported with Matrix strengthening. Pt plans on seeing MD regarding knee pain.         Patient is making excellent progress towards established goals.  Pt will continue to benefit from skilled outpatient physical therapy to address the deficits stated in the impairment chart, provide pt/family  education and to maximize pt's level of independence in the home and community environment.     Anticipated Barriers for therapy: chronicity of condition, cervical symptoms  Pt's spiritual, cultural and educational needs considered and pt agreeable to plan of care and goals as stated below:       GOALS: Pt is in agreement with the following goals.     Short term goals:  6 weeks or 10 visits   1.  Pt will demonstrate increased lumbar ROM by at least 6 degrees from the initial ROM value with improvements noted in functional ROM and ability to perform ADLs.  (MET 10/24/2022)  2.  Pt will demonstrate increased MedX average isometric strength value  by 15% from initial test resulting in improved ability to perform bending, lifting, and carrying activities safely, confidently.  (MET 10/24/2022)  3.  Patient report a reduction in worst pain score by 1-2 points for improved tolerance for walking.  (partially met)  4.  Pt able to perform HEP correctly with minimal cueing or supervision from therapist to encourage independent management of symptoms. (MET 10/24/2022)        Long term goals: 10 weeks or 20 visits   1. Pt will demonstrate increased lumbar ROM by at least 9 degrees from initial ROM value, resulting in improved ability to perform functional fwd bending while standing and sitting. (approp and ongoing)  2. Pt will demonstrate increased MedX average isometric strength value  by 30% from initial test resulting in improved ability to perform bending, lifting, and carrying activities safely, confidently.  (approp and ongoing)  3. Pt to demonstrate ability to independently control and reduce their pain through posture positioning and mechanical movements throughout a typical day.  (approp and ongoing)  4.  Pt will demonstrate reduced pain and improved functional outcomes as reported on the FOTO by reaching a limitation score of < or = 41% or less in order to demonstrate subjective improvement in pt's condition.     (approp and ongoing)  5. Pt will demonstrate independence with the HEP at discharge  (approp and ongoing)  6. Pt will be able to walk for 30 minutes without increased pain.  (approp and ongoing)       Plan   Continue with established Plan of Care towards established PT goals within HB program.    Waylon Seay, PTA  11/16/2022

## 2022-11-16 ENCOUNTER — CLINICAL SUPPORT (OUTPATIENT)
Dept: REHABILITATION | Facility: HOSPITAL | Age: 57
End: 2022-11-16
Payer: COMMERCIAL

## 2022-11-16 ENCOUNTER — PATIENT MESSAGE (OUTPATIENT)
Dept: PAIN MEDICINE | Facility: OTHER | Age: 57
End: 2022-11-16
Payer: COMMERCIAL

## 2022-11-16 DIAGNOSIS — M25.69 DECREASED RANGE OF MOTION OF TRUNK AND BACK: Primary | ICD-10-CM

## 2022-11-16 DIAGNOSIS — R29.898 WEAKNESS OF BOTH LOWER EXTREMITIES: ICD-10-CM

## 2022-11-16 DIAGNOSIS — R29.898 DECREASED STRENGTH OF TRUNK AND BACK: ICD-10-CM

## 2022-11-16 DIAGNOSIS — R29.3 POOR POSTURE: ICD-10-CM

## 2022-11-16 PROCEDURE — 97110 THERAPEUTIC EXERCISES: CPT | Mod: PN,CQ

## 2022-11-17 ENCOUNTER — HOSPITAL ENCOUNTER (OUTPATIENT)
Facility: OTHER | Age: 57
Discharge: HOME | End: 2022-11-17
Attending: ANESTHESIOLOGY | Admitting: ANESTHESIOLOGY
Payer: COMMERCIAL

## 2022-11-17 VITALS
SYSTOLIC BLOOD PRESSURE: 169 MMHG | DIASTOLIC BLOOD PRESSURE: 88 MMHG | OXYGEN SATURATION: 100 % | WEIGHT: 154 LBS | HEART RATE: 57 BPM | HEIGHT: 64 IN | RESPIRATION RATE: 16 BRPM | TEMPERATURE: 98 F | BODY MASS INDEX: 26.29 KG/M2

## 2022-11-17 DIAGNOSIS — M54.16 LUMBAR RADICULOPATHY: Primary | ICD-10-CM

## 2022-11-17 DIAGNOSIS — G89.29 CHRONIC PAIN: ICD-10-CM

## 2022-11-17 DIAGNOSIS — M47.816 SPONDYLOSIS OF LUMBAR REGION WITHOUT MYELOPATHY OR RADICULOPATHY: Primary | ICD-10-CM

## 2022-11-17 PROBLEM — M47.899 OTHER OSTEOARTHRITIS OF SPINE: Status: ACTIVE | Noted: 2022-11-17

## 2022-11-17 PROBLEM — M47.819 SPONDYLOSIS WITHOUT MYELOPATHY: Status: ACTIVE | Noted: 2022-11-17

## 2022-11-17 PROCEDURE — 64493 INJ PARAVERT F JNT L/S 1 LEV: CPT | Mod: 50,KX | Performed by: ANESTHESIOLOGY

## 2022-11-17 PROCEDURE — 64494 PR INJ DX/THER AGNT PARAVERT FACET JOINT,IMG GUIDE,LUMBAR/SAC, 2ND LEVEL: ICD-10-PCS | Mod: 50,KX,, | Performed by: ANESTHESIOLOGY

## 2022-11-17 PROCEDURE — 25000003 PHARM REV CODE 250: Performed by: ANESTHESIOLOGY

## 2022-11-17 PROCEDURE — 64494 INJ PARAVERT F JNT L/S 2 LEV: CPT | Mod: 50,KX | Performed by: ANESTHESIOLOGY

## 2022-11-17 PROCEDURE — 64493 INJ PARAVERT F JNT L/S 1 LEV: CPT | Mod: 50,KX,, | Performed by: ANESTHESIOLOGY

## 2022-11-17 PROCEDURE — 64493 PR INJ DX/THER AGNT PARAVERT FACET JOINT,IMG GUIDE,LUMBAR/SAC,1ST LVL: ICD-10-PCS | Mod: 50,KX,, | Performed by: ANESTHESIOLOGY

## 2022-11-17 PROCEDURE — 64494 INJ PARAVERT F JNT L/S 2 LEV: CPT | Mod: 50,KX,, | Performed by: ANESTHESIOLOGY

## 2022-11-17 RX ORDER — SODIUM CHLORIDE 9 MG/ML
500 INJECTION, SOLUTION INTRAVENOUS CONTINUOUS
Status: DISCONTINUED | OUTPATIENT
Start: 2022-11-17 | End: 2022-11-17 | Stop reason: HOSPADM

## 2022-11-17 RX ORDER — BUPIVACAINE HYDROCHLORIDE 2.5 MG/ML
INJECTION, SOLUTION EPIDURAL; INFILTRATION; INTRACAUDAL
Status: DISCONTINUED | OUTPATIENT
Start: 2022-11-17 | End: 2022-11-17 | Stop reason: HOSPADM

## 2022-11-17 RX ORDER — LIDOCAINE HYDROCHLORIDE 20 MG/ML
INJECTION, SOLUTION INFILTRATION; PERINEURAL
Status: DISCONTINUED | OUTPATIENT
Start: 2022-11-17 | End: 2022-11-17 | Stop reason: HOSPADM

## 2022-11-17 NOTE — INTERVAL H&P NOTE
The patient was examined and no significant changes were noted from the updated H&P or last clinic note.    The risks and benefits of this procedure, including alternative therapies, were discussed with the patient.  The discussion of risks included infection, bleeding, need for additional procedures or surgery, nerve damage, paralysis, adverse medication reaction(s), stroke, and if appropriate for the procedure, death.  Questions regarding the procedure, risks, expected outcome, and possible side effects were solicited and answered to Cherri's satisfaction.  Cherri Noonan wishes to proceed with the injection or procedure as confirmed by written consent.    Plan for second L3-L4-L5 MBB today.      Active Hospital Problems    Diagnosis  POA    Other osteoarthritis of spine [M47.899]  Yes    Spondylosis without myelopathy [M47.819]  Yes      Resolved Hospital Problems   No resolved problems to display.

## 2022-11-17 NOTE — OP NOTE
Diagnostic Lumbar Medial Branch Block Under Fluoroscopy    The procedure, risks, benefits, and options were discussed with the patient. There are no contraindications to the procedure. The patent expressed understanding and agreed to the procedure. Informed written consent was obtained prior to the start of the procedure and can be found in the patient's chart.    PATIENT NAME: Cherri Noonan   MRN: [id number]     DATE OF PROCEDURE: 11/17/2022                                           PROCEDURE:  Diagnostic Bilateral L3, L4, and L5 Lumbar Medial Branch Block under Fluoroscopy    PRE-OP DIAGNOSIS: Spondylosis without myelopathy [M47.819] Lumbar spondylosis [M47.816]    POST-OP DIAGNOSIS: Same    PHYSICIAN: Archana Knott MD    ASSISTANTS: Tim Katz MD    MEDICATIONS INJECTED:  Bupivicaine 0.25%    LOCAL ANESTHETIC INJECTED:   Xylocaine 2%    SEDATION: No sedation    ESTIMATED BLOOD LOSS:  None    COMPLICATIONS:  None.    INTERVAL HISTORY: Patient has clinical and imaging findings suggestive of facet mediated pain. Patient had a previous diagnostic block performed with at least 80% relief in pain and/or at least 50% improvement in the ability to perform previously painful movements and ADLs for the expected duration of the local anesthetic utilized.    TECHNIQUE: Time-out was performed to identify the patient and procedure to be performed. With the patient laying in a prone position, the surgical area was prepped and draped in the usual sterile fashion using ChloraPrep and fenestrated drape. The levels were determined under fluoroscopic guidance. Skin anesthesia was achieved by injecting Lidocaine 2% over the injection sites. A 22 gauge, 3.5 inch needle was introduced into the medial branch nerves at the junctions of the superior articular process and the transverse processes of the targeted sites using AP, lateral and/or contralateral oblique fluoroscopic imaging. After negative aspiration for blood or CSF was  confirmed, 1 mL of the anesthetic listed above was then slowly injected at each site. The needles were removed and bleeding was nil. A sterile dressing was applied. No specimens collected. The patient tolerated the procedure well.     The patient was monitored after the procedure in the recovery area. They were given post-procedure and discharge instructions to follow at home. The patient was discharged in a stable condition.    PAIN BEFORE THE PROCEDURE: 6-7/10    PAIN AFTER THE PROCEDURE: 0/10      Archana Knott MD

## 2022-11-17 NOTE — BRIEF OP NOTE
Discharge Note  Short Stay      SUMMARY     Admit Date: 11/17/2022    Attending Physician: Archana Knott      Discharge Physician: Archana Knott      Discharge Date: 11/17/2022 2:37 PM    Procedure(s) (LRB):  BLOCK, NERVE BILATERAL L3,L4,L5 MEDIAL BRANCH (Bilateral)    Final Diagnosis: Spondylosis without myelopathy [M47.819]    Disposition: Home or self care    Patient Instructions:   Current Discharge Medication List        CONTINUE these medications which have NOT CHANGED    Details   atorvastatin (LIPITOR) 20 MG tablet Take 20 mg by mouth once daily.      azelastine 205.5 mcg (0.15 %) Spry azelastine 205.5 mcg (0.15 %) nasal spray      b complex vitamins tablet Take 1 tablet by mouth once daily.      ciclopirox (LOPROX) 0.77 % Susp Apply topically 2 (two) times daily.      ergocalciferol (ERGOCALCIFEROL) 50,000 unit Cap Take 50,000 Units by mouth every 7 days.      estradioL (ESTRACE) 0.01 % (0.1 mg/gram) vaginal cream Place 0.5 g vaginally.      fluticasone propionate (FLONASE) 50 mcg/actuation nasal spray 1 spray by Each Nostril route once daily.      ginseng 250 mg Cap Take by mouth.      multivitamin capsule Take 1 capsule by mouth once daily.      naproxen (NAPROSYN) 500 MG tablet Take 1 tablet (500 mg total) by mouth 2 (two) times daily as needed (pain).  Qty: 30 tablet, Refills: 0    Associated Diagnoses: Acute pain of left knee      omega-3 fatty acids/fish oil (FISH OIL-OMEGA-3 FATTY ACIDS) 300-1,000 mg capsule Take by mouth once daily.      omeprazole (PRILOSEC) 20 MG capsule Take 20 mg by mouth once daily.      potassium gluconate 595 mg (99 mg) TbSR Take by mouth once.      tumeric-ging-olive-oreg-capryl 100 mg-150 mg- 50 mg-150 mg Cap Take by mouth.      vitamin E 100 UNIT capsule Take 100 Units by mouth once daily.      zinc gluconate 50 mg tablet Take 50 mg by mouth once daily.      zonisamide (ZONEGRAN) 100 MG Cap Take 4 capsules (400 mg total) by mouth once daily.  Qty: 360 capsule, Refills: 0     Associated Diagnoses: Cervical radiculopathy; Peripheral neuropathic pain                 Discharge Diagnosis: Spondylosis without myelopathy [M47.819]  Condition on Discharge: Stable with no complications to procedure   Diet on Discharge: Same as before.  Activity: as per instruction sheet.  Discharge to: Home with a responsible adult.  Follow up: 2-4 weeks       Please call my office or pager at [phone] if experienced any weakness or loss of sensation, fever > 101.5, pain uncontrolled with oral medications, persistent nausea/vomiting/or diarrhea, redness or drainage from the incisions, or any other worrisome concerns. If physician on call was not reached or could not communicate with our office for any reason please go to the nearest emergency department

## 2022-11-17 NOTE — DISCHARGE INSTRUCTIONS

## 2022-11-18 ENCOUNTER — TELEPHONE (OUTPATIENT)
Dept: PAIN MEDICINE | Facility: CLINIC | Age: 57
End: 2022-11-18
Payer: COMMERCIAL

## 2022-11-18 NOTE — TELEPHONE ENCOUNTER
----- Message from Karime Rashid sent at 11/18/2022  9:00 AM CST -----  Regarding: Pain Diary  Name of Who is Calling: LAZARO ALVES [[id number]]      What is the request in detail: Pain Diary     Time                     pain score                pain level   3:30pm-9:00am         0                              100%         Can the clinic reply by [facility]: no      What Number to Call Back if not in [facility]: [phone]

## 2022-11-18 NOTE — TELEPHONE ENCOUNTER
----- Message from Karime Rashid sent at 11/18/2022  9:00 AM CST -----  Regarding: Pain Diary  Name of Who is Calling: LAZARO ALVES [[id number]]      What is the request in detail: Pain Diary     Time                     pain score                pain level   3:30pm-9:00am         0                              100%         Can the clinic reply by [facility]: no      What Number to Call Back if not in [facility]: [phone]

## 2022-11-21 ENCOUNTER — OFFICE VISIT (OUTPATIENT)
Dept: ORTHOPEDICS | Facility: CLINIC | Age: 57
End: 2022-11-21
Payer: COMMERCIAL

## 2022-11-21 VITALS — WEIGHT: 154 LBS | BODY MASS INDEX: 26.29 KG/M2 | HEIGHT: 64 IN

## 2022-11-21 DIAGNOSIS — M17.11 PRIMARY OSTEOARTHRITIS OF RIGHT KNEE: Primary | ICD-10-CM

## 2022-11-21 DIAGNOSIS — M25.561 RIGHT KNEE PAIN, UNSPECIFIED CHRONICITY: Primary | ICD-10-CM

## 2022-11-21 PROCEDURE — 1159F PR MEDICATION LIST DOCUMENTED IN MEDICAL RECORD: ICD-10-PCS | Mod: CPTII,S$GLB,, | Performed by: ORTHOPAEDIC SURGERY

## 2022-11-21 PROCEDURE — 99214 PR OFFICE/OUTPT VISIT, EST, LEVL IV, 30-39 MIN: ICD-10-PCS | Mod: 25,S$GLB,, | Performed by: ORTHOPAEDIC SURGERY

## 2022-11-21 PROCEDURE — 20610 DRAIN/INJ JOINT/BURSA W/O US: CPT | Mod: RT,S$GLB,, | Performed by: ORTHOPAEDIC SURGERY

## 2022-11-21 PROCEDURE — 3008F BODY MASS INDEX DOCD: CPT | Mod: CPTII,S$GLB,, | Performed by: ORTHOPAEDIC SURGERY

## 2022-11-21 PROCEDURE — 3044F PR MOST RECENT HEMOGLOBIN A1C LEVEL <7.0%: ICD-10-PCS | Mod: CPTII,S$GLB,, | Performed by: ORTHOPAEDIC SURGERY

## 2022-11-21 PROCEDURE — 3044F HG A1C LEVEL LT 7.0%: CPT | Mod: CPTII,S$GLB,, | Performed by: ORTHOPAEDIC SURGERY

## 2022-11-21 PROCEDURE — 99999 PR PBB SHADOW E&M-EST. PATIENT-LVL III: CPT | Mod: PBBFAC,,, | Performed by: ORTHOPAEDIC SURGERY

## 2022-11-21 PROCEDURE — 3008F PR BODY MASS INDEX (BMI) DOCUMENTED: ICD-10-PCS | Mod: CPTII,S$GLB,, | Performed by: ORTHOPAEDIC SURGERY

## 2022-11-21 PROCEDURE — 99214 OFFICE O/P EST MOD 30 MIN: CPT | Mod: 25,S$GLB,, | Performed by: ORTHOPAEDIC SURGERY

## 2022-11-21 PROCEDURE — 99999 PR PBB SHADOW E&M-EST. PATIENT-LVL III: ICD-10-PCS | Mod: PBBFAC,,, | Performed by: ORTHOPAEDIC SURGERY

## 2022-11-21 PROCEDURE — 1159F MED LIST DOCD IN RCRD: CPT | Mod: CPTII,S$GLB,, | Performed by: ORTHOPAEDIC SURGERY

## 2022-11-21 PROCEDURE — 20610 LARGE JOINT ASPIRATION/INJECTION: R KNEE: ICD-10-PCS | Mod: RT,S$GLB,, | Performed by: ORTHOPAEDIC SURGERY

## 2022-11-21 RX ORDER — TRIAMCINOLONE ACETONIDE 40 MG/ML
40 INJECTION, SUSPENSION INTRA-ARTICULAR; INTRAMUSCULAR
Status: DISCONTINUED | OUTPATIENT
Start: 2022-11-21 | End: 2022-11-21 | Stop reason: HOSPADM

## 2022-11-21 RX ORDER — MELOXICAM 15 MG/1
15 TABLET ORAL DAILY
Qty: 30 TABLET | Refills: 1 | Status: SHIPPED | OUTPATIENT
Start: 2022-11-21 | End: 2023-05-03 | Stop reason: SDUPTHER

## 2022-11-21 RX ADMIN — TRIAMCINOLONE ACETONIDE 40 MG: 40 INJECTION, SUSPENSION INTRA-ARTICULAR; INTRAMUSCULAR at 07:11

## 2022-11-21 NOTE — PROGRESS NOTES
Follow up PATIENT ORTHOPAEDIC: Knee    PRIMARY CARE PHYSICIAN: Mahsa Arevalo NP   REFERRING PROVIDER: No referring provider defined for this encounter.     ASSESSMENT & PLAN:    Impression:  Left Knee Medial meniscal tear   Right Knee Primary Osteoarthritis, Primary    Follow Up Plan: PRN    Injection:     Cherri Noonan has physical exam evidence of above and wishes to pursue an injection. We discussed alternative non operative modalities including physical therapy, anti-inflammatories, bracing, maintenance of appropriate weight, rest, ambulatory devices. We discussed the risk, benefits, and expectations regarding injection. They have elected to proceed with injection. Should injections fail next step would be MRI. See procedure note for billing purposes.     Non operative care:    Cherri Noonan has physical exam evidence of above and wishes to pursue an non-operative care. I am recommending the following: injection, Mobic    To review previous:   Patient has acute left knee pain after dancing a few months ago. She was diagnosed with meniscal tear. We did injection. This knee is doing well. Now with right knee pain, atraumatic. Her pain is vague. Think this is likely related to some mild OA. She has a complex history with regard to pain, h/o spinal cord stimulator. Follows up with pain management.     The patient has been ordered:  Office Intraarticular injection  Pain Prescription     CONSULTS:   None    ACTIVE PROBLEM LIST  Patient Active Problem List   Diagnosis    Chest pain, atypical    Atrophic vaginitis    Chronic rhinitis    Cervical radiculopathy    Complex regional pain syndrome    Other hyperlipidemia    Vitamin D deficiency    Sleep disorder    Peripheral neuropathic pain    Class 1 obesity with body mass index (BMI) of 31.0 to 31.9 in adult    Decreased range of motion of trunk and back    Decreased strength of trunk and back    Poor posture    Weakness of both lower extremities    Other  osteoarthritis of spine    Spondylosis without myelopathy           SUBJECTIVE    CHIEF COMPLAINT: Knee Pain    HPI:   Cherri Noonan is a 57 y.o. female here for evaluation and management of left knee pain. There is a specific incident that brought about this pain, patient reports that she was dancing last two nights ago and squatted down, following, she felt a pop followed by pain in her medial left knee.  Patient bears weight but with pain. Pain now progressing to interfere with activities which include: walking and functional household ADL's    Currently the pain in the joint is rated at moderate with activity. The pain is constant and is located in the knee, at level of joint line and located medially. The pain is described as aching, stiffness and throbbing. Relieving factors include rest and prescription medication.     There is associated Popping.     Cherri Noonan has no additional complaints.     Interval History 6/20/22: Patient with on going medial based knee pain. No significant improvement in pain. Continues to be active. Has trouble with touch and lying on side with pillow is aggravating.     Interval History 11/21/22: Patient with right knee pain today. No trauma. In PT. Able to ambulate 3 miles. 30 pound weight loss.     PROGRESSIVE SYMPTOMS:  Pain worsened by weight bearing    FUNCTIONAL STATUS:   Climb a flight of stairs or walk up a hill     PREVIOUS TREATMENTS:  Medical: RX NSAIDS  Physical Therapy: Activities Modified   Previous Orthopaedic Surgery: None    REVIEW OF SYSTEMS:  PAIN ASSESSMENT:  See HPI.  MUSCULOSKELETAL: See HPI.  OTHER 10 point review of systems is negative except as stated in HPI above    PAST MEDICAL HISTORY   has a past medical history of Cervical radiculopathy.     PAST SURGICAL HISTORY   has a past surgical history that includes Hysterectomy; Tubal ligation; Spinal cord stimulator implant (10/20/2021); Cervical fusion (08/2015); Injection of anesthetic agent around  nerve (Bilateral, 10/31/2022); and Injection of anesthetic agent around nerve (Bilateral, 11/17/2022).     FAMILY HISTORY  family history includes Aneurysm in her brother; COPD in her brother; Diabetes in her mother; Heart disease in her sister; Hyperlipidemia in her mother; Juvenile idiopathic arthritis in her sister.     SOCIAL HISTORY   reports that she has never smoked. She has never used smokeless tobacco. She reports that she does not drink alcohol and does not use drugs.     ALLERGIES   Review of patient's allergies indicates:   Allergen Reactions    Shingrix (pf) [varicella-zoster [id number] (pf)]      Got all side effects listed        MEDICATIONS  Current Outpatient Medications on File Prior to Visit   Medication Sig Dispense Refill    atorvastatin (LIPITOR) 20 MG tablet Take 20 mg by mouth once daily.      azelastine 205.5 mcg (0.15 %) Spry azelastine 205.5 mcg (0.15 %) nasal spray      b complex vitamins tablet Take 1 tablet by mouth once daily.      ciclopirox (LOPROX) 0.77 % Susp Apply topically 2 (two) times daily.      ergocalciferol (ERGOCALCIFEROL) 50,000 unit Cap Take 50,000 Units by mouth every 7 days.      estradioL (ESTRACE) 0.01 % (0.1 mg/gram) vaginal cream Place 0.5 g vaginally.      fluticasone propionate (FLONASE) 50 mcg/actuation nasal spray 1 spray by Each Nostril route once daily.      ginseng 250 mg Cap Take by mouth.      multivitamin capsule Take 1 capsule by mouth once daily.      naproxen (NAPROSYN) 500 MG tablet Take 1 tablet (500 mg total) by mouth 2 (two) times daily as needed (pain). 30 tablet 0    omega-3 fatty acids/fish oil (FISH OIL-OMEGA-3 FATTY ACIDS) 300-1,000 mg capsule Take by mouth once daily.      omeprazole (PRILOSEC) 20 MG capsule Take 20 mg by mouth once daily.      potassium gluconate 595 mg (99 mg) TbSR Take by mouth once.      tumeric-ging-olive-oreg-capryl 100 mg-150 mg- 50 mg-150 mg Cap Take by mouth.      vitamin E 100 UNIT capsule Take 100 Units by mouth once  "daily.      zinc gluconate 50 mg tablet Take 50 mg by mouth once daily.      zonisamide (ZONEGRAN) 100 MG Cap Take 4 capsules (400 mg total) by mouth once daily. 360 capsule 0     No current facility-administered medications on file prior to visit.          PHYSICAL EXAM   height is 5' 4" (1.626 m) and weight is 69.9 kg (154 lb).   Body mass index is 26.43 kg/m².      All other systems deferred.  GENERAL:  No acute distress  HABITUS: Normal  GAIT: Antalgic  SKIN: Normal     KNEE EXAM:    right:   Effusion: No joint effusion  TTP:None  no crepitus with passive knee ROM  Passive ROM: Extension 0, Flexion 130  No pain with manipulation of patella  Stable to varus/valgus stress. No increased laxity to anterior/posterior drawer testing  positive Josef's test  No pain with IR/ER rotation of the hip  5/5 strength in knee flexion and extension, ankle plantarflexion and dorsiflexion  Neurovascular Status: Sensation intact to light touch in Sural, Saphenous, SPN, DPN, Tibial nerve distribution  2+ pulse DP/PT, normal capillary refill, foot has normal warmth    DATA:  Diagnostic tests reviewed for today's visit:     The obtained right knee radiographs appears mild lateral compartment arthritis.       "

## 2022-11-21 NOTE — PROCEDURES
Large Joint Aspiration/Injection: R knee    Date/Time: 11/21/2022 7:20 AM  Performed by: Markus Valentino MD  Authorized by: Markus Valentino MD     Consent Done?:  Yes (Verbal)  Indications:  Arthritis and pain  Site marked: the procedure site was marked    Timeout: prior to procedure the correct patient, procedure, and site was verified    Prep: patient was prepped and draped in usual sterile fashion      Local anesthesia used?: Yes    Anesthesia:  Local infiltration  Local anesthetic:  Lidocaine 1% without epinephrine and topical anesthetic    Details:  Needle Size:  22 G  Approach:  Anterolateral  Location:  Knee  Site:  R knee  Medications:  40 mg triamcinolone acetonide 40 mg/mL  Patient tolerance:  Patient tolerated the procedure well with no immediate complications

## 2022-11-22 NOTE — PROGRESS NOTES
"  [facility]      Name: Cherri Noonan  Clinic Number: [id number]    Therapy Diagnosis:   Encounter Diagnoses   Name Primary?    Decreased range of motion of trunk and back Yes    Decreased strength of trunk and back     Poor posture     Weakness of both lower extremities              Physician: Archana Knott MD    Visit Date: 2022    Physician Orders: PT Eval and Treat   Medical Diagnosis from Referral:   M54.6 (ICD-10-CM) - Midline thoracic back pain, unspecified chronicity   M47.816 (ICD-10-CM) - Spondylosis of lumbar region without myelopathy or radiculopathy      Evaluation Date: 2022  Authorization Period Expiration: 2023  Plan of Care Expiration: 2022  (sent 2022)  Reassessment Due: 22  Visit # / Visits authorized:       Time In: 1130AM  Time Out: 1230PM  Total Billable Time: 60  Precautions: Standard spinal cord stimulator    Pattern of pain determined: pattern 1    Subjective   Cherri is doing good today. No back pain in low back to report. She got a shot in her knee and it is doing good. Her doctor said it could be arthritis in her knees. She did step wrong in her shower and her knee was a little irritated after that. She went for a walk today and iced her knees after so she is okay. She has been testing her shoes to find the right ones so it doesn't irritate her back or her feet.       Patient reports tolerating previous visit fine.   Patient reports their pain to be 0/10 on a 0-10 scale with 0 being no pain and 10 being the worst pain imaginable.  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      Pt goals: "to get moving again"    Objective        Baseline Isometric Testing on Med X equipment: Testing administered by PT  Date of testin2022  ROM 0-42 deg   Max Peak Torque 97    Min Peak Torque 38    Flex/Ext Ratio 2.3:1   % below normative " "data 53      Midpoint Isometric Testing on Med X equipment: Testing administered by PT  Date of testing: 10/24/2022    ROM 0-54 deg   Max Peak Torque 109   Min Peak Torque 41   Flex/Ext Ratio 2.6:1   % below normative data 44%   % increase in initial visit 16%     MOVEMENT LOSS     ROM Loss initial 10/18/22 11/14/2022   Flexion minimal loss Min loss Min loss   Extension moderate loss Min loss Min loss   Side glide Right minimal loss Min loss Min loss   Side glide Left minimal loss Min loss Min loss   Rotation Right moderate loss Min loss Min loss   Rotation Left moderate loss Min loss Min loss       Limitation/Restriction for FOTO lumbar Survey     Therapist reviewed FOTO scores for Cherri Noonan on 8/12/2022.   FOTO documents entered into [employer] - see Media section.     Limitation Score: 51% limited  Visit 5- 56 % limited, however patient reporting improved walking (5 miles and function)     Visit 10: 33%  Goal: 41%             Treatment    Pt was instructed in and performed the following:     Cherri received therapeutic exercises to develop/improved posture, cardiovascular endurance, muscular endurance, lumbar/cervical ROM, strength and muscular endurance for 60 minutes including the following exercises:     EIL 10x3"  DKTC 10x5"  LTR 10x5"  Bridging w/ glute squeeze x 10x  Bridge with march +10x   HSS 2 x 30" Bilat  LAQ 3# 20  Seated Ham curl GTB 18x  STS x 18 5#    [facility] 11/23/2022   Visit Number 17   VAS Pain Rating 0   Treadmill Time (in min.) -   Speed -   Lumbar Stretches - Slouch Overcorrection -   Extension in Lying 10   Flexion in Lying 10   Lumbar Extension Seat Pad -   Femur Restraint -   Top Dead Center -   Counterweight -   Lumbar Flexion -   Lumbar Extension -   Lumbar Peak Torque -   Min Torque -   Test Percent Below Normative Data -   Test Percent Gain in Strength from Initial  -   Lumbar Weight 69   Repetitions 20   Rating of Perceived Exertion 5   Ice - Z Lie (in min.) 10 "         Peripheral muscle strengthening which included 1 set of 15-20 repetitions at a slow, controlled 10-13 second per rep pace focused on strengthening supporting musculature for improved body mechanics and functional mobility.  Pt and therapist focused on proper form during treatment to ensure optimal strengthening of each targeted muscle group.  Machines were utilized including torso rotation, chest press, upright row. Tricep extension, bicep curl, and hip abduction and adduction.          Home Exercises Provided and Patient Education Provided   Home exercises include: LTR, DKTC EIL, PPT, bridging, SOC  Cardio program: visit 4 given 9/29/22 with goal walking outside 4/week  Lifting education date: next  Lumbar roll: she is using  Coaching offered; she is not interested 10/18/22    Education provided:   - HEP, POC  -encouraged continued walking program and cardio info given previously and she is compliant  -discussion about positive self talk when exercising  -discussed using healthy judgement and not letting fear of pain interfere with finding gloria in activities like movies or playing board games.  Discussed using strategies to manage symptoms like roll, slouch correct, and stretching back after sitting    Written Home Exercises Provided: Patient instructed to cont prior HEP.  Walking program given  Exercises were reviewed and Cherri was able to demonstrate them prior to the end of the session.  Cherri demonstrated good  understanding of the education provided.     See EMR under Patient Instructions for exercises provided prior visit.    Assessment     Cherri reports to session without c/o pain. Continued previous progressions and modifications. No complaints of pain throughout session. MedX was performed at 69ft lbs with 20 reps performed at an exertion rating of 5/10. Pt continues with glut weakness with bridge plus march with pt unable to maintain neutral pelvis. No issues reported with Matrix  strengthening.       Patient is making excellent progress towards established goals.  Pt will continue to benefit from skilled outpatient physical therapy to address the deficits stated in the impairment chart, provide pt/family education and to maximize pt's level of independence in the home and community environment.     Anticipated Barriers for therapy: chronicity of condition, cervical symptoms  Pt's spiritual, cultural and educational needs considered and pt agreeable to plan of care and goals as stated below:       GOALS: Pt is in agreement with the following goals.     Short term goals:  6 weeks or 10 visits   1.  Pt will demonstrate increased lumbar ROM by at least 6 degrees from the initial ROM value with improvements noted in functional ROM and ability to perform ADLs.  (MET 10/24/2022)  2.  Pt will demonstrate increased MedX average isometric strength value  by 15% from initial test resulting in improved ability to perform bending, lifting, and carrying activities safely, confidently.  (MET 10/24/2022)  3.  Patient report a reduction in worst pain score by 1-2 points for improved tolerance for walking.  (partially met)  4.  Pt able to perform HEP correctly with minimal cueing or supervision from therapist to encourage independent management of symptoms. (MET 10/24/2022)        Long term goals: 10 weeks or 20 visits   1. Pt will demonstrate increased lumbar ROM by at least 9 degrees from initial ROM value, resulting in improved ability to perform functional fwd bending while standing and sitting. (approp and ongoing)  2. Pt will demonstrate increased MedX average isometric strength value  by 30% from initial test resulting in improved ability to perform bending, lifting, and carrying activities safely, confidently.  (approp and ongoing)  3. Pt to demonstrate ability to independently control and reduce their pain through posture positioning and mechanical movements throughout a typical day.  (approp and  ongoing)  4.  Pt will demonstrate reduced pain and improved functional outcomes as reported on the FOTO by reaching a limitation score of < or = 41% or less in order to demonstrate subjective improvement in pt's condition.    (approp and ongoing)  5. Pt will demonstrate independence with the HEP at discharge  (approp and ongoing)  6. Pt will be able to walk for 30 minutes without increased pain.  (approp and ongoing)       Plan   Continue with established Plan of Care towards established PT goals within HB program.    Waylon Seay, PTA  11/23/2022

## 2022-11-23 ENCOUNTER — CLINICAL SUPPORT (OUTPATIENT)
Dept: REHABILITATION | Facility: HOSPITAL | Age: 57
End: 2022-11-23
Payer: COMMERCIAL

## 2022-11-23 DIAGNOSIS — R29.898 WEAKNESS OF BOTH LOWER EXTREMITIES: ICD-10-CM

## 2022-11-23 DIAGNOSIS — M25.69 DECREASED RANGE OF MOTION OF TRUNK AND BACK: Primary | ICD-10-CM

## 2022-11-23 DIAGNOSIS — R29.3 POOR POSTURE: ICD-10-CM

## 2022-11-23 DIAGNOSIS — R29.898 DECREASED STRENGTH OF TRUNK AND BACK: ICD-10-CM

## 2022-11-23 PROCEDURE — 97110 THERAPEUTIC EXERCISES: CPT | Mod: PN,CQ

## 2022-11-28 ENCOUNTER — CLINICAL SUPPORT (OUTPATIENT)
Dept: REHABILITATION | Facility: HOSPITAL | Age: 57
End: 2022-11-28
Payer: COMMERCIAL

## 2022-11-28 DIAGNOSIS — R29.898 WEAKNESS OF BOTH LOWER EXTREMITIES: ICD-10-CM

## 2022-11-28 DIAGNOSIS — M25.69 DECREASED RANGE OF MOTION OF TRUNK AND BACK: Primary | ICD-10-CM

## 2022-11-28 DIAGNOSIS — R29.898 DECREASED STRENGTH OF TRUNK AND BACK: ICD-10-CM

## 2022-11-28 DIAGNOSIS — R29.3 POOR POSTURE: ICD-10-CM

## 2022-11-28 PROCEDURE — 97110 THERAPEUTIC EXERCISES: CPT | Mod: PN

## 2022-11-28 NOTE — PROGRESS NOTES
"  [facility]      Name: Cherri Noonan  Clinic Number: [id number]    Therapy Diagnosis:   Encounter Diagnoses   Name Primary?    Decreased range of motion of trunk and back Yes    Decreased strength of trunk and back     Poor posture     Weakness of both lower extremities          Physician: Archana Knott MD    Visit Date: 2022    Physician Orders: PT Eval and Treat   Medical Diagnosis from Referral:   M54.6 (ICD-10-CM) - Midline thoracic back pain, unspecified chronicity   M47.816 (ICD-10-CM) - Spondylosis of lumbar region without myelopathy or radiculopathy      Evaluation Date: 2022  Authorization Period Expiration: 2023  Plan of Care Expiration: 2022    Reassessment Due: 22  Visit # / Visits authorized:  **D/C Visit 19**      Time In: 7:05   Time Out: 8:25  Total Billable Time: 60  Precautions: Standard spinal cord stimulator    Pattern of pain determined: pattern 1    Subjective   Cherri The pain level in the back is about 6/10. She was doing a lot of cleaning over the weekend after family left. She felt ok while she was doing it but afterward she thinks it triggered the constant pain      Patient reports tolerating previous visit fine.   Patient reports their pain to be 6/10 on a 0-10 scale with 0 being no pain and 10 being the worst pain imaginable.  Pain Location: bilateral back, L LE     Occupation: Not currently working  Leisure: Walking, volunteering for her mission/[employer]      Pt goals: "to get moving again"    Objective        Baseline Isometric Testing on Med X equipment: Testing administered by PT  Date of testin2022  ROM 0-42 deg   Max Peak Torque 97    Min Peak Torque 38    Flex/Ext Ratio 2.3:1   % below normative data 53      Midpoint Isometric Testing on Med X equipment: Testing administered by PT  Date of testing: 10/24/2022    ROM 0-54 deg   Max Peak Torque 109   Min Peak Torque 41   Flex/Ext Ratio 2.6:1   % below " "normative data 44%   % increase in initial visit 16%     MOVEMENT LOSS     ROM Loss initial 10/18/22 11/14/2022   Flexion minimal loss Min loss Min loss   Extension moderate loss Min loss Min loss   Side glide Right minimal loss Min loss Min loss   Side glide Left minimal loss Min loss Min loss   Rotation Right moderate loss Min loss Min loss   Rotation Left moderate loss Min loss Min loss       Limitation/Restriction for FOTO lumbar Survey     Therapist reviewed FOTO scores for Cherri Noonan on 8/12/2022.   FOTO documents entered into [employer] - see Media section.     Limitation Score: 51% limited  Visit 5- 56 % limited, however patient reporting improved walking (5 miles and function)     Visit 10: 33%  Goal: 41%             Treatment    Pt was instructed in and performed the following:     Cherri received therapeutic exercises to develop/improved posture, cardiovascular endurance, muscular endurance, lumbar/cervical ROM, strength and muscular endurance for 60 minutes including the following exercises:     EIL 10x3"  DKTC 10x5"  LTR 10x5"  Bridging w/ glute squeeze x 10x  Bridge with march +10x   Piriformis stretch 2x30"  Hamstring stretch 2x30"  LAQ 4# 15  Seated Ham curl BlueTB 20x  STS x 15 8#    [facility] 11/28/2022   Visit Number 18   VAS Pain Rating 6   Treadmill Time (in min.) 5   Speed -   Lumbar Stretches - Slouch Overcorrection -   Extension in Lying -   Flexion in Lying 10   Lumbar Extension Seat Pad -   Femur Restraint -   Top Dead Center -   Counterweight -   Lumbar Flexion -   Lumbar Extension -   Lumbar Peak Torque -   Min Torque -   Test Percent Below Normative Data -   Test Percent Gain in Strength from Initial  -   Lumbar Weight 69   Repetitions 20   Rating of Perceived Exertion 5   Ice - Z Lie (in min.) 10           Peripheral muscle strengthening which included 1 set of 15-20 repetitions at a slow, controlled 10-13 second per rep pace focused on strengthening supporting musculature " for improved body mechanics and functional mobility.  Pt and therapist focused on proper form during treatment to ensure optimal strengthening of each targeted muscle group.  Machines were utilized including torso rotation, chest press, upright row. Tricep extension, bicep curl, and hip abduction and adduction.          Home Exercises Provided and Patient Education Provided   Home exercises include: LTR, DKTC EIL, PPT, bridging, SOC  Cardio program: visit 4 given 9/29/22 with goal walking outside 4/week  Lifting education date: next  Lumbar roll: she is using  Coaching offered; she is not interested 10/18/22    Education provided:   - HEP, POC  -encouraged continued walking program and cardio info given previously and she is compliant  -discussion about positive self talk when exercising  -discussed using healthy judgement and not letting fear of pain interfere with finding gloria in activities like movies or playing board games.  Discussed using strategies to manage symptoms like roll, slouch correct, and stretching back after sitting    Written Home Exercises Provided: Patient instructed to cont prior HEP.  Walking program given  Exercises were reviewed and Cherri was able to demonstrate them prior to the end of the session.  Cherri demonstrated good  understanding of the education provided.     See EMR under Patient Instructions for exercises provided prior visit.    Assessment     Cherri reports to session without c/o pain. Plans on doing to at least 2 visits of wellness and possibly continuing also has plans [place]. Resumed exercises this visit without increased complaints of pain. She did not want to increase resistance on the lumbar medx. Maintained at 69 ftlbs completing 20 repetitions at 5/10 RPE. Plan to d/c to wellness next visit.        Patient is making excellent progress towards established goals.  Pt will continue to benefit from skilled outpatient physical therapy to address the  deficits stated in the impairment chart, provide pt/family education and to maximize pt's level of independence in the home and community environment.     Anticipated Barriers for therapy: chronicity of condition, cervical symptoms  Pt's spiritual, cultural and educational needs considered and pt agreeable to plan of care and goals as stated below:       GOALS: Pt is in agreement with the following goals.     Short term goals:  6 weeks or 10 visits   1.  Pt will demonstrate increased lumbar ROM by at least 6 degrees from the initial ROM value with improvements noted in functional ROM and ability to perform ADLs.  (MET 10/24/2022)  2.  Pt will demonstrate increased MedX average isometric strength value  by 15% from initial test resulting in improved ability to perform bending, lifting, and carrying activities safely, confidently.  (MET 10/24/2022)  3.  Patient report a reduction in worst pain score by 1-2 points for improved tolerance for walking.  (partially met)  4.  Pt able to perform HEP correctly with minimal cueing or supervision from therapist to encourage independent management of symptoms. (MET 10/24/2022)        Long term goals: 10 weeks or 20 visits   1. Pt will demonstrate increased lumbar ROM by at least 9 degrees from initial ROM value, resulting in improved ability to perform functional fwd bending while standing and sitting. (approp and ongoing)  2. Pt will demonstrate increased MedX average isometric strength value  by 30% from initial test resulting in improved ability to perform bending, lifting, and carrying activities safely, confidently.  (approp and ongoing)  3. Pt to demonstrate ability to independently control and reduce their pain through posture positioning and mechanical movements throughout a typical day.  (approp and ongoing)  4.  Pt will demonstrate reduced pain and improved functional outcomes as reported on the FOTO by reaching a limitation score of < or = 41% or less in order to  demonstrate subjective improvement in pt's condition.    (approp and ongoing)  5. Pt will demonstrate independence with the HEP at discharge  (approp and ongoing)  6. Pt will be able to walk for 30 minutes without increased pain.  (approp and ongoing)       Plan   Continue with established Plan of Care towards established PT goals within [facility] program.    Filipe De La Cruz, PT  11/28/2022

## 2022-12-01 ENCOUNTER — CLINICAL SUPPORT (OUTPATIENT)
Dept: REHABILITATION | Facility: HOSPITAL | Age: 57
End: 2022-12-01
Payer: COMMERCIAL

## 2022-12-01 DIAGNOSIS — R29.3 POOR POSTURE: ICD-10-CM

## 2022-12-01 DIAGNOSIS — R29.898 WEAKNESS OF BOTH LOWER EXTREMITIES: ICD-10-CM

## 2022-12-01 DIAGNOSIS — M25.69 DECREASED RANGE OF MOTION OF TRUNK AND BACK: Primary | ICD-10-CM

## 2022-12-01 DIAGNOSIS — R29.898 DECREASED STRENGTH OF TRUNK AND BACK: ICD-10-CM

## 2022-12-01 PROCEDURE — 97110 THERAPEUTIC EXERCISES: CPT | Mod: PN | Performed by: PHYSICAL MEDICINE & REHABILITATION

## 2022-12-01 NOTE — PATIENT INSTRUCTIONS
"HEALTHY BACK TOOLS        KEEP YOUR SPINE FEELING FINE   HEALTHY HABITS   Do your "GO TO" stretches 2/day   Get a good night's REST   Watch your POSTURE in sitting/standing Drink PLENTY of water   Use a lumbar roll sitting and car Eat LOTS of fruits & vegetables   GET UP often (walk and/or stretch) Manage your STRESS   Make your workplace IDEAL FOR YOU  Don't smoke   Lift correctly EXERCISE                           WHAT TO DO WHEN SYMPTOMS FLARE UP  Back and neck pain may occasionally flare up.  If you experience a flare   up, remember your tools. Be encouraged, by remembering that flare-ups will   usually pass.   My Tools:    ~Use your "Go To" Stretches/Positions   ~Keep Moving-pain usually gets better if you move  ~Z lie (with or without ice)  10 min several times a day until symptoms reduce  ~Slowly resume normal activities   ~Practice Deep Breathing and Relaxation techniques                                                 MY EXERCISE PLAN  GO TO STRETCHES  2/day (like brushing your teeth) STRENGTHENING  2-3 times/week CARDIO PROGRAM     Slouch correct exercise in sitting Bridging feet on mat or legs on ball to make it harder Walking daily on street   Stretch backwards in standing Sitting, push back into chair     Rolling legs side to side on ball lying down       Rolling ball to and from you on back       Z lie on back with legs on ball          "

## 2022-12-01 NOTE — PROGRESS NOTES
"  [facility] -discharge note     Name: Cherri Noonan  Clinic Number: [id number]    Therapy Diagnosis:   Encounter Diagnoses   Name Primary?    Decreased range of motion of trunk and back Yes    Decreased strength of trunk and back     Poor posture     Weakness of both lower extremities        Physician: Archana Knott MD    Visit Date: 2022    Physician Orders: PT Eval and Treat   Medical Diagnosis from Referral:   M54.6 (ICD-10-CM) - Midline thoracic back pain, unspecified chronicity   M47.816 (ICD-10-CM) - Spondylosis of lumbar region without myelopathy or radiculopathy      Evaluation Date: 2022  Authorization Period Expiration: 2023  Plan of Care Expiration: 2022    Reassessment Done discharge note 22  Visit # / Visits authorized:  D/C       Time In: 930   Time Out: 1035  Total Billable Time: 66  Precautions: Standard spinal cord stimulator    Pattern of pain determined: pattern 1    Subjective   Cherri reports over all she is much better.  She walks daily.  She uses a roll.  She does her stretches.  She wants to do wellness.  She has intermittent back pain, but better over all      Patient reports tolerating previous visit fine.   Patient reports their pain to be 3/10 on a 0-10 scale with 0 being no pain and 10 being the worst pain imaginable.  Pain Location: bilateral back, L LE     Occupation: Not currently working  Leisure: Walking, volunteering for her mission/[employer]      Pt goals: "to get moving again"    Objective        Baseline Isometric Testing on Med X equipment: Testing administered by PT  Date of testin2022  ROM 0-42 deg   Max Peak Torque 97    Min Peak Torque 38    Flex/Ext Ratio 2.3:1   % below normative data 53      Midpoint Isometric Testing on Med X equipment: Testing administered by PT  Date of testing: 10/24/2022    ROM 0-54 deg   Max Peak Torque 109   Min Peak Torque 41   Flex/Ext Ratio 2.6:1   % below normative data " "44%   % increase in initial visit 16%       Final Isometric Testing on Med X equipment: Testing administered by PT  Date of testin22    ROM 0-54 deg   Max Peak Torque 127   Min Peak Torque 77   Flex/Ext Ratio 1.4/1   % below normative data 17%   % increase in initial visit 80     MOVEMENT LOSS     ROM Loss initial 10/18/22 12/1/22   Flexion minimal loss Min loss No loss   Extension moderate loss Min loss No loss   Side glide Right minimal loss Min loss No loss   Side glide Left minimal loss Min loss No loss   Rotation Right moderate loss Min loss No loss   Rotation Left moderate loss Min loss No loss       Limitation/Restriction for FOTO lumbar Survey     Therapist reviewed FOTO scores for Cherri Noonan on 2022.   FOTO documents entered into [employer] - see Media section.     Limitation Score: 51% limited  Visit 5- 56 % limited, however patient reporting improved walking (5 miles and function)     Visit 10: 33%  Goal: 41%  Visit 19 41% limited, met goal             Treatment    Pt was instructed in and performed the following:     Cherri received therapeutic exercises to develop/improved posture, cardiovascular endurance, muscular endurance, lumbar ROM, strength and muscular endurance for 60 minutes including the following exercises:     EIL 10x3"  DKTC 10x5"  LTR 10x5"  Bridging w/ glute squeeze x 10x  Bridge with march +10x   Piriformis stretch 2x30"  Hamstring stretch 2x30"  LAQ 4# 15  Seated Ham curl BlueTB 20x  STS x 15 8#    [facility] 2022   Visit Number 20   VAS Pain Rating 0   Treadmill Time (in min.) 5   Speed -   Lumbar Stretches - Slouch Overcorrection -   Extension in Lying 10   Flexion in Lying 10   Lumbar Extension Seat Pad -   Femur Restraint -   Top Dead Center -   Counterweight -   Lumbar Flexion 54   Lumbar Extension 0   Lumbar Peak Torque 127   Min Torque 77   Test Percent Below Normative Data 17   Test Percent Gain in Strength from Initial  80   Lumbar Weight - "   Repetitions -   Rating of Perceived Exertion -   Ice - Z Lie (in min.) 10        Peripheral muscle strengthening which included 1 set of 15-20 repetitions at a slow, controlled 10-13 second per rep pace focused on strengthening supporting musculature for improved body mechanics and functional mobility.  Pt and therapist focused on proper form during treatment to ensure optimal strengthening of each targeted muscle group.  Machines were utilized including torso rotation, chest press, upright row. Tricep extension, bicep curl, and hip abduction and adduction.          Home Exercises Provided and Patient Education Provided   MY EXERCISE PLAN  GO TO STRETCHES  2/day (like brushing your teeth) STRENGTHENING  2-3 times/week CARDIO PROGRAM     Slouch correct exercise in sitting Bridging feet on mat or legs on ball to make it harder Walking daily on street   Stretch backwards in standing Sitting, push back into chair     Rolling legs side to side on ball lying down       Rolling ball to and from you on back       Z lie on back with legs on ball         Lumbar roll: she is using  Coaching offered; she is not interested 10/18/22  Wellness, planned and scheduled 12/7/22    Education provided:   - HEP, POC, wellness discussed and set up  -encouraged continued walking program and cardio info given previously and she is compliant  -discussion about positive self talk when exercising  -discussed using healthy judgement and not letting fear of pain interfere with finding gloria in activities like movies or playing board games.  Discussed using strategies to manage symptoms like roll, slouch correct, and stretching back after sitting    Written Home Exercises Provided: Patient instructed to cont prior HEP.  Walking program given  Exercises were reviewed and Cherri was able to demonstrate them prior to the end of the session.  Cherri demonstrated good  understanding of the education provided.     See EMR under Patient Instructions  for exercises provided prior visit.    Assessment     Patient has attended 19 visits of the [facility] program for aerobic work, med ex isometric testing with dynamic strengthening on med ex equipment for spine, whole body strengthening on med ex equipment, HEP, education.  Patient has completed [facility] Program and is ready to be transitioned into wellness program.  Importance of wellness program, and attending 1/week to maintain strength stressed.  Importance of continuing there ex and body mech and ergonomics stressed.   Patient demonstrates improvement in ability to reduce symptoms, improved posture, improved ROM and improved strength.   Reviewed HEP, and importance of maintaining a healthy spine through continued stretching and performance of HEP, good posture, good ergonomics, good body mech with ADL, leisure, and work.  Discharge handout with HEP given, and discussed aspects of exercise program, cardio, strengthening, and stretching.    Patient expressed understanding information given.  Patient plans to attend wellness and is ready to transition to wellness.      -Improved posture,   she is  using lumbar roll  -Improved lumbar ROM,  initially on med ex test 0-42 and   currently 0-54  -Improved strength at each test point on lumbar med ex IM test with   80% average improvement noted with Reduced pain  Noted by patient  -Initial outcome tool score 51% limited and current outcome tool score  41% limited indicating reduced pain and improved function         Patient is making excellent progress towards established goals.  Pt will continue to benefit from skilled outpatient physical therapy to address the deficits stated in the impairment chart, provide pt/family education and to maximize pt's level of independence in the home and community environment.     Anticipated Barriers for therapy: chronicity of condition, cervical symptoms  Pt's spiritual, cultural and educational needs considered and pt agreeable  to plan of care and goals as stated below:       GOALS: Pt is in agreement with the following goals.     Short term goals:  6 weeks or 10 visits   1.  Pt will demonstrate increased lumbar ROM by at least 6 degrees from the initial ROM value with improvements noted in functional ROM and ability to perform ADLs.  (MET 10/24/2022)  2.  Pt will demonstrate increased MedX average isometric strength value  by 15% from initial test resulting in improved ability to perform bending, lifting, and carrying activities safely, confidently.  (MET 10/24/2022)  3.  Patient report a reduction in worst pain score by 1-2 points for improved tolerance for walking.  (MET 12/1/22)  4.  Pt able to perform HEP correctly with minimal cueing or supervision from therapist to encourage independent management of symptoms. (MET 10/24/2022)        Long term goals: 10 weeks or 20 visits   1. Pt will demonstrate increased lumbar ROM by at least 9 degrees from initial ROM value, resulting in improved ability to perform functional fwd bending while standing and sitting. (MET 12/1/22)  2. Pt will demonstrate increased MedX average isometric strength value  by 30% from initial test resulting in improved ability to perform bending, lifting, and carrying activities safely, confidently.  (MET 12/1/22)  3. Pt to demonstrate ability to independently control and reduce their pain through posture positioning and mechanical movements throughout a typical day.  (MET 12/1/22)  4.  Pt will demonstrate reduced pain and improved functional outcomes as reported on the FOTO by reaching a limitation score of < or = 41% or less in order to demonstrate subjective improvement in pt's condition.    (MET 12/1/22)  5. Pt will demonstrate independence with the HEP at discharge  (MET 12/1/22)  6. Pt will be able to walk for 30 minutes without increased pain.  (MET 12/1/22)       Plan   Education complete, patient agreeable, and will attend wellness which is scheduled 12/7/22   Discharge therapy    Kae Son, PT  12/01/2022

## 2022-12-07 ENCOUNTER — PATIENT MESSAGE (OUTPATIENT)
Dept: PAIN MEDICINE | Facility: OTHER | Age: 57
End: 2022-12-07
Payer: COMMERCIAL

## 2022-12-07 ENCOUNTER — DOCUMENTATION ONLY (OUTPATIENT)
Dept: REHABILITATION | Facility: HOSPITAL | Age: 57
End: 2022-12-07
Payer: COMMERCIAL

## 2022-12-07 NOTE — PROGRESS NOTES
Health  Wellness Visit Note    Name: Cherri Noonan  Clinic Number: [id number]  Physician: Archana Knott MD  Diagnosis: No diagnosis found.  Past Medical History:   Diagnosis Date    Cervical radiculopathy      Visit Number: 20  Precautions: Standard --- Spinal Cord Stimulator       1st PT visit:  08/12/2022  Year of care end date:  08/2023  6 Month test  performed: 02/2023  12 Month test performed: 08/2023  Mind body plan: 1F  Patient level:NP    Time In: 12:30 PM  Time Out: 2:00 PM  Total Treatment Time: 1 Hour and 30 Minutes    Wellness [facility] 2022  Handout on this week's wellness topic Michie Peace- Stress provided  along with a discussion on what it means, the benefits, and suggestions for practice.  Reviewed last week's topic of n/a (patient's initial wellness visit).    Subjective:   Patient reports she is at a 4/10 for back pain. She is very sore. Over the weekend she did a lot of work around her mother's house and helped transfer her brother out of a wheelchair Sunday. She keeps up with her stretches and HEP given by Physical Therapist. Patient walks her neighborhood at least 4x a week for 2 miles. She ices her back and knee every night before bed.  She is excited about her fitness journey.     For next Wellness visit patient would like to try Leg Extension and Leg Press machines.     Objective:   Cherri completed therapeutic stretches (EIL, LINDA) and the following MedX exercise machines: core lumbar, torso rotation l/r, leg extension, leg curl, upright row, chest press, biceps curl, triceps extension, leg press    See exercise log in patient folder for rate of exertion and repetitions completed.       Fitness Machine Education Key:  E=education on equipment initiated and further follow up and education needed  I=independent with  and exercise.  The patient:  Adjusts machines to his/her settings  Uses equipment levers, pins, weights safely  Maintains safe and correct posture while  exercising  Moves through exercise with correct pace and control  Gets on and off equipment safely      Lumbar/Cervical Ext.  Torso Rotation  Leg Press    Leg Extension  Seated Leg Curl  Chest Press    Seated Row  Hip ADD  Hip ABD    Triceps Extension  Bicep Curl  Other:        Assessment:   Patient tolerated Patient tolerated MedX Core Lumbar Strength and all other peripheral exercises without an increase in symptoms. Patient warmed up on treadmill for 6 minutes, stretched, and iced low back for 5 minutes after the workout.     Plan:  Continue with established plan of care towards wellness goals.     Health  : Jen Lucas  12/7/2022

## 2022-12-08 ENCOUNTER — HOSPITAL ENCOUNTER (OUTPATIENT)
Facility: OTHER | Age: 57
Discharge: HOME | End: 2022-12-08
Attending: ANESTHESIOLOGY | Admitting: ANESTHESIOLOGY
Payer: COMMERCIAL

## 2022-12-08 VITALS
BODY MASS INDEX: 26.29 KG/M2 | HEART RATE: 65 BPM | HEIGHT: 64 IN | DIASTOLIC BLOOD PRESSURE: 86 MMHG | OXYGEN SATURATION: 100 % | TEMPERATURE: 98 F | RESPIRATION RATE: 16 BRPM | WEIGHT: 154 LBS | SYSTOLIC BLOOD PRESSURE: 147 MMHG

## 2022-12-08 DIAGNOSIS — M47.816 LUMBAR SPONDYLOSIS: Primary | ICD-10-CM

## 2022-12-08 DIAGNOSIS — G89.29 CHRONIC PAIN: ICD-10-CM

## 2022-12-08 DIAGNOSIS — M47.812 OSTEOARTHRITIS OF CERVICAL SPINE, UNSPECIFIED SPINAL OSTEOARTHRITIS COMPLICATION STATUS: ICD-10-CM

## 2022-12-08 PROCEDURE — 64635 PR DESTROY LUMB/SAC FACET JNT: ICD-10-PCS | Mod: LT,,, | Performed by: ANESTHESIOLOGY

## 2022-12-08 PROCEDURE — 63600175 PHARM REV CODE 636 W HCPCS: Performed by: ANESTHESIOLOGY

## 2022-12-08 PROCEDURE — 64635 DESTROY LUMB/SAC FACET JNT: CPT | Mod: LT | Performed by: ANESTHESIOLOGY

## 2022-12-08 PROCEDURE — 25000003 PHARM REV CODE 250: Performed by: STUDENT IN AN ORGANIZED HEALTH CARE EDUCATION/TRAINING PROGRAM

## 2022-12-08 PROCEDURE — 64635 DESTROY LUMB/SAC FACET JNT: CPT | Mod: LT,,, | Performed by: ANESTHESIOLOGY

## 2022-12-08 PROCEDURE — 64636 PR DESTROY L/S FACET JNT ADDL: ICD-10-PCS | Mod: LT,,, | Performed by: ANESTHESIOLOGY

## 2022-12-08 PROCEDURE — 25000003 PHARM REV CODE 250: Performed by: ANESTHESIOLOGY

## 2022-12-08 PROCEDURE — 64636 DESTROY L/S FACET JNT ADDL: CPT | Mod: LT | Performed by: ANESTHESIOLOGY

## 2022-12-08 PROCEDURE — 64636 DESTROY L/S FACET JNT ADDL: CPT | Mod: LT,,, | Performed by: ANESTHESIOLOGY

## 2022-12-08 RX ORDER — LIDOCAINE HYDROCHLORIDE 20 MG/ML
INJECTION, SOLUTION INFILTRATION; PERINEURAL
Status: DISCONTINUED | OUTPATIENT
Start: 2022-12-08 | End: 2022-12-08 | Stop reason: HOSPADM

## 2022-12-08 RX ORDER — FENTANYL CITRATE 50 UG/ML
INJECTION, SOLUTION INTRAMUSCULAR; INTRAVENOUS
Status: DISCONTINUED | OUTPATIENT
Start: 2022-12-08 | End: 2022-12-08 | Stop reason: HOSPADM

## 2022-12-08 RX ORDER — MIDAZOLAM HYDROCHLORIDE 1 MG/ML
INJECTION INTRAMUSCULAR; INTRAVENOUS
Status: DISCONTINUED | OUTPATIENT
Start: 2022-12-08 | End: 2022-12-08 | Stop reason: HOSPADM

## 2022-12-08 RX ORDER — BUPIVACAINE HYDROCHLORIDE 5 MG/ML
INJECTION, SOLUTION EPIDURAL; INTRACAUDAL
Status: DISCONTINUED | OUTPATIENT
Start: 2022-12-08 | End: 2022-12-08 | Stop reason: HOSPADM

## 2022-12-08 RX ORDER — DEXAMETHASONE SODIUM PHOSPHATE 10 MG/ML
INJECTION INTRAMUSCULAR; INTRAVENOUS
Status: DISCONTINUED | OUTPATIENT
Start: 2022-12-08 | End: 2022-12-08 | Stop reason: HOSPADM

## 2022-12-08 RX ORDER — SODIUM CHLORIDE 9 MG/ML
500 INJECTION, SOLUTION INTRAVENOUS CONTINUOUS
Status: DISCONTINUED | OUTPATIENT
Start: 2022-12-08 | End: 2022-12-08 | Stop reason: HOSPADM

## 2022-12-08 NOTE — DISCHARGE SUMMARY
Discharge Note  Short Stay      SUMMARY     Admit Date: 12/8/2022    Attending Physician: Archana Knott      Discharge Physician: Archana Knott      Discharge Date: 12/8/2022 11:43 AM    Procedure(s) (LRB):  RADIOFREQUENCY ABLATION, LEFT L3,L4,L5 MEDIAL BRANCH ONE OF TWO (Left)    Final Diagnosis: Spondylosis without myelopathy [M47.819]    Disposition: Home or self care    Patient Instructions:   Current Discharge Medication List        CONTINUE these medications which have NOT CHANGED    Details   atorvastatin (LIPITOR) 20 MG tablet Take 20 mg by mouth once daily.      azelastine 205.5 mcg (0.15 %) Spry azelastine 205.5 mcg (0.15 %) nasal spray      b complex vitamins tablet Take 1 tablet by mouth once daily.      ciclopirox (LOPROX) 0.77 % Susp Apply topically 2 (two) times daily.      ergocalciferol (ERGOCALCIFEROL) 50,000 unit Cap Take 50,000 Units by mouth every 7 days.      estradioL (ESTRACE) 0.01 % (0.1 mg/gram) vaginal cream Place 0.5 g vaginally.      fluticasone propionate (FLONASE) 50 mcg/actuation nasal spray 1 spray by Each Nostril route once daily.      ginseng 250 mg Cap Take by mouth.      meloxicam (MOBIC) 15 MG tablet Take 1 tablet (15 mg total) by mouth once daily.  Qty: 30 tablet, Refills: 1    Associated Diagnoses: Primary osteoarthritis of right knee      multivitamin capsule Take 1 capsule by mouth once daily.      naproxen (NAPROSYN) 500 MG tablet Take 1 tablet (500 mg total) by mouth 2 (two) times daily as needed (pain).  Qty: 30 tablet, Refills: 0    Associated Diagnoses: Acute pain of left knee      omega-3 fatty acids/fish oil (FISH OIL-OMEGA-3 FATTY ACIDS) 300-1,000 mg capsule Take by mouth once daily.      omeprazole (PRILOSEC) 20 MG capsule Take 20 mg by mouth once daily.      potassium gluconate 595 mg (99 mg) TbSR Take by mouth once.      tumeric-ging-olive-oreg-capryl 100 mg-150 mg- 50 mg-150 mg Cap Take by mouth.      vitamin E 100 UNIT capsule Take 100 Units by mouth once daily.       zinc gluconate 50 mg tablet Take 50 mg by mouth once daily.      zonisamide (ZONEGRAN) 100 MG Cap Take 4 capsules (400 mg total) by mouth once daily.  Qty: 360 capsule, Refills: 0    Associated Diagnoses: Cervical radiculopathy; Peripheral neuropathic pain                 Discharge Diagnosis: Spondylosis without myelopathy [M47.819]  Condition on Discharge: Stable with no complications to procedure   Diet on Discharge: Same as before.  Activity: as per instruction sheet.  Discharge to: Home with a responsible adult.  Follow up: 2-4 weeks       Please call my office or pager at [phone] if experienced any weakness or loss of sensation, fever > 101.5, pain uncontrolled with oral medications, persistent nausea/vomiting/or diarrhea, redness or drainage from the incisions, or any other worrisome concerns. If physician on call was not reached or could not communicate with our office for any reason please go to the nearest emergency department

## 2022-12-08 NOTE — DISCHARGE INSTRUCTIONS
Thank you for allowing us to care for you today. You may receive a survey about the care we provided. Your feedback is valuable and helps us provide excellent care throughout the community.     Home Care Instructions for Pain Management:    1. DIET:   You may resume your normal diet today.   2. BATHING:   You may shower with luke warm water. No tub baths or anything that will soak injection sites under water for the next 24 hours.  3. DRESSING:   You may remove your bandage today.   4. ACTIVITY LEVEL:   You may resume your normal activities 24 hrs after your procedure. Nothing strenuous today.  5. MEDICATIONS:   You may resume your normal medications today. To restart blood thinners, ask your doctor.  6. DRIVING    If you have received any sedatives by mouth today, you may not drive for 12 hours.    If you have received any sedation through your IV, you may not drive for 24 hrs.   7. SPECIAL INSTRUCTIONS:   No heat to the injection site for 24 hrs including, hot bath or shower, heating pad, moist heat, or hot tubs.    Use ice pack to injection site for any pain or discomfort.  Apply ice packs for 20 minute intervals as needed.    IF you have diabetes, be sure to monitor your blood sugar more closely. IF your injection contained steroids your blood sugar levels may become higher than normal.    If you are still having pain upon discharge:  Your pain may improve over the next 48 hours. The anesthetic (numbing medication) works immediately to 48 hours. IF your injection contained a steroid (anti-inflammatory medication), it takes approximately 3 days to start feeling relief and 7-10 days to see your greatest results from the medication. It is possible you may need subsequent injections. This would be discussed at your follow up appointment with pain management or your referring doctor.    Please call the PAIN MANAGEMENT office at [phone] or ON CALL pager at [phone] if you experienced any:   -Weakness or  loss of sensation  -Fever > 101.5  -Pain uncontrolled with oral medications   -Persistent nausea, vomiting, or diarrhea  -Redness or drainage from the injection sites, or any other worrisome concerns.   If physician on call was not reached or could not communicate with our office for any reason please go to the nearest emergency department. Adult Procedural Sedation Instructions    Recovery After Procedural Sedation (Adult)  You have been given medicine by vein to make you sleep during your surgery. This may have included both a pain medicine and sleeping medicine. Most of the effects have worn off. But you may still have some drowsiness for the next 6 to 8 hours.  Home care  Follow these guidelines when you get home:  For the next 8 hours, you should be watched by a responsible adult. This person should make sure your condition is not getting worse.  Don't drink any alcohol for the next 24 hours.  Don't drive, operate dangerous machinery, or make important business or personal decisions during the next 24 hours.  Note: Your healthcare provider may tell you not to take any medicine by mouth for pain or sleep in the next 4 hours. These medicines may react with the medicines you were given in the hospital. This could cause a much stronger response than usual.  Follow-up care  Follow up with your healthcare provider if you are not alert and back to your usual level of activity within 12 hours.  When to seek medical advice  Call your healthcare provider right away if any of these occur:  Drowsiness gets worse  Weakness or dizziness gets worse  Repeated vomiting  You can't be awakened   Date Last Reviewed: 10/18/2016  © 7497-5800 [employer] [address] [place] [address], [place] [address]. All rights reserved. This information is not intended as a substitute for professional medical care. Always follow your healthcare professional's instructions.

## 2022-12-08 NOTE — OP NOTE
Therapeutic Lumbar Medial Branch Radiofrequency Ablation under Fluoroscopy     The procedure, risks, benefits, and options were discussed with the patient. There are no contraindications to the procedure. The patent expressed understanding and agreed to the procedure. Informed written consent was obtained prior to the start of the procedure and can be found in the patient's chart.        PATIENT NAME: Cherri Noonan   MRN: [id number]     DATE OF PROCEDURE: 12/08/2022     PROCEDURE:  Left L3, L4, and L5 Lumbar Radiofrequency Ablation under Fluoroscopy    PRE-OP DIAGNOSIS: Spondylosis without myelopathy [M47.819] Lumbar spondylosis [M47.816]    POST-OP DIAGNOSIS: Same    PHYSICIAN: Archana Knott MD    ASSISTANTS: Tim Katz MD [facility] Pain Fellow      MEDICATIONS INJECTED:  Preservative-free Decadron 10mg with 9cc of Bupivicaine 0.25%    LOCAL ANESTHETIC INJECTED:   Xylocaine 2%    SEDATION: Versed 2mg and Fentanyl 50mcg                                                                                                                                                                                     Conscious sedation ordered by MPRISCA. Patient re-evaluation prior to administration of conscious sedation. No changes noted in patient's status from initial evaluation. The patient's vital signs were monitored by RN and patient remained hemodynamically stable throughout the procedure.    Event Time In   Sedation Start 1150       ESTIMATED BLOOD LOSS:  None    COMPLICATIONS:  None     INTERVAL HISTORY: Patient has clinical and imaging findings suggestive of facet mediated pain. Patients has completed 2 previous diagnostic medial branch blocks at specified levels with at least 80% relief for the expected duration of the local anesthetic utilized.    TECHNIQUE: Time-out was performed to identify the patient and procedure to be performed. With the patient laying in a prone position, the surgical area was prepped and draped in the  usual sterile fashion using ChloraPrep and fenestrated drape. The levels were determined under fluoroscopic guidance. Skin anesthesia was achieved by injecting Lidocaine 2% over the injection sites. A 20 gauge 10mm curved active tip needle was introduced to the anatomic local of the medial branch at each of the above levels using AP, lateral and/or contralateral oblique fluoroscopic imaging. Then sensory and motor testing was performed to confirm that the needle tips were in the correct location. After negative aspiration for blood or CSF was confirmed, 1 mL of the lidocaine 2% listed above was injected slowly at each site. This was followed by thermal lesioning at 80 degrees celsius for 90 seconds. That was followed by slowly injecting 2 mL of the medication mixture listed above at each site. The needles were removed and bleeding was nil. A sterile dressing was applied. No specimens collected. The patient tolerated the procedure well and did not have any procedure related motor deficit at the conclusion of the procedure.  PAIN BEFORE THE PROCEDURE: 5-7/10    PAIN AFTER THE PROCEDURE: 0/10   The patient was monitored after the procedure in the recovery area. They were given post-procedure and discharge instructions to follow at home. The patient was discharged in a stable condition.        Archana Knott MD

## 2022-12-14 ENCOUNTER — DOCUMENTATION ONLY (OUTPATIENT)
Dept: REHABILITATION | Facility: HOSPITAL | Age: 57
End: 2022-12-14
Payer: COMMERCIAL

## 2022-12-14 NOTE — PROGRESS NOTES
Health  Wellness Visit Note    Name: Cherri Noonan  Clinic Number: [id number]  Physician: Archana Knott MD  Diagnosis: No diagnosis found.  Past Medical History:   Diagnosis Date    Cervical radiculopathy      Visit Number: 21  Precautions: Standard --- Spinal Cord Stimulator       1st PT visit:  08/12/2022  Year of care end date:  08/2023  6 Month test  performed: 02/2023  12 Month test performed: 08/2023  Mind body plan: 2F  Patient level:NP    Time In: 8:05 PM  Time Out: 9:20 PM  Total Treatment Time: 75 Minutes    [facility] 2022  Handout on this week's wellness topic [employer]- Coping provided  along with a discussion on what it means, the benefits, and suggestions for practice.  Reviewed last week's topic of [place] [employer]- Stress.    Subjective:   Patient reports she is pain-free today. Last week she did a lot of celebrating her wedding anniversary is coming up, so she did a lot of moving around. She stayed consistent with walking 2 miles 5 times last week. She ices her back every night and does her stretches daily. She won't be coming back to [facility] until after the holidays.       Objective:   Cherri completed therapeutic stretches (EIL, LINDA) and the following MedX exercise machines: core lumbar, torso rotation l/r, leg extension, leg curl, upright row, chest press, biceps curl, triceps extension, leg press    See exercise log in patient folder for rate of exertion and repetitions completed.       Fitness Machine Education Key:  E=education on equipment initiated and further follow up and education needed  I=independent with  and exercise.  The patient:  Adjusts machines to his/her settings  Uses equipment levers, pins, weights safely  Maintains safe and correct posture while exercising  Moves through exercise with correct pace and control  Gets on and off equipment safely      Lumbar/Cervical Ext.  Torso Rotation  Leg Press    Leg Extension  Seated Leg Curl  Chest Press     Seated Row  Hip ADD  Hip ABD    Triceps Extension  Bicep Curl  Other:        Assessment:   Patient tolerated Patient tolerated MedX Core Lumbar Strength and all other peripheral exercises without an increase in symptoms. Patient warmed up on treadmill for 9 minutes, stretched, and iced low back for 5 minutes after the workout.     Plan:  Continue with established plan of care towards wellness goals.     Health  : Jen Lucas  12/14/2022

## 2022-12-21 ENCOUNTER — PATIENT MESSAGE (OUTPATIENT)
Dept: PAIN MEDICINE | Facility: OTHER | Age: 57
End: 2022-12-21
Payer: COMMERCIAL

## 2022-12-22 ENCOUNTER — HOSPITAL ENCOUNTER (OUTPATIENT)
Facility: OTHER | Age: 57
Discharge: HOME | End: 2022-12-22
Attending: ANESTHESIOLOGY | Admitting: ANESTHESIOLOGY
Payer: COMMERCIAL

## 2022-12-22 VITALS
BODY MASS INDEX: 26.63 KG/M2 | DIASTOLIC BLOOD PRESSURE: 83 MMHG | SYSTOLIC BLOOD PRESSURE: 142 MMHG | WEIGHT: 156 LBS | TEMPERATURE: 98 F | HEIGHT: 64 IN | OXYGEN SATURATION: 99 % | HEART RATE: 59 BPM | RESPIRATION RATE: 14 BRPM

## 2022-12-22 DIAGNOSIS — M47.896 OTHER OSTEOARTHRITIS OF SPINE, LUMBAR REGION: ICD-10-CM

## 2022-12-22 DIAGNOSIS — M47.819 SPONDYLOSIS WITHOUT MYELOPATHY: Primary | ICD-10-CM

## 2022-12-22 DIAGNOSIS — G89.29 CHRONIC PAIN: ICD-10-CM

## 2022-12-22 PROCEDURE — 99152 PR MOD CONSCIOUS SEDATION, SAME PHYS, 5+ YRS, FIRST 15 MIN: ICD-10-PCS | Mod: ,,, | Performed by: ANESTHESIOLOGY

## 2022-12-22 PROCEDURE — 64636 DESTROY L/S FACET JNT ADDL: CPT | Mod: RT,,, | Performed by: ANESTHESIOLOGY

## 2022-12-22 PROCEDURE — 99152 MOD SED SAME PHYS/QHP 5/>YRS: CPT | Mod: ,,, | Performed by: ANESTHESIOLOGY

## 2022-12-22 PROCEDURE — 64636 DESTROY L/S FACET JNT ADDL: CPT | Mod: RT | Performed by: ANESTHESIOLOGY

## 2022-12-22 PROCEDURE — 64635 DESTROY LUMB/SAC FACET JNT: CPT | Mod: RT,,, | Performed by: ANESTHESIOLOGY

## 2022-12-22 PROCEDURE — 25000003 PHARM REV CODE 250: Performed by: ANESTHESIOLOGY

## 2022-12-22 PROCEDURE — 64635 DESTROY LUMB/SAC FACET JNT: CPT | Mod: RT | Performed by: ANESTHESIOLOGY

## 2022-12-22 PROCEDURE — 64636 PR DESTROY L/S FACET JNT ADDL: ICD-10-PCS | Mod: RT,,, | Performed by: ANESTHESIOLOGY

## 2022-12-22 PROCEDURE — 25000003 PHARM REV CODE 250: Performed by: STUDENT IN AN ORGANIZED HEALTH CARE EDUCATION/TRAINING PROGRAM

## 2022-12-22 PROCEDURE — 63600175 PHARM REV CODE 636 W HCPCS: Performed by: ANESTHESIOLOGY

## 2022-12-22 PROCEDURE — 99152 MOD SED SAME PHYS/QHP 5/>YRS: CPT | Performed by: ANESTHESIOLOGY

## 2022-12-22 PROCEDURE — 64635 PR DESTROY LUMB/SAC FACET JNT: ICD-10-PCS | Mod: RT,,, | Performed by: ANESTHESIOLOGY

## 2022-12-22 RX ORDER — DEXAMETHASONE SODIUM PHOSPHATE 10 MG/ML
INJECTION INTRAMUSCULAR; INTRAVENOUS
Status: DISCONTINUED | OUTPATIENT
Start: 2022-12-22 | End: 2022-12-22 | Stop reason: HOSPADM

## 2022-12-22 RX ORDER — SODIUM CHLORIDE 9 MG/ML
500 INJECTION, SOLUTION INTRAVENOUS CONTINUOUS
Status: DISCONTINUED | OUTPATIENT
Start: 2022-12-22 | End: 2022-12-22 | Stop reason: HOSPADM

## 2022-12-22 RX ORDER — BUPIVACAINE HYDROCHLORIDE 5 MG/ML
INJECTION, SOLUTION EPIDURAL; INTRACAUDAL
Status: DISCONTINUED | OUTPATIENT
Start: 2022-12-22 | End: 2022-12-22 | Stop reason: HOSPADM

## 2022-12-22 RX ORDER — FENTANYL CITRATE 50 UG/ML
INJECTION, SOLUTION INTRAMUSCULAR; INTRAVENOUS
Status: DISCONTINUED | OUTPATIENT
Start: 2022-12-22 | End: 2022-12-22 | Stop reason: HOSPADM

## 2022-12-22 RX ORDER — LIDOCAINE HYDROCHLORIDE 20 MG/ML
INJECTION, SOLUTION INFILTRATION; PERINEURAL
Status: DISCONTINUED | OUTPATIENT
Start: 2022-12-22 | End: 2022-12-22 | Stop reason: HOSPADM

## 2022-12-22 RX ORDER — MIDAZOLAM HYDROCHLORIDE 1 MG/ML
INJECTION INTRAMUSCULAR; INTRAVENOUS
Status: DISCONTINUED | OUTPATIENT
Start: 2022-12-22 | End: 2022-12-22 | Stop reason: HOSPADM

## 2022-12-22 NOTE — OP NOTE
Therapeutic Lumbar Medial Branch Radiofrequency Ablation under Fluoroscopy     The procedure, risks, benefits, and options were discussed with the patient. There are no contraindications to the procedure. The patent expressed understanding and agreed to the procedure. Informed written consent was obtained prior to the start of the procedure and can be found in the patient's chart.        PATIENT NAME: Cherri Noonan   MRN: [id number]     DATE OF PROCEDURE: 12/22/2022     PROCEDURE:  Right L3, L4, and L5 Lumbar Radiofrequency Ablation under Fluoroscopy    PRE-OP DIAGNOSIS: Spondylosis without myelopathy [M47.819] Lumbar spondylosis [M47.816]    POST-OP DIAGNOSIS: Same    PHYSICIAN: Archana Knott MD    ASSISTANTS: Topher Riley D.O. ([facility] Pain Fellow)     MEDICATIONS INJECTED:  Preservative-free Decadron 10mg with 9cc of Bupivicaine 0.25%    LOCAL ANESTHETIC INJECTED:   Xylocaine 2%    SEDATION: Versed 2mg and Fentanyl 50mcg                                                                                                                                                                                     Conscious sedation ordered by M.D. Patient re-evaluation prior to administration of conscious sedation. No changes noted in patient's status from initial evaluation. The patient's vital signs were monitored by RN and patient remained hemodynamically stable throughout the procedure.    Event Time In   Sedation Start 1130   Sedation End 1142       ESTIMATED BLOOD LOSS:  None    COMPLICATIONS:  None     INTERVAL HISTORY: Patient has clinical and imaging findings suggestive of facet mediated pain. Patients has completed 2 previous diagnostic medial branch blocks at specified levels with at least 80% relief for the expected duration of the local anesthetic utilized.    TECHNIQUE: Time-out was performed to identify the patient and procedure to be performed. With the patient laying in a prone position, the surgical area was  prepped and draped in the usual sterile fashion using ChloraPrep and fenestrated drape. The levels were determined under fluoroscopic guidance. Skin anesthesia was achieved by injecting Lidocaine 2% over the injection sites. A 20 gauge 10mm curved active tip needle was introduced to the anatomic local of the medial branch at each of the above levels using AP, lateral and/or contralateral oblique fluoroscopic imaging. Then sensory and motor testing was performed to confirm that the needle tips were in the correct location. After negative aspiration for blood or CSF was confirmed, 1 mL of the lidocaine 2% listed above was injected slowly at each site. This was followed by thermal lesioning at 80 degrees celsius for 90 seconds. That was followed by slowly injecting 2 mL of the medication mixture listed above at each site. The needles were removed and bleeding was nil. A sterile dressing was applied. No specimens collected. The patient tolerated the procedure well and did not have any procedure related motor deficit at the conclusion of the procedure.    The patient was monitored after the procedure in the recovery area. They were given post-procedure and discharge instructions to follow at home. The patient was discharged in a stable condition.    PAIN BEFORE THE PROCEDURE: 9/10    PAIN AFTER THE PROCEDURE: 0/10      Archana Knott MD

## 2022-12-22 NOTE — DISCHARGE INSTRUCTIONS

## 2022-12-22 NOTE — DISCHARGE SUMMARY
Discharge Note  Short Stay      SUMMARY     Admit Date: 12/22/2022    Attending Physician: Archana Knott      Discharge Physician: Archana Knott      Discharge Date: 12/22/2022 11:41 AM    Procedure(s) (LRB):  RADIOFREQUENCY ABLATION, RIGHT L3,L4,L5 MEDIAL BRANCH TWO OF TWO (Right)    Final Diagnosis: Spondylosis without myelopathy [M47.819]    Disposition: Home or self care    Patient Instructions:   Current Discharge Medication List        CONTINUE these medications which have NOT CHANGED    Details   atorvastatin (LIPITOR) 20 MG tablet Take 20 mg by mouth once daily.      azelastine 205.5 mcg (0.15 %) Spry azelastine 205.5 mcg (0.15 %) nasal spray      b complex vitamins tablet Take 1 tablet by mouth once daily.      ciclopirox (LOPROX) 0.77 % Susp Apply topically 2 (two) times daily.      ergocalciferol (ERGOCALCIFEROL) 50,000 unit Cap Take 50,000 Units by mouth every 7 days.      estradioL (ESTRACE) 0.01 % (0.1 mg/gram) vaginal cream Place 0.5 g vaginally.      fluticasone propionate (FLONASE) 50 mcg/actuation nasal spray 1 spray by Each Nostril route once daily.      ginseng 250 mg Cap Take by mouth.      meloxicam (MOBIC) 15 MG tablet Take 1 tablet (15 mg total) by mouth once daily.  Qty: 30 tablet, Refills: 1    Associated Diagnoses: Primary osteoarthritis of right knee      multivitamin capsule Take 1 capsule by mouth once daily.      naproxen (NAPROSYN) 500 MG tablet Take 1 tablet (500 mg total) by mouth 2 (two) times daily as needed (pain).  Qty: 30 tablet, Refills: 0    Associated Diagnoses: Acute pain of left knee      omega-3 fatty acids/fish oil (FISH OIL-OMEGA-3 FATTY ACIDS) 300-1,000 mg capsule Take by mouth once daily.      omeprazole (PRILOSEC) 20 MG capsule Take 20 mg by mouth once daily.      potassium gluconate 595 mg (99 mg) TbSR Take by mouth once.      tumeric-ging-olive-oreg-capryl 100 mg-150 mg- 50 mg-150 mg Cap Take by mouth.      vitamin E 100 UNIT capsule Take 100 Units by mouth once daily.       zinc gluconate 50 mg tablet Take 50 mg by mouth once daily.      zonisamide (ZONEGRAN) 100 MG Cap Take 4 capsules (400 mg total) by mouth once daily.  Qty: 360 capsule, Refills: 0    Associated Diagnoses: Cervical radiculopathy; Peripheral neuropathic pain                 Discharge Diagnosis: Spondylosis without myelopathy [M47.819]  Condition on Discharge: Stable with no complications to procedure   Diet on Discharge: Same as before.  Activity: as per instruction sheet.  Discharge to: Home with a responsible adult.  Follow up: 2-4 weeks       Please call my office or pager at [phone] if experienced any weakness or loss of sensation, fever > 101.5, pain uncontrolled with oral medications, persistent nausea/vomiting/or diarrhea, redness or drainage from the incisions, or any other worrisome concerns. If physician on call was not reached or could not communicate with our office for any reason please go to the nearest emergency department

## 2022-12-27 ENCOUNTER — HOSPITAL ENCOUNTER (OUTPATIENT)
Dept: RADIOLOGY | Facility: HOSPITAL | Age: 57
Discharge: HOME | End: 2022-12-27
Attending: NURSE PRACTITIONER
Payer: COMMERCIAL

## 2022-12-27 VITALS — HEIGHT: 64 IN | WEIGHT: 156.06 LBS | BODY MASS INDEX: 26.64 KG/M2

## 2022-12-27 DIAGNOSIS — Z12.31 SCREENING MAMMOGRAM, ENCOUNTER FOR: ICD-10-CM

## 2022-12-27 PROCEDURE — 77063 BREAST TOMOSYNTHESIS BI: CPT | Mod: 26,,, | Performed by: RADIOLOGY

## 2022-12-27 PROCEDURE — 77067 MAMMO DIGITAL SCREENING BILAT WITH TOMO: ICD-10-PCS | Mod: 26,,, | Performed by: RADIOLOGY

## 2022-12-27 PROCEDURE — 77067 SCR MAMMO BI INCL CAD: CPT | Mod: 26,,, | Performed by: RADIOLOGY

## 2022-12-27 PROCEDURE — 77063 MAMMO DIGITAL SCREENING BILAT WITH TOMO: ICD-10-PCS | Mod: 26,,, | Performed by: RADIOLOGY

## 2022-12-27 PROCEDURE — 77063 BREAST TOMOSYNTHESIS BI: CPT | Mod: TC

## 2023-01-04 ENCOUNTER — DOCUMENTATION ONLY (OUTPATIENT)
Dept: REHABILITATION | Facility: HOSPITAL | Age: 58
End: 2023-01-04
Payer: COMMERCIAL

## 2023-01-04 NOTE — PROGRESS NOTES
Health  Wellness Visit Note    Name: Cherri Noonan  Clinic Number: [id number]  Physician: Archana Knott MD  Diagnosis: No diagnosis found.  Past Medical History:   Diagnosis Date    Cervical radiculopathy      Visit Number: 23  Precautions: Standard --- Spinal Cord Stimulator       1st PT visit:  08/12/2022  Year of care end date:  08/2023  6 Month test  performed: 02/2023  12 Month test performed: 08/2023  Mind body plan: Plan A-8Months  Patient level:B    Time In: 8:07 AM  Time Out: 9:15 AM  Total Treatment Time: 68 Minutes    [facility] 2022  Handout on this week's wellness topic Gratitude provided  along with a discussion on what it means, the benefits, and suggestions for practice.  Reviewed last week's topic of n/a (patient did not attend Wellness the last two weeks).     Subjective:   Patient reports she has had a lot of back pain recently. She went to her pain- and did a nerve . Since then she has had daily back pain. She went to her [employer] on Friday and did a lot of dancing. Once she got home she immediately iced her back. Patient report she had pain even when dancing. This morning patient went walking for the first time in a week. She walked about two miles. She plans on icing again tonight, and maybe doing some heat.     Skipped leg press today because we ran out of time.     Objective:   Cherri completed therapeutic stretches (EIL, LINDA) and the following MedX exercise machines: core lumbar, torso rotation l/r, leg extension, leg curl, upright row, chest press, biceps curl, triceps extension, leg press    See exercise log in patient folder for rate of exertion and repetitions completed.       Fitness Machine Education Key:  E=education on equipment initiated and further follow up and education needed  I=independent with  and exercise.  The patient:  Adjusts machines to his/her settings  Uses equipment levers, pins, weights  safely  Maintains safe and correct posture while exercising  Moves through exercise with correct pace and control  Gets on and off equipment safely      Lumbar/Cervical Ext. E Torso Rotation E Leg Press    Leg Extension  Seated Leg Curl  Chest Press    Seated Row  Hip ADD  Hip ABD    Triceps Extension  Bicep Curl  Other:        Assessment:   Patient tolerated Patient tolerated MedX Core Lumbar Strength and all other peripheral exercises without an increase in symptoms. Patient skipped warming up because she went walking in the morning, stretched, and iced low back for 5 minutes after the workout.     Plan:  Continue with established plan of care towards wellness goals.     Health  : Jen Lucas  1/4/2023

## 2023-01-11 ENCOUNTER — DOCUMENTATION ONLY (OUTPATIENT)
Dept: REHABILITATION | Facility: HOSPITAL | Age: 58
End: 2023-01-11
Payer: COMMERCIAL

## 2023-01-11 NOTE — PROGRESS NOTES
Health  Wellness Visit Note    Name: Cherri Noonan  Clinic Number: [id number]  Physician: Archana Knott MD  Diagnosis: No diagnosis found.  Past Medical History:   Diagnosis Date    Cervical radiculopathy      Visit Number: 24  Precautions: Standard --- Spinal Cord Stimulator       1st PT visit:  08/12/2022  Year of care end date:  08/2023  6 Month test  performed: 02/2023  12 Month test performed: 08/2023  Mind body plan: Plan A-8Months  Patient level: C    Time In: 8:07 AM  Time Out: 9:38 AM  Total Treatment Time: 91 Minutes    Wellness [employer] 2022  Handout on this week's wellness topic Get Outdoors provided  along with a discussion on what it means, the benefits, and suggestions for practice.  Reviewed last week's topic of Gratitude.    Subjective:   Patient reports she has no back pain today but is having some pain in her right knee. Over the last week she walked everyday for about 3 miles, cut some tree limbs down and did some housework. She does 1 or 2 stretches before starting her day and then does all her stretches during the day daily. Patient sometimes does heat depending on how she feels. She always does 10 minutes of ice after her walking. She is planning to get a massage done soon to help her relax.     Patient typically goes walking right before her Wellness session so she skips the warmup and goes straight into stretches.     Objective:   Cherri completed therapeutic stretches (EIL, LINDA) and the following MedX exercise machines: core lumbar, torso rotation l/r, leg extension, leg curl, upright row, chest press, biceps curl, triceps extension, leg press    See exercise log in patient folder for rate of exertion and repetitions completed.       Fitness Machine Education Key:  E=education on equipment initiated and further follow up and education needed  I=independent with  and exercise.  The patient:  Adjusts machines to his/her settings  Uses equipment levers, pins, weights  safely  Maintains safe and correct posture while exercising  Moves through exercise with correct pace and control  Gets on and off equipment safely      Lumbar/Cervical Ext. E Torso Rotation E Leg Press    Leg Extension  Seated Leg Curl  Chest Press E   Seated Row E Hip ADD  Hip ABD    Triceps Extension  Bicep Curl  Other:        Assessment:   Patient tolerated Patient tolerated MedX Core Lumbar Strength and all other peripheral exercises without an increase in symptoms. Patient skipped warming up because she went walking in the morning, stretched, and iced low back for 10 minutes after the workout.     Plan:  Continue with established plan of care towards wellness goals.     Health  : Jen Lucas  1/11/2023

## 2023-01-18 ENCOUNTER — PATIENT MESSAGE (OUTPATIENT)
Dept: PAIN MEDICINE | Facility: CLINIC | Age: 58
End: 2023-01-18
Payer: COMMERCIAL

## 2023-01-18 ENCOUNTER — DOCUMENTATION ONLY (OUTPATIENT)
Dept: REHABILITATION | Facility: HOSPITAL | Age: 58
End: 2023-01-18
Payer: COMMERCIAL

## 2023-01-18 NOTE — PROGRESS NOTES
Health  Wellness Visit Note    Name: Cherri Noonan  Clinic Number: [id number]  Physician: Archana Knott MD  Diagnosis: No diagnosis found.  Past Medical History:   Diagnosis Date    Cervical radiculopathy      Visit Number: 25  Precautions: Standard --- Spinal Cord Stimulator       1st PT visit:  08/12/2022  Year of care end date:  08/2023  6 Month test  performed: 02/2023  12 Month test performed: 08/2023  Mind body plan: Plan A-8Months  Patient level: C    Time In: 8:05 AM  Time Out: 9:23 AM  Total Treatment Time: 78 Minutes    Wellness [employer] 2022  Handout on this week's wellness topic Meditation provided  along with a discussion on what it means, the benefits, and suggestions for practice.  Reviewed last week's topic of Get Outdoors.    Subjective:   Patient reports she has no back pain today. She kept busy within the last week by watching her grandchildren, going on walks daily and moving things around the house. She usually ices after long days and when she does her walking.    Patient usually goes walking right before her Wellness session so she skips the warmup and goes straight into stretches. Today she warmed up at Wellness session.    Objective:   Cherri completed therapeutic stretches (EIL, LINDA) and the following MedX exercise machines: core lumbar, torso rotation l/r, leg extension, leg curl, upright row, chest press, biceps curl, triceps extension, leg press    See exercise log in patient folder for rate of exertion and repetitions completed.       Fitness Machine Education Key:  E=education on equipment initiated and further follow up and education needed  I=independent with  and exercise.  The patient:  Adjusts machines to his/her settings  Uses equipment levers, pins, weights safely  Maintains safe and correct posture while exercising  Moves through exercise with correct pace and control  Gets on and off equipment safely      Lumbar/Cervical Ext. E Torso Rotation E Leg Press     Leg Extension  Seated Leg Curl  Chest Press E   Seated Row E Hip ADD  Hip ABD    Triceps Extension E Bicep Curl E Other:        Assessment:   Patient tolerated Patient tolerated MedX Core Lumbar Strength and all other peripheral exercises without an increase in symptoms. Patient warmed up on elliptical, stretched, and iced low back for 5 minutes after the workout.     Plan:  Continue with established plan of care towards wellness goals.     Health  : Jen Lucas  1/18/2023

## 2023-01-19 ENCOUNTER — OFFICE VISIT (OUTPATIENT)
Dept: PAIN MEDICINE | Facility: CLINIC | Age: 58
End: 2023-01-19
Payer: COMMERCIAL

## 2023-01-19 ENCOUNTER — TELEPHONE (OUTPATIENT)
Dept: PAIN MEDICINE | Facility: CLINIC | Age: 58
End: 2023-01-19

## 2023-01-19 ENCOUNTER — TELEPHONE (OUTPATIENT)
Dept: PAIN MEDICINE | Facility: CLINIC | Age: 58
End: 2023-01-19
Payer: COMMERCIAL

## 2023-01-19 VITALS
RESPIRATION RATE: 18 BRPM | DIASTOLIC BLOOD PRESSURE: 80 MMHG | HEIGHT: 64 IN | TEMPERATURE: 98 F | WEIGHT: 147 LBS | SYSTOLIC BLOOD PRESSURE: 121 MMHG | HEART RATE: 61 BPM | BODY MASS INDEX: 25.1 KG/M2

## 2023-01-19 DIAGNOSIS — M47.816 SPONDYLOSIS OF LUMBAR REGION WITHOUT MYELOPATHY OR RADICULOPATHY: ICD-10-CM

## 2023-01-19 DIAGNOSIS — M47.816 LUMBAR SPONDYLOSIS: ICD-10-CM

## 2023-01-19 DIAGNOSIS — M54.9 DORSALGIA, UNSPECIFIED: Primary | ICD-10-CM

## 2023-01-19 PROCEDURE — 3008F PR BODY MASS INDEX (BMI) DOCUMENTED: ICD-10-PCS | Mod: CPTII,S$GLB,, | Performed by: NURSE PRACTITIONER

## 2023-01-19 PROCEDURE — 99999 PR PBB SHADOW E&M-EST. PATIENT-LVL V: CPT | Mod: PBBFAC,,, | Performed by: NURSE PRACTITIONER

## 2023-01-19 PROCEDURE — 99999 PR PBB SHADOW E&M-EST. PATIENT-LVL V: ICD-10-PCS | Mod: PBBFAC,,, | Performed by: NURSE PRACTITIONER

## 2023-01-19 PROCEDURE — 3008F BODY MASS INDEX DOCD: CPT | Mod: CPTII,S$GLB,, | Performed by: NURSE PRACTITIONER

## 2023-01-19 PROCEDURE — 3074F PR MOST RECENT SYSTOLIC BLOOD PRESSURE < 130 MM HG: ICD-10-PCS | Mod: CPTII,S$GLB,, | Performed by: NURSE PRACTITIONER

## 2023-01-19 PROCEDURE — 3079F DIAST BP 80-89 MM HG: CPT | Mod: CPTII,S$GLB,, | Performed by: NURSE PRACTITIONER

## 2023-01-19 PROCEDURE — 1159F PR MEDICATION LIST DOCUMENTED IN MEDICAL RECORD: ICD-10-PCS | Mod: CPTII,S$GLB,, | Performed by: NURSE PRACTITIONER

## 2023-01-19 PROCEDURE — 3079F PR MOST RECENT DIASTOLIC BLOOD PRESSURE 80-89 MM HG: ICD-10-PCS | Mod: CPTII,S$GLB,, | Performed by: NURSE PRACTITIONER

## 2023-01-19 PROCEDURE — 99214 PR OFFICE/OUTPT VISIT, EST, LEVL IV, 30-39 MIN: ICD-10-PCS | Mod: S$GLB,,, | Performed by: NURSE PRACTITIONER

## 2023-01-19 PROCEDURE — 3074F SYST BP LT 130 MM HG: CPT | Mod: CPTII,S$GLB,, | Performed by: NURSE PRACTITIONER

## 2023-01-19 PROCEDURE — 1159F MED LIST DOCD IN RCRD: CPT | Mod: CPTII,S$GLB,, | Performed by: NURSE PRACTITIONER

## 2023-01-19 PROCEDURE — 99214 OFFICE O/P EST MOD 30 MIN: CPT | Mod: S$GLB,,, | Performed by: NURSE PRACTITIONER

## 2023-01-19 RX ORDER — METHYLPREDNISOLONE 4 MG/1
TABLET ORAL
Qty: 1 EACH | Refills: 0 | Status: SHIPPED | OUTPATIENT
Start: 2023-01-19 | End: 2023-02-09

## 2023-01-19 RX ORDER — TIZANIDINE 4 MG/1
4 TABLET ORAL NIGHTLY PRN
Qty: 30 TABLET | Refills: 2 | Status: SHIPPED | OUTPATIENT
Start: 2023-01-19 | End: 2023-03-27

## 2023-01-19 NOTE — TELEPHONE ENCOUNTER
----- Message from Jeb Rashid NP sent at 1/19/2023 11:44 AM CST -----  Please schedule MRI and imaging follow up thereafter

## 2023-01-19 NOTE — TELEPHONE ENCOUNTER
----- Message from Maria Fernanda Espinosa sent at 1/19/2023  9:43 AM CST -----   Name of Who is Calling:     What is the request in detail:  patient en route  may be running about 5mins late due to traffic Please contact to further discuss and advise      Can the clinic reply by [facility]:     What Number to Call Back if not in [facility]:  [phone]

## 2023-01-19 NOTE — PROGRESS NOTES
Subjective:   Chronic Pain-TeleEstablished Note (Follow up visit)             Patient ID: Cherri Noonan is a 57 y.o. female.    Chief Complaint: No chief complaint on file.      Referred by: No ref. provider found     Interval History 1/19/2023:  Mrs Noonan presents for follow up of lower back pain. She is s/p Left then Right L3,4,5 RFAs. She states left sided lumbar RFA relieved pain near 100% relief. When she had Right sided RFA symptoms exacerbated thereafter. Pain now 8/10 and bilaterally. She has been doing HEP, denies any s/s concerning for myelopathy.    Interval History 11/10/2022:  Mrs Noonan presents for follow up of chronic pain. She is s/p B L3,4,5 MBB. She states 100% relief and improved functioning for <24hrs and able to walk long distances. She continues to be active in PT and using [employer] [facility]. He is ready to proceed with 2nd MBB and RFA if indicated     Interval History 10/18/2022:  Mrs Noonan presents for follow up virtually. She is doing PT and finds this strengthening her body but not taking away pain. Her pain is worse with driving and sitting for long periods of time to lower lumbar. Heat eases pain somewhat. She has no radicular complaint. She continues to keep in contact with [employer] [facility] rep. There is no new neurological changes, no focal voicing of any s/s concerning for cauda equina. She is currently taking zonegran 400mg qd with benefit and denies SE of medication.     Interval History 8/17/2022:  Mrs Noonan presents for follow up of mid and lower back pain. She just had [facility] from [facility] and is eager to continue. Thoracolumbar back pain worsened by activity recently by HEP in conjunction with walking and unrelieved by ICE. She still has intermittent L medial knee pain. Zonegran 400mg has provided some benefit. Denies any new areas of pain or neurological changes. She will be getting TENS pads online. She has had benefit from SCS reprogramming.     HPI  She returns for  follow-up.  She had a CT scan of the cervical spine.  She had reprogramming of her spinal cord stimulator and told very well for the neck pain and radicular upper extremity pain.  She still has the discomfort along her spine in the thoracic and lumbar spine.  No radicular component.  No specific aggravating factors but it may be worse with activities.  She said overall, she feels much better since the reprogramming and since she started zonisamide.  She lost some weight.  She was able to do a lot of activities moving back into her house yesterday with some soreness but she would not have been able to do that otherwise.  No new symptomatology otherwise    Interventional Hx:  12/8/2022 left L3,4,5 RFA  12/22/22 Right L3,4,5 RFA    Past Medical History:   Diagnosis Date    Cervical radiculopathy        Past Surgical History:   Procedure Laterality Date    CERVICAL FUSION  08/2015    C5-C7    HYSTERECTOMY      INJECTION OF ANESTHETIC AGENT AROUND NERVE Bilateral 10/31/2022    Procedure: BLOCK, NERVE, BILATERAL L3-L4-L5 MEDIAL BRANCH;  Surgeon: Archana Knott MD;  Location: [facility];  Service: Pain Management;  Laterality: Bilateral;    INJECTION OF ANESTHETIC AGENT AROUND NERVE Bilateral 11/17/2022    Procedure: BLOCK, NERVE BILATERAL L3,L4,L5 MEDIAL BRANCH;  Surgeon: Archana Knott MD;  Location: [facility];  Service: Pain Management;  Laterality: Bilateral;    RADIOFREQUENCY ABLATION Left 12/8/2022    Procedure: RADIOFREQUENCY ABLATION, LEFT L3,L4,L5 MEDIAL BRANCH ONE OF TWO;  Surgeon: Archana Knott MD;  Location: [facility];  Service: Pain Management;  Laterality: Left;    RADIOFREQUENCY ABLATION Right 12/22/2022    Procedure: RADIOFREQUENCY ABLATION, RIGHT L3,L4,L5 MEDIAL BRANCH TWO OF TWO;  Surgeon: Archana Knott MD;  Location: [facility];  Service: Pain Management;  Laterality: Right;    SPINAL CORD STIMULATOR IMPLANT  10/20/2021    TUBAL LIGATION         Review of patient's allergies indicates:   Allergen  Reactions    Shingrix (pf) [varicella-zoster [id number] (pf)]      Got all side effects listed       Current Outpatient Medications   Medication Sig Dispense Refill    atorvastatin (LIPITOR) 20 MG tablet Take 20 mg by mouth once daily.      azelastine 205.5 mcg (0.15 %) Spry azelastine 205.5 mcg (0.15 %) nasal spray      b complex vitamins tablet Take 1 tablet by mouth once daily.      ciclopirox (LOPROX) 0.77 % Susp Apply topically 2 (two) times daily.      ergocalciferol (ERGOCALCIFEROL) 50,000 unit Cap Take 50,000 Units by mouth every 7 days.      estradioL (ESTRACE) 0.01 % (0.1 mg/gram) vaginal cream Place 0.5 g vaginally.      fluticasone propionate (FLONASE) 50 mcg/actuation nasal spray 1 spray by Each Nostril route once daily.      ginseng 250 mg Cap Take by mouth.      meloxicam (MOBIC) 15 MG tablet Take 1 tablet (15 mg total) by mouth once daily. 30 tablet 1    multivitamin capsule Take 1 capsule by mouth once daily.      naproxen (NAPROSYN) 500 MG tablet Take 1 tablet (500 mg total) by mouth 2 (two) times daily as needed (pain). 30 tablet 0    omega-3 fatty acids/fish oil (FISH OIL-OMEGA-3 FATTY ACIDS) 300-1,000 mg capsule Take by mouth once daily.      omeprazole (PRILOSEC) 20 MG capsule Take 20 mg by mouth once daily.      potassium gluconate 595 mg (99 mg) TbSR Take by mouth once.      tumeric-ging-olive-oreg-capryl 100 mg-150 mg- 50 mg-150 mg Cap Take by mouth.      vitamin E 100 UNIT capsule Take 100 Units by mouth once daily.      zinc gluconate 50 mg tablet Take 50 mg by mouth once daily.      zonisamide (ZONEGRAN) 100 MG Cap Take 4 capsules (400 mg total) by mouth once daily. 360 capsule 0     No current facility-administered medications for this visit.       Family History   Problem Relation Age of Onset    Hyperlipidemia Mother     Diabetes Mother     Heart disease Sister     Juvenile idiopathic arthritis Sister     Aneurysm Brother     COPD Brother        Social History     Socioeconomic History     Marital status:    Tobacco Use    Smoking status: Never    Smokeless tobacco: Never   Substance and Sexual Activity    Alcohol use: Never    Drug use: Never    Sexual activity: Yes     Partners: Male     Birth control/protection: None     Comment:  39+years           ROS        Objective:   There were no vitals taken for this visit.  Pain Disability Index Review:  Last 3 PDI Scores 11/10/2022 8/17/2022 7/5/2022   Pain Disability Index (PDI) 15 49 35     PHYSICAL EXAMINATION:  Voice normal.  Normal affect without evidence of distress.   Lumbar: +facet loading bilaterally to lower lumbar     Assessment:       No diagnosis found.            Plan:   We discussed with the patient the assessment and recommendations. The following is the plan we agreed on:  - Prior records reviewed  - Imaging reviewed. Will update MRI, [employer] rep states device is MRI compatible   - s/p Left then Right L3,4,5 RFAs  - States left side did well but right side exacerbated thereafter bilaterally  - Continue Zonegran 400mg QD   - Start Zanaflex 4mg qhs  - Rx for medrol for concern of RFA neuroitis  - Continue to keep in contact with [employer] rep  - RTC after imaging for review.       FRANCHESCA Ward  01/19/2023         Cherri was seen today for low-back pain.    Diagnoses and all orders for this visit:    Dorsalgia, unspecified  -     MRI Lumbar Spine Without Contrast; Future    Other orders  -     methylPREDNISolone (MEDROL DOSEPACK) 4 mg tablet; use as directed  -     tiZANidine (ZANAFLEX) 4 MG tablet; Take 1 tablet (4 mg total) by mouth nightly as needed (muscle spasm).         I spent a total of 30 minutes on the day of the visit.  This includes face to face time and non-face to face time preparing to see the patient by reviewing previous labs/imaging, obtaining and/or reviewing separately obtained history, documenting clinical information in the electronic or other health record, independently  interpreting results and communicating results to the patient/family/caregiver.

## 2023-01-19 NOTE — TELEPHONE ENCOUNTER
Patient was contacted staff left a message on VM informing patient to contact the office to schedule Mri and follow up appt

## 2023-01-25 ENCOUNTER — DOCUMENTATION ONLY (OUTPATIENT)
Dept: REHABILITATION | Facility: HOSPITAL | Age: 58
End: 2023-01-25
Payer: COMMERCIAL

## 2023-01-25 NOTE — PROGRESS NOTES
Health  Wellness Visit Note    Name: Cherri Noonan  Clinic Number: [id number]  Physician: Archana Knott MD  Diagnosis: No diagnosis found.  Past Medical History:   Diagnosis Date    Cervical radiculopathy      Visit Number: 26  Precautions: Standard --- Spinal Cord Stimulator       1st PT visit:  08/12/2022  Year of care end date:  08/2023  6 Month test  performed: 02/2023  12 Month test performed: 08/2023  Mind body plan: Plan A-8Months  Patient level: C    Time In: 8:05 AM  Time Out: 9:25 AM  Total Treatment Time: 80 Minutes    Wellness [facility] 2022  Handout on this week's wellness topic Kindness provided  along with a discussion on what it means, the benefits, and suggestions for practice.  Reviewed last week's topic of Meditation     Subjective:   Patient reports she has no back pain today. After her last Wellness session she did have some knee pain. Patient believes her knee pain may come from the hamstring curl machine. At today's Wellness session HC double checked settings on machine and adjusted where necessary. Since her last Wellness session patient continued to walk her neighborhood everyday. She ices her back when need be.      Patient usually goes walking right before her Wellness session so she skips the warmup and goes straight into stretches. Today she warmed up at Wellness session.    Objective:   Cherri completed therapeutic stretches (EIL, LINDA) and the following MedX exercise machines: core lumbar, torso rotation l/r, leg extension, leg curl, upright row, chest press, biceps curl, triceps extension, leg press    See exercise log in patient folder for rate of exertion and repetitions completed.       Fitness Machine Education Key:  E=education on equipment initiated and further follow up and education needed  I=independent with  and exercise.  The patient:  Adjusts machines to his/her settings  Uses equipment levers, pins, weights safely  Maintains safe and correct posture while  exercising  Moves through exercise with correct pace and control  Gets on and off equipment safely      Lumbar/Cervical Ext. E Torso Rotation E Leg Press    Leg Extension E Seated Leg Curl E Chest Press E   Seated Row E Hip ADD  Hip ABD    Triceps Extension E Bicep Curl E Other:        Assessment:   Patient tolerated Patient tolerated MedX Core Lumbar Strength and all other peripheral exercises without an increase in symptoms. Patient warmed up on elliptical, stretched, and iced low back for 10 minutes after the workout.     Plan:  Continue with established plan of care towards wellness goals.     Health  : Jen Lucas  1/25/2023

## 2023-01-28 ENCOUNTER — HOSPITAL ENCOUNTER (OUTPATIENT)
Dept: RADIOLOGY | Facility: OTHER | Age: 58
Discharge: HOME | End: 2023-01-28
Attending: NURSE PRACTITIONER
Payer: COMMERCIAL

## 2023-01-28 DIAGNOSIS — M54.9 DORSALGIA, UNSPECIFIED: ICD-10-CM

## 2023-01-30 ENCOUNTER — HOSPITAL ENCOUNTER (OUTPATIENT)
Dept: RADIOLOGY | Facility: OTHER | Age: 58
Discharge: HOME | End: 2023-01-30
Attending: NURSE PRACTITIONER
Payer: COMMERCIAL

## 2023-01-30 ENCOUNTER — DOCUMENTATION ONLY (OUTPATIENT)
Dept: REHABILITATION | Facility: HOSPITAL | Age: 58
End: 2023-01-30
Payer: COMMERCIAL

## 2023-01-30 PROCEDURE — 72148 MRI LUMBAR SPINE W/O DYE: CPT | Mod: TC

## 2023-01-30 PROCEDURE — 72148 MRI LUMBAR SPINE W/O DYE: CPT | Mod: 26,,, | Performed by: RADIOLOGY

## 2023-01-30 PROCEDURE — 72148 MRI LUMBAR SPINE WITHOUT CONTRAST: ICD-10-PCS | Mod: 26,,, | Performed by: RADIOLOGY

## 2023-01-30 NOTE — PROGRESS NOTES
Health  Wellness Visit Note    Name: Cherri Noonan  Clinic Number: [id number]  Physician: Archana Knott MD  Diagnosis: No diagnosis found.  Past Medical History:   Diagnosis Date    Cervical radiculopathy      Visit Number: 27  Precautions: Standard --- Spinal Cord Stimulator       1st PT visit:  08/12/2022  Year of care end date:  08/2023  6 Month test  performed: 02/2023  12 Month test performed: 08/2023  Mind body plan: Plan A-8Months  Patient level: C    Time In: 8:05 AM  Time Out: 9:05 AM  Total Treatment Time: 60 Minutes    Wellness [facility] 2022  Handout on this week's wellness topic Kindness provided  along with a discussion on what it means, the benefits, and suggestions for practice.  Reviewed last week's topic of Meditation     Subjective:   Patient reports she has no back pain today. Cherri's knee feeling better today.  She feels better physically since coming to therapy. She was able to dance this weekend. Increased resistance while lowering reps on a few machines.  Patient usually goes walking right before her Wellness session so she skips the warmup and goes straight into stretches. Today she warmed up on the elliptical because no time to walk before.     Objective:   Cherri completed therapeutic stretches (EIL, LINDA) and the following MedX exercise machines: core lumbar, torso rotation l/r, leg extension, leg curl, upright row, chest press, biceps curl, triceps extension, leg press    See exercise log in patient folder for rate of exertion and repetitions completed.       Fitness Machine Education Key:  E=education on equipment initiated and further follow up and education needed  I=independent with  and exercise.  The patient:  Adjusts machines to his/her settings  Uses equipment levers, pins, weights safely  Maintains safe and correct posture while exercising  Moves through exercise with correct pace and control  Gets on and off equipment safely      Lumbar/Cervical Ext. E Torso  Rotation E Leg Press    Leg Extension E Seated Leg Curl E Chest Press E   Seated Row E Hip ADD  Hip ABD    Triceps Extension E Bicep Curl E Other:        Assessment:   Patient tolerated Patient tolerated MedX Core Lumbar Strength and all other peripheral exercises without an increase in symptoms. Patient warmed up on elliptical, stretched, and iced low back for 10 minutes after the workout.     Plan:  Continue with established plan of care towards wellness goals.     Health  : Supriya Mcdermott  1/30/2023

## 2023-02-06 ENCOUNTER — DOCUMENTATION ONLY (OUTPATIENT)
Dept: REHABILITATION | Facility: HOSPITAL | Age: 58
End: 2023-02-06
Payer: COMMERCIAL

## 2023-02-06 NOTE — PROGRESS NOTES
Health  Wellness Visit Note    Name: Cherri Noonan  Clinic Number: [id number]  Physician: Archana Knott MD  Diagnosis: No diagnosis found.  Past Medical History:   Diagnosis Date    Cervical radiculopathy      Visit Number: 28  Precautions: Standard --- Spinal Cord Stimulator       1st PT visit:  08/12/2022  Year of care end date:  08/2023  6 Month test  performed: 02/2023  12 Month test performed: 08/2023  Mind body plan: Plan A-8 Months  Patient level: C    Time In: 8:07 AM  Time Out: 9:00 AM  Total Treatment Time: 53 Minutes    Wellness [employer] 2022  Handout on this week's wellness topic Cardiovascular Exercise was provided along with a discussion on what it means, the benefits, and suggestions for practice.  Reviewed last week's topic of Muscular Strength.    Subjective:   Patient reports she still has some back pain, but feeling overall better. Cherri's knee is feeling better today after not walking all week to give it some rest. Increased resistance while lowering reps on a few machines.    Objective:   Cherri completed therapeutic stretches (EIL, LINDA) and the following MedX exercise machines: core lumbar, torso rotation l/r, leg extension, leg curl, upright row, chest press, biceps curl, triceps extension, leg press    See exercise log in patient folder for rate of exertion and repetitions completed.       Fitness Machine Education Key:  E=education on equipment initiated and further follow up and education needed  I=independent with  and exercise.  The patient:  Adjusts machines to his/her settings  Uses equipment levers, pins, weights safely  Maintains safe and correct posture while exercising  Moves through exercise with correct pace and control  Gets on and off equipment safely      Lumbar/Cervical Ext. E Torso Rotation E Leg Press    Leg Extension E Seated Leg Curl E Chest Press E   Seated Row E Hip ADD E Hip ABD    Triceps Extension E Bicep Curl E Other:        Assessment:   Patient  tolerated Patient tolerated MedX Core Lumbar Strength and all other peripheral exercises without an increase in symptoms. Patient warmed up on elliptical, stretched, and iced low back for 10 minutes after the workout.     Plan:  Continue with established plan of care towards wellness goals.     Health  : Supriya Mcdermott  2/6/2023

## 2023-02-08 ENCOUNTER — TELEPHONE (OUTPATIENT)
Dept: PAIN MEDICINE | Facility: CLINIC | Age: 58
End: 2023-02-08
Payer: COMMERCIAL

## 2023-02-09 ENCOUNTER — PATIENT MESSAGE (OUTPATIENT)
Dept: PAIN MEDICINE | Facility: OTHER | Age: 58
End: 2023-02-09
Payer: COMMERCIAL

## 2023-02-09 ENCOUNTER — OFFICE VISIT (OUTPATIENT)
Dept: PAIN MEDICINE | Facility: CLINIC | Age: 58
End: 2023-02-09
Payer: COMMERCIAL

## 2023-02-09 VITALS
WEIGHT: 149.94 LBS | RESPIRATION RATE: 18 BRPM | SYSTOLIC BLOOD PRESSURE: 126 MMHG | BODY MASS INDEX: 25.6 KG/M2 | HEART RATE: 62 BPM | DIASTOLIC BLOOD PRESSURE: 83 MMHG | HEIGHT: 64 IN

## 2023-02-09 DIAGNOSIS — M48.061 LUMBAR STENOSIS WITHOUT NEUROGENIC CLAUDICATION: ICD-10-CM

## 2023-02-09 DIAGNOSIS — M47.816 LUMBAR SPONDYLOSIS: Primary | ICD-10-CM

## 2023-02-09 PROCEDURE — 1160F PR REVIEW ALL MEDS BY PRESCRIBER/CLIN PHARMACIST DOCUMENTED: ICD-10-PCS | Mod: CPTII,S$GLB,, | Performed by: NURSE PRACTITIONER

## 2023-02-09 PROCEDURE — 3074F PR MOST RECENT SYSTOLIC BLOOD PRESSURE < 130 MM HG: ICD-10-PCS | Mod: CPTII,S$GLB,, | Performed by: NURSE PRACTITIONER

## 2023-02-09 PROCEDURE — 3079F PR MOST RECENT DIASTOLIC BLOOD PRESSURE 80-89 MM HG: ICD-10-PCS | Mod: CPTII,S$GLB,, | Performed by: NURSE PRACTITIONER

## 2023-02-09 PROCEDURE — 99999 PR PBB SHADOW E&M-EST. PATIENT-LVL IV: CPT | Mod: PBBFAC,,, | Performed by: NURSE PRACTITIONER

## 2023-02-09 PROCEDURE — 1160F RVW MEDS BY RX/DR IN RCRD: CPT | Mod: CPTII,S$GLB,, | Performed by: NURSE PRACTITIONER

## 2023-02-09 PROCEDURE — 3008F PR BODY MASS INDEX (BMI) DOCUMENTED: ICD-10-PCS | Mod: CPTII,S$GLB,, | Performed by: NURSE PRACTITIONER

## 2023-02-09 PROCEDURE — 3008F BODY MASS INDEX DOCD: CPT | Mod: CPTII,S$GLB,, | Performed by: NURSE PRACTITIONER

## 2023-02-09 PROCEDURE — 3079F DIAST BP 80-89 MM HG: CPT | Mod: CPTII,S$GLB,, | Performed by: NURSE PRACTITIONER

## 2023-02-09 PROCEDURE — 99214 OFFICE O/P EST MOD 30 MIN: CPT | Mod: S$GLB,,, | Performed by: NURSE PRACTITIONER

## 2023-02-09 PROCEDURE — 99214 PR OFFICE/OUTPT VISIT, EST, LEVL IV, 30-39 MIN: ICD-10-PCS | Mod: S$GLB,,, | Performed by: NURSE PRACTITIONER

## 2023-02-09 PROCEDURE — 1159F PR MEDICATION LIST DOCUMENTED IN MEDICAL RECORD: ICD-10-PCS | Mod: CPTII,S$GLB,, | Performed by: NURSE PRACTITIONER

## 2023-02-09 PROCEDURE — 99999 PR PBB SHADOW E&M-EST. PATIENT-LVL IV: ICD-10-PCS | Mod: PBBFAC,,, | Performed by: NURSE PRACTITIONER

## 2023-02-09 PROCEDURE — 3074F SYST BP LT 130 MM HG: CPT | Mod: CPTII,S$GLB,, | Performed by: NURSE PRACTITIONER

## 2023-02-09 PROCEDURE — 1159F MED LIST DOCD IN RCRD: CPT | Mod: CPTII,S$GLB,, | Performed by: NURSE PRACTITIONER

## 2023-02-09 NOTE — PROGRESS NOTES
Subjective:   Chronic Pain-TeleEstablished Note (Follow up visit)             Patient ID: Cherri Noonan is a 57 y.o. female.    Chief Complaint: Low-back Pain      Referred by: No ref. provider found     Interval History 2/9/2023:  Mrs Noonan presents for follow up of lower back pain bilaterally without radicular pain. She has had updated MRI showing severe L4/5 NF and central canal stenosis. She denies radicular pain, denies leg weakness or heaviness when walking or standing. She continues PT without no focal benefit, Med mgt without significant benefit, denies SE. No focal voicing of s/s concerning for cauda equina syndrome. Pain >6/10 and limiting functioning.     Interval History 1/19/2023:  Mrs Noonan presents for follow up of lower back pain. She is s/p Left then Right L3,4,5 RFAs. She states left sided lumbar RFA relieved pain near 100% relief. When she had Right sided RFA symptoms exacerbated thereafter. Pain now 8/10 and bilaterally. She has been doing HEP, denies any s/s concerning for myelopathy.    Interval History 11/10/2022:  Mrs Noonan presents for follow up of chronic pain. She is s/p B L3,4,5 MBB. She states 100% relief and improved functioning for <24hrs and able to walk long distances. She continues to be active in PT and using [facility]. He is ready to proceed with 2nd MBB and RFA if indicated     Interval History 10/18/2022:  Mrs Noonan presents for follow up virtually. She is doing PT and finds this strengthening her body but not taking away pain. Her pain is worse with driving and sitting for long periods of time to lower lumbar. Heat eases pain somewhat. She has no radicular complaint. She continues to keep in contact with [facility] rep. There is no new neurological changes, no focal voicing of any s/s concerning for cauda equina. She is currently taking zonegran 400mg qd with benefit and denies SE of medication.     Interval History 8/17/2022:  Mrs Noonan presents for follow up of mid  and lower back pain. She just had evaluation from [facility] back and is eager to continue. Thoracolumbar back pain worsened by activity recently by HEP in conjunction with walking and unrelieved by ICE. She still has intermittent L medial knee pain. Zonegran 400mg has provided some benefit. Denies any new areas of pain or neurological changes. She will be getting TENS pads online. She has had benefit from SCS reprogramming.     HPI  She returns for follow-up.  She had a CT scan of the cervical spine.  She had reprogramming of her spinal cord stimulator and told very well for the neck pain and radicular upper extremity pain.  She still has the discomfort along her spine in the thoracic and lumbar spine.  No radicular component.  No specific aggravating factors but it may be worse with activities.  She said overall, she feels much better since the reprogramming and since she started zonisamide.  She lost some weight.  She was able to do a lot of activities moving back into her house yesterday with some soreness but she would not have been able to do that otherwise.  No new symptomatology otherwise    Interventional Hx:  12/8/2022 left L3,4,5 RFA  12/22/22 Right L3,4,5 RFA    Past Medical History:   Diagnosis Date    Cervical radiculopathy        Past Surgical History:   Procedure Laterality Date    CERVICAL FUSION  08/2015    C5-C7    HYSTERECTOMY      INJECTION OF ANESTHETIC AGENT AROUND NERVE Bilateral 10/31/2022    Procedure: BLOCK, NERVE, BILATERAL L3-L4-L5 MEDIAL BRANCH;  Surgeon: Archana Knott MD;  Location: [facility] PAIN MGT;  Service: Pain Management;  Laterality: Bilateral;    INJECTION OF ANESTHETIC AGENT AROUND NERVE Bilateral 11/17/2022    Procedure: BLOCK, NERVE BILATERAL L3,L4,L5 MEDIAL BRANCH;  Surgeon: Archana Knott MD;  Location: [facility] PAIN MGT;  Service: Pain Management;  Laterality: Bilateral;    RADIOFREQUENCY ABLATION Left 12/8/2022    Procedure: RADIOFREQUENCY ABLATION, LEFT L3,L4,L5 MEDIAL BRANCH ONE OF  TWO;  Surgeon: Archana Knott MD;  Location: [facility] PAIN [facility]GT;  Service: Pain Management;  Laterality: Left;    RADIOFREQUENCY ABLATION Right 12/22/2022    Procedure: RADIOFREQUENCY ABLATION, RIGHT L3,L4,L5 MEDIAL BRANCH TWO OF TWO;  Surgeon: Archana Knott MD;  Location: [facility] PAIN MGT;  Service: Pain Management;  Laterality: Right;    SPINAL CORD STIMULATOR IMPLANT  10/20/2021    TUBAL LIGATION         Review of patient's allergies indicates:   Allergen Reactions    Shingrix (pf) [varicella-zoster [id number] (pf)]      Got all side effects listed       Current Outpatient Medications   Medication Sig Dispense Refill    b complex vitamins tablet Take 1 tablet by mouth once daily.      ciclopirox (LOPROX) 0.77 % Susp Apply topically 2 (two) times daily.      ergocalciferol (ERGOCALCIFEROL) 50,000 unit Cap Take 50,000 Units by mouth every 7 days.      fluticasone propionate (FLONASE) 50 mcg/actuation nasal spray 1 spray by Each Nostril route once daily.      ginseng 250 mg Cap Take by mouth.      multivitamin capsule Take 1 capsule by mouth once daily.      naproxen (NAPROSYN) 500 MG tablet Take 1 tablet (500 mg total) by mouth 2 (two) times daily as needed (pain). 30 tablet 0    omega-3 fatty acids/fish oil (FISH OIL-OMEGA-3 FATTY ACIDS) 300-1,000 mg capsule Take by mouth once daily.      potassium gluconate 595 mg (99 mg) TbSR Take by mouth once.      tiZANidine (ZANAFLEX) 4 MG tablet Take 1 tablet (4 mg total) by mouth nightly as needed (muscle spasm). 30 tablet 2    tumeric-ging-olive-oreg-capryl 100 mg-150 mg- 50 mg-150 mg Cap Take by mouth.      vitamin E 100 UNIT capsule Take 100 Units by mouth once daily.      zinc gluconate 50 mg tablet Take 50 mg by mouth once daily.      zonisamide (ZONEGRAN) 100 MG Cap TAKE 4 CAPSULES BY MOUTH ONCE DAILY. 360 capsule 0    atorvastatin (LIPITOR) 20 MG tablet Take 20 mg by mouth once daily.      azelastine 205.5 mcg (0.15 %) Spry azelastine 205.5 mcg (0.15 %) nasal spray       "estradioL (ESTRACE) 0.01 % (0.1 mg/gram) vaginal cream Place 0.5 g vaginally.      meloxicam (MOBIC) 15 MG tablet Take 1 tablet (15 mg total) by mouth once daily. (Patient not taking: Reported on 2/9/2023) 30 tablet 1    methylPREDNISolone (MEDROL DOSEPACK) 4 mg tablet use as directed (Patient not taking: Reported on 2/9/2023) 1 each 0    omeprazole (PRILOSEC) 20 MG capsule Take 20 mg by mouth once daily.       No current facility-administered medications for this visit.       Family History   Problem Relation Age of Onset    Hyperlipidemia Mother     Diabetes Mother     Heart disease Sister     Juvenile idiopathic arthritis Sister     Aneurysm Brother     COPD Brother        Social History     Socioeconomic History    Marital status:    Tobacco Use    Smoking status: Never    Smokeless tobacco: Never   Substance and Sexual Activity    Alcohol use: Never    Drug use: Never    Sexual activity: Yes     Partners: Male     Birth control/protection: None     Comment:  39+years           ROS        Objective:   /83   Pulse 62   Resp 18   Ht 5' 4" (1.626 m)   Wt 68 kg (149 lb 14.6 oz)   BMI 25.73 kg/m²   Pain Disability Index Review:  Last 3 PDI Scores 2/9/2023 1/19/2023 11/10/2022   Pain Disability Index (PDI) 56 40 15      Imaging:     MRI LUMBAR SPINE WITHOUT CONTRAST 1/30/2023     CLINICAL HISTORY:  Low back pain, symptoms persist with > 6wks conservative treatment; Dorsalgia, unspecified     TECHNIQUE:  Multiplanar, multisequence MR images were acquired from the thoracolumbar junction to the sacrum without the administration of contrast.     COMPARISON:  Radiograph 07/05/2022.     FINDINGS:  Lumbar spine alignment demonstrates grade 1 anterolisthesis of L4 on L5.  No spondylolysis.  Vertebral body heights are well maintained without evidence for fracture.  No marrow signal abnormality to suggest an infiltrative process.     There is degenerative disc space narrowing and desiccation at L4-L5.  " No endplate edema.     Distal spinal cord demonstrates normal contour and signal intensity.  Cauda equina appears normal without findings to suggest arachnoiditis.  Conus medullaris terminates at L1-L2.     Right renal cyst.  Multiple hepatic T2 hyperintense lesions, favored to represent cysts but incompletely evaluated on this exam.  Mild edema of the left posterior-inferior paraspinal musculature, possibly related to a low-grade strain.     T12-L1: No spinal canal stenosis.  No neural foraminal narrowing.     L1-L2: No spinal canal stenosis.  No neural foraminal narrowing.     L2-L3: Circumferential disc bulge slightly encroaches into the bilateral foraminal zones.  Bilateral ligamentum flavum buckling.  No spinal canal stenosis.  Mild right neural foraminal narrowing.     L3-L4: Circumferential disc bulge.  Bilateral facet arthropathy and bilateral ligamentum flavum buckling.  No spinal canal stenosis.  No neural foraminal narrowing.     L4-L5: Grade 1 anterolisthesis with uncovering of the intervertebral disc.  Bilateral facet arthropathy and bilateral ligamentum flavum buckling.  Severe spinal canal and lateral recess stenosis.  Mild right and moderate left neural foraminal narrowing.     L5-S1: Circumferential disc bulge.  Bilateral facet arthropathy with a small right posteriorly oriented synovial cyst.  No spinal canal stenosis.  Mild bilateral neural foraminal narrowing.     Impression:     1. Lumbar degenerative changes most pronounced at L4-L5 noting grade 1 anterolisthesis, bilateral ligamentum flavum buckling and bilateral facet arthropathy contributing to severe spinal canal and lateral recess stenosis and mild-to-moderate neural foraminal narrowing.    PHYSICAL EXAMINATION:  Voice normal.  Normal affect without evidence of distress.   Lumbar: +facet loading bilaterally to lower lumbar     Assessment:       No diagnosis found.            Plan:   We discussed with the patient the assessment and  recommendations. The following is the plan we agreed on:  - Prior records reviewed  - No relief from RFAs despite +MBB  - Updated MRI reviewed and severe NF and central stenosis at L4/5  - [facility], MRI compatible   - She continues PT with benefit  - Continue Zonegran 400mg QD   - Start Zanaflex 4mg qhs  - s/f B L5/S1 TFESI  - Continue to keep in contact with [facility] rep  - RTC 2 weeks after FANNY      FRANCHESCA Ward  02/09/2023         There are no diagnoses linked to this encounter.       I spent a total of 30 minutes on the day of the visit.  This includes face to face time and non-face to face time preparing to see the patient by reviewing previous labs/imaging, obtaining and/or reviewing separately obtained history, documenting clinical information in the electronic or other health record, independently interpreting results and communicating results to the patient/family/caregiver.

## 2023-02-16 ENCOUNTER — DOCUMENTATION ONLY (OUTPATIENT)
Dept: REHABILITATION | Facility: HOSPITAL | Age: 58
End: 2023-02-16
Payer: COMMERCIAL

## 2023-02-16 NOTE — PROGRESS NOTES
Health  Wellness Visit Note    Name: Cherri Noonan  Clinic Number: [id number]  Physician: Archana Knott MD  Diagnosis: No diagnosis found.  Past Medical History:   Diagnosis Date    Cervical radiculopathy      Visit Number: 29  Precautions: Standard --- Spinal Cord Stimulator       1st PT visit:  08/12/2022  Year of care end date:  08/2023  6 Month test  performed: 02/2023  12 Month test performed: 08/2023  Mind body plan: Plan A-8 Months  Patient level: C    Time In: 9:00 AM  Time Out: 10:15 AM  Total Treatment Time: 75 Minutes    Wellness [employer] 2022  Handout on this week's wellness topic Flexibility was provided along with a discussion on what it means, the benefits, and suggestions for practice.  Reviewed last week's topic of Cardiovascular Exercise.    Subjective:   Patient reports she has had no back pain this last week. For exercise she went walking for at least 2 miles everyday and did her stretches daily. She went to two parties where she danced the whole night. Patient loves to dance and has no back pain when moving thanks to [employer]. Her knee has been a little bit of a set back but her recent pain management visit went over some options for her. Patient ices her back at home when need be.    Objective:   Cherri completed therapeutic stretches (EIL, LINDA) and the following MedX exercise machines: core lumbar, torso rotation l/r, leg extension, leg curl, upright row, chest press, biceps curl, triceps extension, leg press    See exercise log in patient folder for rate of exertion and repetitions completed.       Fitness Machine Education Key:  E=education on equipment initiated and further follow up and education needed  I=independent with  and exercise.  The patient:  Adjusts machines to his/her settings  Uses equipment levers, pins, weights safely  Maintains safe and correct posture while exercising  Moves through exercise with correct pace and control  Gets on and off equipment  safely      Lumbar/Cervical Ext. E Torso Rotation E Leg Press E   Leg Extension E Seated Leg Curl E Chest Press E   Seated Row E Hip ADD E Hip ABD E   Triceps Extension E Bicep Curl E Other: X       Assessment:   Patient tolerated Patient tolerated MedX Core Lumbar Strength and all other peripheral exercises without an increase in symptoms. Patient warmed up on elliptical, stretched, and iced low back for 6 minutes after the workout.     Plan:  Continue with established plan of care towards wellness goals.     Health  : Jen Lucas  2/16/2023

## 2023-02-23 ENCOUNTER — DOCUMENTATION ONLY (OUTPATIENT)
Dept: REHABILITATION | Facility: HOSPITAL | Age: 58
End: 2023-02-23
Payer: COMMERCIAL

## 2023-02-23 NOTE — PROGRESS NOTES
Health  Wellness Visit Note    Name: Cherri Noonan  Clinic Number: [id number]  Physician: Archana Knott MD  Diagnosis: No diagnosis found.  Past Medical History:   Diagnosis Date    Cervical radiculopathy      Visit Number: 30  Precautions: Standard --- Spinal Cord Stimulator       1st PT visit:  08/12/2022  Year of care end date:  08/2023  6 Month test  performed: 02/2023  12 Month test performed: 08/2023  Mind body plan: Plan A-8 Months  Patient level: B    Time In: 9:09 AM  Time Out: 10:07 AM  Total Treatment Time: 58 Minutes    Wellness [employer] 2022  Handout on this week's wellness topic Balance was provided along with a discussion on what it means, the benefits, and suggestions for practice.  Reviewed last week's topic of Flexibility.    Subjective:   Patient reports she has no back pain today. Recently she has had more and more knee pain. Patient can't describe her knee pain but says she keeps moving to help it subside. Before Wellness today she did a 4 mile walk in her neighborhood. She usually walks everyday for about 4 miles but didn't these last 2 days because her sleep pattern has been disturbed. Instead of walking these last 2 days, she cleaned her closet and moved a lot of boxes around the house. [facility] and did a lot of walking and dances. She ices her back and knees everyday after activity.     Objective:   Cherri completed therapeutic stretches (EIL, LINDA) and the following MedX exercise machines: core lumbar, torso rotation l/r, leg extension, leg curl, upright row, chest press, biceps curl, triceps extension, leg press    See exercise log in patient folder for rate of exertion and repetitions completed.       Fitness Machine Education Key:  E=education on equipment initiated and further follow up and education needed  I=independent with  and exercise.  The patient:  Adjusts machines to his/her settings  Uses equipment levers, pins, weights  safely  Maintains safe and correct posture while exercising  Moves through exercise with correct pace and control  Gets on and off equipment safely      Lumbar/Cervical Ext. E Torso Rotation E Leg Press E   Leg Extension E Seated Leg Curl E Chest Press E   Seated Row E Hip ADD E Hip ABD E   Triceps Extension E Bicep Curl E Other: X       Assessment:   Patient tolerated Patient tolerated MedX Core Lumbar Strength and all other peripheral exercises without an increase in symptoms. Patient warmed up on elliptical, stretched, and iced low back for 6 minutes after the workout.     Plan:  Continue with established plan of care towards wellness goals.     Health  : Jen Lucas  2/23/2023

## 2023-03-02 ENCOUNTER — DOCUMENTATION ONLY (OUTPATIENT)
Dept: REHABILITATION | Facility: HOSPITAL | Age: 58
End: 2023-03-02
Payer: COMMERCIAL

## 2023-03-02 NOTE — PROGRESS NOTES
Health  Wellness Visit Note    Name: Cherri Noonan  Clinic Number: [id number]  Physician: Archana Knott MD  Diagnosis: No diagnosis found.  Past Medical History:   Diagnosis Date    Cervical radiculopathy      Visit Number: 31  Precautions: Standard --- Spinal Cord Stimulator       1st PT visit:  08/12/2022  Year of care end date:  08/2023  6 Month test  performed: 02/2023  12 Month test performed: 08/2023  Mind body plan: Plan A-8 Months  Patient level: B    Time In: 9:00 AM  Time Out: 10:30 AM  Total Treatment Time: 90 Minutes    Wellness [employer] 2022  Handout on this week's wellness topic How's Your Hydration was provided along with a discussion on what it means, the benefits, and suggestions for practice.  Reviewed last week's topic of Balance.    Subjective:   Patient reports she has had some back pain recently. To help with her pain, she keeps moving. She has an epidural scheduled for this month to also help with her sciatica. Patient went walking, danced and did her stretches almost everyday. She planned on icing her back but couldn't find time to do so. She is interested in getting back to jumping and hula-hooping one day. Patient says Wellness has helped strength her legs which has helped with her knee pain!    Objective:   Cherri completed therapeutic stretches (EIL, LINDA) and the following MedX exercise machines: core lumbar, torso rotation l/r, leg extension, leg curl, upright row, chest press, biceps curl, triceps extension, leg press    See exercise log in patient folder for rate of exertion and repetitions completed.       Fitness Machine Education Key:  E=education on equipment initiated and further follow up and education needed  I=independent with  and exercise.  The patient:  Adjusts machines to his/her settings  Uses equipment levers, pins, weights safely  Maintains safe and correct posture while exercising  Moves through exercise with correct pace and control  Gets on and off  equipment safely      Lumbar/Cervical Ext. E Torso Rotation E Leg Press E   Leg Extension E Seated Leg Curl E Chest Press E   Seated Row E Hip ADD E Hip ABD E   Triceps Extension E Bicep Curl E Other: X       Assessment:   Patient tolerated Patient tolerated MedX Core Lumbar Strength and all other peripheral exercises without an increase in symptoms. Patient warmed up on elliptical, stretched, and iced low back and knees for 10 minutes after the workout.     Plan:  Continue with established plan of care towards wellness goals.     Health  : Jen Lucas  3/2/2023

## 2023-03-09 ENCOUNTER — DOCUMENTATION ONLY (OUTPATIENT)
Dept: REHABILITATION | Facility: HOSPITAL | Age: 58
End: 2023-03-09
Payer: COMMERCIAL

## 2023-03-09 NOTE — PROGRESS NOTES
Health  Wellness Visit Note    Name: Cherri Noonan  Clinic Number: [id number]  Physician: Archana Knott MD  Diagnosis: No diagnosis found.  Past Medical History:   Diagnosis Date    Cervical radiculopathy      Visit Number: 32  Precautions: Standard --- Spinal Cord Stimulator       1st PT visit:  08/12/2022  Year of care end date:  08/2023  6 Month test  performed: 02/2023  12 Month test performed: 08/2023  Mind body plan: Plan A-8 Months  Patient level: B    Time In: 9:10 AM  Time Out: 10:43 AM  Total Treatment Time: 93 Minutes    Wellness [employer] 2022  Handout on this week's wellness topic [employer] was provided along with a discussion on what it means, the benefits, and suggestions for practice.  Reviewed last week's topic of How's Your Hydration.    Subjective:   Patient reports she has had no back pain this week. She did have some full body soreness after last week's Wellness session. Patient continued to walk around her neighborhood and did her HEP/stretches for exercise. She has been busy watching her grandchildren and running errands. She has an epidural scheduled for this month to help with her sciatica. She ices her back and knees outside of Wellness when need be.     Objective:   Cherri completed therapeutic stretches (EIL, LINDA) and the following MedX exercise machines: core lumbar, torso rotation l/r, leg extension, leg curl, upright row, chest press, biceps curl, triceps extension, leg press    See exercise log in patient folder for rate of exertion and repetitions completed.       Fitness Machine Education Key:  E=education on equipment initiated and further follow up and education needed  I=independent with  and exercise.  The patient:  Adjusts machines to his/her settings  Uses equipment levers, pins, weights safely  Maintains safe and correct posture while exercising  Moves through exercise with correct pace and control  Gets on and off equipment safely      Lumbar/Cervical  Ext. E Torso Rotation E Leg Press E   Leg Extension E Seated Leg Curl E Chest Press E   Seated Row E Hip ADD E Hip ABD E   Triceps Extension E Bicep Curl E Other: X       Assessment:   Patient tolerated Patient tolerated MedX Core Lumbar Strength and all other peripheral exercises without an increase in symptoms. Patient warmed up on recumbent bike for 5 minutes, stretched, and iced low back and knees for 10 minutes after the workout.     Plan:  Continue with established plan of care towards wellness goals.     Health  : Jen Lucas  3/9/2023

## 2023-03-13 ENCOUNTER — HOSPITAL ENCOUNTER (OUTPATIENT)
Facility: OTHER | Age: 58
Discharge: HOME | End: 2023-03-13
Attending: ANESTHESIOLOGY | Admitting: ANESTHESIOLOGY
Payer: COMMERCIAL

## 2023-03-13 VITALS
HEIGHT: 64 IN | OXYGEN SATURATION: 97 % | TEMPERATURE: 98 F | RESPIRATION RATE: 16 BRPM | HEART RATE: 59 BPM | DIASTOLIC BLOOD PRESSURE: 88 MMHG | BODY MASS INDEX: 25.44 KG/M2 | SYSTOLIC BLOOD PRESSURE: 152 MMHG | WEIGHT: 149 LBS

## 2023-03-13 DIAGNOSIS — M47.27 LUMBOSACRAL SPONDYLOSIS WITH RADICULOPATHY: ICD-10-CM

## 2023-03-13 DIAGNOSIS — M47.819 SPONDYLOSIS WITHOUT MYELOPATHY: Primary | ICD-10-CM

## 2023-03-13 DIAGNOSIS — G89.29 CHRONIC PAIN: ICD-10-CM

## 2023-03-13 PROCEDURE — 25500020 PHARM REV CODE 255: Performed by: ANESTHESIOLOGY

## 2023-03-13 PROCEDURE — 64483 NJX AA&/STRD TFRM EPI L/S 1: CPT | Mod: 50,,, | Performed by: ANESTHESIOLOGY

## 2023-03-13 PROCEDURE — 64483 PR EPIDURAL INJ, ANES/STEROID, TRANSFORAMINAL, LUMB/SACR, SNGL LEVL: ICD-10-PCS | Mod: 50,,, | Performed by: ANESTHESIOLOGY

## 2023-03-13 PROCEDURE — 64483 NJX AA&/STRD TFRM EPI L/S 1: CPT | Mod: 50 | Performed by: ANESTHESIOLOGY

## 2023-03-13 PROCEDURE — 25000003 PHARM REV CODE 250: Performed by: STUDENT IN AN ORGANIZED HEALTH CARE EDUCATION/TRAINING PROGRAM

## 2023-03-13 PROCEDURE — 63600175 PHARM REV CODE 636 W HCPCS: Performed by: ANESTHESIOLOGY

## 2023-03-13 PROCEDURE — 25000003 PHARM REV CODE 250: Performed by: ANESTHESIOLOGY

## 2023-03-13 RX ORDER — MIDAZOLAM HYDROCHLORIDE 1 MG/ML
INJECTION INTRAMUSCULAR; INTRAVENOUS
Status: DISCONTINUED | OUTPATIENT
Start: 2023-03-13 | End: 2023-03-13 | Stop reason: HOSPADM

## 2023-03-13 RX ORDER — FENTANYL CITRATE 50 UG/ML
INJECTION, SOLUTION INTRAMUSCULAR; INTRAVENOUS
Status: DISCONTINUED | OUTPATIENT
Start: 2023-03-13 | End: 2023-03-13 | Stop reason: HOSPADM

## 2023-03-13 RX ORDER — DEXAMETHASONE SODIUM PHOSPHATE 10 MG/ML
INJECTION INTRAMUSCULAR; INTRAVENOUS
Status: DISCONTINUED | OUTPATIENT
Start: 2023-03-13 | End: 2023-03-13 | Stop reason: HOSPADM

## 2023-03-13 RX ORDER — SODIUM CHLORIDE 9 MG/ML
500 INJECTION, SOLUTION INTRAVENOUS CONTINUOUS
Status: DISCONTINUED | OUTPATIENT
Start: 2023-03-13 | End: 2023-03-13 | Stop reason: HOSPADM

## 2023-03-13 RX ORDER — LIDOCAINE HYDROCHLORIDE 20 MG/ML
INJECTION, SOLUTION INFILTRATION; PERINEURAL
Status: DISCONTINUED | OUTPATIENT
Start: 2023-03-13 | End: 2023-03-13 | Stop reason: HOSPADM

## 2023-03-13 RX ORDER — LIDOCAINE HYDROCHLORIDE 10 MG/ML
INJECTION, SOLUTION EPIDURAL; INFILTRATION; INTRACAUDAL; PERINEURAL
Status: DISCONTINUED | OUTPATIENT
Start: 2023-03-13 | End: 2023-03-13 | Stop reason: HOSPADM

## 2023-03-13 NOTE — DISCHARGE SUMMARY
Discharge Note  Short Stay      SUMMARY     Admit Date: 3/13/2023    Attending Physician: Archana Knott      Discharge Physician: Archana Knott      Discharge Date: 3/13/2023 11:42 AM    Procedure(s) (LRB):  INJECTION, STEROID, EPIDURAL, TRANSFORAMINAL APPROACH BILATERAL L5/S1 (Bilateral)    Final Diagnosis: Lumbar stenosis without neurogenic claudication [M48.061]    Disposition: Home or self care    Patient Instructions:   Current Discharge Medication List        CONTINUE these medications which have NOT CHANGED    Details   atorvastatin (LIPITOR) 20 MG tablet Take 20 mg by mouth once daily.      azelastine 205.5 mcg (0.15 %) Spry azelastine 205.5 mcg (0.15 %) nasal spray      b complex vitamins tablet Take 1 tablet by mouth once daily.      ciclopirox (LOPROX) 0.77 % Susp Apply topically 2 (two) times daily.      ergocalciferol (ERGOCALCIFEROL) 50,000 unit Cap Take 50,000 Units by mouth every 7 days.      estradioL (ESTRACE) 0.01 % (0.1 mg/gram) vaginal cream Place 0.5 g vaginally.      fluticasone propionate (FLONASE) 50 mcg/actuation nasal spray 1 spray by Each Nostril route once daily.      ginseng 250 mg Cap Take by mouth.      meloxicam (MOBIC) 15 MG tablet Take 1 tablet (15 mg total) by mouth once daily.  Qty: 30 tablet, Refills: 1    Associated Diagnoses: Primary osteoarthritis of right knee      multivitamin capsule Take 1 capsule by mouth once daily.      naproxen (NAPROSYN) 500 MG tablet Take 1 tablet (500 mg total) by mouth 2 (two) times daily as needed (pain).  Qty: 30 tablet, Refills: 0    Associated Diagnoses: Acute pain of left knee      omega-3 fatty acids/fish oil (FISH OIL-OMEGA-3 FATTY ACIDS) 300-1,000 mg capsule Take by mouth once daily.      omeprazole (PRILOSEC) 20 MG capsule Take 20 mg by mouth once daily.      potassium gluconate 595 mg (99 mg) TbSR Take by mouth once.      tumeric-ging-olive-oreg-capryl 100 mg-150 mg- 50 mg-150 mg Cap Take by mouth.      vitamin E 100 UNIT capsule Take 100  Units by mouth once daily.      zinc gluconate 50 mg tablet Take 50 mg by mouth once daily.      zonisamide (ZONEGRAN) 100 MG Cap TAKE 4 CAPSULES BY MOUTH ONCE DAILY.  Qty: 360 capsule, Refills: 0    Associated Diagnoses: Cervical radiculopathy; Peripheral neuropathic pain                 Discharge Diagnosis: Lumbar stenosis without neurogenic claudication [M48.061]  Condition on Discharge: Stable with no complications to procedure   Diet on Discharge: Same as before.  Activity: as per instruction sheet.  Discharge to: Home with a responsible adult.  Follow up: 2-4 weeks       Please call my office or pager at [phone] if experienced any weakness or loss of sensation, fever > 101.5, pain uncontrolled with oral medications, persistent nausea/vomiting/or diarrhea, redness or drainage from the incisions, or any other worrisome concerns. If physician on call was not reached or could not communicate with our office for any reason please go to the nearest emergency department

## 2023-03-13 NOTE — OP NOTE
Lumbar Transforaminal Epidural Steroid Injection under Fluoroscopic Guidance    The procedure, risks, benefits, and options were discussed with the patient. There are no contraindications to the procedure. The patent expressed understanding and agreed to the procedure. Informed written consent was obtained prior to the start of the procedure and can be found in the patient's chart.    PATIENT NAME: Cherri Noonan   MRN: [id number]     DATE OF PROCEDURE: 03/13/2023    PROCEDURE:  Bilateral  L5/S1 Lumbar Transforaminal Epidural Steroid Injection under Fluoroscopic Guidance    PRE-OP DIAGNOSIS: Lumbar stenosis without neurogenic claudication [M48.061] Lumbar radiculopathy [M54.16]    POST-OP DIAGNOSIS: Same    PHYSICIAN: Archana Knott MD    ASSISTANTS: Carlos Ward MD [employer] Pain Fellow      MEDICATIONS INJECTED: Preservative-free Decadron 10mg with 5cc of Lidocaine 1% MPF     LOCAL ANESTHETIC INJECTED: Xylocaine 2%     SEDATION: Versed 2mg and Fentanyl 50mcg                                                                                                                                                                                     Conscious sedation ordered by M.D. Patient re-evaluation prior to administration of conscious sedation. No changes noted in patient's status from initial evaluation. The patient's vital signs were monitored by RN and patient remained hemodynamically stable throughout the procedure.    Event Time In   Sedation Start 1135   Sedation End 1141       ESTIMATED BLOOD LOSS: None    COMPLICATIONS: None    TECHNIQUE: Time-out was performed to identify the patient and procedure to be performed. With the patient laying in a prone position, the surgical area was prepped and draped in the usual sterile fashion using ChloraPrep and a fenestrated drape.The levels were determined under fluoroscopy guidance. Skin anesthesia was achieved by injecting Lidocaine 2% over the injection sites. The  transforaminal spaces were then approached with a 22 gauge, 3.5 inch spinal quinke needle that was introduced under fluoroscopic guidance in the AP and Lateral views. Once the needle tip was in the area of the transforaminal space, and there was no blood, CSF or paraesthesias, contrast dye Omnipaque (300mg/mL) was injected to confirm placement and there was no vascular runoff. Fluoroscopic imaging in the AP and lateral views revealed a clear outline of the spinal nerve with proximal spread of agent through the neural foramen into the epidural space. 3 mL of the medication mixture listed above was injected slowly at each site. Displacement of the radio opaque contrast after injection of the medication confirmed that the medication went into the area of the transforaminal spaces. The needles were removed and bleeding was nil. A sterile dressing was applied. No specimens collected. The patient tolerated the procedure well.     PAIN BEFORE THE PROCEDURE: 8-10/10    PAIN AFTER THE PROCEDURE: 0/10     The patient was monitored after the procedure in the recovery area. They were given post-procedure and discharge instructions to follow at home. The patient was discharged in a stable condition.        Archana Knott MD

## 2023-03-13 NOTE — H&P
"HPI  Patient presenting for Procedure(s) (LRB):  INJECTION, STEROID, EPIDURAL, TRANSFORAMINAL APPROACH BILATERAL L5/S1 (Bilateral)     Patient on Anti-coagulation No    No health changes since previous encounter    Past Medical History:   Diagnosis Date    Cervical radiculopathy      Past Surgical History:   Procedure Laterality Date    CERVICAL FUSION  08/2015    C5-C7    HYSTERECTOMY      INJECTION OF ANESTHETIC AGENT AROUND NERVE Bilateral 10/31/2022    Procedure: BLOCK, NERVE, BILATERAL L3-L4-L5 MEDIAL BRANCH;  Surgeon: Archana Knott MD;  Location: [facility];  Service: Pain Management;  Laterality: Bilateral;    INJECTION OF ANESTHETIC AGENT AROUND NERVE Bilateral 11/17/2022    Procedure: BLOCK, NERVE BILATERAL L3,L4,L5 MEDIAL BRANCH;  Surgeon: Archana Knott MD;  Location: [facility];  Service: Pain Management;  Laterality: Bilateral;    RADIOFREQUENCY ABLATION Left 12/8/2022    Procedure: RADIOFREQUENCY ABLATION, LEFT L3,L4,L5 MEDIAL BRANCH ONE OF TWO;  Surgeon: Archana Knott MD;  Location: [facility];  Service: Pain Management;  Laterality: Left;    RADIOFREQUENCY ABLATION Right 12/22/2022    Procedure: RADIOFREQUENCY ABLATION, RIGHT L3,L4,L5 MEDIAL BRANCH TWO OF TWO;  Surgeon: Archana Knott MD;  Location: [facility];  Service: Pain Management;  Laterality: Right;    SPINAL CORD STIMULATOR IMPLANT  10/20/2021    TUBAL LIGATION       Review of patient's allergies indicates:   Allergen Reactions    Shingrix (pf) [varicella-zoster ge-as01b (pf)]      Got all side effects listed      Current Facility-Administered Medications   Medication    0.9%  NaCl infusion       PMHx, PSHx, Allergies, Medications reviewed in epic    ROS negative except pain complaints in HPI    OBJECTIVE:    BP (!) 160/73 (BP Location: Right arm, Patient Position: Sitting)   Pulse 61   Temp 98.4 °F (36.9 °C) (Oral)   Resp 16   Ht 5' 4" (1.626 m)   Wt 67.6 kg (149 lb)   SpO2 99%   BMI 25.58 kg/m²     PHYSICAL " EXAMINATION:    GENERAL: Well appearing, in no acute distress, alert and oriented x3.  PSYCH:  Mood and affect appropriate.  SKIN: Skin color, texture, turgor normal, no rashes or lesions which will impact the procedure.  CV: RRR with palpation of the radial artery.  PULM: No evidence of respiratory difficulty, symmetric chest rise. Clear to auscultation.  NEURO: Cranial nerves grossly intact.    Plan:    Proceed with procedure as planned Procedure(s) (LRB):  INJECTION, STEROID, EPIDURAL, TRANSFORAMINAL APPROACH BILATERAL L5/S1 (Bilateral)    Carlos Ward  03/13/2023

## 2023-03-13 NOTE — DISCHARGE INSTRUCTIONS

## 2023-03-23 ENCOUNTER — DOCUMENTATION ONLY (OUTPATIENT)
Dept: REHABILITATION | Facility: HOSPITAL | Age: 58
End: 2023-03-23
Payer: COMMERCIAL

## 2023-03-23 NOTE — PROGRESS NOTES
Health  Wellness Visit Note    Name: Cherri Noonan  Clinic Number: [id number]  Physician: Archana Knott MD  Diagnosis: No diagnosis found.  Past Medical History:   Diagnosis Date    Cervical radiculopathy      Visit Number: 33  Precautions: Standard --- Spinal Cord Stimulator       1st PT visit:  08/12/2022  Year of care end date:  08/2023  6 Month test  performed: 02/2023  12 Month test performed: 08/2023  Mind body plan: Plan A-8 Months  Patient level: B    Time In: 9:00 AM  Time Out: 10:30 AM  Total Treatment Time: 90 Minutes    Wellness [employer] 2022  Handout on this week's wellness topic Anti-Inflammatory Diet was provided along with a discussion on what it means, the benefits, and suggestions for practice.  Reviewed last week's topic of n/a (patient did not attend Wellness last week).     Subjective:   Patient reports she is at an 8/10 back pain. Last week she got an injection done to help relieve her pain but felt it caused more pain. She goes back to the doctor next week to explore more options. Patient's pain is at the low back, shoots straight across and travels down her legs. She started back walking Monday and did light stretching to get back into her normal routine.     Objective:   Cherri completed therapeutic stretches (EIL, LINDA) and the following MedX exercise machines: core lumbar, torso rotation l/r, leg extension, leg curl, upright row, chest press, biceps curl, triceps extension, leg press    See exercise log in patient folder for rate of exertion and repetitions completed.       Fitness Machine Education Key:  E=education on equipment initiated and further follow up and education needed  I=independent with  and exercise.  The patient:  Adjusts machines to his/her settings  Uses equipment levers, pins, weights safely  Maintains safe and correct posture while exercising  Moves through exercise with correct pace and control  Gets on and off equipment safely      Lumbar/Cervical  Ext. E Torso Rotation E Leg Press E   Leg Extension E Seated Leg Curl E Chest Press E   Seated Row E Hip ADD E Hip ABD E   Triceps Extension E Bicep Curl E Other: X       Assessment:   Patient tolerated Patient tolerated MedX Core Lumbar Strength and all other peripheral exercises without an increase in symptoms. Patient warmed up on recumbent bike for 5 minutes, stretched, and iced low back and knees for 10 minutes after the workout.     Plan:  Continue with established plan of care towards wellness goals.     Health  : Jen Lucas  3/23/2023

## 2023-03-27 ENCOUNTER — OFFICE VISIT (OUTPATIENT)
Dept: PAIN MEDICINE | Facility: CLINIC | Age: 58
End: 2023-03-27
Payer: COMMERCIAL

## 2023-03-27 VITALS
TEMPERATURE: 98 F | DIASTOLIC BLOOD PRESSURE: 86 MMHG | HEART RATE: 64 BPM | BODY MASS INDEX: 25.21 KG/M2 | SYSTOLIC BLOOD PRESSURE: 130 MMHG | HEIGHT: 64 IN | RESPIRATION RATE: 20 BRPM | WEIGHT: 147.69 LBS

## 2023-03-27 DIAGNOSIS — M48.061 SPINAL STENOSIS OF LUMBAR REGION, UNSPECIFIED WHETHER NEUROGENIC CLAUDICATION PRESENT: ICD-10-CM

## 2023-03-27 DIAGNOSIS — M47.27 LUMBOSACRAL SPONDYLOSIS WITH RADICULOPATHY: Primary | ICD-10-CM

## 2023-03-27 PROCEDURE — 3008F BODY MASS INDEX DOCD: CPT | Mod: CPTII,S$GLB,, | Performed by: NURSE PRACTITIONER

## 2023-03-27 PROCEDURE — 99214 PR OFFICE/OUTPT VISIT, EST, LEVL IV, 30-39 MIN: ICD-10-PCS | Mod: S$GLB,,, | Performed by: NURSE PRACTITIONER

## 2023-03-27 PROCEDURE — 1160F RVW MEDS BY RX/DR IN RCRD: CPT | Mod: CPTII,S$GLB,, | Performed by: NURSE PRACTITIONER

## 2023-03-27 PROCEDURE — 3008F PR BODY MASS INDEX (BMI) DOCUMENTED: ICD-10-PCS | Mod: CPTII,S$GLB,, | Performed by: NURSE PRACTITIONER

## 2023-03-27 PROCEDURE — 3075F SYST BP GE 130 - 139MM HG: CPT | Mod: CPTII,S$GLB,, | Performed by: NURSE PRACTITIONER

## 2023-03-27 PROCEDURE — 3079F DIAST BP 80-89 MM HG: CPT | Mod: CPTII,S$GLB,, | Performed by: NURSE PRACTITIONER

## 2023-03-27 PROCEDURE — 3079F PR MOST RECENT DIASTOLIC BLOOD PRESSURE 80-89 MM HG: ICD-10-PCS | Mod: CPTII,S$GLB,, | Performed by: NURSE PRACTITIONER

## 2023-03-27 PROCEDURE — 3075F PR MOST RECENT SYSTOLIC BLOOD PRESS GE 130-139MM HG: ICD-10-PCS | Mod: CPTII,S$GLB,, | Performed by: NURSE PRACTITIONER

## 2023-03-27 PROCEDURE — 99214 OFFICE O/P EST MOD 30 MIN: CPT | Mod: S$GLB,,, | Performed by: NURSE PRACTITIONER

## 2023-03-27 PROCEDURE — 99999 PR PBB SHADOW E&M-EST. PATIENT-LVL V: CPT | Mod: PBBFAC,,, | Performed by: NURSE PRACTITIONER

## 2023-03-27 PROCEDURE — 1160F PR REVIEW ALL MEDS BY PRESCRIBER/CLIN PHARMACIST DOCUMENTED: ICD-10-PCS | Mod: CPTII,S$GLB,, | Performed by: NURSE PRACTITIONER

## 2023-03-27 PROCEDURE — 1159F PR MEDICATION LIST DOCUMENTED IN MEDICAL RECORD: ICD-10-PCS | Mod: CPTII,S$GLB,, | Performed by: NURSE PRACTITIONER

## 2023-03-27 PROCEDURE — 1159F MED LIST DOCD IN RCRD: CPT | Mod: CPTII,S$GLB,, | Performed by: NURSE PRACTITIONER

## 2023-03-27 PROCEDURE — 99999 PR PBB SHADOW E&M-EST. PATIENT-LVL V: ICD-10-PCS | Mod: PBBFAC,,, | Performed by: NURSE PRACTITIONER

## 2023-03-27 NOTE — PROGRESS NOTES
Subjective:   Chronic Pain-TeleEstablished Note (Follow up visit)             Patient ID: Cherri Noonan is a 57 y.o. female.    Chief Complaint: Back Pain      Referred by: No ref. provider found     Interval History 3/27/2023:  Mrs Noonan presents for follow up of lower back pain and radicular pain. She is s/p B L5/S1 TFESI. She states no relief from this procedure. Radicular pain is progressing more and more prominent down R leg in L5 pattern with associated cramping. At this time she has had no significant relief from PT, RFA nor TFESI. When discussing she is open to Surgical consult to consider improved functioning and quality of life.     Interval History 2/9/2023:  Mrs Noonan presents for follow up of lower back pain bilaterally without radicular pain. She has had updated MRI showing severe L4/5 NF and central canal stenosis. She denies radicular pain, denies leg weakness or heaviness when walking or standing. She continues PT without no focal benefit, Med mgt without significant benefit, denies SE. No focal voicing of s/s concerning for cauda equina syndrome. Pain >6/10 and limiting functioning.     Interval History 1/19/2023:  Mrs Noonan presents for follow up of lower back pain. She is s/p Left then Right L3,4,5 RFAs. She states left sided lumbar RFA relieved pain near 100% relief. When she had Right sided RFA symptoms exacerbated thereafter. Pain now 8/10 and bilaterally. She has been doing HEP, denies any s/s concerning for myelopathy.    Interval History 11/10/2022:  Mrs Noonan presents for follow up of chronic pain. She is s/p B L3,4,5 MBB. She states 100% relief and improved functioning for <24hrs and able to walk long distances. She continues to be active in PT and using [facility]. He is ready to proceed with 2nd MBB and RFA if indicated     Interval History 10/18/2022:  Mrs Noonan presents for follow up virtually. She is doing PT and finds this strengthening her body but not taking away pain. Her  pain is worse with driving and sitting for long periods of time to lower lumbar. Heat eases pain somewhat. She has no radicular complaint. She continues to keep in contact with [facility] rep. There is no new neurological changes, no focal voicing of any s/s concerning for cauda equina. She is currently taking zonegran 400mg qd with benefit and denies SE of medication.     Interval History 8/17/2022:  Mrs Noonan presents for follow up of mid and lower back pain. She just had evaluation from [facility] back and is eager to continue. Thoracolumbar back pain worsened by activity recently by HEP in conjunction with walking and unrelieved by ICE. She still has intermittent L medial knee pain. Zonegran 400mg has provided some benefit. Denies any new areas of pain or neurological changes. She will be getting TENS pads online. She has had benefit from SCS reprogramming.     HPI  She returns for follow-up.  She had a CT scan of the cervical spine.  She had reprogramming of her spinal cord stimulator and told very well for the neck pain and radicular upper extremity pain.  She still has the discomfort along her spine in the thoracic and lumbar spine.  No radicular component.  No specific aggravating factors but it may be worse with activities.  She said overall, she feels much better since the reprogramming and since she started zonisamide.  She lost some weight.  She was able to do a lot of activities moving back into her house yesterday with some soreness but she would not have been able to do that otherwise.  No new symptomatology otherwise    Interventional Hx:  12/8/2022 left L3,4,5 RFA  12/22/22 Right L3,4,5 RFA  3/13/2023 B L5/S1 TFESI     Past Medical History:   Diagnosis Date    Cervical radiculopathy        Past Surgical History:   Procedure Laterality Date    CERVICAL FUSION  08/2015    C5-C7    HYSTERECTOMY      INJECTION OF ANESTHETIC AGENT AROUND NERVE Bilateral 10/31/2022    Procedure: BLOCK, NERVE, BILATERAL  L3-L4-L5 MEDIAL BRANCH;  Surgeon: Archana Knott MD;  Location: [facility];  Service: Pain Management;  Laterality: Bilateral;    INJECTION OF ANESTHETIC AGENT AROUND NERVE Bilateral 11/17/2022    Procedure: BLOCK, NERVE BILATERAL L3,L4,L5 MEDIAL BRANCH;  Surgeon: Archana Knott MD;  Location: [facility];  Service: Pain Management;  Laterality: Bilateral;    RADIOFREQUENCY ABLATION Left 12/8/2022    Procedure: RADIOFREQUENCY ABLATION, LEFT L3,L4,L5 MEDIAL BRANCH ONE OF TWO;  Surgeon: Archana Knott MD;  Location: [facility];  Service: Pain Management;  Laterality: Left;    RADIOFREQUENCY ABLATION Right 12/22/2022    Procedure: RADIOFREQUENCY ABLATION, RIGHT L3,L4,L5 MEDIAL BRANCH TWO OF TWO;  Surgeon: Archana Knott MD;  Location: [facility];  Service: Pain Management;  Laterality: Right;    SPINAL CORD STIMULATOR IMPLANT  10/20/2021    TRANSFORAMINAL EPIDURAL INJECTION OF STEROID Bilateral 3/13/2023    Procedure: INJECTION, STEROID, EPIDURAL, TRANSFORAMINAL APPROACH BILATERAL L5/S1;  Surgeon: Archana Knott MD;  Location: [facility];  Service: Pain Management;  Laterality: Bilateral;    TUBAL LIGATION         Review of patient's allergies indicates:   Allergen Reactions    Shingrix (pf) [varicella-zoster ge-as01b (pf)]      Got all side effects listed       Current Outpatient Medications   Medication Sig Dispense Refill    atorvastatin (LIPITOR) 20 MG tablet Take 20 mg by mouth once daily.      azelastine 205.5 mcg (0.15 %) Spry azelastine 205.5 mcg (0.15 %) nasal spray      b complex vitamins tablet Take 1 tablet by mouth once daily.      ciclopirox (LOPROX) 0.77 % Susp Apply topically 2 (two) times daily.      ergocalciferol (ERGOCALCIFEROL) 50,000 unit Cap Take 50,000 Units by mouth every 7 days.      fluticasone propionate (FLONASE) 50 mcg/actuation nasal spray 1 spray by Each Nostril route once daily.      ginseng 250 mg Cap Take by mouth.      meloxicam (MOBIC) 15 MG tablet Take 1 tablet (15 mg total) by  "mouth once daily. 30 tablet 1    multivitamin capsule Take 1 capsule by mouth once daily.      naproxen (NAPROSYN) 500 MG tablet Take 1 tablet (500 mg total) by mouth 2 (two) times daily as needed (pain). 30 tablet 0    omega-3 fatty acids/fish oil (FISH OIL-OMEGA-3 FATTY ACIDS) 300-1,000 mg capsule Take by mouth once daily.      omeprazole (PRILOSEC) 20 MG capsule Take 20 mg by mouth once daily.      potassium gluconate 595 mg (99 mg) TbSR Take by mouth once.      tiZANidine (ZANAFLEX) 4 MG tablet TAKE 1 TABLET (4 MG TOTAL) BY MOUTH NIGHTLY AS NEEDED FOR MUSCLE SPASM 30 tablet 2    tumeric-ging-olive-oreg-capryl 100 mg-150 mg- 50 mg-150 mg Cap Take by mouth.      vitamin E 100 UNIT capsule Take 100 Units by mouth once daily.      zinc gluconate 50 mg tablet Take 50 mg by mouth once daily.      zonisamide (ZONEGRAN) 100 MG Cap TAKE 4 CAPSULES BY MOUTH ONCE DAILY 120 capsule 2    estradioL (ESTRACE) 0.01 % (0.1 mg/gram) vaginal cream Place 0.5 g vaginally.       No current facility-administered medications for this visit.       Family History   Problem Relation Age of Onset    Hyperlipidemia Mother     Diabetes Mother     Heart disease Sister     Juvenile idiopathic arthritis Sister     Aneurysm Brother     COPD Brother        Social History     Socioeconomic History    Marital status:    Tobacco Use    Smoking status: Never    Smokeless tobacco: Never   Substance and Sexual Activity    Alcohol use: Never    Drug use: Never    Sexual activity: Yes     Partners: Male     Birth control/protection: None     Comment:  39+years           ROS        Objective:   /86 (BP Location: Right arm, Patient Position: Sitting)   Pulse 64   Temp 98 °F (36.7 °C) (Oral)   Resp 20   Ht 5' 4" (1.626 m)   Wt 67 kg (147 lb 11.3 oz)   BMI 25.35 kg/m²   Pain Disability Index Review:  Last 3 PDI Scores 3/27/2023 2/9/2023 1/19/2023   Pain Disability Index (PDI) 56 56 40      Imaging:     MRI LUMBAR SPINE WITHOUT " CONTRAST 1/30/2023     CLINICAL HISTORY:  Low back pain, symptoms persist with > 6wks conservative treatment; Dorsalgia, unspecified     TECHNIQUE:  Multiplanar, multisequence MR images were acquired from the thoracolumbar junction to the sacrum without the administration of contrast.     COMPARISON:  Radiograph 07/05/2022.     FINDINGS:  Lumbar spine alignment demonstrates grade 1 anterolisthesis of L4 on L5.  No spondylolysis.  Vertebral body heights are well maintained without evidence for fracture.  No marrow signal abnormality to suggest an infiltrative process.     There is degenerative disc space narrowing and desiccation at L4-L5.  No endplate edema.     Distal spinal cord demonstrates normal contour and signal intensity.  Cauda equina appears normal without findings to suggest arachnoiditis.  Conus medullaris terminates at L1-L2.     Right renal cyst.  Multiple hepatic T2 hyperintense lesions, favored to represent cysts but incompletely evaluated on this exam.  Mild edema of the left posterior-inferior paraspinal musculature, possibly related to a low-grade strain.     T12-L1: No spinal canal stenosis.  No neural foraminal narrowing.     L1-L2: No spinal canal stenosis.  No neural foraminal narrowing.     L2-L3: Circumferential disc bulge slightly encroaches into the bilateral foraminal zones.  Bilateral ligamentum flavum buckling.  No spinal canal stenosis.  Mild right neural foraminal narrowing.     L3-L4: Circumferential disc bulge.  Bilateral facet arthropathy and bilateral ligamentum flavum buckling.  No spinal canal stenosis.  No neural foraminal narrowing.     L4-L5: Grade 1 anterolisthesis with uncovering of the intervertebral disc.  Bilateral facet arthropathy and bilateral ligamentum flavum buckling.  Severe spinal canal and lateral recess stenosis.  Mild right and moderate left neural foraminal narrowing.     L5-S1: Circumferential disc bulge.  Bilateral facet arthropathy with a small right  posteriorly oriented synovial cyst.  No spinal canal stenosis.  Mild bilateral neural foraminal narrowing.     Impression:     1. Lumbar degenerative changes most pronounced at L4-L5 noting grade 1 anterolisthesis, bilateral ligamentum flavum buckling and bilateral facet arthropathy contributing to severe spinal canal and lateral recess stenosis and mild-to-moderate neural foraminal narrowing.    PHYSICAL EXAMINATION:  Voice normal.  Normal affect without evidence of distress.  Lumbar: +facet loading bilaterally to lower lumbar     Assessment:       Encounter Diagnoses   Name Primary?    Lumbosacral spondylosis with radiculopathy Yes    Spinal stenosis of lumbar region, unspecified whether neurogenic claudication present                Plan:   We discussed with the patient the assessment and recommendations. The following is the plan we agreed on:  - Prior records reviewed  - s/p B L5/S1 TFESI and no benefit as well as prior RFA without benefit   - [facility] (cervical), MRI compatible   - She continues PT with benefit  - Continue Zonegran 400mg QD   - Start Zanaflex 4mg qhs  - Continue to keep in contact with [facility] rep  - RTC PRN after NS evaluation if further interventions indicated/recommended     FRANCHESCA Ward  03/27/2023         Cherri was seen today for back pain.    Diagnoses and all orders for this visit:    Lumbosacral spondylosis with radiculopathy  -     X-Ray Lumbar Spine Ap Lateral w/Flex Ext; Future  -     Ambulatory referral/consult to Neurosurgery; Future    Spinal stenosis of lumbar region, unspecified whether neurogenic claudication present         I spent a total of 30 minutes on the day of the visit.  This includes face to face time and non-face to face time preparing to see the patient by reviewing previous labs/imaging, obtaining and/or reviewing separately obtained history, documenting clinical information in the electronic or other health record, independently  interpreting results and communicating results to the patient/family/caregiver.

## 2023-03-28 ENCOUNTER — PATIENT MESSAGE (OUTPATIENT)
Dept: RESEARCH | Facility: HOSPITAL | Age: 58
End: 2023-03-28
Payer: COMMERCIAL

## 2023-03-30 ENCOUNTER — DOCUMENTATION ONLY (OUTPATIENT)
Dept: REHABILITATION | Facility: HOSPITAL | Age: 58
End: 2023-03-30
Payer: COMMERCIAL

## 2023-03-30 NOTE — PROGRESS NOTES
Health  Wellness Visit Note    Name: Cherri Noonan  Clinic Number: [id number]  Physician: Archana Knott MD  Diagnosis: No diagnosis found.  Past Medical History:   Diagnosis Date    Cervical radiculopathy      Visit Number: 34  Precautions: Standard --- Spinal Cord Stimulator       1st PT visit:  08/12/2022  Year of care end date:  08/2023  6 Month test  performed: 02/2023  12 Month test performed: 08/2023  Mind body plan: Plan A-8 Months  Patient level: B    Time In: 9:10 AM  Time Out: 10:30 AM  Total Treatment Time: 80 Minutes    Wellness [facility] 2022  Handout on this week's wellness topic Fruits and Vegetables was provided along with a discussion on what it means, the benefits, and suggestions for practice.  Reviewed last week's topic of Anti-Inflammatory Diet.     Subjective:   Patient reports she has been having a lot of back pain. She is going see a surgeon in April about a procedure to shave the nerve that is causing the bulk of her pain. Patient saw her MD last week about her knee pain and knee spasms. She is still experiencing some pain in her knees here and there. When she goes see her MD again, she plans to talk about her shoulder pain. Several months back she got some shots in her right delts that have been giving her some pain. Patient has not been walking like she usually does but plans to get back to it this week. She has no iced her back since Wellness last week.     Objective:   Cherri completed therapeutic stretches (EIL, LINDA) and the following MedX exercise machines: core lumbar, torso rotation l/r, leg extension, leg curl, upright row, chest press, biceps curl, triceps extension, leg press    See exercise log in patient folder for rate of exertion and repetitions completed.       Fitness Machine Education Key:  E=education on equipment initiated and further follow up and education needed  I=independent with  and exercise.  The patient:  Adjusts machines to his/her  settings  Uses equipment levers, pins, weights safely  Maintains safe and correct posture while exercising  Moves through exercise with correct pace and control  Gets on and off equipment safely      Lumbar/Cervical Ext. E Torso Rotation E Leg Press E   Leg Extension E Seated Leg Curl E Chest Press E   Seated Row E Hip ADD E Hip ABD E   Triceps Extension E Bicep Curl E Other: X       Assessment:   Patient tolerated Patient tolerated MedX Core Lumbar Strength and all other peripheral exercises without an increase in symptoms. Patient warmed up on recumbent bike for 5 minutes, stretched, and iced low back and knees for 10 minutes after the workout.     Plan:  Continue with established plan of care towards wellness goals.     Health  : Jen Lucas  3/30/2023

## 2023-04-04 ENCOUNTER — PATIENT MESSAGE (OUTPATIENT)
Dept: RESEARCH | Facility: HOSPITAL | Age: 58
End: 2023-04-04
Payer: COMMERCIAL

## 2023-04-06 ENCOUNTER — HOSPITAL ENCOUNTER (OUTPATIENT)
Dept: RADIOLOGY | Facility: HOSPITAL | Age: 58
Discharge: HOME | End: 2023-04-06
Attending: PHYSICIAN ASSISTANT
Payer: COMMERCIAL

## 2023-04-06 ENCOUNTER — OFFICE VISIT (OUTPATIENT)
Dept: NEUROSURGERY | Facility: CLINIC | Age: 58
End: 2023-04-06
Payer: COMMERCIAL

## 2023-04-06 ENCOUNTER — DOCUMENTATION ONLY (OUTPATIENT)
Dept: REHABILITATION | Facility: HOSPITAL | Age: 58
End: 2023-04-06
Payer: COMMERCIAL

## 2023-04-06 VITALS
SYSTOLIC BLOOD PRESSURE: 135 MMHG | HEART RATE: 77 BPM | DIASTOLIC BLOOD PRESSURE: 69 MMHG | WEIGHT: 146.38 LBS | HEIGHT: 64 IN | OXYGEN SATURATION: 99 % | BODY MASS INDEX: 24.99 KG/M2

## 2023-04-06 DIAGNOSIS — M47.27 LUMBOSACRAL SPONDYLOSIS WITH RADICULOPATHY: ICD-10-CM

## 2023-04-06 PROCEDURE — 72114 X-RAY EXAM L-S SPINE BENDING: CPT | Mod: TC,FY

## 2023-04-06 PROCEDURE — 99999 PR PBB SHADOW E&M-EST. PATIENT-LVL V: CPT | Mod: PBBFAC,,, | Performed by: PHYSICIAN ASSISTANT

## 2023-04-06 PROCEDURE — 72114 X-RAY EXAM L-S SPINE BENDING: CPT | Mod: 26,,, | Performed by: RADIOLOGY

## 2023-04-06 PROCEDURE — 3078F PR MOST RECENT DIASTOLIC BLOOD PRESSURE < 80 MM HG: ICD-10-PCS | Mod: CPTII,S$GLB,, | Performed by: PHYSICIAN ASSISTANT

## 2023-04-06 PROCEDURE — 99204 PR OFFICE/OUTPT VISIT, NEW, LEVL IV, 45-59 MIN: ICD-10-PCS | Mod: S$GLB,,, | Performed by: PHYSICIAN ASSISTANT

## 2023-04-06 PROCEDURE — 99204 OFFICE O/P NEW MOD 45 MIN: CPT | Mod: S$GLB,,, | Performed by: PHYSICIAN ASSISTANT

## 2023-04-06 PROCEDURE — 1159F PR MEDICATION LIST DOCUMENTED IN MEDICAL RECORD: ICD-10-PCS | Mod: CPTII,S$GLB,, | Performed by: PHYSICIAN ASSISTANT

## 2023-04-06 PROCEDURE — 1160F RVW MEDS BY RX/DR IN RCRD: CPT | Mod: CPTII,S$GLB,, | Performed by: PHYSICIAN ASSISTANT

## 2023-04-06 PROCEDURE — 1159F MED LIST DOCD IN RCRD: CPT | Mod: CPTII,S$GLB,, | Performed by: PHYSICIAN ASSISTANT

## 2023-04-06 PROCEDURE — 99999 PR PBB SHADOW E&M-EST. PATIENT-LVL V: ICD-10-PCS | Mod: PBBFAC,,, | Performed by: PHYSICIAN ASSISTANT

## 2023-04-06 PROCEDURE — 3078F DIAST BP <80 MM HG: CPT | Mod: CPTII,S$GLB,, | Performed by: PHYSICIAN ASSISTANT

## 2023-04-06 PROCEDURE — 72114 XR LUMBAR SPINE 5 VIEW WITH FLEX AND EXT: ICD-10-PCS | Mod: 26,,, | Performed by: RADIOLOGY

## 2023-04-06 PROCEDURE — 1160F PR REVIEW ALL MEDS BY PRESCRIBER/CLIN PHARMACIST DOCUMENTED: ICD-10-PCS | Mod: CPTII,S$GLB,, | Performed by: PHYSICIAN ASSISTANT

## 2023-04-06 PROCEDURE — 3075F PR MOST RECENT SYSTOLIC BLOOD PRESS GE 130-139MM HG: ICD-10-PCS | Mod: CPTII,S$GLB,, | Performed by: PHYSICIAN ASSISTANT

## 2023-04-06 PROCEDURE — 3008F BODY MASS INDEX DOCD: CPT | Mod: CPTII,S$GLB,, | Performed by: PHYSICIAN ASSISTANT

## 2023-04-06 PROCEDURE — 3075F SYST BP GE 130 - 139MM HG: CPT | Mod: CPTII,S$GLB,, | Performed by: PHYSICIAN ASSISTANT

## 2023-04-06 PROCEDURE — 3008F PR BODY MASS INDEX (BMI) DOCUMENTED: ICD-10-PCS | Mod: CPTII,S$GLB,, | Performed by: PHYSICIAN ASSISTANT

## 2023-04-06 NOTE — PROGRESS NOTES
Health  Wellness Visit Note    Name: Cherri Noonan  Clinic Number: [id number]  Physician: Archana Knott MD  Diagnosis: No diagnosis found.  Past Medical History:   Diagnosis Date    Cervical radiculopathy      Visit Number: 35  Precautions: Standard --- Spinal Cord Stimulator       1st PT visit:  08/12/2022  Year of care end date:  08/2023  6 Month test  performed: 02/2023  12 Month test performed: 08/2023  Mind body plan: Plan A-8 Months  Patient level: B    Time In: 9:10 AM  Time Out: 10:30 AM  Total Treatment Time: 80 Minutes    Wellness Vision 2022  Handout on this week's wellness topic Postural Awareness was provided along with a discussion on what it means, the benefits, and suggestions for practice.  Reviewed last week's topic of Fruits and Vegetables.     Subjective:   Patient reports she has some back pain but nothing out the normal. She has a massage scheduled for Monday and plans to see the neuro-surgeon soon to discuss her back pain. Patient does her stretches and goes walking daily. She loves to dance and plans to dance quite a bit this weekend. She sometimes experiences some knee pain that she monitors and ices when need be. Today her pain is mostly in the back of her knee. Patient and HC noticed she hyperextends at her knees.     Objective:   Cherri completed therapeutic stretches (EIL, LINDA) and the following MedX exercise machines: core lumbar, torso rotation l/r, leg extension, leg curl, upright row, chest press, biceps curl, triceps extension, leg press    See exercise log in patient folder for rate of exertion and repetitions completed.       Fitness Machine Education Key:  E=education on equipment initiated and further follow up and education needed  I=independent with  and exercise.  The patient:  Adjusts machines to his/her settings  Uses equipment levers, pins, weights safely  Maintains safe and correct posture while exercising  Moves through exercise with correct pace and  control  Gets on and off equipment safely      Lumbar/Cervical Ext. E Torso Rotation E Leg Press E   Leg Extension E Seated Leg Curl E Chest Press E   Seated Row E Hip ADD E Hip ABD E   Triceps Extension E Bicep Curl E Other: X       Assessment:   Patient tolerated Patient tolerated MedX Core Lumbar Strength and all other peripheral exercises without an increase in symptoms. Patient warmed up on recumbent bike for 5 minutes, stretched, and iced low back and knees for 10 minutes after the workout.     Plan:  Continue with established plan of care towards wellness goals.     Health  : Jen Lucas  4/6/2023

## 2023-04-06 NOTE — PROGRESS NOTES
[facility]  Neurosurgery    SUBJECTIVE:     History of Present Illness:  Cherri Noonan is a 57 y.o. female with below listed PMH who presents with chronic low back pain.  She has a long h/o spinal interventions including a C5-7 ACDF in 2015 by Dr. Peña, cervical spinal cord stimulator, multiple cervical FANNY and trigger point injections, and physical therapy.  She feels that her symptoms are finally settled in the neck and upper extremities since obtaining the spinal cord stimulator.  She has regular meetings with the [employer] rep to make adjustments to her programming.  She denies any persistent numbness, weakness, fine motor dysfunction, and gait disturbance.    Her primary concern today is of chronic b/l low back pain.  Her pain radiates into her b/l buttocks.  Since her most recent FANNY, she is had new radiation down her right leg, both anterior and posterior, generally stopping at the ankle.  Very occasionally she has radiation into her b/l feet.  She has tried physical therapy for her low back as well, and found it to be somewhat helpful.  She is currently attending the healthy back program and feels that her back is much stronger, but pain persists.  It is 8/10 today in clinic.  She denies saddle anesthesia and b/b dysfunction.  She further denies numbness and weakness in her legs.  She has no significant limitations with ambulation.  She can walk approximately 7 miles without stopping.        (Not in a hospital admission)      Review of patient's allergies indicates:   Allergen Reactions    Shingrix (pf) [varicella-zoster [id number] (pf)]      Got all side effects listed       Past Medical History:   Diagnosis Date    Cervical radiculopathy      Past Surgical History:   Procedure Laterality Date    CERVICAL FUSION  08/2015    C5-C7    HYSTERECTOMY      INJECTION OF ANESTHETIC AGENT AROUND NERVE Bilateral 10/31/2022    Procedure: BLOCK, NERVE, BILATERAL L3-L4-L5 MEDIAL BRANCH;  Surgeon: Archana Knott MD;   Location: [facility]IN [facility]GT;  Service: Pain Management;  Laterality: Bilateral;    INJECTION OF ANESTHETIC AGENT AROUND NERVE Bilateral 11/17/2022    Procedure: BLOCK, NERVE BILATERAL L3,L4,L5 MEDIAL BRANCH;  Surgeon: Archana Knott MD;  Location: [facility] PAIN [facility]GT;  Service: Pain Management;  Laterality: Bilateral;    RADIOFREQUENCY ABLATION Left 12/8/2022    Procedure: RADIOFREQUENCY ABLATION, LEFT L3,L4,L5 MEDIAL BRANCH ONE OF TWO;  Surgeon: Archana Knott MD;  Location: [facility]IN [facility];  Service: Pain Management;  Laterality: Left;    RADIOFREQUENCY ABLATION Right 12/22/2022    Procedure: RADIOFREQUENCY ABLATION, RIGHT L3,L4,L5 MEDIAL BRANCH TWO OF TWO;  Surgeon: Archana Knott MD;  Location: [facility]IN [facility];  Service: Pain Management;  Laterality: Right;    SPINAL CORD STIMULATOR IMPLANT  10/20/2021    TRANSFORAMINAL EPIDURAL INJECTION OF STEROID Bilateral 3/13/2023    Procedure: INJECTION, STEROID, EPIDURAL, TRANSFORAMINAL APPROACH BILATERAL L5/S1;  Surgeon: Archana Knott MD;  Location: [facility] PAIN [facility]GT;  Service: Pain Management;  Laterality: Bilateral;    TUBAL LIGATION       Family History   Problem Relation Age of Onset    Hyperlipidemia Mother     Diabetes Mother     Heart disease Sister     Juvenile idiopathic arthritis Sister     Aneurysm Brother     COPD Brother      Social History     Tobacco Use    Smoking status: Never    Smokeless tobacco: Never   Substance Use Topics    Alcohol use: Never    Drug use: Never        Review of Systems:  As noted in HPI    OBJECTIVE:     Vital Signs (Most Recent):  Pulse: 77 (04/06/23 1311)  BP: 135/69 (04/06/23 1311)  SpO2: 99 % (04/06/23 1311)    Physical Exam:  General: well developed, well nourished, no distress  Head: normocephalic, atraumatic  Neurologic: Alert and oriented. Thought content appropriate  GCS: Motor: 6/Verbal: 5/Eyes: 4 GCS Total: 15  Language: No aphasia  Speech: No dysarthria  Cranial nerves: face symmetric, tongue midline, CN II-XII grossly intact.   Eyes: pupils  equal, round, reactive to light with accommodation, EOMI.   Pulmonary: normal respirations, not labored, no accessory muscles used  Sensory: intact to light touch throughout  Motor Strength: Moves all extremities spontaneously with good tone.  Full strength upper and lower extremities. No abnormal movements seen.     Strength  Deltoids Triceps Biceps Wrist Extension Wrist Flexion Hand    Upper: R 5/5 5/5 5/5 5/5 5/5 5/5    L 5/5 5/5 5/5 5/5 5/5 5/5     Iliopsoas Quadriceps Knee  Flexion Tibialis  anterior Gastro- cnemius EHL   Lower: R 5/5 5/5 5/5 5/5 5/5 5/5    L 5/5 5/5 5/5 5/5 5/5 5/5     DTR's - 1 + and symmetric patellar  Contreras: absent  Clonus: absent  Babinski: absent  Skin: warm, dry and intact, no rashes  Gait: normal    Lumbar ROM: full   SI Joint tenderness:  Mildly positive bilaterally   Pain on Hip ROM: Negative  Midline Bony Tenderness:  Positive throughout cervical, thoracic, and lumbar spine  Paraspinous muscle tenderness:  Positive throughout cervical, thoracic and lumbar spine    Diagnostic Results:  I have personally reviewed imaging and agree with the findings.     MRI lumbar spine 01/30/2023   1. Lumbar degenerative changes most pronounced at L4-L5 noting grade 1 anterolisthesis, bilateral ligamentum flavum buckling and bilateral facet arthropathy contributing to severe spinal canal and lateral recess stenosis and mild-to-moderate neural foraminal narrowing.    ASSESSMENT/PLAN:     Cherri Noonan is a 57 y.o. female who presents with chronic low back pain with newer onset of radiation into her right > left leg on an intermittent basis.  Radicular component is in a nondermatomal distribution.  She has no symptoms of claudication.  MRI shows grade 1 anterolisthesis with severe lumbar stenosis at L4-5.  She continues to participate in the healthy back program with some relief and has found FANNY unhelpful.  She wishes to proceed with surgery.  We reviewed the TLIF procedure in some detail  today and discussed postop expectations.  She would like to return to clinic to discuss further with Dr. Santiago.  She is in the middle of a 30 day blood pressure trial with [facility] and would like to postpone surgery scheduling until after this trial was complete.    Upright lumbar x-rays taken today show grade 1 anterolisthesis at L4-5 with 1-2mm of motion between flexion and extension views.     Please feel free to call with any further questions        Rahel Woods PA-C  [facility]  Department of Neurosurgery  [phone]    Disclaimer: This note was dictated by speech recognition. Minor errors in transcription may be present.  Please call with any questions.

## 2023-04-13 ENCOUNTER — DOCUMENTATION ONLY (OUTPATIENT)
Dept: REHABILITATION | Facility: HOSPITAL | Age: 58
End: 2023-04-13
Payer: COMMERCIAL

## 2023-04-13 NOTE — PROGRESS NOTES
Health  Wellness Visit Note    Name: Cherri Noonan  Clinic Number: [id number]  Physician: Archana Knott MD  Diagnosis: No diagnosis found.  Past Medical History:   Diagnosis Date    Cervical radiculopathy      Visit Number: 36  Precautions: Standard --- Spinal Cord Stimulator       1st PT visit:  08/12/2022  Year of care end date:  08/2023  6 Month test  performed: 02/2023  12 Month test performed: 08/2023  Mind body plan: Plan A-8 Months  Patient level: B    Time In: 9:10 AM  Time Out: 10:24 AM  Total Treatment Time: 74 Minutes    Wellness Vision 2022  Handout on this week's wellness topic Breathing Awareness was provided along with a discussion on what it means, the benefits, and suggestions for practice.  Reviewed last week's topic of Postural Awareness.     Subjective:   Patient reports she has some lower back pain today. She may be having surgery for her back soon but hasn't decided on if she should do it or not. If she decides to have surgery she will be out for 6 weeks. Patient and HC talked through what will happen with Wellness while she is away. Over the last week, she walked 2 miles daily. She does her stretches almost everyday and ices her back when need be at home.     Objective:   Cherri completed therapeutic stretches (EIL, LINDA) and the following MedX exercise machines: core lumbar, torso rotation l/r, leg extension, leg curl, upright row, chest press, biceps curl, triceps extension, leg press    See exercise log in patient folder for rate of exertion and repetitions completed.       Fitness Machine Education Key:  E=education on equipment initiated and further follow up and education needed  I=independent with  and exercise.  The patient:  Adjusts machines to his/her settings  Uses equipment levers, pins, weights safely  Maintains safe and correct posture while exercising  Moves through exercise with correct pace and control  Gets on and off equipment safely      Lumbar/Cervical  Ext. E Torso Rotation E Leg Press E   Leg Extension E Seated Leg Curl E Chest Press E   Seated Row E Hip ADD E Hip ABD E   Triceps Extension E Bicep Curl E Other: X       Assessment:   Patient tolerated Patient tolerated MedX Core Lumbar Strength and all other peripheral exercises without an increase in symptoms. Patient warmed up on recumbent bike for 5 minutes, stretched, and iced low back and knees for 10 minutes after the workout.     Plan:  Continue with established plan of care towards wellness goals.     Health  : Jen Lucas  4/13/2023

## 2023-04-20 ENCOUNTER — DOCUMENTATION ONLY (OUTPATIENT)
Dept: REHABILITATION | Facility: HOSPITAL | Age: 58
End: 2023-04-20
Payer: COMMERCIAL

## 2023-04-20 NOTE — PROGRESS NOTES
Health  Wellness Visit Note    Name: Cherri Noonan  Clinic Number: [id number]  Physician: Archana Knott MD  Diagnosis: No diagnosis found.  Past Medical History:   Diagnosis Date    Cervical radiculopathy      Visit Number: 37  Precautions: Standard --- Spinal Cord Stimulator       1st PT visit:  08/12/2022  Year of care end date:  08/2023  6 Month test  performed: 02/2023  12 Month test performed: 08/2023  Mind body plan: Plan A-8 Months  Patient level: B    Time In: 9:08 AM  Time Out: 10:18 AM  Total Treatment Time: 70 Minutes    Wellness Vision 2022  Handout on this week's wellness topic Air Quality was provided along with a discussion on what it means, the benefits, and suggestions for practice.  Reviewed last week's topic of Breathing Awareness.     Subjective:   Patient reports she had some back pain within the last week, but it's nothing new. Her pain is very moderate and she manages her pain the best she can. Patient does her stretches daily and goes for 2 mile walks at least 5 times a week. Over this past week she also cut grass and did a little dancing. She occasionally has knee pain but she monitors it and adjusts her schedule where need be. Patient ices her back and knees at home when need be.     Objective:   Cherri completed therapeutic stretches (EIL, LINDA) and the following MedX exercise machines: core lumbar, torso rotation l/r, leg extension, leg curl, upright row, chest press, biceps curl, triceps extension, leg press    See exercise log in patient folder for rate of exertion and repetitions completed.       Fitness Machine Education Key:  E=education on equipment initiated and further follow up and education needed  I=independent with  and exercise.  The patient:  Adjusts machines to his/her settings  Uses equipment levers, pins, weights safely  Maintains safe and correct posture while exercising  Moves through exercise with correct pace and control  Gets on and off equipment  safely      Lumbar/Cervical Ext. E Torso Rotation E Leg Press E   Leg Extension E Seated Leg Curl E Chest Press E   Seated Row E Hip ADD E Hip ABD E   Triceps Extension E Bicep Curl E Other: X       Assessment:   Patient tolerated Patient tolerated MedX Core Lumbar Strength and all other peripheral exercises without an increase in symptoms. Patient skipped warm up, stretched, and iced low back and knees for 5 minutes after the workout.     Plan:  Continue with established plan of care towards wellness goals.     Health  : Jen Lucas  4/20/2023

## 2023-04-27 ENCOUNTER — DOCUMENTATION ONLY (OUTPATIENT)
Dept: REHABILITATION | Facility: HOSPITAL | Age: 58
End: 2023-04-27
Payer: COMMERCIAL

## 2023-04-27 NOTE — PROGRESS NOTES
Health  Wellness Visit Note    Name: Cherri Noonan  Clinic Number: [id number]  Physician: Archana Knott MD  Diagnosis: No diagnosis found.  Past Medical History:   Diagnosis Date    Cervical radiculopathy      Visit Number: 38  Precautions: Standard --- Spinal Cord Stimulator       1st PT visit:  08/12/2022  Year of care end date:  08/2023  6 Month test  performed: 02/2023  12 Month test performed: 08/2023  Mind body plan: Plan A-8 Months  Patient level: B    Time In: 9:28 AM  Time Out: 10:26 AM  Total Treatment Time: 58 Minutes    Wellness Vision 2022  Handout on this week's wellness topic Breathing Exercises was provided along with a discussion on what it means, the benefits, and suggestions for practice.  Reviewed last week's topic of Air Quality.     Subjective:   Patient reports she was not sore after her session last week; which was new for her. Patient does her stretches daily and goes for 2 mile walks at least 5 times a week. She occasionally has knee pain but she monitors it and adjusts her schedule where need be. Patient ices her back and knees at home when need be.     She increased weights and lowered reps on most machines.  HC will monitor pt's soreness.      Objective:   Cherri completed therapeutic stretches (EIL, LINDA) and the following MedX exercise machines: core lumbar, torso rotation l/r, leg extension, leg curl, upright row, chest press, biceps curl, triceps extension, leg press    See exercise log in patient folder for rate of exertion and repetitions completed.       Fitness Machine Education Key:  E=education on equipment initiated and further follow up and education needed  I=independent with  and exercise.  The patient:  Adjusts machines to his/her settings  Uses equipment levers, pins, weights safely  Maintains safe and correct posture while exercising  Moves through exercise with correct pace and control  Gets on and off equipment safely      Lumbar/Cervical Ext. E  Torso Rotation E Leg Press E   Leg Extension E Seated Leg Curl E Chest Press E   Seated Row E Hip ADD E Hip ABD E   Triceps Extension E Bicep Curl E Other: X       Assessment:   Patient tolerated Patient tolerated MedX Core Lumbar Strength and all other peripheral exercises without an increase in symptoms. Patient warm up on the elliptical, then started on lumbar strength machine, and iced low back and knees for 5 minutes after the workout.     Plan:  Continue with established plan of care towards wellness goals.     Health  : Supriya Mcdermott  4/27/2023

## 2023-05-03 ENCOUNTER — OFFICE VISIT (OUTPATIENT)
Dept: NEUROSURGERY | Facility: CLINIC | Age: 58
End: 2023-05-03
Payer: COMMERCIAL

## 2023-05-03 ENCOUNTER — OFFICE VISIT (OUTPATIENT)
Dept: OBSTETRICS AND GYNECOLOGY | Facility: CLINIC | Age: 58
End: 2023-05-03
Payer: COMMERCIAL

## 2023-05-03 VITALS
WEIGHT: 146.19 LBS | SYSTOLIC BLOOD PRESSURE: 119 MMHG | HEART RATE: 73 BPM | OXYGEN SATURATION: 100 % | BODY MASS INDEX: 25.09 KG/M2 | DIASTOLIC BLOOD PRESSURE: 77 MMHG

## 2023-05-03 VITALS
DIASTOLIC BLOOD PRESSURE: 62 MMHG | HEIGHT: 64 IN | WEIGHT: 146.06 LBS | SYSTOLIC BLOOD PRESSURE: 102 MMHG | BODY MASS INDEX: 24.94 KG/M2

## 2023-05-03 DIAGNOSIS — Z01.419 WELL WOMAN EXAM WITH ROUTINE GYNECOLOGICAL EXAM: Primary | ICD-10-CM

## 2023-05-03 DIAGNOSIS — M43.16 SPONDYLOLISTHESIS AT L4-L5 LEVEL: ICD-10-CM

## 2023-05-03 DIAGNOSIS — M47.819 SPONDYLOSIS WITHOUT MYELOPATHY: Primary | ICD-10-CM

## 2023-05-03 DIAGNOSIS — N95.1 VAGINAL DRYNESS, MENOPAUSAL: ICD-10-CM

## 2023-05-03 PROCEDURE — 1160F RVW MEDS BY RX/DR IN RCRD: CPT | Mod: CPTII,S$GLB,, | Performed by: NEUROLOGICAL SURGERY

## 2023-05-03 PROCEDURE — 3008F PR BODY MASS INDEX (BMI) DOCUMENTED: ICD-10-PCS | Mod: CPTII,S$GLB,, | Performed by: OBSTETRICS & GYNECOLOGY

## 2023-05-03 PROCEDURE — 99396 PR PREVENTIVE VISIT,EST,40-64: ICD-10-PCS | Mod: S$GLB,,, | Performed by: OBSTETRICS & GYNECOLOGY

## 2023-05-03 PROCEDURE — 99214 PR OFFICE/OUTPT VISIT, EST, LEVL IV, 30-39 MIN: ICD-10-PCS | Mod: S$GLB,,, | Performed by: NEUROLOGICAL SURGERY

## 2023-05-03 PROCEDURE — 99396 PREV VISIT EST AGE 40-64: CPT | Mod: S$GLB,,, | Performed by: OBSTETRICS & GYNECOLOGY

## 2023-05-03 PROCEDURE — 1159F PR MEDICATION LIST DOCUMENTED IN MEDICAL RECORD: ICD-10-PCS | Mod: CPTII,S$GLB,, | Performed by: OBSTETRICS & GYNECOLOGY

## 2023-05-03 PROCEDURE — 3008F BODY MASS INDEX DOCD: CPT | Mod: CPTII,S$GLB,, | Performed by: OBSTETRICS & GYNECOLOGY

## 2023-05-03 PROCEDURE — 1160F PR REVIEW ALL MEDS BY PRESCRIBER/CLIN PHARMACIST DOCUMENTED: ICD-10-PCS | Mod: CPTII,S$GLB,, | Performed by: NEUROLOGICAL SURGERY

## 2023-05-03 PROCEDURE — 1159F MED LIST DOCD IN RCRD: CPT | Mod: CPTII,S$GLB,, | Performed by: OBSTETRICS & GYNECOLOGY

## 2023-05-03 PROCEDURE — 1159F PR MEDICATION LIST DOCUMENTED IN MEDICAL RECORD: ICD-10-PCS | Mod: CPTII,S$GLB,, | Performed by: NEUROLOGICAL SURGERY

## 2023-05-03 PROCEDURE — 99999 PR PBB SHADOW E&M-EST. PATIENT-LVL V: ICD-10-PCS | Mod: PBBFAC,,, | Performed by: NEUROLOGICAL SURGERY

## 2023-05-03 PROCEDURE — 3078F DIAST BP <80 MM HG: CPT | Mod: CPTII,S$GLB,, | Performed by: OBSTETRICS & GYNECOLOGY

## 2023-05-03 PROCEDURE — 99214 OFFICE O/P EST MOD 30 MIN: CPT | Mod: S$GLB,,, | Performed by: NEUROLOGICAL SURGERY

## 2023-05-03 PROCEDURE — 1160F PR REVIEW ALL MEDS BY PRESCRIBER/CLIN PHARMACIST DOCUMENTED: ICD-10-PCS | Mod: CPTII,S$GLB,, | Performed by: OBSTETRICS & GYNECOLOGY

## 2023-05-03 PROCEDURE — 1159F MED LIST DOCD IN RCRD: CPT | Mod: CPTII,S$GLB,, | Performed by: NEUROLOGICAL SURGERY

## 2023-05-03 PROCEDURE — 3008F PR BODY MASS INDEX (BMI) DOCUMENTED: ICD-10-PCS | Mod: CPTII,S$GLB,, | Performed by: NEUROLOGICAL SURGERY

## 2023-05-03 PROCEDURE — 99999 PR PBB SHADOW E&M-EST. PATIENT-LVL V: CPT | Mod: PBBFAC,,, | Performed by: NEUROLOGICAL SURGERY

## 2023-05-03 PROCEDURE — 3078F PR MOST RECENT DIASTOLIC BLOOD PRESSURE < 80 MM HG: ICD-10-PCS | Mod: CPTII,S$GLB,, | Performed by: NEUROLOGICAL SURGERY

## 2023-05-03 PROCEDURE — 3078F PR MOST RECENT DIASTOLIC BLOOD PRESSURE < 80 MM HG: ICD-10-PCS | Mod: CPTII,S$GLB,, | Performed by: OBSTETRICS & GYNECOLOGY

## 2023-05-03 PROCEDURE — 3074F PR MOST RECENT SYSTOLIC BLOOD PRESSURE < 130 MM HG: ICD-10-PCS | Mod: CPTII,S$GLB,, | Performed by: NEUROLOGICAL SURGERY

## 2023-05-03 PROCEDURE — 3008F BODY MASS INDEX DOCD: CPT | Mod: CPTII,S$GLB,, | Performed by: NEUROLOGICAL SURGERY

## 2023-05-03 PROCEDURE — 99999 PR PBB SHADOW E&M-EST. PATIENT-LVL III: CPT | Mod: PBBFAC,,, | Performed by: OBSTETRICS & GYNECOLOGY

## 2023-05-03 PROCEDURE — 3074F PR MOST RECENT SYSTOLIC BLOOD PRESSURE < 130 MM HG: ICD-10-PCS | Mod: CPTII,S$GLB,, | Performed by: OBSTETRICS & GYNECOLOGY

## 2023-05-03 PROCEDURE — 3074F SYST BP LT 130 MM HG: CPT | Mod: CPTII,S$GLB,, | Performed by: OBSTETRICS & GYNECOLOGY

## 2023-05-03 PROCEDURE — 99999 PR PBB SHADOW E&M-EST. PATIENT-LVL III: ICD-10-PCS | Mod: PBBFAC,,, | Performed by: OBSTETRICS & GYNECOLOGY

## 2023-05-03 PROCEDURE — 3078F DIAST BP <80 MM HG: CPT | Mod: CPTII,S$GLB,, | Performed by: NEUROLOGICAL SURGERY

## 2023-05-03 PROCEDURE — 1160F RVW MEDS BY RX/DR IN RCRD: CPT | Mod: CPTII,S$GLB,, | Performed by: OBSTETRICS & GYNECOLOGY

## 2023-05-03 PROCEDURE — 3074F SYST BP LT 130 MM HG: CPT | Mod: CPTII,S$GLB,, | Performed by: NEUROLOGICAL SURGERY

## 2023-05-03 RX ORDER — ESTRADIOL 0.1 MG/G
0.5 CREAM VAGINAL
Qty: 42.5 G | Refills: 1 | Status: SHIPPED | OUTPATIENT
Start: 2023-05-04 | End: 2023-10-27

## 2023-05-03 RX ORDER — COVID-19 ANTIGEN TEST
KIT MISCELLANEOUS
COMMUNITY
Start: 2023-04-20 | End: 2024-02-29

## 2023-05-03 RX ORDER — SODIUM CHLORIDE 9 MG/ML
INJECTION, SOLUTION INTRAVENOUS CONTINUOUS
Status: CANCELLED | OUTPATIENT
Start: 2023-05-03

## 2023-05-03 RX ORDER — EVENING PRIMROSE OIL 500 MG
100 CAPSULE ORAL
COMMUNITY
End: 2024-02-29

## 2023-05-03 RX ORDER — MUPIROCIN 20 MG/G
1 OINTMENT TOPICAL
Status: CANCELLED | OUTPATIENT
Start: 2023-05-03

## 2023-05-03 RX ORDER — MUPIROCIN 20 MG/G
OINTMENT TOPICAL
Status: CANCELLED | OUTPATIENT
Start: 2023-05-03

## 2023-05-03 NOTE — PROGRESS NOTES
Subjective:   I, Josefina Dean, attest that this documentation has been prepared under the direction and in the presence of Layo Santiago MD.     Patient ID: Cherri Noonan is a 57 y.o. female     Chief Complaint: Lumbar Spine Pain (L-Spine)      HPI  Ms. Cherri Noonan is a 57 y.o. woman with cervical radiculopathy and spondylolisthesis, referred to me by Rahel Woods PA-C, who presents today to [facility] care. This ia a patient who presented to me with chronic low back pain for the last 9 years. Per CR, she has a long h/o spinal interventions including a C5-7 ACDF in 2015 by Dr. Peña, cervical spinal cord stimulator, multiple cervical FANNY and trigger point injections, and physical therapy.  Pt continues to remain symptomatic despite conservative treatment. She reports her low back pain radiates down the LLE. Denies b/b dysfunction and SA.    Her primary concern today is of chronic b/l low back pain.  Her pain radiates into her b/l buttocks.  Since her most recent FANNY, she is had new radiation down her right leg, both anterior and posterior, generally stopping at the ankle.  Very occasionally she has radiation into her b/l feet.  She has tried physical therapy for her low back as well, and found it to be somewhat helpful.  She is currently attending the healthy back program and feels that her back is much stronger, but pain persists.  It is 8/10 today in clinic.  She denies saddle anesthesia and b/b dysfunction.  She further denies numbness and weakness in her legs.  She has no significant limitations with ambulation.  She can walk approximately 7 miles without stopping.       Review of Systems   Constitutional:  Negative for activity change, appetite change, fatigue, fever and unexpected weight change.   HENT:  Negative for facial swelling.    Eyes: Negative.    Respiratory: Negative.     Cardiovascular: Negative.    Gastrointestinal:  Negative for diarrhea, nausea and vomiting.   Endocrine: Negative.     Genitourinary: Negative.    Musculoskeletal:  Positive for back pain and myalgias. Negative for joint swelling and neck pain.   Neurological:  Negative for dizziness, seizures, weakness, numbness and headaches.   Psychiatric/Behavioral: Negative.        Past Medical History:   Diagnosis Date    Cervical radiculopathy        Objective:      Vitals:    05/03/23 0955   BP: 119/77   Pulse: 73      Physical Exam  Constitutional:       General: She is not in acute distress.     Appearance: Normal appearance.   HENT:      Head: Normocephalic and atraumatic.   Pulmonary:      Effort: Pulmonary effort is normal.   Musculoskeletal:      Cervical back: Neck supple.   Neurological:      General: No focal deficit present.      Mental Status: She is alert and oriented to person, place, and time.      GCS: GCS eye subscore is 4. GCS verbal subscore is 5. GCS motor subscore is 6.      Cranial Nerves: No cranial nerve deficit.      Sensory: Sensation is intact.      Motor: Motor function is intact.      Coordination: Coordination is intact.      Gait: Gait is intact.        IMAGING:  MRI Lumbar Spine WO Contrast (1/30/2023):  1. Lumbar degenerative changes most pronounced at L4-L5 noting grade 1 anterolisthesis, bilateral ligamentum flavum buckling and bilateral facet arthropathy contributing to severe spinal canal and lateral recess stenosis and mild-to-moderate neural foraminal narrowing.    I have personally reviewed the images with the pt.      I, Dr. Layo Santiago, personally performed the services described in this documentation. All medical record entries made by the scribe, Josefina Dean, were at my direction and in my presence.  I have reviewed the chart and agree that the record reflects my personal performance and is accurate and complete. Layo Santiago MD. 05/03/2023    Assessment:     Lumbar spondylolisthesis.  Lumbar spondylosis.     Plan:   I have personally reviewed the MRI lumbar spine with the pt which shows:  1.  Lumbar degenerative changes most pronounced at L4-L5 noting grade 1 anterolisthesis, bilateral ligamentum flavum buckling and bilateral facet arthropathy contributing to severe spinal canal and lateral recess stenosis and mild-to-moderate neural foraminal narrowing.    I recommend MIS Left L4-5 TLIF. I have discussed the risks/benefits, indications, and alternatives for the proposed procedure in detail. I have answered all of their questions and patient wish to proceed with surgery. We will schedule patient.

## 2023-05-03 NOTE — PATIENT INSTRUCTIONS
I have personally reviewed the MRI lumbar spine with the pt which shows:  1. Lumbar degenerative changes most pronounced at L4-L5 noting grade 1 anterolisthesis, bilateral ligamentum flavum buckling and bilateral facet arthropathy contributing to severe spinal canal and lateral recess stenosis and mild-to-moderate neural foraminal narrowing.    I recommend MIS Left L4-5 TLIF. I have discussed the risks/benefits, indications, and alternatives for the proposed procedure in detail. I have answered all of their questions and patient wish to proceed with surgery. We will schedule patient.

## 2023-05-03 NOTE — PROGRESS NOTES
"[facility]  Ambulatory Clinic  Obstetrics & Gynecology    Visit Date:  5/3/2023    Chief Complaint:  Annual GYN exam, vaginal dryness    History of Present Illness:      Cherri Noonan is a 57 y.o.  here for a gynecologic exam with c/o vaginal dryness.  Pt reports hysterectomy with ovarian conservation in 30's secondary to abnormal uterine bleeding.    Pt reports an uneventful transition into menopause and is not on hormone replacement therapy.    Last mammo ~2022 was benign.  Pt is a non-smoker.  Pt denies vaginal bleeding, vaginal discharge, dyspareunia, pelvic pain, bloating, early satiety, unintentional weight loss, breast mass/skin changes, incontinence, GI or urinary complaints.    Otherwise, the pt is in her usual state of health.    Past History:  Gynecologic history as noted above.    Review of Systems:      GENERAL:  No fever, fatigue, excessive weight gain or loss  HEENT:  No headaches, hearing changes, visual disturbance  RESPIRATORY:  No cough, shortness of breath  CARDIOVASCULAR:  No chest pain, heart palpitations, leg swelling  BREAST:  No lump, pain, nipple discharge, skin changes  GASTROINTESTINAL:  No nausea, vomiting, constipation, diarrhea, abd pain, rectal bleeding   GENITOURINARY:  See HPI  ENDOCRINE:  No heat or cold intolerance  HEMATOLOGIC:  No easy bruisability or bleeding   LYMPHATICS:  No enlarged nodes  MUSCULOSKELETAL:  No acute joint pain or swelling  SKIN:  No rash, lesions, jaundice  NEUROLOGIC:  No dizziness, weakness, syncope  PSYCHIATRIC:  No significant mood changes, homicidal/suicidal ideations, abuse    Physical Exam:     /62   Ht 5' 4" (1.626 m)   Wt 66.3 kg (146 lb 0.9 oz)   BMI 25.07 kg/m²   Pulse 60's, Resp rate 14     GENERAL:  No acute distress, well-nourished  HEENT:  Atraumatic, anicteric, moist mucus membranes. Neck supple w/o masses.  BREAST:  Symmetric, nontender, no obvious masses, adenopathy, skin changes or nipple " discharge.  LUNGS:  Clear normal respiratory effort  HEART:  Regular rate and rhythm, no murmurs, gallops, or rubs  ABDOMEN:  Soft, non-tender, non-distended, normoactive bowel sounds, no obvious organomegaly  EXT:  Symmetric w/o cramping, claudication, or edema. +2 distal pulses.  SKIN:  No rashes or bruising  PSYCH:  Mood and affect appropriate  NEURO:  Grossly intact bilaterally    GENITOURINARY:    VULVAR:  Female external genitalia w/o any obvious lesions. Normal urethral meatus. No gross lymphadenopathy.   VAGINA:  Mild, age appropriate vulvovaginal atrophy. Adequate support. No significant cystocele or rectocele. No obvious lesion. No discharge.  CERVIX:   Surgically absent. No cuff lesions or tenderness.     UTERUS:  Surgically absent.   ADNEXA:  No obvious masses, non-tender   RECTAL:  Deferred. No obvious external lesions    Chaperone present for exam.    Assessment:     57 y.o.  with hysterectomy with ovarian conservation:    Well woman gynecologic exam  Vaginal dryness/atrophy    Plan:    A gynecologic health assessment was performed with age appropriate counseling.    Cervical cancer screening - pap not clinically indicated due to h/o hysterectomy for benign indications.    Screening mammogram up to date.    D/w pt vaginal atrophy.  Continue estrace cream.  Risks, benefits, and alternatives to estrace cream reviewed.       Encourage healthy lifestyle modifications, monthly self breast exams, colonoscopy and f/u with PCP for health maintenance.    Return 1 year for gynecologic exam, or sooner as needed.      All questions answered, pt voiced understanding.        Cali Sarmiento MD

## 2023-05-04 ENCOUNTER — DOCUMENTATION ONLY (OUTPATIENT)
Dept: REHABILITATION | Facility: HOSPITAL | Age: 58
End: 2023-05-04
Payer: COMMERCIAL

## 2023-05-04 NOTE — PROGRESS NOTES
Health  Wellness Visit Note    Name: Cherri Noonan  Clinic Number: [id number]  Physician: Archana Knott MD  Diagnosis: No diagnosis found.  Past Medical History:   Diagnosis Date    Cervical radiculopathy      Visit Number: 39  Precautions: Standard --- Spinal Cord Stimulator       1st PT visit:  08/12/2022  Year of care end date:  08/2023  6 Month test  performed: 02/2023  12 Month test performed: 08/2023  Mind body plan: Plan A-8 Months  Patient level: B    Time In: 9:00 AM  Time Out: 9:58 AM  Total Treatment Time: 58 Minutes    Wellness [employer] 2022  Handout on this week's wellness topic of Creative Brain was provided along with a discussion on what it means, the benefits, and suggestions for practice.  Reviewed last week's topic of Breathing Exercises.     Subjective:   Patient reports after a dentist appt. HC showed her some standing stretches: open books against wall, alternate knee to chest and arch back. Patient does her stretches daily and goes for 2 mile walks at least 5 times a week. She occasionally has knee pain but she monitors it and adjusts her schedule where need be. Patient ices her back and knees at home when need be.     She increased weights and lowered reps on most machines.  HC will monitor pt's soreness.      Objective:   Cherri completed therapeutic stretches (EIL, LINDA) and the following MedX exercise machines: core lumbar, torso rotation l/r, leg extension, leg curl, upright row, chest press, biceps curl, triceps extension, leg press    See exercise log in patient folder for rate of exertion and repetitions completed.       Fitness Machine Education Key:  E=education on equipment initiated and further follow up and education needed  I=independent with  and exercise.  The patient:  Adjusts machines to his/her settings  Uses equipment levers, pins, weights safely  Maintains safe and correct posture while exercising  Moves through exercise with correct pace and control  Gets  on and off equipment safely      Lumbar/Cervical Ext. E Torso Rotation E Leg Press E   Leg Extension E Seated Leg Curl E Chest Press E   Seated Row E Hip ADD E Hip ABD E   Triceps Extension E Bicep Curl E Other: X       Assessment:   Patient tolerated Patient tolerated MedX Core Lumbar Strength and all other peripheral exercises without an increase in symptoms. Patient warm up on the elliptical, then started on lumbar strength machine, and iced low back and knees for 5 minutes after the workout.     Plan:  Continue with established plan of care towards wellness goals.     Health  : Supriya Mcdermott  5/4/2023

## 2023-05-08 ENCOUNTER — PATIENT MESSAGE (OUTPATIENT)
Dept: INTERNAL MEDICINE | Facility: CLINIC | Age: 58
End: 2023-05-08
Payer: COMMERCIAL

## 2023-05-08 RX ORDER — FLUTICASONE PROPIONATE 50 MCG
1 SPRAY, SUSPENSION (ML) NASAL DAILY
Qty: 16 G | Refills: 3 | Status: SHIPPED | OUTPATIENT
Start: 2023-05-08 | End: 2023-09-04

## 2023-05-09 ENCOUNTER — PATIENT MESSAGE (OUTPATIENT)
Dept: RESEARCH | Facility: HOSPITAL | Age: 58
End: 2023-05-09
Payer: COMMERCIAL

## 2023-05-11 ENCOUNTER — DOCUMENTATION ONLY (OUTPATIENT)
Dept: REHABILITATION | Facility: HOSPITAL | Age: 58
End: 2023-05-11
Payer: COMMERCIAL

## 2023-05-11 NOTE — PROGRESS NOTES
Health  Wellness Visit Note    Name: Cherri Noonan  Clinic Number: [id number]  Physician: Archana Knott MD  Diagnosis: No diagnosis found.  Past Medical History:   Diagnosis Date    Cervical radiculopathy      Visit Number: 40  Precautions: Standard --- Spinal Cord Stimulator       1st PT visit:  08/12/2022  Year of care end date:  08/2023  6 Month test  performed: 02/2023  12 Month test performed: 08/2023  Mind body plan: Plan A-8 Months  Patient level: B    Time In: 9:20 AM (20 mins late)  Time Out: 10:00 AM  Total Treatment Time: 40 Minutes    Wellness Vision 2022  Handout on this week's wellness topic of Learning was provided along with a discussion on what it means, the benefits, and suggestions for practice.  Reviewed last week's topic of Creative Brain     Subjective:   Patient reports after doing yardwork.  Skipped the warm-up, arrived 20 mins late. Patient does her stretches daily and goes for 2 mile walks at least 5 times a week. She occasionally has knee pain but she monitors it and adjusts her schedule where need be. Patient ices her back and knees at home when need be.       Objective:   Cherri completed therapeutic stretches (EIL, LINDA) and the following MedX exercise machines: core lumbar, torso rotation l/r, leg extension, leg curl, upright row, chest press, biceps curl, triceps extension, leg press    See exercise log in patient folder for rate of exertion and repetitions completed.       Fitness Machine Education Key:  E=education on equipment initiated and further follow up and education needed  I=independent with  and exercise.  The patient:  Adjusts machines to his/her settings  Uses equipment levers, pins, weights safely  Maintains safe and correct posture while exercising  Moves through exercise with correct pace and control  Gets on and off equipment safely      Lumbar/Cervical Ext. E Torso Rotation E Leg Press E   Leg Extension E Seated Leg Curl E Chest Press E   Seated  Row E Hip ADD E Hip ABD E   Triceps Extension E Bicep Curl E Other: X       Assessment:   Patient tolerated Patient tolerated MedX Core Lumbar Strength and all other peripheral exercises without an increase in symptoms. Patient warm up on the elliptical, then started on lumbar strength machine, and iced low back and knees for 5 minutes after the workout.     Plan:  Continue with established plan of care towards wellness goals.     Health  : Supriya Mcdermott  5/11/2023

## 2023-05-18 ENCOUNTER — DOCUMENTATION ONLY (OUTPATIENT)
Dept: REHABILITATION | Facility: HOSPITAL | Age: 58
End: 2023-05-18
Payer: COMMERCIAL

## 2023-05-18 NOTE — PROGRESS NOTES
Health  Wellness Visit Note    Name: Cherri Noonan  Clinic Number: [id number]  Physician: Archana Knott MD  Diagnosis: No diagnosis found.  Past Medical History:   Diagnosis Date    Cervical radiculopathy      Visit Number: 41  Precautions: Standard --- Spinal Cord Stimulator       1st PT visit:  08/12/2022  Year of care end date:  08/2023  6 Month test  performed: 02/2023  12 Month test performed: 08/2023  Mind body plan: Plan A-8 Months 9/4/23  Patient level: B    Time In: 9:00 AM  Time Out: 10:00 AM  Total Treatment Time: 60 Minutes    Wellness [employer] 2022  Handout on this week's wellness topic of Positive Thinking was provided along with a discussion on what it means, the benefits, and suggestions for practice.  Reviewed last week's topic of Learning.     Subjective:   Patient reports feeling stronger and had some weight loss since attending . Patient does her stretches daily and goes for 2 mile walks at least 5 times a week. She occasionally has knee pain but she monitors it and adjusts her schedule where need be. Patient ices her back and knees at home when need be.       Objective:   Cherri completed therapeutic stretches (EIL, LINDA) and the following MedX exercise machines: core lumbar, torso rotation l/r, leg extension, leg curl, upright row, chest press, biceps curl, triceps extension, leg press    See exercise log in patient folder for rate of exertion and repetitions completed.       Fitness Machine Education Key:  E=education on equipment initiated and further follow up and education needed  I=independent with  and exercise.  The patient:  Adjusts machines to his/her settings  Uses equipment levers, pins, weights safely  Maintains safe and correct posture while exercising  Moves through exercise with correct pace and control  Gets on and off equipment safely      Lumbar/Cervical Ext. E Torso Rotation E Leg Press E   Leg Extension E Seated Leg Curl E Chest Press E   Seated Row E Hip  ADD E Hip ABD E   Triceps Extension E Bicep Curl E Other: X       Assessment:   Patient tolerated Patient tolerated MedX Core Lumbar Strength and all other peripheral exercises without an increase in symptoms. Patient warm up on the elliptical, then started on lumbar strength machine, and iced low back and knees for 5 minutes after the workout.     Plan:  Continue with established plan of care towards wellness goals.     Health  : Supriya Mcdermott  5/18/2023

## 2023-05-24 ENCOUNTER — ANESTHESIA EVENT (OUTPATIENT)
Dept: SURGERY | Facility: HOSPITAL | Age: 58
End: 2023-05-24
Payer: COMMERCIAL

## 2023-05-25 ENCOUNTER — DOCUMENTATION ONLY (OUTPATIENT)
Dept: REHABILITATION | Facility: HOSPITAL | Age: 58
End: 2023-05-25
Payer: COMMERCIAL

## 2023-05-25 NOTE — PROGRESS NOTES
Health  Wellness Visit Note    Name: Cherri Noonan  Clinic Number: [id number]  Physician: Archana Knott MD  Diagnosis: No diagnosis found.  Past Medical History:   Diagnosis Date    Cervical radiculopathy      Visit Number: 42  Precautions: Standard --- Spinal Cord Stimulator       1st PT visit:  08/12/2022  Year of care end date:  08/2023  6 Month test  performed: 5/29/23 scheduled  12 Month test performed: 08/2023  Mind body plan: Plan A-8 Months 9/4/23  Patient level: B    Time In: 10:08 AM  Time Out: 11:04 AM  Total Treatment Time: 56 Minutes    Wellness [employer] 2022  Handout on this week's wellness topic of Memory was provided along with a discussion on what it means, the benefits, and suggestions for practice.  Reviewed last week's topic of Positive Thinking.     Subjective:   Patient reports after walking 2.5 miles and doing yard work.  Skipped the warm-up but stretched. Patient does her stretches daily and goes for 2 mile walks at least 5 times a week. She occasionally has knee pain but she monitors it and adjusts her schedule where need be. Patient ices her back and knees at home when need be.         Objective:   Cherri completed therapeutic stretches (EIL, LINDA) and the following MedX exercise machines: core lumbar, torso rotation l/r, leg extension, leg curl, upright row, chest press, biceps curl, triceps extension, leg press    See exercise log in patient folder for rate of exertion and repetitions completed.       Fitness Machine Education Key:  E=education on equipment initiated and further follow up and education needed  I=independent with  and exercise.  The patient:  Adjusts machines to his/her settings  Uses equipment levers, pins, weights safely  Maintains safe and correct posture while exercising  Moves through exercise with correct pace and control  Gets on and off equipment safely      Lumbar/Cervical Ext. I Torso Rotation I Leg Press E   Leg Extension I Seated Leg Curl E  Chest Press I   Seated Row I Hip ADD E Hip ABD I   Triceps Extension I Bicep Curl I Other: X       Assessment:   Patient tolerated Patient tolerated MedX Core Lumbar Strength and all other peripheral exercises without an increase in symptoms. Patient warm up on the elliptical, then started on lumbar strength machine, and iced low back and knees for 5 minutes after the workout.     Plan:  Continue with established plan of care towards wellness goals.     Health  : Supriya Mcdermott  5/25/2023

## 2023-06-01 ENCOUNTER — DOCUMENTATION ONLY (OUTPATIENT)
Dept: REHABILITATION | Facility: HOSPITAL | Age: 58
End: 2023-06-01
Payer: COMMERCIAL

## 2023-06-01 NOTE — PROGRESS NOTES
Health  Wellness Visit Note    Name: Cherri Noonan  Clinic Number: [id number]  Physician: Archana Knott MD  Diagnosis: No diagnosis found.  Past Medical History:   Diagnosis Date    Cervical radiculopathy      Visit Number: 43  Precautions: Standard --- Spinal Cord Stimulator       1st PT visit:  08/12/2022  Year of care end date:  08/2023  6 Month test  performed: 5/29/23 scheduled  12 Month test performed: 08/2023  Mind body plan: Plan A-8 Months 9/4/23  Patient level: B    Time In: 9:14 AM (appt at 9:00)  Time Out: 10:04 AM  Total Treatment Time: 50 Minutes    Wellness Vision 2022  Handout on this week's wellness topic of Mindfulness was provided along with a discussion on what it means, the benefits, and suggestions for practice.  Reviewed last week's topic of Memory.     Subjective:   Patient reports after walking 1.5 miles.  She icompleting the 6 month test on Lumbar MedX today.  Warmed up on elliptical, stretched on table, and completed some exercises. Patient and  believe the test was not completed correctly because of the results not matching her strength gain.  Will test again 12 month.    Patient stretches daily and goes for 2 mile walks at least 5 times a week. She occasionally has knee pain but she monitors it and adjusts her schedule where need be. Patient ices her back and knees at home when need be.         Objective:   Cherri completed therapeutic stretches (EIL, LINDA) and the following MedX exercise machines: core lumbar, torso rotation l/r, leg extension, leg curl, upright row, chest press, biceps curl, triceps extension, leg press    See exercise log in patient folder for rate of exertion and repetitions completed.       Fitness Machine Education Key:  E=education on equipment initiated and further follow up and education needed  I=independent with  and exercise.  The patient:  Adjusts machines to his/her settings  Uses equipment levers, pins, weights safely  Maintains  safe and correct posture while exercising  Moves through exercise with correct pace and control  Gets on and off equipment safely      Lumbar/Cervical Ext. I Torso Rotation I Leg Press E   Leg Extension I Seated Leg Curl E Chest Press I   Seated Row I Hip ADD E Hip ABD I   Triceps Extension I Bicep Curl I Other: X       Assessment:   Patient tolerated Patient tolerated MedX Core Lumbar Strength and all other peripheral exercises without an increase in symptoms. Patient warm up on the elliptical, then started on lumbar strength machine, and iced low back and knees for 5 minutes after the workout.     Plan:  Continue with established plan of care towards wellness goals.     Health  : Supriya Mcdermott  6/1/2023

## 2023-06-08 ENCOUNTER — DOCUMENTATION ONLY (OUTPATIENT)
Dept: REHABILITATION | Facility: HOSPITAL | Age: 58
End: 2023-06-08
Payer: COMMERCIAL

## 2023-06-08 NOTE — PROGRESS NOTES
Health  Wellness Visit Note    Name: Cherri Noonan  Clinic Number: [id number]  Physician: Archana Knott MD  Diagnosis: No diagnosis found.  Past Medical History:   Diagnosis Date    Cervical radiculopathy      Visit Number: 44  Precautions: Standard --- Spinal Cord Stimulator       1st PT visit:  08/12/2022  Year of care end date:  08/2023  6 Month test  performed: 5/29/23 completed   12 Month test performed: 08/2023  Mind body plan: Plan A-8 Months 9/4/23  Patient level: B    Time In: 9:08 AM (appt at 9:00)  Time Out: 9:58 AM  Total Treatment Time: 50 Minutes    Wellness Vision 2022  Handout on this week's wellness topic of Guilt vs Shame was provided along with a discussion on what it means, the benefits, and suggestions for practice.  Reviewed last week's topic of Mindfulness.     Subjective:   Patient reports for Wellness after taking the MedX test last visit.  She was sore for a couple days after.  She warmed up before arrival.   Patient and  believe the test was not completed correctly because of the results not matching her strength gain.  Will test again 12 month.    Patient stretches daily and goes for 2 mile walks at least 5 times a week. She occasionally has knee pain but she monitors it and adjusts her schedule where need be. Patient ices her back and knees at home when need be.     Objective:   Cherri completed therapeutic stretches (EIL, LINDA) and the following MedX exercise machines: core lumbar, torso rotation l/r, leg extension, leg curl, upright row, chest press, biceps curl, triceps extension, leg press    See exercise log in patient folder for rate of exertion and repetitions completed.       Fitness Machine Education Key:  E=education on equipment initiated and further follow up and education needed  I=independent with  and exercise.  The patient:  Adjusts machines to his/her settings  Uses equipment levers, pins, weights safely  Maintains safe and correct posture while  exercising  Moves through exercise with correct pace and control  Gets on and off equipment safely      Lumbar/Cervical Ext. I Torso Rotation I Leg Press E   Leg Extension I Seated Leg Curl E Chest Press I   Seated Row I Hip ADD E Hip ABD I   Triceps Extension I Bicep Curl I Other: X       Assessment:   Patient tolerated Patient tolerated MedX Core Lumbar Strength and all other peripheral exercises without an increase in symptoms. Patient warm up on the elliptical, then started on lumbar strength machine, and iced low back and knees for 5 minutes after the workout.     Plan:  Continue with established plan of care towards wellness goals.     Health  : Supriya Mcdermott  6/8/2023

## 2023-06-12 ENCOUNTER — HOSPITAL ENCOUNTER (OUTPATIENT)
Dept: PREADMISSION TESTING | Facility: HOSPITAL | Age: 58
Discharge: HOME | End: 2023-06-12
Attending: NEUROLOGICAL SURGERY
Payer: COMMERCIAL

## 2023-06-12 ENCOUNTER — HOSPITAL ENCOUNTER (OUTPATIENT)
Dept: RADIOLOGY | Facility: HOSPITAL | Age: 58
Discharge: HOME | End: 2023-06-12
Attending: NEUROLOGICAL SURGERY
Payer: COMMERCIAL

## 2023-06-12 VITALS
DIASTOLIC BLOOD PRESSURE: 77 MMHG | SYSTOLIC BLOOD PRESSURE: 121 MMHG | OXYGEN SATURATION: 100 % | HEIGHT: 64 IN | BODY MASS INDEX: 25.25 KG/M2 | WEIGHT: 147.94 LBS | TEMPERATURE: 99 F | HEART RATE: 64 BPM | RESPIRATION RATE: 20 BRPM

## 2023-06-12 DIAGNOSIS — Z01.818 PRE-OP EVALUATION: ICD-10-CM

## 2023-06-12 DIAGNOSIS — Z01.818 PREOP TESTING: Primary | ICD-10-CM

## 2023-06-12 LAB
ANION GAP SERPL CALC-SCNC: 8 MMOL/L (ref 8–16)
APTT PPP: 27.5 SEC (ref 21–32)
BASOPHILS # BLD AUTO: 0.08 K/UL (ref 0–0.2)
BASOPHILS NFR BLD: 1.5 % (ref 0–1.9)
BUN SERPL-MCNC: 14 MG/DL (ref 6–20)
CALCIUM SERPL-MCNC: 9.3 MG/DL (ref 8.7–10.5)
CHLORIDE SERPL-SCNC: 109 MMOL/L (ref 95–110)
CO2 SERPL-SCNC: 23 MMOL/L (ref 23–29)
CREAT SERPL-MCNC: 1 MG/DL (ref 0.5–1.4)
DIFFERENTIAL METHOD: ABNORMAL
EOSINOPHIL # BLD AUTO: 0.1 K/UL (ref 0–0.5)
EOSINOPHIL NFR BLD: 1.3 % (ref 0–8)
ERYTHROCYTE [DISTWIDTH] IN BLOOD BY AUTOMATED COUNT: 15.6 % (ref 11.5–14.5)
EST. GFR  (NO RACE VARIABLE): >60 ML/MIN/1.73 M^2
GLUCOSE SERPL-MCNC: 75 MG/DL (ref 70–110)
HCT VFR BLD AUTO: 38.6 % (ref 37–48.5)
HGB BLD-MCNC: 12.4 G/DL (ref 12–16)
IMM GRANULOCYTES # BLD AUTO: 0 K/UL (ref 0–0.04)
IMM GRANULOCYTES NFR BLD AUTO: 0 % (ref 0–0.5)
INR PPP: 1 (ref 0.8–1.2)
LYMPHOCYTES # BLD AUTO: 2.6 K/UL (ref 1–4.8)
LYMPHOCYTES NFR BLD: 48.5 % (ref 18–48)
MCH RBC QN AUTO: 24.9 PG (ref 27–31)
MCHC RBC AUTO-ENTMCNC: 32.1 G/DL (ref 32–36)
MCV RBC AUTO: 78 FL (ref 82–98)
MONOCYTES # BLD AUTO: 0.4 K/UL (ref 0.3–1)
MONOCYTES NFR BLD: 7.1 % (ref 4–15)
NEUTROPHILS # BLD AUTO: 2.2 K/UL (ref 1.8–7.7)
NEUTROPHILS NFR BLD: 41.6 % (ref 38–73)
NRBC BLD-RTO: 0 /100 WBC
PLATELET # BLD AUTO: 269 K/UL (ref 150–450)
PMV BLD AUTO: 10.1 FL (ref 9.2–12.9)
POTASSIUM SERPL-SCNC: 3.7 MMOL/L (ref 3.5–5.1)
PROTHROMBIN TIME: 10.6 SEC (ref 9–12.5)
RBC # BLD AUTO: 4.97 M/UL (ref 4–5.4)
SODIUM SERPL-SCNC: 140 MMOL/L (ref 136–145)
WBC # BLD AUTO: 5.38 K/UL (ref 3.9–12.7)

## 2023-06-12 PROCEDURE — 93010 ELECTROCARDIOGRAM REPORT: CPT | Mod: ,,, | Performed by: INTERNAL MEDICINE

## 2023-06-12 PROCEDURE — 71046 X-RAY EXAM CHEST 2 VIEWS: CPT | Mod: TC,FY

## 2023-06-12 PROCEDURE — 71046 X-RAY EXAM CHEST 2 VIEWS: CPT | Mod: 26,,, | Performed by: RADIOLOGY

## 2023-06-12 PROCEDURE — 85025 COMPLETE CBC W/AUTO DIFF WBC: CPT | Performed by: PHYSICIAN ASSISTANT

## 2023-06-12 PROCEDURE — 71046 XR CHEST PA AND LATERAL PRE-OP: ICD-10-PCS | Mod: 26,,, | Performed by: RADIOLOGY

## 2023-06-12 PROCEDURE — 85610 PROTHROMBIN TIME: CPT | Performed by: PHYSICIAN ASSISTANT

## 2023-06-12 PROCEDURE — 93005 ELECTROCARDIOGRAM TRACING: CPT

## 2023-06-12 PROCEDURE — 85730 THROMBOPLASTIN TIME PARTIAL: CPT | Performed by: PHYSICIAN ASSISTANT

## 2023-06-12 PROCEDURE — 80048 BASIC METABOLIC PNL TOTAL CA: CPT | Performed by: PHYSICIAN ASSISTANT

## 2023-06-12 PROCEDURE — 93010 EKG 12-LEAD: ICD-10-PCS | Mod: ,,, | Performed by: INTERNAL MEDICINE

## 2023-06-12 PROCEDURE — 36415 COLL VENOUS BLD VENIPUNCTURE: CPT | Performed by: NEUROLOGICAL SURGERY

## 2023-06-12 NOTE — ANESTHESIA PREPROCEDURE EVALUATION
06/12/2023    Pre-operative evaluation for Procedure(s) (LRB):  FUSION, SPINE, LUMBAR, TLIF, MINIMALLY INVASIVE (Left)    Cherri Noonan is a 57 y.o. female     Patient Active Problem List   Diagnosis    Chest pain, atypical    Atrophic vaginitis    Chronic rhinitis    Cervical radiculopathy    Complex regional pain syndrome    Other hyperlipidemia    Vitamin D deficiency    Sleep disorder    Peripheral neuropathic pain    Class 1 obesity with body mass index (BMI) of 31.0 to 31.9 in adult    Decreased range of motion of trunk and back    Decreased strength of trunk and back    Poor posture    Weakness of both lower extremities    Other osteoarthritis of spine    Spondylosis without myelopathy       Review of patient's allergies indicates:   Allergen Reactions    Shingrix (pf) [varicella-zoster [id number] (pf)]      Got all side effects listed       No current facility-administered medications on file prior to encounter.     Current Outpatient Medications on File Prior to Encounter   Medication Sig Dispense Refill    azelastine 205.5 mcg (0.15 %) Spry azelastine 205.5 mcg (0.15 %) nasal spray      b complex vitamins tablet Take 1 tablet by mouth once daily.      ciclopirox (LOPROX) 0.77 % Susp Apply topically 2 (two) times daily.      COVID-19 AT-HOME TEST Kit FOLLOW INSTRUCTIONS INCLUDED WITH THE PACKAGE.      ergocalciferol (ERGOCALCIFEROL) 50,000 unit Cap Take 50,000 Units by mouth every 7 days.      estradioL (ESTRACE) 0.01 % (0.1 mg/gram) vaginal cream Place 0.5 g vaginally twice a week. As needed for vaginal dryness. 42.5 g 1    ginseng 250 mg Cap Take by mouth.      multivitamin capsule Take 1 capsule by mouth once daily.      naproxen (NAPROSYN) 500 MG tablet Take 1 tablet (500 mg total) by mouth 2 (two) times daily as needed (pain). 30 tablet 0    omega-3 fatty acids/fish oil (FISH OIL-OMEGA-3 FATTY ACIDS) 300-1,000 mg capsule Take by mouth once daily.       tumeric-ging-olive-oreg-capryl 100 mg-150 mg- 50 mg-150 mg Cap Take by mouth.      vitamin E 100 UNIT capsule Take 100 Units by mouth once daily.      vitamin E, dl,tocopheryl acet, (VITAMIN E, DL, ACETATE,) 45 mg (100 unit) Cap Take 100 Units by mouth.      zinc gluconate 50 mg tablet Take 50 mg by mouth once daily.           Pre-op Assessment    I have reviewed the Patient Summary Reports.     I have reviewed the Nursing Notes. I have reviewed the NPO Status.   I have reviewed the Medications.     Review of Systems  Anesthesia Hx:  No problems with previous Anesthesia  Denies Family Hx of Anesthesia complications.   Denies Personal Hx of Anesthesia complications.   Social:  Non-Smoker, No Alcohol Use    Hematology/Oncology:  Hematology Normal   Oncology Normal     EENT/Dental:EENT/Dental Normal   Cardiovascular:   Exercise tolerance: good Denies Hypertension.   Denies MELENDEZ.    Pulmonary:  Pulmonary Normal    Renal/:  Renal/ Normal     Hepatic/GI:  Hepatic/GI Normal    Musculoskeletal:   Arthritis   Spine Disorders:    Neurological:   Denies CVA. Neuromuscular Disease,  Denies Seizures. Spinal cord stimulator   Endocrine:  Endocrine Normal    Dermatological:  Skin Normal        Physical Exam  General: Well nourished, Cooperative and Alert    Airway:  Mallampati: II   Mouth Opening: Normal  TM Distance: Normal  Neck ROM: Normal ROM    Dental:  Intact    Chest/Lungs:  Normal Respiratory Rate    Heart:  Rate: Normal  Rhythm: Regular Rhythm        Anesthesia Plan  Type of Anesthesia, risks & benefits discussed:    Anesthesia Type: Gen ETT  Intra-op Monitoring Plan: Standard ASA Monitors  Post Op Pain Control Plan: multimodal analgesia  Induction:  IV  Informed Consent: Informed consent signed with the Patient and all parties understand the risks and agree with anesthesia plan.  All questions answered.   ASA Score: 2    Ready For Surgery From Anesthesia Perspective.     .

## 2023-06-12 NOTE — DISCHARGE INSTRUCTIONS

## 2023-06-15 ENCOUNTER — DOCUMENTATION ONLY (OUTPATIENT)
Dept: REHABILITATION | Facility: HOSPITAL | Age: 58
End: 2023-06-15
Payer: COMMERCIAL

## 2023-06-15 NOTE — PROGRESS NOTES
Health  Wellness Visit Note    Name: Cherri Noonan  Clinic Number: [id number]  Physician: Archana Knott MD  Diagnosis: No diagnosis found.  Past Medical History:   Diagnosis Date    Cervical radiculopathy      Visit Number: 44  Precautions: Standard --- Spinal Cord Stimulator       1st PT visit:  08/12/2022  Year of care end date:  08/2023  6 Month test  performed: 5/29/23 completed   12 Month test performed: 08/2023  Mind body plan: Plan A-8 Months 9/4/23  Patient level: B    Time In: 9:10 AM (appt at 9:00)  Time Out: 10:00 AM  Total Treatment Time: 50 Minutes    Wellness [employer] 2022  Handout on this week's wellness topic of Anxiety was provided along with a discussion on what it means, the benefits, and suggestions for practice.  Reviewed last week's topic of Guilt vs Shame.     Subjective:   Patient reports for Wellness a few minutes after scheduled time, choosing to skip the warm-up because she was already active this morning.  She is losing weight and gaining strength since starting HB.    Patient stretches daily and goes for 2 mile walks at least 5 times a week. She occasionally has knee pain but she monitors it and adjusts her schedule where need be. Patient ices her back and knees at home when need be.     Objective:   Cherri completed therapeutic stretches (EIL, LINDA) and the following MedX exercise machines: core lumbar, torso rotation l/r, leg extension, leg curl, upright row, chest press, biceps curl, triceps extension, leg press    See exercise log in patient folder for rate of exertion and repetitions completed.       Fitness Machine Education Key:  E=education on equipment initiated and further follow up and education needed  I=independent with  and exercise.  The patient:  Adjusts machines to his/her settings  Uses equipment levers, pins, weights safely  Maintains safe and correct posture while exercising  Moves through exercise with correct pace and control  Gets on and off  equipment safely      Lumbar/Cervical Ext. I Torso Rotation I Leg Press E   Leg Extension I Seated Leg Curl E Chest Press I   Seated Row I Hip ADD E Hip ABD I   Triceps Extension I Bicep Curl I Other: X       Assessment:   Patient tolerated Patient tolerated MedX Core Lumbar Strength and all other peripheral exercises without an increase in symptoms. Patient warm up on the elliptical, then started on lumbar strength machine, and iced low back and knees for 5 minutes after the workout.     Plan:  Continue with established plan of care towards wellness goals.     Health  : Supriya Mcdermott  6/15/2023

## 2023-06-22 ENCOUNTER — DOCUMENTATION ONLY (OUTPATIENT)
Dept: REHABILITATION | Facility: HOSPITAL | Age: 58
End: 2023-06-22
Payer: COMMERCIAL

## 2023-06-22 NOTE — PROGRESS NOTES
Health  Wellness Visit Note    Name: Cherri Noonan  Clinic Number: [id number]  Physician: Archana Knott MD  Diagnosis: No diagnosis found.  Past Medical History:   Diagnosis Date    Cervical radiculopathy      Visit Number: 46  Precautions: Standard --- Spinal Cord Stimulator       1st PT visit:  08/12/2022  Year of care end date:  08/2023  6 Month test  performed: 5/29/23 completed   12 Month test performed: 08/2023  Mind body plan: Plan A-8 Months 9/4/23  Patient level: B    Time In: 9:00 AM  Time Out: 10:00 AM  Total Treatment Time: 50 Minutes    Wellness [employer] 2022  Handout on this week's wellness topic of Fear-Avoidance was provided along with a discussion on what it means, the benefits, and suggestions for practice.  Reviewed last week's topic of Anxiety.     Subjective:   Patient reports for Wellness without pain or tightness.  She hasn't been walking due to allergies from the pollen.  She is losing weight and gaining strength since starting HB.    Patient stretches daily and goes for 2 mile walks at least 5 times a week. She occasionally has knee pain but she monitors it and adjusts her schedule where need be. Patient ices her back and knees at home when need be.     Pt will be out of Wellness for spinal fusion surgery.    Objective:   Cherri completed therapeutic stretches (EIL, LINDA) and the following MedX exercise machines: core lumbar, torso rotation l/r, leg extension, leg curl, upright row, chest press, biceps curl, triceps extension, leg press    See exercise log in patient folder for rate of exertion and repetitions completed.       Fitness Machine Education Key:  E=education on equipment initiated and further follow up and education needed  I=independent with  and exercise.  The patient:  Adjusts machines to his/her settings  Uses equipment levers, pins, weights safely  Maintains safe and correct posture while exercising  Moves through exercise with correct pace and control  Gets  on and off equipment safely      Lumbar/Cervical Ext. I Torso Rotation I Leg Press E   Leg Extension I Seated Leg Curl E Chest Press I   Seated Row I Hip ADD E Hip ABD I   Triceps Extension I Bicep Curl I Other: X       Assessment:   Patient tolerated Patient tolerated MedX Core Lumbar Strength and all other peripheral exercises without an increase in symptoms. Patient warm up on the elliptical, then started on lumbar strength machine, and iced low back and knees for 5 minutes after the workout.     Plan:  Continue with established plan of care towards wellness goals.     Health  : Supriya Mcdermott  6/22/2023

## 2023-06-23 ENCOUNTER — LAB VISIT (OUTPATIENT)
Dept: LAB | Facility: HOSPITAL | Age: 58
End: 2023-06-23
Attending: NEUROLOGICAL SURGERY
Payer: COMMERCIAL

## 2023-06-23 DIAGNOSIS — Z01.818 PREOP TESTING: ICD-10-CM

## 2023-06-23 LAB
ABO + RH BLD: NORMAL
BLD GP AB SCN CELLS X3 SERPL QL: NORMAL
SPECIMEN OUTDATE: NORMAL

## 2023-06-23 PROCEDURE — 86900 BLOOD TYPING SEROLOGIC ABO: CPT | Performed by: NEUROLOGICAL SURGERY

## 2023-06-23 PROCEDURE — 36415 COLL VENOUS BLD VENIPUNCTURE: CPT | Performed by: NEUROLOGICAL SURGERY

## 2023-06-25 RX ORDER — ACETAMINOPHEN 500 MG
1000 TABLET ORAL
Status: CANCELLED | OUTPATIENT
Start: 2023-06-25 | End: 2023-06-25

## 2023-06-26 ENCOUNTER — TELEPHONE (OUTPATIENT)
Dept: NEUROSURGERY | Facility: CLINIC | Age: 58
End: 2023-06-26
Payer: COMMERCIAL

## 2023-06-26 ENCOUNTER — ANESTHESIA (OUTPATIENT)
Dept: SURGERY | Facility: HOSPITAL | Age: 58
End: 2023-06-26
Payer: COMMERCIAL

## 2023-06-26 ENCOUNTER — HOSPITAL ENCOUNTER (OUTPATIENT)
Facility: HOSPITAL | Age: 58
Discharge: HOME | End: 2023-06-26
Attending: NEUROLOGICAL SURGERY | Admitting: NEUROLOGICAL SURGERY
Payer: COMMERCIAL

## 2023-06-26 VITALS
TEMPERATURE: 98 F | OXYGEN SATURATION: 100 % | SYSTOLIC BLOOD PRESSURE: 141 MMHG | RESPIRATION RATE: 14 BRPM | HEART RATE: 54 BPM | DIASTOLIC BLOOD PRESSURE: 68 MMHG

## 2023-06-26 DIAGNOSIS — M47.819 SPONDYLOSIS WITHOUT MYELOPATHY: ICD-10-CM

## 2023-06-26 DIAGNOSIS — Z98.1 STATUS POST LUMBAR SPINAL FUSION: Primary | ICD-10-CM

## 2023-06-26 DIAGNOSIS — Z98.1 S/P LUMBAR SPINAL FUSION: Primary | ICD-10-CM

## 2023-06-26 PROCEDURE — 25000003 PHARM REV CODE 250: Performed by: PHYSICIAN ASSISTANT

## 2023-06-26 PROCEDURE — 22630 PR ARTHRODESIS POSTERIOR INTERBODY LUMBAR: ICD-10-PCS | Mod: ,,, | Performed by: NEUROLOGICAL SURGERY

## 2023-06-26 PROCEDURE — 27201423 OPTIME MED/SURG SUP & DEVICES STERILE SUPPLY: Performed by: NEUROLOGICAL SURGERY

## 2023-06-26 PROCEDURE — 71000033 HC RECOVERY, INTIAL HOUR: Performed by: NEUROLOGICAL SURGERY

## 2023-06-26 PROCEDURE — 63600175 PHARM REV CODE 636 W HCPCS: Performed by: ANESTHESIOLOGY

## 2023-06-26 PROCEDURE — C1713 ANCHOR/SCREW BN/BN,TIS/BN: HCPCS | Performed by: NEUROLOGICAL SURGERY

## 2023-06-26 PROCEDURE — 63052 PR LAMINECT/FACETECT/FORAMINOT, ARTHRODESIS, 1 VERTEBR SEGM: ICD-10-PCS | Mod: AS,,, | Performed by: PHYSICIAN ASSISTANT

## 2023-06-26 PROCEDURE — 63600175 PHARM REV CODE 636 W HCPCS: Performed by: PHYSICIAN ASSISTANT

## 2023-06-26 PROCEDURE — 71000015 HC POSTOP RECOV 1ST HR: Performed by: NEUROLOGICAL SURGERY

## 2023-06-26 PROCEDURE — 25000003 PHARM REV CODE 250: Performed by: ANESTHESIOLOGY

## 2023-06-26 PROCEDURE — 22853 PR INSERT BIOMECH DEV W/INTERBODY ARTHRODESIS, EA CONTIGUOUS DEFECT: ICD-10-PCS | Mod: AS,,, | Performed by: PHYSICIAN ASSISTANT

## 2023-06-26 PROCEDURE — D9220A PRA ANESTHESIA: Mod: ANES,,, | Performed by: ANESTHESIOLOGY

## 2023-06-26 PROCEDURE — 25000003 PHARM REV CODE 250: Performed by: NURSE ANESTHETIST, CERTIFIED REGISTERED

## 2023-06-26 PROCEDURE — 22630 ARTHRD PST TQ 1NTRSPC LUM: CPT | Mod: AS,,, | Performed by: PHYSICIAN ASSISTANT

## 2023-06-26 PROCEDURE — 63052 LAM FACETC/FRMT ARTHRD LUM 1: CPT | Mod: AS,,, | Performed by: PHYSICIAN ASSISTANT

## 2023-06-26 PROCEDURE — 63600175 PHARM REV CODE 636 W HCPCS: Performed by: NURSE ANESTHETIST, CERTIFIED REGISTERED

## 2023-06-26 PROCEDURE — 63052 LAM FACETC/FRMT ARTHRD LUM 1: CPT | Mod: ,,, | Performed by: NEUROLOGICAL SURGERY

## 2023-06-26 PROCEDURE — 63600175 PHARM REV CODE 636 W HCPCS: Performed by: NEUROLOGICAL SURGERY

## 2023-06-26 PROCEDURE — 71000016 HC POSTOP RECOV ADDL HR: Performed by: NEUROLOGICAL SURGERY

## 2023-06-26 PROCEDURE — 36000710: Performed by: NEUROLOGICAL SURGERY

## 2023-06-26 PROCEDURE — 22853 INSJ BIOMECHANICAL DEVICE: CPT | Mod: AS,,, | Performed by: PHYSICIAN ASSISTANT

## 2023-06-26 PROCEDURE — 22840 INSERT SPINE FIXATION DEVICE: CPT | Mod: AS,,, | Performed by: PHYSICIAN ASSISTANT

## 2023-06-26 PROCEDURE — 22630 ARTHRD PST TQ 1NTRSPC LUM: CPT | Mod: ,,, | Performed by: NEUROLOGICAL SURGERY

## 2023-06-26 PROCEDURE — D9220A PRA ANESTHESIA: ICD-10-PCS | Mod: ANES,,, | Performed by: ANESTHESIOLOGY

## 2023-06-26 PROCEDURE — D9220A PRA ANESTHESIA: Mod: CRNA,,, | Performed by: NURSE ANESTHETIST, CERTIFIED REGISTERED

## 2023-06-26 PROCEDURE — 36000711: Performed by: NEUROLOGICAL SURGERY

## 2023-06-26 PROCEDURE — 22630 PR ARTHRODESIS POSTERIOR INTERBODY LUMBAR: ICD-10-PCS | Mod: AS,,, | Performed by: PHYSICIAN ASSISTANT

## 2023-06-26 PROCEDURE — 22840 PR POSTERIOR NON-SEGMENTAL INSTRUMENTATION: ICD-10-PCS | Mod: AS,,, | Performed by: PHYSICIAN ASSISTANT

## 2023-06-26 PROCEDURE — 22840 PR POSTERIOR NON-SEGMENTAL INSTRUMENTATION: ICD-10-PCS | Mod: ,,, | Performed by: NEUROLOGICAL SURGERY

## 2023-06-26 PROCEDURE — 20936 SP BONE AGRFT LOCAL ADD-ON: CPT | Mod: ,,, | Performed by: NEUROLOGICAL SURGERY

## 2023-06-26 PROCEDURE — D9220A PRA ANESTHESIA: ICD-10-PCS | Mod: CRNA,,, | Performed by: NURSE ANESTHETIST, CERTIFIED REGISTERED

## 2023-06-26 PROCEDURE — 37000009 HC ANESTHESIA EA ADD 15 MINS: Performed by: NEUROLOGICAL SURGERY

## 2023-06-26 PROCEDURE — 37000008 HC ANESTHESIA 1ST 15 MINUTES: Performed by: NEUROLOGICAL SURGERY

## 2023-06-26 PROCEDURE — 22840 INSERT SPINE FIXATION DEVICE: CPT | Mod: ,,, | Performed by: NEUROLOGICAL SURGERY

## 2023-06-26 PROCEDURE — 63052 PR LAMINECT/FACETECT/FORAMINOT, ARTHRODESIS, 1 VERTEBR SEGM: ICD-10-PCS | Mod: ,,, | Performed by: NEUROLOGICAL SURGERY

## 2023-06-26 PROCEDURE — 22853 PR INSERT BIOMECH DEV W/INTERBODY ARTHRODESIS, EA CONTIGUOUS DEFECT: ICD-10-PCS | Mod: ,,, | Performed by: NEUROLOGICAL SURGERY

## 2023-06-26 PROCEDURE — 71000039 HC RECOVERY, EACH ADD'L HOUR: Performed by: NEUROLOGICAL SURGERY

## 2023-06-26 PROCEDURE — 22853 INSJ BIOMECHANICAL DEVICE: CPT | Mod: ,,, | Performed by: NEUROLOGICAL SURGERY

## 2023-06-26 PROCEDURE — 25000003 PHARM REV CODE 250: Performed by: NEUROLOGICAL SURGERY

## 2023-06-26 PROCEDURE — 20936 PR AUTOGRAFT SPINE SURGERY LOCAL FROM SAME INCISION: ICD-10-PCS | Mod: ,,, | Performed by: NEUROLOGICAL SURGERY

## 2023-06-26 DEVICE — ROD SPINAL LORDOTIC 5.5X50MM: Type: IMPLANTABLE DEVICE | Site: BACK | Status: FUNCTIONAL

## 2023-06-26 DEVICE — CAGE LEVA 12MM 25X10X12: Type: IMPLANTABLE DEVICE | Site: SPINE LUMBAR | Status: FUNCTIONAL

## 2023-06-26 DEVICE — ROD SPINAL PERC 45MM LORDOTIC: Type: IMPLANTABLE DEVICE | Site: BACK | Status: FUNCTIONAL

## 2023-06-26 DEVICE — SCREW LOCKING EXTENDED TAB: Type: IMPLANTABLE DEVICE | Site: BACK | Status: FUNCTIONAL

## 2023-06-26 DEVICE — SCREW EXT TAB 6.5X45MM: Type: IMPLANTABLE DEVICE | Site: BACK | Status: FUNCTIONAL

## 2023-06-26 RX ORDER — SUCCINYLCHOLINE CHLORIDE 20 MG/ML
INJECTION INTRAMUSCULAR; INTRAVENOUS
Status: DISCONTINUED | OUTPATIENT
Start: 2023-06-26 | End: 2023-06-26

## 2023-06-26 RX ORDER — LIDOCAINE HYDROCHLORIDE AND EPINEPHRINE 10; 10 MG/ML; UG/ML
INJECTION, SOLUTION INFILTRATION; PERINEURAL
Status: DISCONTINUED | OUTPATIENT
Start: 2023-06-26 | End: 2023-06-26 | Stop reason: HOSPADM

## 2023-06-26 RX ORDER — SODIUM CHLORIDE 9 MG/ML
INJECTION, SOLUTION INTRAVENOUS CONTINUOUS
Status: DISCONTINUED | OUTPATIENT
Start: 2023-06-26 | End: 2023-06-26 | Stop reason: HOSPADM

## 2023-06-26 RX ORDER — SODIUM CHLORIDE 0.9 % (FLUSH) 0.9 %
10 SYRINGE (ML) INJECTION
Status: DISCONTINUED | OUTPATIENT
Start: 2023-06-26 | End: 2023-06-26 | Stop reason: HOSPADM

## 2023-06-26 RX ORDER — HYDROCODONE BITARTRATE AND ACETAMINOPHEN 10; 325 MG/1; MG/1
1 TABLET ORAL EVERY 4 HOURS PRN
Qty: 42 TABLET | Refills: 0 | Status: SHIPPED | OUTPATIENT
Start: 2023-06-26 | End: 2023-06-26 | Stop reason: HOSPADM

## 2023-06-26 RX ORDER — PROPOFOL 10 MG/ML
VIAL (ML) INTRAVENOUS CONTINUOUS PRN
Status: DISCONTINUED | OUTPATIENT
Start: 2023-06-26 | End: 2023-06-26

## 2023-06-26 RX ORDER — HYDROMORPHONE HYDROCHLORIDE 2 MG/ML
0.2 INJECTION, SOLUTION INTRAMUSCULAR; INTRAVENOUS; SUBCUTANEOUS EVERY 5 MIN PRN
Status: COMPLETED | OUTPATIENT
Start: 2023-06-26 | End: 2023-06-26

## 2023-06-26 RX ORDER — EPHEDRINE SULFATE 50 MG/ML
INJECTION, SOLUTION INTRAVENOUS
Status: DISCONTINUED | OUTPATIENT
Start: 2023-06-26 | End: 2023-06-26

## 2023-06-26 RX ORDER — MIDAZOLAM HYDROCHLORIDE 1 MG/ML
INJECTION INTRAMUSCULAR; INTRAVENOUS
Status: DISCONTINUED | OUTPATIENT
Start: 2023-06-26 | End: 2023-06-26

## 2023-06-26 RX ORDER — AMOXICILLIN 500 MG
1 CAPSULE ORAL DAILY
Start: 2023-07-01 | End: 2024-02-29

## 2023-06-26 RX ORDER — VANCOMYCIN HYDROCHLORIDE 1 G/20ML
INJECTION, POWDER, LYOPHILIZED, FOR SOLUTION INTRAVENOUS
Status: DISCONTINUED | OUTPATIENT
Start: 2023-06-26 | End: 2023-06-26 | Stop reason: HOSPADM

## 2023-06-26 RX ORDER — METHOCARBAMOL 750 MG/1
750 TABLET, FILM COATED ORAL 4 TIMES DAILY
Status: DISCONTINUED | OUTPATIENT
Start: 2023-06-26 | End: 2023-06-26 | Stop reason: HOSPADM

## 2023-06-26 RX ORDER — FENTANYL CITRATE 50 UG/ML
25 INJECTION, SOLUTION INTRAMUSCULAR; INTRAVENOUS EVERY 5 MIN PRN
Status: DISCONTINUED | OUTPATIENT
Start: 2023-06-26 | End: 2023-06-26 | Stop reason: HOSPADM

## 2023-06-26 RX ORDER — BUPIVACAINE HYDROCHLORIDE 5 MG/ML
INJECTION, SOLUTION PERINEURAL
Status: DISCONTINUED | OUTPATIENT
Start: 2023-06-26 | End: 2023-06-26 | Stop reason: HOSPADM

## 2023-06-26 RX ORDER — CEFAZOLIN SODIUM 2 G/50ML
2 SOLUTION INTRAVENOUS
Status: COMPLETED | OUTPATIENT
Start: 2023-06-26 | End: 2023-06-26

## 2023-06-26 RX ORDER — DEXAMETHASONE SODIUM PHOSPHATE 4 MG/ML
INJECTION, SOLUTION INTRA-ARTICULAR; INTRALESIONAL; INTRAMUSCULAR; INTRAVENOUS; SOFT TISSUE
Status: DISCONTINUED | OUTPATIENT
Start: 2023-06-26 | End: 2023-06-26

## 2023-06-26 RX ORDER — ONDANSETRON 4 MG/1
4 TABLET, ORALLY DISINTEGRATING ORAL EVERY 6 HOURS PRN
Qty: 15 TABLET | Refills: 0 | Status: SHIPPED | OUTPATIENT
Start: 2023-06-26

## 2023-06-26 RX ORDER — SODIUM CHLORIDE, SODIUM LACTATE, POTASSIUM CHLORIDE, CALCIUM CHLORIDE 600; 310; 30; 20 MG/100ML; MG/100ML; MG/100ML; MG/100ML
INJECTION, SOLUTION INTRAVENOUS CONTINUOUS
Status: DISCONTINUED | OUTPATIENT
Start: 2023-06-26 | End: 2023-06-26 | Stop reason: HOSPADM

## 2023-06-26 RX ORDER — LIDOCAINE HYDROCHLORIDE 20 MG/ML
INJECTION INTRAVENOUS
Status: DISCONTINUED | OUTPATIENT
Start: 2023-06-26 | End: 2023-06-26

## 2023-06-26 RX ORDER — OXYCODONE AND ACETAMINOPHEN 10; 325 MG/1; MG/1
1 TABLET ORAL EVERY 4 HOURS PRN
Qty: 42 TABLET | Refills: 0 | Status: SHIPPED | OUTPATIENT
Start: 2023-06-26

## 2023-06-26 RX ORDER — ACETAMINOPHEN 500 MG
1000 TABLET ORAL ONCE
Status: COMPLETED | OUTPATIENT
Start: 2023-06-26 | End: 2023-06-26

## 2023-06-26 RX ORDER — FENTANYL CITRATE 50 UG/ML
INJECTION, SOLUTION INTRAMUSCULAR; INTRAVENOUS
Status: DISCONTINUED | OUTPATIENT
Start: 2023-06-26 | End: 2023-06-26

## 2023-06-26 RX ORDER — PHENYLEPHRINE HYDROCHLORIDE 10 MG/ML
INJECTION INTRAVENOUS
Status: DISCONTINUED | OUTPATIENT
Start: 2023-06-26 | End: 2023-06-26

## 2023-06-26 RX ORDER — MUPIROCIN 20 MG/G
OINTMENT TOPICAL
Status: DISCONTINUED | OUTPATIENT
Start: 2023-06-26 | End: 2023-06-26 | Stop reason: HOSPADM

## 2023-06-26 RX ADMIN — HYDROMORPHONE HYDROCHLORIDE 0.2 MG: 2 INJECTION INTRAMUSCULAR; INTRAVENOUS; SUBCUTANEOUS at 12:06

## 2023-06-26 RX ADMIN — EPHEDRINE SULFATE 15 MG: 50 INJECTION INTRAVENOUS at 10:06

## 2023-06-26 RX ADMIN — ACETAMINOPHEN 1000 MG: 500 TABLET ORAL at 08:06

## 2023-06-26 RX ADMIN — DEXAMETHASONE SODIUM PHOSPHATE 4 MG: 4 INJECTION, SOLUTION INTRAMUSCULAR; INTRAVENOUS at 10:06

## 2023-06-26 RX ADMIN — REMIFENTANIL HYDROCHLORIDE 0.24 MCG/KG/MIN: 1 INJECTION, POWDER, LYOPHILIZED, FOR SOLUTION INTRAVENOUS at 09:06

## 2023-06-26 RX ADMIN — SODIUM CHLORIDE, SODIUM LACTATE, POTASSIUM CHLORIDE, AND CALCIUM CHLORIDE: .6; .31; .03; .02 INJECTION, SOLUTION INTRAVENOUS at 09:06

## 2023-06-26 RX ADMIN — PHENYLEPHRINE HYDROCHLORIDE 200 MCG: 10 INJECTION INTRAVENOUS at 09:06

## 2023-06-26 RX ADMIN — PROPOFOL 120 MCG/KG/MIN: 10 INJECTION, EMULSION INTRAVENOUS at 09:06

## 2023-06-26 RX ADMIN — FENTANYL CITRATE 100 MCG: 50 INJECTION, SOLUTION INTRAMUSCULAR; INTRAVENOUS at 09:06

## 2023-06-26 RX ADMIN — MUPIROCIN: 20 OINTMENT TOPICAL at 08:06

## 2023-06-26 RX ADMIN — CEFAZOLIN SODIUM 2 G: 2 SOLUTION INTRAVENOUS at 09:06

## 2023-06-26 RX ADMIN — METHOCARBAMOL 750 MG: 750 TABLET ORAL at 01:06

## 2023-06-26 RX ADMIN — PROPOFOL 120 MG: 10 INJECTION, EMULSION INTRAVENOUS at 09:06

## 2023-06-26 RX ADMIN — REMIFENTANIL HYDROCHLORIDE 0.23 MCG/KG/MIN: 1 INJECTION, POWDER, LYOPHILIZED, FOR SOLUTION INTRAVENOUS at 08:06

## 2023-06-26 RX ADMIN — EPHEDRINE SULFATE 15 MG: 50 INJECTION INTRAVENOUS at 09:06

## 2023-06-26 RX ADMIN — SODIUM CHLORIDE, POTASSIUM CHLORIDE, SODIUM LACTATE AND CALCIUM CHLORIDE: 600; 310; 30; 20 INJECTION, SOLUTION INTRAVENOUS at 08:06

## 2023-06-26 RX ADMIN — LIDOCAINE HYDROCHLORIDE 100 MG: 20 INJECTION, SOLUTION INTRAVENOUS at 09:06

## 2023-06-26 RX ADMIN — SUCCINYLCHOLINE CHLORIDE 80 MG: 20 INJECTION, SOLUTION INTRAMUSCULAR; INTRAVENOUS at 09:06

## 2023-06-26 RX ADMIN — SODIUM CHLORIDE, POTASSIUM CHLORIDE, SODIUM LACTATE AND CALCIUM CHLORIDE: 600; 310; 30; 20 INJECTION, SOLUTION INTRAVENOUS at 09:06

## 2023-06-26 RX ADMIN — MIDAZOLAM HYDROCHLORIDE 2 MG: 1 INJECTION INTRAMUSCULAR; INTRAVENOUS at 09:06

## 2023-06-26 NOTE — BRIEF OP NOTE
[facility] [place]    SUMMARY     Surgery Date: 6/26/2023     Surgeon(s) and Role:     * Layo Santiago MD - Primary    Assisting Surgeon: Rahel Woods PA-C - Assisting       Pre-op Diagnosis:  Spondylosis without myelopathy [M47.819]  Spondylolisthesis at L4-L5 level [M43.16]    Post-op Diagnosis:  Post-Op Diagnosis Codes:     * Spondylosis without myelopathy [M47.819]     * Spondylolisthesis at L4-L5 level [M43.16]    Procedure(s) (LRB):  FUSION, SPINE, LUMBAR, TLIF, MINIMALLY INVASIVE (Left)    Anesthesia: General    Operative Findings: Left MIS L4-5 TLIF     Estimated Blood Loss: 50cc         Specimens:   Specimen (24h ago, onward)      None            [id number]

## 2023-06-26 NOTE — OP NOTE
DATE OF PROCEDURE:  6/26/2023     SURGEON:  Layo Santiago M.D., Ph.D.     ASSISTANT:  Rahel Woods PA-C  (the assistant is a [facility] resident).     PREOPERATIVE DIAGNOSES:  L4-5 spondylolisthesis.  L4-5 spondylosis with associated radiculopathy.  L4-5 stenosis.    POSTOPERATIVE DIAGNOSES:  L4-5 spondylolisthesis.  2.   L4-5 spondylosis with associated radiculopathy.  3.   L4-5 stenosis.     PROCEDURES PERFORMED:  1.  Minimally invasive approach.  2.  Left L4-5-complete facetectomy, laminectomy and diskectomy with decompression of the thecal sac and nerve roots.  3.  Placement of expandable interbody cage (Leva, 25 x 10 x 12 mm)   4.  L4-5 autograft with associated ffusion.  5.  Placement of percutaneous rods and pedicle screws at L4-5 for posterior bracing     INDICATIONS IN DETAIL:    Ms. Cherri Noonan is a 57 y.o. woman with cervical radiculopathy and spondylolisthesis who presents today for TLIF. This ia a patient who presented to me with chronic low back pain for the last 9 years. Per CR, she has a long h/o spinal interventions including a C5-7 ACDF in 2015 by Dr. Peña, cervical spinal cord stimulator, multiple cervical FANNY and trigger point injections, and physical therapy.  Pt continues to remain symptomatic despite conservative treatment. She reports her low back pain radiates down the LLE. Denies b/b dysfunction and SA.  Her primary concern today is of chronic b/l low back pain.  Her pain radiates into her b/l buttocks.  Since her most recent FANNY, she is had new radiation down her right leg, both anterior and posterior, generally stopping at the ankle.  Very occasionally she has radiation into her b/l feet.  She has tried physical therapy for her low back as well, and found it to be somewhat helpful.  She is currently attending the healthy back program and feels that her back is much stronger, but pain persists.  It is 8/10 today in clinic.  She denies saddle anesthesia and  b/b dysfunction.  She further denies numbness and weakness in her legs.  She has no significant limitations with ambulation.  She can walk approximately 7 miles without stopping.  MRI Lumbar Spine WO Contrast (1/30/2023): Lumbar degenerative changes most pronounced at L4-L5 noting grade 1 anterolisthesis, bilateral ligamentum flavum buckling and bilateral facet arthropathy contributing to severe spinal canal and lateral recess stenosis and mild-to-moderate neural foraminal narrowing.  I have recommended MIS Left L4-5 TLIF. I have discussed the risks/benefits, indications, and alternatives for the proposed procedure in detail. I have answered all of their questions and patient wish to proceed with surgery.      PROCEDURE IN DETAIL:    The patient was seen in the pretreatment area and the risks, benefits and alternatives were again discussed.  The patient wished to proceed.  The patient was brought to the Operating Room and a general anesthetic was administered.  All proper lines were placed.  The patient was placed in the prone position on a Andrew table and her back was cleaned, prepped and draped in usual manner.  AP and lateral fluoroscopy was performed and the L4-5 level could be seen.  A line of approximately 2.5 cm was drawn just lateral to the shadow of the facet at L4-5 on the left side.  A lidocaine-bupivacaine mix was infiltrated under the skin.  An incision was made through the skin and   down to the fascia.  A minimally invasive approach was then made using the METRx set down to the junction of the lamina and the facet at L4-5.  The soft  tissue was removed using Bovie cautery and then using a high-speed Audioms drill with a matchstick eda, a complete facetectomy was performed.  The bone dust from this drilling was collected using the Hensler bone press.  We then proceeded to drill the lamina and completely decompress from the foramen to the thecal sac.  Once this was performed, we dissected and  retracted the thecal sac and exiting nerve root.  Once this was medially retracted, the annulus of the L4-5 disk was cut and diskectomy was performed in the usual fashion using curettes and rongeurs.  Once this was performed, we then prepared the disk for fusion using disk dae.  Upon doing this maneuver with fluoroscopy, we could clearly see good movement at this level.  We then placed a trial cage in this area and then placed a 12 mm Leva cage at this level.  Upon placement of the cage, there was distraction of the disk and correction of most of the spondylolisthesis.  Once the cage was in place, we then placed the bone from the Hensler bone press in the cage.  This will allow for anterior fusion.  Once this was complete, the retractors were removed.  The wound was irrigated copiously and all bleeding points were coagulated.     We then turned our attention to placement of percutaneous pedicle screws.  Jamshidi needles were used to penetrate the pedicles at in the usual fashion.  This was done using AP fluoroscopy and the Jamshidi needles were placed in a lateral to medial fashion.  Once we had placed the Jamshidi needles, we then stimulated them.  All CMAPs were greater than 21 milliamperes.  Once this was performed, we then placed K-wires and tapped each of the pedicles.  Then, 6.5 x 45.0 mm screws were placed at L4 and L5.  We then obtained rods of the appropriate size and placed them into the towers.  The rods were reduced into the screw heads and end caps were placed.  Once this was complete, we final tightened the end caps.  The wound was irrigated copiously using a Pulsavac and the soft tissue was then closed in layers after vancomycin powder was placed over the hardware.  Once this was complete, a clean dressing was placed.  The patient was turned to the supine position on a gurney.  The patient was awakened by the Anesthesia staff.  At the point the patient was awake and following commands, she was  extubated.     EBL was approximately 75 mL.     There were no intraprocedural complications.     All counts were correct at the end of surgery.     Dr. Layo Santiago was present during the entire procedure.

## 2023-06-26 NOTE — ANESTHESIA PROCEDURE NOTES
Intubation    Date/Time: 6/26/2023 9:16 AM  Performed by: Chicho Voss CRNA  Authorized by: Chika Kowalski MD     Intubation:     Induction:  Intravenous    Intubated:  Postinduction    Mask Ventilation:  Easy mask    Attempts:  1    Attempted By:  CRNA    Method of Intubation:  Direct    Blade:  Field 2    Laryngeal View Grade: Grade I - full view of cords      Difficult Airway Encountered?: No      Complications:  None    Airway Device:  Oral endotracheal tube    Airway Device Size:  7.0    Style/Cuff Inflation:  Cuffed    Inflation Amount (mL):  5    Tube secured:  21    Secured at:  The teeth    Placement Verified By:  Capnometry    Complicating Factors:  None    Findings Post-Intubation:  BS equal bilateral and atraumatic/condition of teeth unchanged

## 2023-06-26 NOTE — PATIENT INSTRUCTIONS
Please follow ONLY the instructions that are checked below.    Activity Restrictions:  [x]  Return to work will be determined on an individual basis.  [x]  No lifting greater than 10 pounds.  [x]  Avoid bending and twisting the area of your surgery more than 45 degrees from neutral position in any direction.  [x]  No driving or operating machinery:  [x]  until cleared by your surgeon.  [x]  while taking narcotic pain medications or muscle relaxants.    [x]  Wear brace/collar at all times except when lying flat in bed     [x]  Increase ambulation over the next 2 weeks so that you are walking 2 miles per day at 2 weeks post-operatively.  [x]  Make sure to take two dedicated 5-10 minute walks per day, along with short walks every 1-2 hours, even if just to walk to the restroom and back.   [x]  Walk on paved surfaces only. It is okay to walk up and down stairs while holding onto a side rail.  [x]  No sexual activity for 2-3 weeks.    Discharge Medication/Follow-up:  [x]  Please refer to discharge medication reconciliation form.  [x]  For the first week after surgery: Check your blood pressure before taking any blood pressure medications at home. If BP is below 120/80, do not take your blood pressure medication.   [x]  Do not take ANY non-steroidal anti-inflammatory drugs (NSAIDS), including the following: ibuprofen, naprosyn, Aleve, Advil, Indocin, Mobic, or Celebrex for:  [x]  8 weeks  [x]  Prescriptions for appropriate medication will be given upon discharge.   [x]  Pain control: Percocet for severe pain. Over the counter tylenol for mild pain   [x]  Muscle relaxer: Tizanidine (use home Rx) for muscle spasms. Can take every 8 hours if needed, but most helpful at bedtime.    [x]  Take docusate (Colace 100 mg) and miralax daily until bowel activity returns to mariano. You can get this over the counter.  [x]  If you have not had a bowel movement by day 3 after surgery, try a fleet's enema or suppository, both over the  counter. Call neurosurgery if you have not had a bowel movement by day 5 after surgery.   [x]  Follow-up appointment:  [x]  10-14 days post-op for wound check by PA/nurse  [x]  4-6 weeks with MD    Wound Care:  [x]  Remove dressing or bandaid in  3  days.  [x]  No bandage required once removed. Keep your incision open to the air.  [x]  You may shower on the 3rd day after your surgery. Have the force of water hit you opposite from the incision. Pat the incision dry after your shower; do not scrub the incision. Do not use Hibiclens after surgery.  [x]  You wound is closed with one of the following: skin glue, steri strips, OR staples. Allow skin glue/steri strips to fall off on their own over time (if applicable). If you have staples, these will be removed at your clinic appt in 2 weeks.   [x]  You cannot take a bath until 8 weeks after surgery.    Call your doctor or go to the Emergency Room for any signs of infection, including: increased redness, drainage, pain, or fever (temperature ?101.5 for 24 hours). Call your doctor or go to the Emergency Room if there are any localized neurological changes; problems with speech, vision, numbness, tingling, weakness, or severe headache; or for other concerns.    Special Instructions:  [x]  No use of tobacco products.  [x]  Diet: Please eat a regular diet as tolerated.  []  Other diet:              Specific physician instructions:           Physicians need 3 days' notice for pain medicine to be refilled. Pain medicine will only be refilled between 8 AM and 5 PM, Monday through Friday, due to Food and Drug Administration regulation of documentation.    If you have any questions about this form, please call [phone].

## 2023-06-26 NOTE — H&P
Patient ID: Cherri Noonan is a 57 y.o. female      Chief Complaint: Lumbar Spine Pain (L-Spine)        HPI  Ms. Cherri Noonan is a 57 y.o. woman with cervical radiculopathy and spondylolisthesis, referred to me by Rahel Woods PA-C, who presents today to establish care. This ia a patient who presented to me with chronic low back pain for the last 9 years. Per CR, she has a long h/o spinal interventions including a C5-7 ACDF in 2015 by Dr. Peña, cervical spinal cord stimulator, multiple cervical FANNY and trigger point injections, and physical therapy.  Pt continues to remain symptomatic despite conservative treatment. She reports her low back pain radiates down the LLE. Denies b/b dysfunction and SA.     Her primary concern today is of chronic b/l low back pain.  Her pain radiates into her b/l buttocks.  Since her most recent FANNY, she is had new radiation down her right leg, both anterior and posterior, generally stopping at the ankle.  Very occasionally she has radiation into her b/l feet.  She has tried physical therapy for her low back as well, and found it to be somewhat helpful.  She is currently attending the healthy back program and feels that her back is much stronger, but pain persists.  It is 8/10 today in clinic.  She denies saddle anesthesia and b/b dysfunction.  She further denies numbness and weakness in her legs.  She has no significant limitations with ambulation.  She can walk approximately 7 miles without stopping.        Review of Systems   Constitutional:  Negative for activity change, appetite change, fatigue, fever and unexpected weight change.   HENT:  Negative for facial swelling.    Eyes: Negative.    Respiratory: Negative.     Cardiovascular: Negative.    Gastrointestinal:  Negative for diarrhea, nausea and vomiting.   Endocrine: Negative.    Genitourinary: Negative.    Musculoskeletal:  Positive for back pain and myalgias. Negative for joint swelling and neck pain.    Neurological:  Negative for dizziness, seizures, weakness, numbness and headaches.   Psychiatric/Behavioral: Negative.              Past Medical History:   Diagnosis Date    Cervical radiculopathy           Objective:          Vitals:     05/03/23 0955   BP: 119/77   Pulse: 73      Physical Exam  Constitutional:       General: She is not in acute distress.     Appearance: Normal appearance.   HENT:      Head: Normocephalic and atraumatic.   Pulmonary:      Effort: Pulmonary effort is normal.   Musculoskeletal:      Cervical back: Neck supple.   Neurological:      General: No focal deficit present.      Mental Status: She is alert and oriented to person, place, and time.      GCS: GCS eye subscore is 4. GCS verbal subscore is 5. GCS motor subscore is 6.      Cranial Nerves: No cranial nerve deficit.      Sensory: Sensation is intact.      Motor: Motor function is intact.      Coordination: Coordination is intact.      Gait: Gait is intact.         IMAGING:  MRI Lumbar Spine WO Contrast (1/30/2023):  1. Lumbar degenerative changes most pronounced at L4-L5 noting grade 1 anterolisthesis, bilateral ligamentum flavum buckling and bilateral facet arthropathy contributing to severe spinal canal and lateral recess stenosis and mild-to-moderate neural foraminal narrowing.     I have personally reviewed the images with the pt.       I, Dr. Layo Santiago, personally performed the services described in this documentation. All medical record entries made by the scribe, Josefina Dean, were at my direction and in my presence.  I have reviewed the chart and agree that the record reflects my personal performance and is accurate and complete. Layo Santiago MD. 05/03/2023     Assessment:      Lumbar spondylolisthesis.  Lumbar spondylosis.     Plan:   I have personally reviewed the MRI lumbar spine with the pt which shows:  1. Lumbar degenerative changes most pronounced at L4-L5 noting grade 1 anterolisthesis, bilateral ligamentum  flavum buckling and bilateral facet arthropathy contributing to severe spinal canal and lateral recess stenosis and mild-to-moderate neural foraminal narrowing.     I recommend MIS Left L4-5 TLIF. I have discussed the risks/benefits, indications, and alternatives for the proposed procedure in detail. I have answered all of their questions and patient wish to proceed with surgery. We will schedule patient.    UPDATE: 5/5 BLE  No hoffmans or clonus  No recent nsaid or blood thinners  SCS therapy turned off

## 2023-06-26 NOTE — PLAN OF CARE
Patient has turned off spinal stimulator therapy as directed by Rahel Woods Pac. Dr Santiago and Ms Woods have spoken with patient at bedside

## 2023-06-26 NOTE — TRANSFER OF CARE
Anesthesia Transfer of Care Note    Patient: Cherri Noonan    Procedure(s) Performed: Procedure(s) (LRB):  FUSION, SPINE, LUMBAR, TLIF, MINIMALLY INVASIVE (Left)    Patient location: PACU    Anesthesia Type: general    Transport from OR: Transported from OR on room air with adequate spontaneous ventilation    Post pain: adequate analgesia    Post assessment: tolerated procedure well and no apparent anesthetic complications    Post vital signs: stable    Level of consciousness: awake    Nausea/Vomiting: no nausea/vomiting    Complications: none    Transfer of care protocol was followed      Last vitals:   Visit Vitals  BP (!) 143/83   Pulse 77   Temp 36.3 °C (97.3 °F) (Temporal)   Resp 14   SpO2 100%   Breastfeeding No

## 2023-06-26 NOTE — ANESTHESIA POSTPROCEDURE EVALUATION
Anesthesia Post Evaluation    Patient: Cherri Noonan    Procedure(s) Performed: Procedure(s) (LRB):  FUSION, SPINE, LUMBAR, TLIF, MINIMALLY INVASIVE (Left)    Final Anesthesia Type: general      Patient location during evaluation: PACU  Patient participation: Yes- Able to Participate  Level of consciousness: awake and alert and oriented  Post-procedure vital signs: reviewed and stable  Pain management: adequate  Airway patency: patent    PONV status at discharge: No PONV  Anesthetic complications: no      Cardiovascular status: blood pressure returned to baseline, hemodynamically stable and stable  Respiratory status: unassisted, spontaneous ventilation and room air  Hydration status: euvolemic  Follow-up not needed.          Vitals Value Taken Time   /72 06/26/23 1402   Temp 36.4 °C (97.5 °F) 06/26/23 1402   Pulse 57 06/26/23 1402   Resp 14 06/26/23 1402   SpO2 97 % 06/26/23 1402         No case tracking events are documented in the log.      Pain/Nathaniel Score: Pain Rating Prior to Med Admin: 8 (6/26/2023 12:52 PM)  Pain Rating Post Med Admin: 7 (6/26/2023  1:21 PM)  Nathaniel Score: 10 (6/26/2023  2:08 PM)

## 2023-06-26 NOTE — DISCHARGE INSTRUCTIONS
What you should know:    Call the office for and appointment if one has not already been made.    You may experience common minor surgical & anesthesia related discomforts such as: ?  muscle aches, headaches, pain/discomfort, nausea & vomiting. These should improve in   24-48 hrs.    Swelling and discoloration/bruising are common around surgical incision; a cold pack over the   wound during the first 24 hours will help to minimize swelling and bruising.     You have received sedation/anesthesia today: You may NOT drive, drink alcohol, sign legal documents for the next 24 hours or longer if instructed by MD.    It is important that you stay ahead of the pain. Take pain medication as prescribed by   your doctor. After 24-48 hours, you may take Acetaminophen (Tylenol) or Ibuprofen   (Advil) for minor discomfort, unless you are restricted from using these.    Rest today: A responsible adult should stay with you the first night after your procedure.  You may resume regular activities 24 hours after the procedure.    You may slowly resume your regular diet as tolerated. Drink plenty of fluids the first 48 hours and you may resume your   usual diet.     Activity: No heavy lifting (over 10 pounds), pushing or pulling until your   post op visit. Please wear back brace as instructed. Your doctor's office may have told you to limit your lifting to less weight, or even no weight.  Be sure to follow those instructions.    Call the doctor for any of the following problems:   fever above 101,   severe pain, bleeding, or abdominal distention (swelling).   If constipated you may take any stool softener you choose.     Occasionally small areas of skin numbness or an unpleasant skin sensation can result. Also, you may find that your incision is swollen and tender for a few days.    Some redness around sutures and staples is a normal reaction, but if the discomfort persists or worsens, call you doctor. Please go to nearest Emergency  Room/call 911 if you feel your symptoms need immediate attention.        Fall Prevention  Millions of people fall every year and injure themselves. You may have had anesthesia or sedation which may increase your risk of falling. You may have health issues that put you at an increased risk of falling.     Here are ways to reduce your risk of falling.    Make your home safe by keeping walkways clear of objects you may trip over.  Use non-slip pads under rugs. Do not use area rugs or small throw rugs.  Use non-slip mats in bathtubs and showers.  Install handrails and lights on staircases.  Do not walk in poorly lit areas.  Do not stand on chairs or wobbly ladders.  Use caution when reaching overhead or looking upward. This position can cause a loss of balance.  Be sure your shoes fit properly, have non-slip bottoms and are in good condition.   Wear shoes both inside and out. Avoid going barefoot or wearing slippers.  Be cautious when going up and down stairs, curbs, and when walking on uneven sidewalks.  If your balance is poor, consider using a cane or walker.  If your fall was related to alcohol use, stop or limit alcohol intake.   If your fall was related to use of sleeping medicines, talk to your doctor about this. You may need to reduce your dosage at bedtime if you awaken during the night to go to the bathroom.    To reduce the need for nighttime bathroom trips:  Avoid drinking fluids for several hours before going to bed  Empty your bladder before going to bed  Men can keep a urinal at the bedside  Stay as active as you can. Balance, flexibility, strength, and endurance all come from exercise. They all play a role in preventing falls. Ask your healthcare provider which types of activity are right for you.  Get your vision checked on a regular basis.  If you have pets, know where they are before you stand up or walk so you don't trip over them.  Use night lights.

## 2023-06-26 NOTE — PROGRESS NOTES
Certification of Assistant at Surgery       Surgery Date: 6/26/2023     Participating Surgeons:  Surgeon(s) and Role:     * Layo Santiago MD - Primary       Rahel Woods PA-C - Assisting       Procedures:  Procedure(s) (LRB):  FUSION, SPINE, LUMBAR, TLIF, MINIMALLY INVASIVE (Left)    Assistant Surgeon's Certification of Necessity:  I understand that section 1842 (b) (6) (d) of the Social Security Act generally prohibits Medicare Part B reasonable charge payment for the services of assistants at surgery in teaching hospitals when qualified residents are available to furnish such services. I certify that the services for which payment is claimed were medically necessary, and that no qualified resident was available to perform the services. I further understand that these services are subject to post-payment review by the Medicare carrier.      Rahel Woods PA-C    06/26/2023  12:08 PM

## 2023-06-27 NOTE — DISCHARGE SUMMARY
[facility] - Surgery  Neurosurgery  Discharge Summary      Patient Name: Cherri Noonan  MRN: [id number]  Admission Date: 6/26/2023  Hospital Length of Stay: 0 days  Discharge Date and Time: 6/26/2023  3:57 PM  Attending Physician: Layo Santiago MD    Discharging Provider: Rahel Woods PA-C  Primary Care Provider: Mahsa Arevalo NP     HPI:   Ms. Cherri Noonan is a 57 y.o. woman with cervical radiculopathy and spondylolisthesis, referred to me by Rahel Woods PA-C, who presents today to \[facility]\"" care. This ia a patient who presented to me with chronic low back pain for the last 9 years. Per CR, she has a long h/o spinal interventions including a C5-7 ACDF in 2015 by Dr. Peña, cervical spinal cord stimulator, multiple cervical FANNY and trigger point injections, and physical therapy.  Pt continues to remain symptomatic despite conservative treatment. She reports her low back pain radiates down the LLE. Denies b/b dysfunction and SA.     Her primary concern today is of chronic b/l low back pain.  Her pain radiates into her b/l buttocks.  Since her most recent FANNY, she is had new radiation down her right leg, both anterior and posterior, generally stopping at the ankle.  Very occasionally she has radiation into her b/l feet.  She has tried physical therapy for her low back as well, and found it to be somewhat helpful.  She is currently attending the healthy back program and feels that her back is much stronger, but pain persists.  It is 8/10 today in clinic.  She denies saddle anesthesia and b/b dysfunction.  She further denies numbness and weakness in her legs.  She has no significant limitations with ambulation.  She can walk approximately 7 miles without stopping.    Procedure(s) (LRB):  FUSION, SPINE, LUMBAR, TLIF, MINIMALLY INVASIVE (Left)     Hospital Course:   6/26: MIS Left L4-5 TLIF with Dr. Santiago. Tolerated procedure well. LSO applied post-operatively. Woke with expected back pain but  denied leg pain. Discharge instructions reviewed with patient. All questions answered. Prescriptions sent to hospital pharmacy for bedside delivery. Discharged home.       Pending Diagnostic Studies:       None          Final Active Diagnoses:    Diagnosis Date Noted POA    PRINCIPAL PROBLEM:  S/P lumbar spinal fusion [Z98.1] 06/26/2023 Not Applicable      Problems Resolved During this Admission:      Discharged Condition: stable    Disposition: Home or Self Care    Follow Up:   Follow-up Information       Rahel Woods PA-C Follow up on 7/11/2023.    Specialty: Neurosurgery  Why: For wound re-check and staple removal at 11:30am  Contact information:  [address]  [address]  [facility] [address]  [phone]                           Patient Instructions:      Diet Adult Regular     Lifting restrictions     Other restrictions (specify):     No driving until:      Remove dressing in 72 hours     Notify your health care provider if you experience any of the following:  temperature >100.4     Notify your health care provider if you experience any of the following:  persistent nausea and vomiting or diarrhea     Notify your health care provider if you experience any of the following:  severe uncontrolled pain     Notify your health care provider if you experience any of the following:  redness, tenderness, or signs of infection (pain, swelling, redness, odor or green/yellow discharge around incision site)     Notify your health care provider if you experience any of the following:  difficulty breathing or increased cough     Notify your health care provider if you experience any of the following:  severe persistent headache     Notify your health care provider if you experience any of the following:  worsening rash     Notify your health care provider if you experience any of the following:  persistent dizziness, light-headedness, or visual disturbances     Notify your health care provider if you experience any  of the following:  increased confusion or weakness     Shower on day dressing removed (No bath)     Medications:  Reconciled Home Medications:      Medication List        START taking these medications      ondansetron 4 MG Tbdl  Commonly known as: ZOFRAN-ODT  Dissolve 1 tablet (4 mg total) by mouth every 6 (six) hours as needed (nausea).     oxyCODONE-acetaminophen  mg per tablet  Commonly known as: PERCOCET  Take 1 tablet by mouth every 4 (four) hours as needed for Pain (severe pain only (7-10/10)).            CHANGE how you take these medications      fish oil-omega-3 fatty acids 300-1,000 mg capsule  Take 1 capsule by mouth once daily.  Start taking on: July 1, 2023  What changed:   how much to take  These instructions start on July 1, 2023. If you are unsure what to do until then, ask your doctor or other care provider.     tumeric-ging-olive-oreg-capryl 100 mg-150 mg- 50 mg-150 mg Cap  Take 1 Units by mouth Daily.  Start taking on: July 1, 2023  What changed:   how much to take  when to take this  These instructions start on July 1, 2023. If you are unsure what to do until then, ask your doctor or other care provider.            CONTINUE taking these medications      azelastine 205.5 mcg (0.15 %) Spry  azelastine 205.5 mcg (0.15 %) nasal spray     b complex vitamins tablet  Take 1 tablet by mouth once daily.     ciclopirox 0.77 % Susp  Commonly known as: LOPROX  Apply topically 2 (two) times daily.     COVID-19 AT-HOME TEST Kit  Generic drug: COVID-19 antigen test  FOLLOW INSTRUCTIONS INCLUDED WITH THE PACKAGE.     ergocalciferol 50,000 unit Cap  Commonly known as: ERGOCALCIFEROL  Take 50,000 Units by mouth every 7 days.     estradioL 0.01 % (0.1 mg/gram) vaginal cream  Commonly known as: ESTRACE  Place 0.5 g vaginally twice a week. As needed for vaginal dryness.     fluticasone propionate 50 mcg/actuation nasal spray  Commonly known as: FLONASE  1 spray (50 mcg total) by Each Nostril route once daily.      ginseng 250 mg Cap  Take by mouth.     multivitamin capsule  Take 1 capsule by mouth once daily.     tiZANidine 4 MG tablet  Commonly known as: ZANAFLEX  TAKE 1 TABLET (4 MG TOTAL) BY MOUTH NIGHTLY AS NEEDED FOR MUSCLE SPASM     vitamin E (dl, acetate) 45 mg (100 unit) Cap  Take 100 Units by mouth.     vitamin E 100 UNIT capsule  Take 100 Units by mouth once daily.     zinc gluconate 50 mg tablet  Take 50 mg by mouth once daily.     zonisamide 100 MG Cap  Commonly known as: ZONEGRAN  TAKE 4 CAPSULES BY MOUTH ONCE DAILY            STOP taking these medications      naproxen 500 MG tablet  Commonly known as: NAPWALE Woods PA-C  Neurosurgery  [facility] - Surgery

## 2023-06-28 ENCOUNTER — HOSPITAL ENCOUNTER (OUTPATIENT)
Dept: RADIOLOGY | Facility: HOSPITAL | Age: 58
Discharge: HOME | End: 2023-06-28
Attending: NEUROLOGICAL SURGERY
Payer: COMMERCIAL

## 2023-06-28 DIAGNOSIS — Z98.1 STATUS POST LUMBAR SPINAL FUSION: ICD-10-CM

## 2023-06-28 DIAGNOSIS — Z98.1 STATUS POST LUMBAR SPINAL FUSION: Primary | ICD-10-CM

## 2023-06-28 PROCEDURE — 72131 CT LUMBAR SPINE WITHOUT CONTRAST: ICD-10-PCS | Mod: 26,,, | Performed by: STUDENT IN AN ORGANIZED HEALTH CARE EDUCATION/TRAINING PROGRAM

## 2023-06-28 PROCEDURE — 72131 CT LUMBAR SPINE W/O DYE: CPT | Mod: 26,,, | Performed by: STUDENT IN AN ORGANIZED HEALTH CARE EDUCATION/TRAINING PROGRAM

## 2023-06-28 PROCEDURE — 72131 CT LUMBAR SPINE W/O DYE: CPT | Mod: TC

## 2023-06-28 RX ORDER — METHOCARBAMOL 500 MG/1
TABLET, FILM COATED ORAL
Qty: 40 TABLET | Refills: 1 | Status: SHIPPED | OUTPATIENT
Start: 2023-06-28

## 2023-06-28 NOTE — TELEPHONE ENCOUNTER
Returned pt's call  States she is being active but the muscle spasms are waking her up and not allowing her to sit comfortably.    Taking tizanidine every 6 hrs but it makes her sleepy and she doesn't like it.    Also states she cannot hold her urine. She feels the need to go but can't get to the bathroom fast enough.    Discussed with Dr Santiago. Will get a CT and ordered Methocarbamol to replace Tizanidine.    ----- Message from Bulmaro Hua sent at 6/28/2023  8:49 AM CDT -----  Regarding: Post Op call back  Contact: [phone]  Cherri Noonan calling regarding Patient Advice (message) for #Hi, pt had surgery on Monday and pt is having severe muscle spasms. Pt is asking for the nurse to give her a call to discuss the spasms at  [phone] to spk with the pt.

## 2023-06-28 NOTE — TELEPHONE ENCOUNTER
----- Message from Bulmaro Hua sent at 6/28/2023  8:49 AM CDT -----  Regarding: Post Op call back  Contact: [phone]  Cherri Noonan calling regarding Patient Advice (message) for #Hi, pt had surgery on Monday and pt is having severe muscle spasms. Pt is asking for the nurse to give her a call to discuss the spasms at  [phone] to spk with the pt.

## 2023-07-11 ENCOUNTER — OFFICE VISIT (OUTPATIENT)
Dept: NEUROSURGERY | Facility: CLINIC | Age: 58
End: 2023-07-11
Payer: COMMERCIAL

## 2023-07-11 VITALS
HEIGHT: 64 IN | SYSTOLIC BLOOD PRESSURE: 114 MMHG | DIASTOLIC BLOOD PRESSURE: 74 MMHG | TEMPERATURE: 98 F | WEIGHT: 147.06 LBS | OXYGEN SATURATION: 99 % | HEART RATE: 79 BPM | BODY MASS INDEX: 25.11 KG/M2

## 2023-07-11 DIAGNOSIS — Z98.1 STATUS POST LUMBAR SPINAL FUSION: Primary | ICD-10-CM

## 2023-07-11 PROCEDURE — 99024 POSTOP FOLLOW-UP VISIT: CPT | Mod: S$GLB,,, | Performed by: PHYSICIAN ASSISTANT

## 2023-07-11 PROCEDURE — 3008F PR BODY MASS INDEX (BMI) DOCUMENTED: ICD-10-PCS | Mod: CPTII,S$GLB,, | Performed by: PHYSICIAN ASSISTANT

## 2023-07-11 PROCEDURE — 3078F PR MOST RECENT DIASTOLIC BLOOD PRESSURE < 80 MM HG: ICD-10-PCS | Mod: CPTII,S$GLB,, | Performed by: PHYSICIAN ASSISTANT

## 2023-07-11 PROCEDURE — 3078F DIAST BP <80 MM HG: CPT | Mod: CPTII,S$GLB,, | Performed by: PHYSICIAN ASSISTANT

## 2023-07-11 PROCEDURE — 1160F RVW MEDS BY RX/DR IN RCRD: CPT | Mod: CPTII,S$GLB,, | Performed by: PHYSICIAN ASSISTANT

## 2023-07-11 PROCEDURE — 1160F PR REVIEW ALL MEDS BY PRESCRIBER/CLIN PHARMACIST DOCUMENTED: ICD-10-PCS | Mod: CPTII,S$GLB,, | Performed by: PHYSICIAN ASSISTANT

## 2023-07-11 PROCEDURE — 3008F BODY MASS INDEX DOCD: CPT | Mod: CPTII,S$GLB,, | Performed by: PHYSICIAN ASSISTANT

## 2023-07-11 PROCEDURE — 99999 PR PBB SHADOW E&M-EST. PATIENT-LVL V: CPT | Mod: PBBFAC,,, | Performed by: PHYSICIAN ASSISTANT

## 2023-07-11 PROCEDURE — 3074F PR MOST RECENT SYSTOLIC BLOOD PRESSURE < 130 MM HG: ICD-10-PCS | Mod: CPTII,S$GLB,, | Performed by: PHYSICIAN ASSISTANT

## 2023-07-11 PROCEDURE — 99024 PR POST-OP FOLLOW-UP VISIT: ICD-10-PCS | Mod: S$GLB,,, | Performed by: PHYSICIAN ASSISTANT

## 2023-07-11 PROCEDURE — 1159F PR MEDICATION LIST DOCUMENTED IN MEDICAL RECORD: ICD-10-PCS | Mod: CPTII,S$GLB,, | Performed by: PHYSICIAN ASSISTANT

## 2023-07-11 PROCEDURE — 99999 PR PBB SHADOW E&M-EST. PATIENT-LVL V: ICD-10-PCS | Mod: PBBFAC,,, | Performed by: PHYSICIAN ASSISTANT

## 2023-07-11 PROCEDURE — 1159F MED LIST DOCD IN RCRD: CPT | Mod: CPTII,S$GLB,, | Performed by: PHYSICIAN ASSISTANT

## 2023-07-11 PROCEDURE — 3074F SYST BP LT 130 MM HG: CPT | Mod: CPTII,S$GLB,, | Performed by: PHYSICIAN ASSISTANT

## 2023-07-11 RX ORDER — ACETAMINOPHEN 500 MG
500 TABLET ORAL EVERY 6 HOURS PRN
COMMUNITY

## 2023-07-11 NOTE — PROGRESS NOTES
Wound Check   Neurosurgery     Cherri Noonan is a 57 y.o. female who presents to clinic today for 2 week wound check, s/p MIS L4-5 TLIF with Dr. Santiago.  Denies fevers, chills, night sweats or N/V. Further denies wound drainage or swelling. Very occasional use of percocet for pain. Walking daily. +bm and urination without difficulty.         Physical Exam:   General: well developed, well nourished, no distress  Neurologic: Alert and oriented. Thought content appropriate.   GCS: Motor: 6/Verbal: 5/Eyes: 4 GCS Total: 15   Mental Status: Awake, Alert, Oriented x3   Cranial nerves: face symmetric, tongue midline, pupils equal, round, reactive to light with accomodation, EOMI.   Motor Strength: moves all extremities with good strength and tone 5/5 BLE  Sensation: response to light touch throughout  No gait disturbances     Incision is clean, dry and intact with no signs of erythema, swelling or purulent drainage. Staples are intact on exam and removed in clinic. All skin edges are completely approximated.       Vitals:    07/11/23 0915   BP: 114/74   Pulse: 79   Temp: 98.4 °F (36.9 °C)             Assessment/Plan:   Cherri Noonan is a 57 y.o. female who presents for 2 week wound check, s/p MIS Left L4-5 TLIF with Dr. Santiago.  Doing very well post-operatively with reduced leg pain.     -Keep incision open to air   -Avoid anti-inflammatories for anther 6 weeks. These include tumeric, ibuprofen, excedrin, mobic, celebrex, aleve, naproxen, etc. Tylenol may be taken for mild-moderate pain.   -OK to resume fish oil   -continue LSO brace whenever out of bed   -Apply medihoney twice per day until redness resolves  -Can shower and get incision wet, just pat dry and no vigorous scrubbing. Do not submerge incision for another 4 weeks.   -No lifting more than 10 lbs or excessive bending/twisting.   -Can drive after 4 weeks and when no longer taking narcotics   -Follow up with Dr. Santiago in 4 weeks with new xrays  -Encouraged patient  to call if they have any questions or concerns prior to next follow up appt        Rahel Woods PA-C  [facility]  Department of Neurosurgery  [phone]

## 2023-07-11 NOTE — PATIENT INSTRUCTIONS
-Keep incision open to air   -Apply medihoney twice per day until redness resolves. Call with any worsening   -Can shower and get incision wet, just pat dry and no vigorous scrubbing. Do not submerge incision for another 4 weeks.     -Avoid anti-inflammatories for anther 6 weeks. These include tumeric, ibuprofen, excedrin, mobic, celebrex, aleve, naproxen, etc. Tylenol may be taken for mild-moderate pain.   -OK to resume fish oil     -continue LSO brace whenever out of bed   -No lifting more than 10 lbs or excessive bending/twisting.   -Can drive after 4 weeks and when no longer taking narcotics     -Follow up with Dr. Santiago in 4 weeks with new xrays  -Please call with any questions or concerns prior to your next appointment.

## 2023-07-20 ENCOUNTER — PATIENT MESSAGE (OUTPATIENT)
Dept: NEUROSURGERY | Facility: CLINIC | Age: 58
End: 2023-07-20
Payer: COMMERCIAL

## 2023-07-24 ENCOUNTER — PATIENT MESSAGE (OUTPATIENT)
Dept: RESEARCH | Facility: HOSPITAL | Age: 58
End: 2023-07-24
Payer: COMMERCIAL

## 2023-07-31 ENCOUNTER — PATIENT MESSAGE (OUTPATIENT)
Dept: RESEARCH | Facility: HOSPITAL | Age: 58
End: 2023-07-31
Payer: COMMERCIAL

## 2023-08-09 ENCOUNTER — OFFICE VISIT (OUTPATIENT)
Dept: NEUROSURGERY | Facility: CLINIC | Age: 58
End: 2023-08-09
Payer: COMMERCIAL

## 2023-08-09 ENCOUNTER — PATIENT MESSAGE (OUTPATIENT)
Dept: NEUROSURGERY | Facility: CLINIC | Age: 58
End: 2023-08-09

## 2023-08-09 ENCOUNTER — HOSPITAL ENCOUNTER (OUTPATIENT)
Dept: RADIOLOGY | Facility: HOSPITAL | Age: 58
Discharge: HOME | End: 2023-08-09
Attending: NEUROLOGICAL SURGERY
Payer: COMMERCIAL

## 2023-08-09 VITALS
OXYGEN SATURATION: 100 % | TEMPERATURE: 99 F | HEIGHT: 64 IN | BODY MASS INDEX: 25.56 KG/M2 | DIASTOLIC BLOOD PRESSURE: 83 MMHG | WEIGHT: 149.69 LBS | HEART RATE: 69 BPM | SYSTOLIC BLOOD PRESSURE: 139 MMHG

## 2023-08-09 DIAGNOSIS — Z98.1 STATUS POST LUMBAR SPINAL FUSION: ICD-10-CM

## 2023-08-09 DIAGNOSIS — Z98.1 STATUS POST LUMBAR SPINAL FUSION: Primary | ICD-10-CM

## 2023-08-09 PROCEDURE — 99999 PR PBB SHADOW E&M-EST. PATIENT-LVL V: CPT | Mod: PBBFAC,,, | Performed by: NEUROLOGICAL SURGERY

## 2023-08-09 PROCEDURE — 3079F PR MOST RECENT DIASTOLIC BLOOD PRESSURE 80-89 MM HG: ICD-10-PCS | Mod: CPTII,S$GLB,, | Performed by: NEUROLOGICAL SURGERY

## 2023-08-09 PROCEDURE — 99024 POSTOP FOLLOW-UP VISIT: CPT | Mod: S$GLB,,, | Performed by: NEUROLOGICAL SURGERY

## 2023-08-09 PROCEDURE — 99999 PR PBB SHADOW E&M-EST. PATIENT-LVL V: ICD-10-PCS | Mod: PBBFAC,,, | Performed by: NEUROLOGICAL SURGERY

## 2023-08-09 PROCEDURE — 72100 X-RAY EXAM L-S SPINE 2/3 VWS: CPT | Mod: 26,,, | Performed by: STUDENT IN AN ORGANIZED HEALTH CARE EDUCATION/TRAINING PROGRAM

## 2023-08-09 PROCEDURE — 1160F RVW MEDS BY RX/DR IN RCRD: CPT | Mod: CPTII,S$GLB,, | Performed by: NEUROLOGICAL SURGERY

## 2023-08-09 PROCEDURE — 72100 XR LUMBAR SPINE AP AND LATERAL: ICD-10-PCS | Mod: 26,,, | Performed by: STUDENT IN AN ORGANIZED HEALTH CARE EDUCATION/TRAINING PROGRAM

## 2023-08-09 PROCEDURE — 3079F DIAST BP 80-89 MM HG: CPT | Mod: CPTII,S$GLB,, | Performed by: NEUROLOGICAL SURGERY

## 2023-08-09 PROCEDURE — 3075F PR MOST RECENT SYSTOLIC BLOOD PRESS GE 130-139MM HG: ICD-10-PCS | Mod: CPTII,S$GLB,, | Performed by: NEUROLOGICAL SURGERY

## 2023-08-09 PROCEDURE — 99024 PR POST-OP FOLLOW-UP VISIT: ICD-10-PCS | Mod: S$GLB,,, | Performed by: NEUROLOGICAL SURGERY

## 2023-08-09 PROCEDURE — 72100 X-RAY EXAM L-S SPINE 2/3 VWS: CPT | Mod: TC,FY

## 2023-08-09 PROCEDURE — 1160F PR REVIEW ALL MEDS BY PRESCRIBER/CLIN PHARMACIST DOCUMENTED: ICD-10-PCS | Mod: CPTII,S$GLB,, | Performed by: NEUROLOGICAL SURGERY

## 2023-08-09 PROCEDURE — 1159F PR MEDICATION LIST DOCUMENTED IN MEDICAL RECORD: ICD-10-PCS | Mod: CPTII,S$GLB,, | Performed by: NEUROLOGICAL SURGERY

## 2023-08-09 PROCEDURE — 3008F PR BODY MASS INDEX (BMI) DOCUMENTED: ICD-10-PCS | Mod: CPTII,S$GLB,, | Performed by: NEUROLOGICAL SURGERY

## 2023-08-09 PROCEDURE — 3008F BODY MASS INDEX DOCD: CPT | Mod: CPTII,S$GLB,, | Performed by: NEUROLOGICAL SURGERY

## 2023-08-09 PROCEDURE — 1159F MED LIST DOCD IN RCRD: CPT | Mod: CPTII,S$GLB,, | Performed by: NEUROLOGICAL SURGERY

## 2023-08-09 PROCEDURE — 3075F SYST BP GE 130 - 139MM HG: CPT | Mod: CPTII,S$GLB,, | Performed by: NEUROLOGICAL SURGERY

## 2023-08-09 NOTE — PROGRESS NOTES
Subjective:   I, Josefina Dean, attest that this documentation has been prepared under the direction and in the presence of Layo Santiago MD.     Patient ID: Cherri Noonan is a 57 y.o. female     Chief Complaint: Follow-up (6 wk P/O TLIF w X/R)      HPI  Ms. Cherri Noonan is a 57 y.o. woman with lumbar radiculopathy and spondylolisthesis who presents today for  6 wk PO of MIS L4-5 TLIF done on 6/26/2023. This is a patient who presented to me with chronic low back pain for the last 9 years. Per CR, she has a long h/o spinal interventions including a C5-7 ACDF in 2015 by Dr. Peña, cervical spinal cord stimulator, multiple cervical FANNY and trigger point injections, and physical therapy.  Pt continues to remain symptomatic despite conservative treatment. She reports her low back pain radiates down the LLE. Denies b/b dysfunction and SA.  Her primary concern today is of chronic b/l low back pain.  Her pain radiates into her b/l buttocks.  Since her most recent FANNY, she is had new radiation down her right leg, both anterior and posterior, generally stopping at the ankle.  Very occasionally she has radiation into her b/l feet.  She has tried physical therapy for her low back as well, and found it to be somewhat helpful.  She is currently attending the healthy back program and feels that her back is much stronger, but pain persists.  It is 8/10 today in clinic.  She denies saddle anesthesia and b/b dysfunction.  She further denies numbness and weakness in her legs.  She has no significant limitations with ambulation.  She can walk approximately 7 miles without stopping.  MRI Lumbar Spine WO Contrast (1/30/2023): Lumbar degenerative changes most pronounced at L4-L5 noting grade 1 anterolisthesis, bilateral ligamentum flavum buckling and bilateral facet arthropathy contributing to severe spinal canal and lateral recess stenosis and mild-to-moderate neural foraminal narrowing.  I have recommended MIS Left L4-5 TLIF. I  have discussed the risks/benefits, indications, and alternatives for the proposed procedure in detail. I have answered all of their questions and patient wish to proceed with surgery.     Today the pt reports she is not currently ambulating or partaking in physical activity. She is doing well in recovery and only c/o mild myalgias around the incision site. She is compliant with wearing a back brace. Pt also c/o significant head pressure 2/2 a sinus infection.     Review of Systems   Constitutional:  Negative for activity change, appetite change, fatigue, fever and unexpected weight change.   HENT:  Negative for facial swelling.    Eyes: Negative.    Respiratory: Negative.     Cardiovascular: Negative.    Gastrointestinal:  Negative for diarrhea, nausea and vomiting.   Endocrine: Negative.    Genitourinary: Negative.    Musculoskeletal:  Positive for myalgias. Negative for back pain, joint swelling and neck pain.   Neurological:  Positive for headaches. Negative for dizziness, seizures, weakness and numbness.   Psychiatric/Behavioral: Negative.          Past Medical History:   Diagnosis Date    Cervical radiculopathy        Objective:      Vitals:    08/09/23 1237   BP: 139/83   Pulse: 69   Temp: 98.9 °F (37.2 °C)      Physical Exam  Constitutional:       General: She is not in acute distress.     Appearance: Normal appearance.   HENT:      Head: Normocephalic and atraumatic.   Pulmonary:      Effort: Pulmonary effort is normal.   Musculoskeletal:      Cervical back: Neck supple.   Neurological:      Mental Status: She is alert and oriented to person, place, and time.      GCS: GCS eye subscore is 4. GCS verbal subscore is 5. GCS motor subscore is 6.      Cranial Nerves: No cranial nerve deficit.         IMAGING:  CT Lumbar Spine WO Contrast (6/28/23):  Postoperative changes of L4-5 laminectomy, discectomy, and posterior instrumented fusion.  Hardware appears intact.  Improved lumbar alignment. Nonspecific  hyperdensity noted posterior to the L4 vertebral body and operative level disc space which may represent postoperative material or hematoma.  This hyperdensity appears to narrow the L4-5 left anterior canal and left neural foramen. Diffuse paraspinal soft tissue thickening and edema surrounding the operative level.  Scattered foci of air within the paraspinal soft tissues and spinal canal.  No organized fluid collection noting decreased sensitivity on noncontrast exam.    I have personally reviewed the images with the pt.      I, Dr. Layo Santiago, personally performed the services described in this documentation. All medical record entries made by the scribe, Josefina Dean, were at my direction and in my presence.  I have reviewed the chart and agree that the record reflects my personal performance and is accurate and complete. Layo Santiago MD. 08/09/2023    Assessment:       1. Status post lumbar spinal fusion         Plan:   I have personally reviewed the CT lumbar spine with the pt which shows postoperative changes of L4-5 laminectomy, discectomy, and posterior instrumented fusion.  Hardware appears intact.  Improved lumbar alignment. Nonspecific hyperdensity noted posterior to the L4 vertebral body and operative level disc space which may represent postoperative material or hematoma.  This hyperdensity appears to narrow the L4-5 left anterior canal and left neural foramen. Diffuse paraspinal soft tissue thickening and edema surrounding the operative level.  Scattered foci of air within the paraspinal soft tissues and spinal canal.  No organized fluid collection noting decreased sensitivity on noncontrast exam.    Continue wearing the back brace when partaking in outdoor physical activities. Pt may not need to utilize her back brace when indoors. I do not recommend lifting any more than 30 lbs. She may partake in physical exercises that do not strain her back. I recommend she lay in supine, or similar, when  engaging in lifting weights.     I will schedule the patient for 6 month follow up with lumbar spine.

## 2023-08-09 NOTE — PATIENT INSTRUCTIONS
I have personally reviewed the CT lumbar spine with the pt which shows postoperative changes of L4-5 laminectomy, discectomy, and posterior instrumented fusion.  Hardware appears intact.  Improved lumbar alignment. Nonspecific hyperdensity noted posterior to the L4 vertebral body and operative level disc space which may represent postoperative material or hematoma.  This hyperdensity appears to narrow the L4-5 left anterior canal and left neural foramen. Diffuse paraspinal soft tissue thickening and edema surrounding the operative level.  Scattered foci of air within the paraspinal soft tissues and spinal canal.  No organized fluid collection noting decreased sensitivity on noncontrast exam.    Continue wearing the back brace when partaking in outdoor physical activities. Pt may not need to utilize her back brace when indoors or when sleeping. I do not recommend lifting any more than 30 lbs. She may partake in physical exercises that do not strain her back. I recommend she lay in supine, or similar, when engaging in lifting weights.     I will schedule the patient for 6 month follow up with lumbar spine.

## 2023-08-10 ENCOUNTER — PATIENT MESSAGE (OUTPATIENT)
Dept: NEUROSURGERY | Facility: CLINIC | Age: 58
End: 2023-08-10
Payer: COMMERCIAL

## 2023-09-01 ENCOUNTER — OFFICE VISIT (OUTPATIENT)
Dept: INTERNAL MEDICINE | Facility: CLINIC | Age: 58
End: 2023-09-01
Payer: COMMERCIAL

## 2023-09-01 ENCOUNTER — PATIENT MESSAGE (OUTPATIENT)
Dept: INTERNAL MEDICINE | Facility: CLINIC | Age: 58
End: 2023-09-01

## 2023-09-01 ENCOUNTER — HOSPITAL ENCOUNTER (OUTPATIENT)
Dept: RADIOLOGY | Facility: HOSPITAL | Age: 58
Discharge: HOME | End: 2023-09-01
Attending: NURSE PRACTITIONER
Payer: COMMERCIAL

## 2023-09-01 DIAGNOSIS — E78.49 OTHER HYPERLIPIDEMIA: ICD-10-CM

## 2023-09-01 DIAGNOSIS — M79.2 PERIPHERAL NEUROPATHIC PAIN: ICD-10-CM

## 2023-09-01 DIAGNOSIS — J01.90 ACUTE BACTERIAL SINUSITIS: Primary | ICD-10-CM

## 2023-09-01 DIAGNOSIS — B96.89 ACUTE BACTERIAL SINUSITIS: Primary | ICD-10-CM

## 2023-09-01 DIAGNOSIS — J01.90 ACUTE BACTERIAL SINUSITIS: ICD-10-CM

## 2023-09-01 DIAGNOSIS — G89.29 CHRONIC RIGHT SHOULDER PAIN: ICD-10-CM

## 2023-09-01 DIAGNOSIS — M25.511 CHRONIC RIGHT SHOULDER PAIN: ICD-10-CM

## 2023-09-01 DIAGNOSIS — M47.819 SPONDYLOSIS WITHOUT MYELOPATHY: ICD-10-CM

## 2023-09-01 DIAGNOSIS — B96.89 ACUTE BACTERIAL SINUSITIS: ICD-10-CM

## 2023-09-01 DIAGNOSIS — J30.9 ALLERGIC RHINITIS, UNSPECIFIED SEASONALITY, UNSPECIFIED TRIGGER: ICD-10-CM

## 2023-09-01 DIAGNOSIS — Z98.1 S/P LUMBAR SPINAL FUSION: ICD-10-CM

## 2023-09-01 DIAGNOSIS — M54.12 CERVICAL RADICULOPATHY: ICD-10-CM

## 2023-09-01 PROCEDURE — 73030 X-RAY EXAM OF SHOULDER: CPT | Mod: TC,FY,RT

## 2023-09-01 PROCEDURE — 73030 XR SHOULDER COMPLETE 2 OR MORE VIEWS RIGHT: ICD-10-PCS | Mod: 26,RT,, | Performed by: RADIOLOGY

## 2023-09-01 PROCEDURE — 73030 X-RAY EXAM OF SHOULDER: CPT | Mod: 26,RT,, | Performed by: RADIOLOGY

## 2023-09-01 PROCEDURE — 99214 OFFICE O/P EST MOD 30 MIN: CPT | Mod: 95,,, | Performed by: NURSE PRACTITIONER

## 2023-09-01 PROCEDURE — 99214 PR OFFICE/OUTPT VISIT, EST, LEVL IV, 30-39 MIN: ICD-10-PCS | Mod: 95,,, | Performed by: NURSE PRACTITIONER

## 2023-09-01 RX ORDER — AMOXICILLIN AND CLAVULANATE POTASSIUM 875; 125 MG/1; MG/1
1 TABLET, FILM COATED ORAL EVERY 12 HOURS
Qty: 14 TABLET | Refills: 0 | Status: SHIPPED | OUTPATIENT
Start: 2023-09-01 | End: 2023-09-01 | Stop reason: SDUPTHER

## 2023-09-01 RX ORDER — OMEPRAZOLE 20 MG/1
20 CAPSULE, DELAYED RELEASE ORAL DAILY
Qty: 30 CAPSULE | Refills: 0 | Status: SHIPPED | OUTPATIENT
Start: 2023-09-01 | End: 2023-09-25

## 2023-09-01 RX ORDER — AMOXICILLIN AND CLAVULANATE POTASSIUM 875; 125 MG/1; MG/1
1 TABLET, FILM COATED ORAL EVERY 12 HOURS
Qty: 14 TABLET | Refills: 0 | Status: SHIPPED | OUTPATIENT
Start: 2023-09-01 | End: 2024-02-29

## 2023-09-01 NOTE — PROGRESS NOTES
INTERNAL MEDICINE URGENT CARE NOTE    CHIEF COMPLAINT     Chief Complaint   Patient presents with    Arm Pain    Nasal Congestion       HPI     Cherri Noonan is a 57 y.o. female who presents for an urgent visit today.    The patient location is: home, [place]  The chief complaint leading to consultation is: congestion, arm pain    Visit type: audiovisual    Face to Face time with patient: 25 minutes of total time spent on the encounter, which includes face to face time and non-face to face time preparing to see the patient (eg, review of tests), Obtaining and/or reviewing separately obtained history, Documenting clinical information in the electronic or other health record, Independently interpreting results (not separately reported) and communicating results to the patient/family/caregiver, or Care coordination (not separately reported).         Each patient to whom he or she provides medical services by telemedicine is:  (1) informed of the relationship between the physician and patient and the respective role of any other health care provider with respect to management of the patient; and (2) notified that he or she may decline to receive medical services by telemedicine and may withdraw from such care at any time.    Notes:      Here with c/o itchy eyes, sore throat, headaches, chills, nasal drainage, cough one and off x 2 months   No documented fevers   Attributes to [place] sand and pollen   Taking flonase and over the counter cold medicine   Also added green tea with honey and lemon   Took mom to the doctor 1 week ago     S/p lumbar fusion with Dr Santiago 6/26/2023    Right shoulder pain - since she received tow injections in that shoulder. Pain at rest and with mvmt. Constant soreness.   No certain mvmts make it worse   Full ROM   Constant soreness and sharp pains that wake her up     Pain to the lower abd bilaterally and epigastric pain - and reflux -           Past Medical History:  Past Medical History:    Diagnosis Date    Cervical radiculopathy        Home Medications:  Prior to Admission medications    Medication Sig Start Date End Date Taking? Authorizing Provider   acetaminophen (TYLENOL) 500 MG tablet Take 500 mg by mouth every 6 (six) hours as needed for Pain.    Provider, Historical   azelastine 205.5 mcg (0.15 %) Spry azelastine 205.5 mcg (0.15 %) nasal spray    Provider, Historical   b complex vitamins tablet Take 1 tablet by mouth once daily.    Provider, Historical   ciclopirox (LOPROX) 0.77 % Susp Apply topically 2 (two) times daily.    Provider, Historical   COVID-19 AT-HOME TEST Kit FOLLOW INSTRUCTIONS INCLUDED WITH THE PACKAGE. 4/20/23   Provider, Historical   ergocalciferol (ERGOCALCIFEROL) 50,000 unit Cap Take 50,000 Units by mouth every 7 days.    Provider, Historical   estradioL (ESTRACE) 0.01 % (0.1 mg/gram) vaginal cream Place 0.5 g vaginally twice a week. As needed for vaginal dryness. 5/4/23   Cali Sarmiento MD   fluticasone propionate (FLONASE) 50 mcg/actuation nasal spray 1 spray (50 mcg total) by Each Nostril route once daily. 5/8/23   Colette Sarmiento MD   ginseng 250 mg Cap Take by mouth.    Provider, Historical   methocarbamoL (ROBAXIN) 500 MG Tab Take 1 tablet (500 mg) by mouth up to every 6 hours as needed for Muscle Spasm. 6/28/23   Gloria Anaya PA-C   multivitamin capsule Take 1 capsule by mouth once daily.    Provider, Historical   omega-3 fatty acids/fish oil (FISH OIL-OMEGA-3 FATTY ACIDS) 300-1,000 mg capsule Take 1 capsule by mouth once daily. 7/1/23   Rahel Woods PA-C   ondansetron (ZOFRAN-ODT) 4 MG TbDL Dissolve 1 tablet (4 mg total) by mouth every 6 (six) hours as needed (nausea). 6/26/23   Rahel Woods PA-C   oxyCODONE-acetaminophen (PERCOCET)  mg per tablet Take 1 tablet by mouth every 4 (four) hours as needed for Pain (severe pain only (7-10/10)). 6/26/23   Rahel Woods, LIVAN   tiZANidine (ZANAFLEX) 4 MG tablet TAKE 1 TABLET (4 MG  TOTAL) BY MOUTH NIGHTLY AS NEEDED FOR MUSCLE SPASM 6/22/23   Archana Knott MD   tumeric-ging-olive-oreg-capryl 100 mg-150 mg- 50 mg-150 mg Cap Take 1 Units by mouth Daily.  Patient not taking: Reported on 7/11/2023 7/1/23   Rahel Woods PA-C   vitamin E 100 UNIT capsule Take 100 Units by mouth once daily.    Provider, Historical   vitamin E, dl,tocopheryl acet, (VITAMIN E, DL, ACETATE,) 45 mg (100 unit) Cap Take 100 Units by mouth.    Provider, Historical   zinc gluconate 50 mg tablet Take 50 mg by mouth once daily.    Provider, Historical   zonisamide (ZONEGRAN) 100 MG Cap TAKE 4 CAPSULES BY MOUTH ONCE DAILY 6/22/23   Archana Knott MD       Review of Systems:  Review of Systems   Constitutional:  Positive for chills. Negative for fever.   HENT:  Positive for ear pain, postnasal drip, rhinorrhea and sore throat.    Respiratory:  Positive for cough. Negative for shortness of breath and wheezing.    Cardiovascular:  Negative for chest pain.   Musculoskeletal:  Positive for myalgias.   Skin:  Negative for rash.   Allergic/Immunologic: Negative for environmental allergies.   Neurological:  Positive for headaches.       Health Maintainence:   Immunizations:  Health Maintenance         Date Due Completion Date    Shingles Vaccine (2 of 2) 05/24/2022 3/29/2022    COVID-19 Vaccine (4 - Booster for Domenico series) 10/12/2022 8/17/2022    Influenza Vaccine (1) 09/01/2023 9/28/2022    Mammogram 12/27/2023 12/27/2022    Hemoglobin A1c (Diabetic Prevention Screening) 03/29/2025 3/29/2022    Colorectal Cancer Screening 10/13/2026 12/17/2021    Lipid Panel 02/09/2028 2/9/2023    TETANUS VACCINE 09/28/2032 9/28/2022             PHYSICAL EXAM     There were no vitals taken for this visit.    Physical Exam  Constitutional:       General: She is not in acute distress.     Appearance: Normal appearance.   Pulmonary:      Effort: No respiratory distress.   Musculoskeletal:      Right shoulder: Tenderness present. Normal range  of motion. Normal strength.        Arms:    Neurological:      General: No focal deficit present.      Mental Status: She is alert and oriented to person, place, and time.         LABS     Lab Results   Component Value Date    HGBA1C 5.6 03/29/2022     CMP  Sodium   Date Value Ref Range Status   06/12/2023 140 136 - 145 mmol/L Final     Potassium   Date Value Ref Range Status   06/12/2023 3.7 3.5 - 5.1 mmol/L Final     Chloride   Date Value Ref Range Status   06/12/2023 109 95 - 110 mmol/L Final     CO2   Date Value Ref Range Status   06/12/2023 23 23 - 29 mmol/L Final     Glucose   Date Value Ref Range Status   06/12/2023 75 70 - 110 mg/dL Final     BUN   Date Value Ref Range Status   06/12/2023 14 6 - 20 mg/dL Final     Creatinine   Date Value Ref Range Status   06/12/2023 1.0 0.5 - 1.4 mg/dL Final     Calcium   Date Value Ref Range Status   06/12/2023 9.3 8.7 - 10.5 mg/dL Final     Total Protein   Date Value Ref Range Status   03/29/2022 7.9 6.0 - 8.4 g/dL Final     Albumin   Date Value Ref Range Status   03/29/2022 4.5 3.5 - 5.2 g/dL Final     Total Bilirubin   Date Value Ref Range Status   03/29/2022 0.5 0.1 - 1.0 mg/dL Final     Comment:     For infants and newborns, interpretation of results should be based  on gestational age, weight and in agreement with clinical  observations.    Premature Infant recommended reference ranges:  Up to 24 hours.............<8.0 mg/dL  Up to 48 hours............<12.0 mg/dL  3-5 days..................<15.0 mg/dL  6-29 days.................<15.0 mg/dL       Alkaline Phosphatase   Date Value Ref Range Status   03/29/2022 100 55 - 135 U/L Final     AST   Date Value Ref Range Status   03/29/2022 17 10 - 40 U/L Final     ALT   Date Value Ref Range Status   03/29/2022 23 10 - 44 U/L Final     Anion Gap   Date Value Ref Range Status   06/12/2023 8 8 - 16 mmol/L Final     eGFR if    Date Value Ref Range Status   03/29/2022 >60.0 >60 mL/min/1.73 m^2 Final     eGFR if non     Date Value Ref Range Status   03/29/2022 >60.0 >60 mL/min/1.73 m^2 Final     Comment:     Calculation used to obtain the estimated glomerular filtration  rate (eGFR) is the CKD-EPI equation.        Lab Results   Component Value Date    WBC 5.38 06/12/2023    HGB 12.4 06/12/2023    HCT 38.6 06/12/2023    MCV 78 (L) 06/12/2023     06/12/2023     Lab Results   Component Value Date    CHOL 230 (H) 03/29/2022     Lab Results   Component Value Date    HDL 62 03/29/2022     Lab Results   Component Value Date    LDLCALC 152.0 03/29/2022     Lab Results   Component Value Date    TRIG 80 03/29/2022     Lab Results   Component Value Date    CHOLHDL 27.0 03/29/2022     Lab Results   Component Value Date    TSH 0.862 03/29/2022       ASSESSMENT/PLAN     Cherri Noonan is a 57 y.o. female     Acute bacterial sinusitis- will start augmentin and cotn flonase and zyrtec and mucinex DM   -     amoxicillin-clavulanate 875-125mg (AUGMENTIN) 875-125 mg per tablet; Take 1 tablet by mouth every 12 (twelve) hours.  Dispense: 14 tablet; Refill: 0    Allergic rhinitis, unspecified seasonality, unspecified trigger- will start augmentin and cotn flonase and zyrtec and mucinex DM     Chronic right shoulder pain- will send for imaging and if non contributory will refer to Ortho   -     X-ray Shoulder 2 or More Views Right; Future; Expected date: 09/01/2023    Cervical radiculopathy- will monitor- possible cause of shoulder pain but will work up shoulder with imaging     Peripheral neuropathic pain    Other hyperlipidemia    S/P lumbar spinal fusion    Spondylosis without myelopathy    GERD-  -     omeprazole (PRILOSEC) 20 MG capsule; Take 1 capsule (20 mg total) by mouth once daily.  Dispense: 30 capsule; Refill: 0           Follow up with PCP    Patient education provided from [facility]. Patient was counseled on when and how to seek emergent care.       Mahsa Arevalo MN, APRN, FNP-c   Department of Internal  [facility] maureen  7:34 AM    Answers submitted by the patient for this visit:  Cough Questionnaire (Submitted on 9/1/2023)  Chief Complaint: Cough  Chronicity: new  Onset: in the past 7 days  Progression since onset: gradually improving  Frequency: hourly  Cough characteristics: non-productive, productive of purulent sputum  ear congestion: Yes  heartburn: Yes  hemoptysis: No  nasal congestion: Yes  sweats: Yes  weight loss: No  Aggravated by: pollens  asthma: No  bronchiectasis: No  bronchitis: No  COPD: No  emphysema: No  pneumonia: No  Treatments tried: OTC cough suppressant, body position changes, cool air, rest  Improvement on treatment: mild

## 2023-09-04 RX ORDER — FLUTICASONE PROPIONATE 50 MCG
SPRAY, SUSPENSION (ML) NASAL
Qty: 48 ML | Refills: 1 | Status: SHIPPED | OUTPATIENT
Start: 2023-09-04

## 2023-09-04 NOTE — TELEPHONE ENCOUNTER
Refill Routing Note     Refill Routing Note   Medication(s) are not appropriate for processing by [facility] for the following reason(s):      Non-participating provider  No active prescription written by provider    [facility] action(s):  Route Care Due:  None identified            Appointments  past 12m or future 3m with PCP    Date Provider   Last Visit   9/1/2023 Mahsa Arevalo NP   Next Visit   Visit date not found Mahsa Arevalo NP   ED visits in past 90 days: 0        Note composed:7:14 AM 09/04/2023

## 2023-09-07 ENCOUNTER — OFFICE VISIT (OUTPATIENT)
Dept: ORTHOPEDICS | Facility: CLINIC | Age: 58
End: 2023-09-07
Payer: COMMERCIAL

## 2023-09-07 DIAGNOSIS — G89.29 CHRONIC RIGHT SHOULDER PAIN: ICD-10-CM

## 2023-09-07 DIAGNOSIS — M25.511 CHRONIC RIGHT SHOULDER PAIN: ICD-10-CM

## 2023-09-07 PROCEDURE — 1159F MED LIST DOCD IN RCRD: CPT | Mod: CPTII,S$GLB,, | Performed by: ORTHOPAEDIC SURGERY

## 2023-09-07 PROCEDURE — 1159F PR MEDICATION LIST DOCUMENTED IN MEDICAL RECORD: ICD-10-PCS | Mod: CPTII,S$GLB,, | Performed by: ORTHOPAEDIC SURGERY

## 2023-09-07 PROCEDURE — 1160F RVW MEDS BY RX/DR IN RCRD: CPT | Mod: CPTII,S$GLB,, | Performed by: ORTHOPAEDIC SURGERY

## 2023-09-07 PROCEDURE — 99214 OFFICE O/P EST MOD 30 MIN: CPT | Mod: S$GLB,,, | Performed by: ORTHOPAEDIC SURGERY

## 2023-09-07 PROCEDURE — 99214 PR OFFICE/OUTPT VISIT, EST, LEVL IV, 30-39 MIN: ICD-10-PCS | Mod: S$GLB,,, | Performed by: ORTHOPAEDIC SURGERY

## 2023-09-07 PROCEDURE — 1160F PR REVIEW ALL MEDS BY PRESCRIBER/CLIN PHARMACIST DOCUMENTED: ICD-10-PCS | Mod: CPTII,S$GLB,, | Performed by: ORTHOPAEDIC SURGERY

## 2023-09-07 PROCEDURE — 99999 PR PBB SHADOW E&M-EST. PATIENT-LVL IV: ICD-10-PCS | Mod: PBBFAC,,, | Performed by: ORTHOPAEDIC SURGERY

## 2023-09-07 PROCEDURE — 99999 PR PBB SHADOW E&M-EST. PATIENT-LVL IV: CPT | Mod: PBBFAC,,, | Performed by: ORTHOPAEDIC SURGERY

## 2023-09-07 RX ORDER — MELOXICAM 15 MG/1
15 TABLET ORAL DAILY
Qty: 30 TABLET | Refills: 0 | Status: SHIPPED | OUTPATIENT
Start: 2023-09-07

## 2023-09-07 NOTE — PROGRESS NOTES
"Assessment: 57 y.o. female with right shoulder pain after vaccination - source of pain unclear    I explained my diagnostic impression and the reasoning behind it in detail, using layman's terms.      Plan:   -  Mobic 15 mg PO QD x 2 weeks then PRN. The patient was advised that NSAID-type medications have important potential side effects: gastrointestinal irritation, GI bleeding, cardiac effects and renal injuries. Take the medication with food and to stop and call the office for any GI upset, vomiting, abdominal pain or black/bloody stools. The patient expresses understanding of these issues and questions were answered.  - Can consider PT, pain management referral   - Heat   - Return to clinic in 12 weeks. Return sooner if symptoms worsen or fail to improve.    All questions were answered in detail. The patient is in full agreement with the treatment plan and will proceed accordingly.    Chief Complaint   Patient presents with    Right Shoulder - Pain       Initial visit (9/7/23): Cherri Noonan is a 57 y.o. female who presents today complaining of right shoulder pain    Duration of symptoms:  over 1 year   Trauma or new activity: pain started after getting covid and tetanus vaccines in the right shoulder   Pain is constant  Aggravating factors: no known  Relieving factors: rest  Pain is sharp, localized to the subdeltoid fossa. Sometimes at night she notes "shooting pain"  Night pain is present and is disruptive to sleep  Radicular symptoms: no numbness, paresthesias   Associated symptoms:  no limited range of motion.    Prior treatment:   Has tried stretching the shoulder and "moving it around - demonstrated repetitive shrugging of the shoulder). Has pushed through pain to do these things because she was afraid of getting stiff. Has tried massage with temporary relief. No medications, heat or ice, PT, injections  Pain does interfere with sleep and activities of daily living .    Has a cervical spinal cord " stimulator     Had lumbar fusion with Dr Santiago in June. Has been able to participate with PT with minimal pain - did involve some upper body strengthening exercises     This patient was seen in consultation at the request of Mahsa Arevalo    This is the extent of the patient's complaints at this time.     Hand dominance: Right     Occupation: Retired        Review of patient's allergies indicates:   Allergen Reactions    Shingrix (pf) [varicella-zoster [id number] (pf)]      Got all side effects listed     Physical Exam:   Vitals:    09/07/23 0945   PainSc:   3   PainLoc: Shoulder       General: Patient is alert, awake and oriented to time, place and person. Mood and affect are appropriate.  Patient does not appear to be in any distress, denies any constitutional symptoms and appears stated age.   HEENT: Pupils are equal and round, sclera are not injected. External examination of ears and nose reveals no abnormalities. Cranial nerves II-X are grossly intact  Neck: examination demonstrates painful limited active range of motion. Spurling's sign is negative  Skin: no rashes, abrasions or open wounds on the affected extremity   Resp: No respiratory distress or audible wheezing   CV: 2+  pulses, all extremities warm and well perfused   Right Shoulder    Shoulder Range of Motion    Right     Left   (Active/Passive)       Forward Elevation     165/165            165/165  External rotation (abduction)   90/90             90/90   External rotation (arm at side)  45/45             45/45   Internal rotation in abduction   70/70                        70/70   Internal rotation behind the back  L5             L5     Range of motion is not painful     Acromioclavicular joint is not tender  Crossbody test: negative    Neer's negative  Hawkin's negative    Kenyatta's negative  Drop arm negative  Belly press negative      Cuff Strength     Right     Left   Supraspinatus        5/5    5/5  Infraspinatus     5/5    5/5  Subscapularis      5/5    5/5    Deltoid testing            5/5    5/5    Speeds negative  Yergasons negative    Elbow examination demonstrates no tenderness to palpation and has normal range of motion.     ltsi C5-T1  + epl, io, fds, fdp   2+ RP      Imaging: 3 views of the right shoulder:  positive for degenerative changes of the AC joint. The humeral head is well centered on the AP and axillary views.  TThere is not significant degenerative change of the glenohumeral joint or posterior subluxation of the humeral head. No acute changes or fracture.      I personally reviewed and interpreted the patient's imaging obtained today in clinic     A note notifying Mahsa Irina of my findings was sent via the electronic medical record     This note was created by combination of typed  and M-Modal dictation. Transcription and phonetic errors may be present.  If there are any questions, please contact me.      Current Outpatient Medications:     acetaminophen (TYLENOL) 500 MG tablet, Take 500 mg by mouth every 6 (six) hours as needed for Pain., Disp: , Rfl:     amoxicillin-clavulanate 875-125mg (AUGMENTIN) 875-125 mg per tablet, Take 1 tablet by mouth every 12 (twelve) hours., Disp: 14 tablet, Rfl: 0    azelastine 205.5 mcg (0.15 %) Spry, azelastine 205.5 mcg (0.15 %) nasal spray, Disp: , Rfl:     b complex vitamins tablet, Take 1 tablet by mouth once daily., Disp: , Rfl:     ciclopirox (LOPROX) 0.77 % Susp, Apply topically 2 (two) times daily., Disp: , Rfl:     COVID-19 AT-HOME TEST Kit, FOLLOW INSTRUCTIONS INCLUDED WITH THE PACKAGE., Disp: , Rfl:     ergocalciferol (ERGOCALCIFEROL) 50,000 unit Cap, Take 50,000 Units by mouth every 7 days., Disp: , Rfl:     estradioL (ESTRACE) 0.01 % (0.1 mg/gram) vaginal cream, Place 0.5 g vaginally twice a week. As needed for vaginal dryness., Disp: 42.5 g, Rfl: 1    fluticasone propionate (FLONASE) 50 mcg/actuation nasal spray, USE 1 SPRAY (50 MCG TOTAL) IN EACH NOSTRIL ONCE DAILY,  Disp: 48 mL, Rfl: 1    ginseng 250 mg Cap, Take by mouth., Disp: , Rfl:     methocarbamoL (ROBAXIN) 500 MG Tab, Take 1 tablet (500 mg) by mouth up to every 6 hours as needed for Muscle Spasm., Disp: 40 tablet, Rfl: 1    multivitamin capsule, Take 1 capsule by mouth once daily., Disp: , Rfl:     omega-3 fatty acids/fish oil (FISH OIL-OMEGA-3 FATTY ACIDS) 300-1,000 mg capsule, Take 1 capsule by mouth once daily., Disp: , Rfl:     omeprazole (PRILOSEC) 20 MG capsule, Take 1 capsule (20 mg total) by mouth once daily., Disp: 30 capsule, Rfl: 0    ondansetron (ZOFRAN-ODT) 4 MG TbDL, Dissolve 1 tablet (4 mg total) by mouth every 6 (six) hours as needed (nausea)., Disp: 15 tablet, Rfl: 0    oxyCODONE-acetaminophen (PERCOCET)  mg per tablet, Take 1 tablet by mouth every 4 (four) hours as needed for Pain (severe pain only (7-10/10))., Disp: 42 tablet, Rfl: 0    tiZANidine (ZANAFLEX) 4 MG tablet, TAKE 1 TABLET (4 MG TOTAL) BY MOUTH NIGHTLY AS NEEDED FOR MUSCLE SPASM, Disp: 90 tablet, Rfl: 2    tumeric-ging-olive-oreg-capryl 100 mg-150 mg- 50 mg-150 mg Cap, Take 1 Units by mouth Daily. (Patient not taking: Reported on 7/11/2023), Disp: , Rfl:     vitamin E 100 UNIT capsule, Take 100 Units by mouth once daily., Disp: , Rfl:     vitamin E, dl,tocopheryl acet, (VITAMIN E, DL, ACETATE,) 45 mg (100 unit) Cap, Take 100 Units by mouth., Disp: , Rfl:     zinc gluconate 50 mg tablet, Take 50 mg by mouth once daily., Disp: , Rfl:     zonisamide (ZONEGRAN) 100 MG Cap, TAKE 4 CAPSULES BY MOUTH ONCE DAILY, Disp: 360 capsule, Rfl: 2    Past Medical History:   Diagnosis Date    Cervical radiculopathy        Active Problem List with Overview Notes    Diagnosis Date Noted    S/P lumbar spinal fusion 06/26/2023 6/26/23: Left MIS L4-5 TLIF with Dr. Santiago       Other osteoarthritis of spine 11/17/2022    Spondylosis without myelopathy 11/17/2022    Decreased range of motion of trunk and back 08/12/2022    Decreased strength of trunk and  back 08/12/2022    Poor posture 08/12/2022    Weakness of both lower extremities 08/12/2022    Cervical radiculopathy 03/29/2022    Complex regional pain syndrome 03/29/2022    Sleep disorder 03/29/2022    Peripheral neuropathic pain 03/29/2022    Class 1 obesity with body mass index (BMI) of 31.0 to 31.9 in adult 03/29/2022    Chronic rhinitis 12/02/2021    Chest pain, atypical 06/08/2020    Vitamin D deficiency 12/04/2019    Other hyperlipidemia 11/18/2019    Atrophic vaginitis 11/19/2018       Past Surgical History:   Procedure Laterality Date    CERVICAL FUSION  08/2015    C5-C7    HYSTERECTOMY      INJECTION OF ANESTHETIC AGENT AROUND NERVE Bilateral 10/31/2022    Procedure: BLOCK, NERVE, BILATERAL L3-L4-L5 MEDIAL BRANCH;  Surgeon: Archana Knott MD;  Location: [facility];  Service: Pain Management;  Laterality: Bilateral;    INJECTION OF ANESTHETIC AGENT AROUND NERVE Bilateral 11/17/2022    Procedure: BLOCK, NERVE BILATERAL L3,L4,L5 MEDIAL BRANCH;  Surgeon: Archana Knott MD;  Location: [facility];  Service: Pain Management;  Laterality: Bilateral;    MINIMALLY INVASIVE TRANSFORAMINAL LUMBAR INTERBODY FUSION (TLIF) Left 6/26/2023    Procedure: FUSION, SPINE, LUMBAR, TLIF, MINIMALLY INVASIVE;  Surgeon: Layo Santiago MD;  Location: [facility];  Service: Neurosurgery;  Laterality: Left;  MIS Left L4-5 TLIF   fluoro  Andrew table 4 poster  Neuromonitoring  spinewave  SPINEWAVE [employer]USHANT [phone] TEXTED ТАТЬЯНА ON 6/15/2023 @ 11:29AM. ТАТЬЯНА WILDE RESPONDED ON 6/15/2023 @ 11:35AM-LO  NEUROMONITORING ТАТЬЯНА ARANDA [phone]  RN    RADIOFREQUENCY ABLATION Left 12/8/2022    Procedure: RADIOFREQUENCY ABLATION, LEFT L3,L4,L5 MEDIAL BRANCH ONE OF TWO;  Surgeon: Archana Knott MD;  Location: [facility];  Service: Pain Management;  Laterality: Left;    RADIOFREQUENCY ABLATION Right 12/22/2022    Procedure: RADIOFREQUENCY ABLATION, RIGHT L3,L4,L5 MEDIAL BRANCH TWO OF TWO;  Surgeon: Archana Knott MD;  Location: [facility]  [facility]IN [facility]GT;  Service: Pain Management;  Laterality: Right;    SPINAL CORD STIMULATOR IMPLANT  10/20/2021    TRANSFORAMINAL EPIDURAL INJECTION OF STEROID Bilateral 3/13/2023    Procedure: INJECTION, STEROID, EPIDURAL, TRANSFORAMINAL APPROACH BILATERAL L5/S1;  Surgeon: Archana Knott MD;  Location: [facility];  Service: Pain Management;  Laterality: Bilateral;    TUBAL LIGATION         Social History     Socioeconomic History    Marital status:    Tobacco Use    Smoking status: Never    Smokeless tobacco: Never   Substance and Sexual Activity    Alcohol use: Never    Drug use: Never    Sexual activity: Not Currently     Partners: Male     Birth control/protection: None     Comment:  38+years

## 2023-09-23 DIAGNOSIS — K21.9 GASTROESOPHAGEAL REFLUX DISEASE, UNSPECIFIED WHETHER ESOPHAGITIS PRESENT: Primary | ICD-10-CM

## 2023-09-24 NOTE — TELEPHONE ENCOUNTER
Refill Routing Note   Medication(s) are not appropriate for processing by [facility] for the following reason(s):      Non-participating provider    [facility] action(s):  Route Care Due:  None identified            Appointments  past 12m or future 3m with PCP    Date Provider   Last Visit   9/1/2023 Mahsa Arevalo NP   Next Visit   Visit date not found Mahsa Arevalo NP   ED visits in past 90 days: 0        Note composed:2:11 AM 09/24/2023

## 2023-09-25 RX ORDER — OMEPRAZOLE 20 MG/1
20 CAPSULE, DELAYED RELEASE ORAL
Qty: 30 CAPSULE | Refills: 0 | Status: SHIPPED | OUTPATIENT
Start: 2023-09-25

## 2023-10-27 ENCOUNTER — PATIENT MESSAGE (OUTPATIENT)
Dept: NEUROSURGERY | Facility: CLINIC | Age: 58
End: 2023-10-27
Payer: COMMERCIAL

## 2023-10-27 DIAGNOSIS — N95.1 VAGINAL DRYNESS, MENOPAUSAL: ICD-10-CM

## 2023-10-27 RX ORDER — ESTRADIOL 0.1 MG/G
0.5 CREAM VAGINAL
Qty: 42.5 G | Refills: 1 | Status: SHIPPED | OUTPATIENT
Start: 2023-10-30

## 2023-10-27 NOTE — TELEPHONE ENCOUNTER
Refill Routing Note   Medication(s) are not appropriate for processing by [facility] for the following reason(s):      Non-participating provider    [facility] action(s):  Route Care Due:  None identified            Appointments  past 12m or future 3m with PCP    Date Provider   Last Visit   5/3/2023 Cali Sarmiento MD   Next Visit   Visit date not found Cali Sarmiento MD   ED visits in past 90 days: 0        Note composed:12:35 PM 10/27/2023

## 2023-10-31 ENCOUNTER — TELEPHONE (OUTPATIENT)
Dept: NEUROSURGERY | Facility: CLINIC | Age: 58
End: 2023-10-31
Payer: COMMERCIAL

## 2023-11-01 ENCOUNTER — OFFICE VISIT (OUTPATIENT)
Dept: NEUROSURGERY | Facility: CLINIC | Age: 58
End: 2023-11-01
Payer: COMMERCIAL

## 2023-11-01 VITALS
DIASTOLIC BLOOD PRESSURE: 79 MMHG | HEART RATE: 66 BPM | WEIGHT: 158.06 LBS | OXYGEN SATURATION: 99 % | BODY MASS INDEX: 26.98 KG/M2 | SYSTOLIC BLOOD PRESSURE: 138 MMHG | HEIGHT: 64 IN

## 2023-11-01 DIAGNOSIS — Z98.1 STATUS POST LUMBAR SPINAL FUSION: ICD-10-CM

## 2023-11-01 DIAGNOSIS — M43.16 SPONDYLOLISTHESIS AT L4-L5 LEVEL: Primary | ICD-10-CM

## 2023-11-01 DIAGNOSIS — M54.9 BACK PAIN, UNSPECIFIED BACK LOCATION, UNSPECIFIED BACK PAIN LATERALITY, UNSPECIFIED CHRONICITY: ICD-10-CM

## 2023-11-01 PROCEDURE — 1159F MED LIST DOCD IN RCRD: CPT | Mod: CPTII,S$GLB,, | Performed by: PHYSICIAN ASSISTANT

## 2023-11-01 PROCEDURE — 1160F PR REVIEW ALL MEDS BY PRESCRIBER/CLIN PHARMACIST DOCUMENTED: ICD-10-PCS | Mod: CPTII,S$GLB,, | Performed by: PHYSICIAN ASSISTANT

## 2023-11-01 PROCEDURE — 1159F PR MEDICATION LIST DOCUMENTED IN MEDICAL RECORD: ICD-10-PCS | Mod: CPTII,S$GLB,, | Performed by: PHYSICIAN ASSISTANT

## 2023-11-01 PROCEDURE — 3078F PR MOST RECENT DIASTOLIC BLOOD PRESSURE < 80 MM HG: ICD-10-PCS | Mod: CPTII,S$GLB,, | Performed by: PHYSICIAN ASSISTANT

## 2023-11-01 PROCEDURE — 99213 PR OFFICE/OUTPT VISIT, EST, LEVL III, 20-29 MIN: ICD-10-PCS | Mod: S$GLB,,, | Performed by: PHYSICIAN ASSISTANT

## 2023-11-01 PROCEDURE — 3078F DIAST BP <80 MM HG: CPT | Mod: CPTII,S$GLB,, | Performed by: PHYSICIAN ASSISTANT

## 2023-11-01 PROCEDURE — 3075F SYST BP GE 130 - 139MM HG: CPT | Mod: CPTII,S$GLB,, | Performed by: PHYSICIAN ASSISTANT

## 2023-11-01 PROCEDURE — 1160F RVW MEDS BY RX/DR IN RCRD: CPT | Mod: CPTII,S$GLB,, | Performed by: PHYSICIAN ASSISTANT

## 2023-11-01 PROCEDURE — 3008F PR BODY MASS INDEX (BMI) DOCUMENTED: ICD-10-PCS | Mod: CPTII,S$GLB,, | Performed by: PHYSICIAN ASSISTANT

## 2023-11-01 PROCEDURE — 3075F PR MOST RECENT SYSTOLIC BLOOD PRESS GE 130-139MM HG: ICD-10-PCS | Mod: CPTII,S$GLB,, | Performed by: PHYSICIAN ASSISTANT

## 2023-11-01 PROCEDURE — 99213 OFFICE O/P EST LOW 20 MIN: CPT | Mod: S$GLB,,, | Performed by: PHYSICIAN ASSISTANT

## 2023-11-01 PROCEDURE — 3008F BODY MASS INDEX DOCD: CPT | Mod: CPTII,S$GLB,, | Performed by: PHYSICIAN ASSISTANT

## 2023-11-01 NOTE — PROGRESS NOTES
[facility]   Neurosurgery     Interval history 11/1/23:  Patient returns to clinic today for evaluation of increased back pain, s/p MIS L4-5 TLIF with Dr. Santiago on 6/26/23. She has continued to wear her back brace as instructed, perhaps a little more often than is necessary because it helps her back pain. She reports a return of pain that is similar to what she felt before surgery. She is adamant that she is not overdoing it and has following the post-op restrictions to the letter. I was initially concerned that she may have been immobile, but she confirmed that she has been walking daily and moving throughout the day, just avoiding the extra care she would have normally provided for her extended family. Denies new numbness or weakness. Denies trauma or falls. Back pain>left leg pain>right leg pain.       HPI 7/11/23:  Cherri Noonan is a 58 y.o. female who presents to clinic today for 2 week wound check, s/p MIS L4-5 TLIF with Dr. Santiago.  Denies fevers, chills, night sweats or N/V. Further denies wound drainage or swelling. Very occasional use of percocet for pain. Walking daily. +bm and urination without difficulty.         Physical Exam:   General: well developed, well nourished, no distress  Neurologic: Alert and oriented. Thought content appropriate.   GCS: Motor: 6/Verbal: 5/Eyes: 4 GCS Total: 15   Mental Status: Awake, Alert, Oriented x3   Cranial nerves: face symmetric, tongue midline, pupils equal, round, reactive to light with accomodation, EOMI.   Motor Strength: moves all extremities with good strength and tone 5/5 BLE  Sensation: response to light touch throughout  No gait disturbances     Incision is clean, dry and intact with no signs of erythema, swelling or purulent drainage. Staples are intact on exam and removed in clinic. All skin edges are completely approximated.       Vitals:    11/01/23 1111   BP: 138/79   Pulse: 66       Imaging:  I have personally reviewed imaging and agree with the findings.      Xray lumbar spine 11/2/23:   Stable hardware placement    Xray lumbar spine 8/9/23:  Stable hardware placement     CT lumbar spine 6/28/23:  Satisfactory hardware placement     Assessment/Plan:   Cherri Noonan is a 58 y.o. female who presents 4 months post-op for evaluation of increased low back pain that feels similar to her pre-op sxs. She is s/p MIS Left L4-5 TLIF with Dr. Santiago on 6/26/23. She had been doing very well and following post-op restrictions faithfully. However, she is now experiencing increased low back pain. Xrays ordered today show stable hardware placement, and she is intact on exam. I recommend she try physical therapy for conditioning of her back and core muscles. She should start to wean out of the brace for everyday activities but continue to use it for PT, grocery trips, or anything that puts her at risk of injury/strain. FU in 2 months, sooner for new or worsening sxs.     -Encouraged patient to call if they have any questions or concerns prior to next follow up appt        Rahel Woods PA-C  [facility]  Department of Neurosurgery  [phone]

## 2023-11-02 ENCOUNTER — APPOINTMENT (OUTPATIENT)
Dept: RADIOLOGY | Facility: HOSPITAL | Age: 58
End: 2023-11-02
Attending: PHYSICIAN ASSISTANT
Payer: COMMERCIAL

## 2023-11-02 ENCOUNTER — CLINICAL SUPPORT (OUTPATIENT)
Dept: REHABILITATION | Facility: HOSPITAL | Age: 58
End: 2023-11-02
Payer: COMMERCIAL

## 2023-11-02 DIAGNOSIS — M54.9 BACK PAIN, UNSPECIFIED BACK LOCATION, UNSPECIFIED BACK PAIN LATERALITY, UNSPECIFIED CHRONICITY: ICD-10-CM

## 2023-11-02 DIAGNOSIS — R29.898 WEAKNESS OF BOTH LOWER EXTREMITIES: ICD-10-CM

## 2023-11-02 DIAGNOSIS — Z98.1 STATUS POST LUMBAR SPINAL FUSION: ICD-10-CM

## 2023-11-02 DIAGNOSIS — M25.69 DECREASED RANGE OF MOTION OF TRUNK AND BACK: Primary | ICD-10-CM

## 2023-11-02 DIAGNOSIS — R29.898 DECREASED STRENGTH OF TRUNK AND BACK: ICD-10-CM

## 2023-11-02 PROCEDURE — 72100 XR LUMBAR SPINE AP AND LATERAL  UPRIGHT: ICD-10-PCS | Mod: 26,,, | Performed by: RADIOLOGY

## 2023-11-02 PROCEDURE — 97112 NEUROMUSCULAR REEDUCATION: CPT | Mod: PN

## 2023-11-02 PROCEDURE — 97162 PT EVAL MOD COMPLEX 30 MIN: CPT | Mod: PN

## 2023-11-02 PROCEDURE — 72100 X-RAY EXAM L-S SPINE 2/3 VWS: CPT | Mod: 26,,, | Performed by: RADIOLOGY

## 2023-11-02 PROCEDURE — 72100 X-RAY EXAM L-S SPINE 2/3 VWS: CPT | Mod: TC,FY,PN

## 2023-11-02 NOTE — PROGRESS NOTES
[facility]PAT[facility] THERAPY AND [facility]   Physical Therapy Initial Evaluation      Name: Cherri Noonan  Clinic Number: [id number]    Therapy Diagnosis:   Encounter Diagnoses   Name Primary?    Status post lumbar spinal fusion     Back pain, unspecified back location, unspecified back pain laterality, unspecified chronicity     Decreased range of motion of trunk and back Yes    Decreased strength of trunk and back     Weakness of both lower extremities         Physician: Rahel Woods, *    Physician Orders: PT Eval and Treat Lumbar spine fusion  Medical Diagnosis from Referral: Status post lumbar spinal fusion [Z98.1], Back pain, unspecified back location, unspecified back pain laterality, unspecified chronicity [M54.9]  Evaluation Date: 11/2/2023  Authorization Period Expiration: 10/31/2024  Plan of Care Expiration: 12/31/2023  Progress Note Due: 12/1/2023  Date of Surgery: 06/26/2023  Visit # / Visits authorized: 1/ 1   FOTO: 1/ 3    Precautions: Standard     Time In: 1102  Time Out: 1200  Total Billable Time: 45 minutes    Subjective     Date of onset: 06/26/2023    History of current condition - Cherri reports: that she has had surgery to fuse L4/5 in June. She notes that she started feeling more stiff and tight but overall since the weather is cooling down. She notes that immediately following surgery her pain felt significantly better. She notes that she had PT right before surgery, noted she was pushing weights as quick as she could. She notes that during recovery she started feeling the same pain within 3 weeks of surgery. Denies post surgical physical therapy.  She notes that she was feeling pain and burning and the same symptoms as before surgery. She notes that it is continuing to be challenging for her. She notes that she follows her rules to avoid irritating or causing problems with her lumbar surgery. She notes that when she started getting angry her symptoms started to worsen, around 3 weeks  She says he has a sore throat. Has white patches  At the back of his throat. Ran a fever yesterday. Ibuprofen brought it down. Would like to have something sent to [facility]   "ago.     She notes that she has not bent since surgery, only recently started driving again. Has been advised to use her back brace with grocery shopping, driving.     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   Prior Level of Function: Noted able to do all tasks with increasing lumbar pain and radicular pain into L LE.   Current Level of Function: Noted limited ability to bend, transfer, squat, and lift due to LBP and increasing L LE pain.     Pain:  Current 7/10, worst 10/10, best 5/10   Location: bilateral back, left leg from lower back to foot on lateral thigh.  Description: Aching, Burning, and Shooting  Aggravating Factors: Bending, lifting, anger  Easing Factors:  Massage, rest, and medication     Patients goals: Be able to travel without pain, be able to improve as much as possible prior to discharge.      Medical History:   Past Medical History:   Diagnosis Date    Cervical radiculopathy        Surgical History:   Cherri Noonan  has a past surgical history that includes Hysterectomy; Tubal ligation; Spinal cord stimulator implant (10/20/2021); Cervical fusion (08/2015); Injection of anesthetic agent around nerve (Bilateral, 10/31/2022); Injection of anesthetic agent around nerve (Bilateral, 11/17/2022); Radiofrequency ablation (Left, 12/8/2022); Radiofrequency ablation (Right, 12/22/2022); Transforaminal epidural injection of steroid (Bilateral, 3/13/2023); and Minimally invasive transforaminal lumbar interbody fusion (TLIF) (Left, 6/26/2023).    Medications:   Cherri has a current medication list which includes the following prescription(s): acetaminophen, amoxicillin-clavulanate 875-125mg, azelastine, b complex vitamins, ciclopirox, covid-19 at-home " "test, ergocalciferol, estradiol, fluticasone propionate, ginseng, meloxicam, methocarbamol, multivitamin, fish oil-omega-3 fatty acids, omeprazole, ondansetron, oxycodone-acetaminophen, tizanidine, tumeric-ging-olive-oreg-capryl, vitamin e, vitamin e (dl, acetate), zinc gluconate, and zonisamide.    Allergies:   Review of patient's allergies indicates:   Allergen Reactions    Shingrix (pf) [varicella-zoster [id number] (pf)]      Got all side effects listed        Objective      Posture:  Increased lumbar lordosis, limited hip extension in standing.       Functional Movements:   Gait: Trendelenburg L>R, minimal hip extension during stance phase with increased lumbar extension/rotation compensation  OH Squat: Significant lumbar hinging, poor posterior chain recruitment.   SLS: Trendelenburg L>R, WFL.      Standing Thoracolumbar Range of Motion:    % Observation Pain   Flexion 90 Flat lumbar spine, no flexion through spine, full hip ROM No   Extension 100 Hinging at lower lumbar spine Yes   Right Rotation 75 Limited left hip rotation Yes   Left Rotation 100 WFL Yes   Right Sidebend 80 -1" from TF jt line Yes   Left Sidebend 100 +1 past TF jt line Yes       Hip Passive Range of Motion:    Right Left   Flexion 110+ 110+   Extension 9 0   Internal Rotation 15 15   External Rotation 40 50       Ankle Passive Range of Motion:    Right Left   Dorsi Flexion WNL   WNL   1st MTP Extension NT   NT       Lower Extremity Strength:   Right  Left    Hip flexor 4+/5 4-/5   Hip abductor 4-/5 3+/5   Hip ER 4+/5 4-/5   Hip IR NT NT   Hip extensor:  4-/5 4-/5   Core flexors 3/5 3/5       Special Tests:   Observation/Result   Repeated Flexion Reduced pain in supine   Repeated Extension Increasing pain at lumbar spine in standing   Repeated Extension NT in prone   Quadrant NT   Spring Test  Reproduction of pain at L4-S1   Prone Hypermobility Test NT   Prone LE Extension Lumbar extension compensation         MSI Observation    Bent Knee Fall " "Out Increased lumbopelvic rocking                 Neural Tension Testing:   Slump:NT  SLR: + Left at 50% of Right.            Intake Outcome Measure for FOTO Lumbar Survey    Therapist reviewed FOTO scores for Cherri Noonan on 11/2/2023.   FOTO report - see Media section or FOTO account episode details.    Intake Score: 44%  Expected score 59% at 15 visits    M-VINH: 34% (Higher Score = Greater Disability)         Treatment     Total Treatment time (time-based codes) separate from Evaluation: 15 minutes     Cherri received the treatments listed below:      therapeutic exercises to develop strength, endurance, ROM, and flexibility for -- minutes including:  TO ADD:     -Upright bike  -Standing hip flexor stretch  -4" lateral step up (glute med focus)  -Banded lateral walk      manual therapy techniques: Joint mobilizations, Myofacial release, and Soft tissue Mobilization were applied to the: Lumbar spine for -- minutes, including:  PRN to address increasing tone or pain at lumbar spine    neuromuscular re-education activities to improve: Balance, Coordination, Kinesthetic, and Proprioception for 15 minutes. The following activities were included:  PNE: Introduction to pain as sensation, analogy of cold vs pain and how we have varying levels of intensity and education regarding thresholds for action vs inaction.     TO ADD:   -Pallof press  -TrA retraining from supine to quadruped as appropriate  -Hip hinging intervention  -Supine hip flexor eccentrics    Patient Education and Home Exercises     Education provided:   - POC interview, education regarding impairments and ability to address impairments contributing to pain.    Written Home Exercises Provided:  Day 2 . Exercises were reviewed and Cherri was able to demonstrate them prior to the end of the session.  Cherri demonstrated good  understanding of the education provided. See EMR under Patient Instructions for exercises provided during therapy " sessions.    Assessment     Cherri is a 58 y.o. female referred to outpatient Physical Therapy and presents with s/s consistent with low back pain following L4/5 fusion completed 6/23. They present with impairments in lumbo-pelvic control, muscle stability, hip extension ROM, and lumbo-pelvic and lower quarter strength that limit their ability to transfer, stand, walk, and tolerate prolonged activity. This further limits their tolerance to ADLs, self-care, travel, and maintaining health with active lifestyle due to pain. They present with impairments in knowledge, understanding, and ability to self-manage or self-treat documented condition based upon subjective and objective measures outlined. Based on the impairments, functional limitations, and complaints outlined they require skilled physical therapy services to facilitate a functional and efficient recovery.     Patient prognosis is Good.   Patient will benefit from skilled outpatient Physical Therapy to address the deficits stated above and in the chart below, provide patient /family education, and to maximize patientt's level of independence.     Plan of care discussed with patient: Yes  Patient's spiritual, cultural and educational needs considered and patient is agreeable to the plan of care and goals as stated below:     Anticipated Barriers for therapy: Chronicity of complaints, time since surgery, difficulty with setting patient goals around desired lifestyle and difficulty determining SINSS of patient complaints.     Medical Necessity is demonstrated by the following  History  Co-morbidities and personal factors that may impact the plan of care [] LOW: no personal factors / co-morbidities  [x] MODERATE: 1-2 personal factors / co-morbidities  [] HIGH: 3+ personal factors / co-morbidities    Moderate / High Support Documentation:   Co-morbidities affecting plan of care: cervical pain and cervical spine stimulator    Personal Factors:   lifestyle      Examination  Body Structures and Functions, activity limitations and participation restrictions that may impact the plan of care [x] LOW: addressing 1-2 elements  [] MODERATE: 3+ elements  [] HIGH: 4+ elements (please support below)    Moderate / High Support Documentation: NA     Clinical Presentation [] LOW: stable  [x] MODERATE: Evolving  [] HIGH: Unstable     Decision Making/ Complexity Score: moderate       Short Term Goals (4 Weeks):  1. Pt will be compliant with HEP to supplement PT in decreasing pain with functional mobility.  2. Pt will perform squat without lumbar extension compensation and neutral spine with good control to demonstrate improved core strength.  3. Pt will report no pain during thoracolumbar ROM to promote functional mobility.  4. Pt will improve impaired LE MMTs to >/=4/5 to improve strength for functional tasks.  Long Term Goals (8 Weeks):   1. Pt will improve FOTO score to </= 40% limited to decrease perceived limitation with maintaining/changing body position.   2. Pt will perform hip hinging lift from floor to waist height without lumbar extension compensation and with good posterior chain control to demonstrate improved core strength.  3. Pt will improve impaired LE MMTs to >/=4+/5 to improve strength for functional tasks.  4. Pt will report pain no greater than 2/10 during ADLs, self-care, and care taking to promote return to PLOF or greater.   Plan     Plan of care Certification: 11/2/2023 to 12/31/2023.    Outpatient Physical Therapy 2 times weekly for 8 weeks to include the following interventions: Gait Training, Manual Therapy, Neuromuscular Re-ed, Patient Education, Self Care, Therapeutic Activities, and Therapeutic Exercise.     Priyank Ventura, PT        Physician's Signature: _________________________________________ Date: ________________

## 2023-11-07 ENCOUNTER — CLINICAL SUPPORT (OUTPATIENT)
Dept: REHABILITATION | Facility: HOSPITAL | Age: 58
End: 2023-11-07
Payer: COMMERCIAL

## 2023-11-07 DIAGNOSIS — R29.898 WEAKNESS OF BOTH LOWER EXTREMITIES: ICD-10-CM

## 2023-11-07 DIAGNOSIS — R29.898 DECREASED STRENGTH OF TRUNK AND BACK: ICD-10-CM

## 2023-11-07 DIAGNOSIS — M25.69 DECREASED RANGE OF MOTION OF TRUNK AND BACK: Primary | ICD-10-CM

## 2023-11-07 PROCEDURE — 97112 NEUROMUSCULAR REEDUCATION: CPT | Mod: PN

## 2023-11-07 NOTE — PROGRESS NOTES
"[facility]   Physical Therapy Treatment Note      Name: Cherri Noonan  Clinic Number: [id number]    Therapy Diagnosis:   Encounter Diagnoses   Name Primary?    Decreased range of motion of trunk and back Yes    Decreased strength of trunk and back     Weakness of both lower extremities      Physician: Rahel Woods, *    Visit Date: 11/7/2023    Physician Orders: PT Eval and Treat Lumbar spine fusion  Medical Diagnosis from Referral: Status post lumbar spinal fusion [Z98.1], Back pain, unspecified back location, unspecified back pain laterality, unspecified chronicity [M54.9]  Evaluation Date: 11/2/2023  Authorization Period Expiration: 10/31/2024  Plan of Care Expiration: 12/31/2023  Progress Note Due: 12/1/2023  Date of Surgery: 06/26/2023  Visit # / Visits authorized: 1/ 20 +eval   FOTO: 1/ 3     Precautions: Standard     PTA Visit #: 0/5     Time In: 1100  Time Out: 1200  Total Billable Time: 60 minutes (30' 1:1)     Subjective     Pt reports: she has been trying to stay safe, but allowing her back to move more and more. Denies increased pain today. .  She was compliant with home exercise program.  Response to previous treatment: Established care, improved understanding of impairments.  Functional change: None reported    Pain:  Current 5/10, worst 10/10, best 5/10   Location: bilateral back, left leg from lower back to foot on lateral thigh.  Description: Aching, Burning, and Shooting  Aggravating Factors: Bending, lifting, anger  Easing Factors:  Massage, rest, and medication      Patients goals: Be able to travel without pain, be able to improve as much as possible prior to discharge.     Objective      Objective Measures updated at progress report unless specified.     Treatment     Cherri received the treatments listed below:      therapeutic exercises to develop strength, endurance, ROM, and flexibility for 25 minutes including:  TO ADD: -4" lateral step up (glute med " focus)     -Upright bike 10' Lv 1  -Standing hip flexor stretch 2' each side  -RTB lateral walk 2'            neuromuscular re-education activities to improve: Balance, Coordination, Kinesthetic, and Proprioception for 30 minutes. The following activities were included:    -Seated Pallof press Alphabet x 2 each side YTB  -TrA retraining from supine to quadruped as appropriate  -Supine nerve glides x 15 each side  -Supine hip flexor eccentrics 3 x 12 each side         Bold = Performed    Patient Education and Home Exercises       Education provided:   - POC interview, education regarding impairments and ability to address impairments contributing to pain.     Written Home Exercises Provided:  Day 2 . Exercises were reviewed and Cherri was able to demonstrate them prior to the end of the session.  Cherri demonstrated good  understanding of the education provided. See EMR under Patient Instructions for exercises provided during therapy sessions.       Assessment     Noted good tolerance to interventions overall. Did demonstrate significant impairment in core control due to avoidance of bending and moving. Patient continues to lack normalized lumbopelvic control. Continue to progress at follow up, consider integration of posterior chain with hinging movements, Cnosider progression of lateral hip control and strength with lateral step ups at follow up.     Cherri Is progressing well towards her goals.   Pt prognosis is Good.     Pt will continue to benefit from skilled outpatient physical therapy to address the deficits listed in the problem list box on initial evaluation, provide pt/family education and to maximize pt's level of independence in the home and community environment.     Pt's spiritual, cultural and educational needs considered and pt agreeable to plan of care and goals.     Anticipated barriers to physical therapy: None currently    Goals:    Short Term Goals (4 Weeks):  1. Pt will be compliant with HEP  to supplement PT in decreasing pain with functional mobility.  2. Pt will perform squat without lumbar extension compensation and neutral spine with good control to demonstrate improved core strength.  3. Pt will report no pain during thoracolumbar ROM to promote functional mobility.  4. Pt will improve impaired LE MMTs to >/=4/5 to improve strength for functional tasks.  Long Term Goals (8 Weeks):   1. Pt will improve FOTO score to </= 40% limited to decrease perceived limitation with maintaining/changing body position.   2. Pt will perform hip hinging lift from floor to waist height without lumbar extension compensation and with good posterior chain control to demonstrate improved core strength.  3. Pt will improve impaired LE MMTs to >/=4+/5 to improve strength for functional tasks.  4. Pt will report pain no greater than 2/10 during ADLs, self-care, and care taking to promote return to PLOF or greater.     Plan     Plan of care Certification: 11/2/2023 to 12/31/2023.     Outpatient Physical Therapy 2 times weekly for 8 weeks to include the following interventions: Gait Training, Manual Therapy, Neuromuscular Re-ed, Patient Education, Self Care, Therapeutic Activities, and Therapeutic Exercise.        Priyank Ventura, PT, DPT, OCS

## 2023-11-10 ENCOUNTER — CLINICAL SUPPORT (OUTPATIENT)
Dept: REHABILITATION | Facility: HOSPITAL | Age: 58
End: 2023-11-10
Payer: COMMERCIAL

## 2023-11-10 DIAGNOSIS — M25.69 DECREASED RANGE OF MOTION OF TRUNK AND BACK: Primary | ICD-10-CM

## 2023-11-10 DIAGNOSIS — R29.898 DECREASED STRENGTH OF TRUNK AND BACK: ICD-10-CM

## 2023-11-10 DIAGNOSIS — R29.898 WEAKNESS OF BOTH LOWER EXTREMITIES: ICD-10-CM

## 2023-11-10 PROCEDURE — 97110 THERAPEUTIC EXERCISES: CPT | Mod: PN,CQ

## 2023-11-10 PROCEDURE — 97112 NEUROMUSCULAR REEDUCATION: CPT | Mod: PN,CQ

## 2023-11-10 NOTE — PROGRESS NOTES
"[facility]   Physical Therapy Treatment Note      Name: Cherri Noonan  Clinic Number: [id number]    Therapy Diagnosis:   Encounter Diagnoses   Name Primary?    Decreased range of motion of trunk and back Yes    Decreased strength of trunk and back     Weakness of both lower extremities      Physician: Rahel Woods, *    Visit Date: 11/10/2023    Physician Orders: PT Eval and Treat Lumbar spine fusion  Medical Diagnosis from Referral: Status post lumbar spinal fusion [Z98.1], Back pain, unspecified back location, unspecified back pain laterality, unspecified chronicity [M54.9]  Evaluation Date: 11/2/2023  Authorization Period Expiration: 10/31/2024  Plan of Care Expiration: 12/31/2023  Progress Note Due: 12/1/2023  Date of Surgery: 06/26/2023  Visit # / Visits authorized: 3/ 20 +eval   FOTO: 1/ 3     Precautions: Standard     PTA Visit #: 1/5     Time In: 0940AM  Time Out: 1035AM  Total Billable Time: 55 minutes    Subjective     Pt reports: she's been mindful of her pain levels while moving around. She isn't currently having any left leg pain.     She was compliant with home exercise program.    Response to previous treatment: Established care, improved understanding of impairments.  Functional change: None reported    Pain:  5/10  Location: bilateral back, left leg from lower back to foot on lateral thigh.     Patients goals: Be able to travel without pain, be able to improve as much as possible prior to discharge.     Objective      Objective Measures updated at progress report unless specified.     Treatment     Cherri received the treatments listed below:        therapeutic exercises to develop strength, endurance, ROM, and flexibility for 25 minutes including:    -Upright bike 10' Lv 1  -6" lateral step up (glute med focus)  -Half kneeling hip flexor stretch on foam 3x30" each side  -RTB lateral walk 40ftx6     neuromuscular re-education activities to improve: Balance, " Coordination, Kinesthetic, and Proprioception for 30 minutes. The following activities were included:    -Seated Pallof press Alphabet 3 x 8 each side RTB  -TrA retraining from supine to quadruped as appropriate  -Supine nerve glides x 15 each side  -Supine hip flexor eccentrics 3 x 12 each side     Bold = Performed    Patient Education and Home Exercises       Education provided:   - POC interview, education regarding impairments and ability to address impairments contributing to pain.     Written Home Exercises Provided:  Day 2 . Exercises were reviewed and Cherri was able to demonstrate them prior to the end of the session. Cherri demonstrated good  understanding of the education provided. See EMR under Patient Instructions for exercises provided during therapy sessions.       Assessment     Pt tolerated the above tx session well without c/o pain. TherEx presented an appropriate challenge with no adverse effects reported. Verbal and tactile cueing required throughout for proper form, core activation and technique. Encouraged pt to continue HEP between sessions for optimal therapeutic gains. Will continue working towards established PT goals in future sessions.      Cherri Is progressing well towards her goals.   Pt prognosis is Good.     Pt will continue to benefit from skilled outpatient physical therapy to address the deficits listed in the problem list box on initial evaluation, provide pt/family education and to maximize pt's level of independence in the home and community environment.     Pt's spiritual, cultural and educational needs considered and pt agreeable to plan of care and goals.     Anticipated barriers to physical therapy: None currently    Goals:    Short Term Goals (4 Weeks):  1. Pt will be compliant with HEP to supplement PT in decreasing pain with functional mobility.  2. Pt will perform squat without lumbar extension compensation and neutral spine with good control to demonstrate improved  core strength.  3. Pt will report no pain during thoracolumbar ROM to promote functional mobility.  4. Pt will improve impaired LE MMTs to >/=4/5 to improve strength for functional tasks.  Long Term Goals (8 Weeks):   1. Pt will improve FOTO score to </= 40% limited to decrease perceived limitation with maintaining/changing body position.   2. Pt will perform hip hinging lift from floor to waist height without lumbar extension compensation and with good posterior chain control to demonstrate improved core strength.  3. Pt will improve impaired LE MMTs to >/=4+/5 to improve strength for functional tasks.  4. Pt will report pain no greater than 2/10 during ADLs, self-care, and care taking to promote return to PLOF or greater.     Plan     Plan of care Certification: 11/2/2023 to 12/31/2023.     Outpatient Physical Therapy 2 times weekly for 8 weeks to include the following interventions: Gait Training, Manual Therapy, Neuromuscular Re-ed, Patient Education, Self Care, Therapeutic Activities, and Therapeutic Exercise.        Penny Crockett PTA,

## 2023-11-14 ENCOUNTER — CLINICAL SUPPORT (OUTPATIENT)
Dept: REHABILITATION | Facility: HOSPITAL | Age: 58
End: 2023-11-14
Payer: COMMERCIAL

## 2023-11-14 DIAGNOSIS — M25.69 DECREASED RANGE OF MOTION OF TRUNK AND BACK: Primary | ICD-10-CM

## 2023-11-14 DIAGNOSIS — R29.898 WEAKNESS OF BOTH LOWER EXTREMITIES: ICD-10-CM

## 2023-11-14 DIAGNOSIS — R29.898 DECREASED STRENGTH OF TRUNK AND BACK: ICD-10-CM

## 2023-11-14 PROCEDURE — 97112 NEUROMUSCULAR REEDUCATION: CPT | Mod: PN

## 2023-11-14 NOTE — PROGRESS NOTES
"[facility]   Physical Therapy Treatment Note      Name: Cherri Noonan  Clinic Number: [id number]    Therapy Diagnosis:   Encounter Diagnoses   Name Primary?    Decreased range of motion of trunk and back Yes    Decreased strength of trunk and back     Weakness of both lower extremities        Physician: Rahel Woods, *    Visit Date: 11/14/2023    Physician Orders: PT Eval and Treat Lumbar spine fusion  Medical Diagnosis from Referral: Status post lumbar spinal fusion [Z98.1], Back pain, unspecified back location, unspecified back pain laterality, unspecified chronicity [M54.9]  Evaluation Date: 11/2/2023  Authorization Period Expiration: 10/31/2024  Plan of Care Expiration: 12/31/2023  Progress Note Due: 12/1/2023  Date of Surgery: 06/26/2023  Visit # / Visits authorized: 3/ 20 +eval   FOTO: 1/ 3     Precautions: Standard     PTA Visit #: 1/5     Time In: 1100AM  Time Out: 1200PM  Total Billable Time: 55 minutes (30 min 1:1)     Subjective     Pt reports: she's been doing well overall. Notes she walked 2 miles this morning, mopped, and kept moving all morning.     She was compliant with home exercise program.    Response to previous treatment: Noted no increased pain, noted increased confidence with mobility.  Functional change: Significantly increasing function without increased pain.     Pain:  5/10  Location: bilateral back, left leg from lower back to foot on lateral thigh.     Patients goals: Be able to travel without pain, be able to improve as much as possible prior to discharge.     Objective      Objective Measures updated at progress report unless specified.     Treatment     Cherri received the treatments listed below:        therapeutic exercises to develop strength, endurance, ROM, and flexibility for 25 minutes including:    -Upright bike 10' Lv 1    -Half kneeling hip flexor stretch on foam 3x30" each side  -RTB lateral walk 3 laps  -RTB Monster walk 2 laps   " "  neuromuscular re-education activities to improve: Balance, Coordination, Kinesthetic, and Proprioception for 30 minutes. The following activities were included:    -Seated Pallof press Alphabet 3 x 8 each side RTB  -6" lateral step up (glute med focus) 3 x 10 each   -Supine hip flexor eccentrics 3 x 12 each side  -15# farmer's carry (anti-lateral flexion) 3 laps each      Bold = Performed    Patient Education and Home Exercises       Education provided:   - POC interview, education regarding impairments and ability to address impairments contributing to pain.     Written Home Exercises Provided:  Day 2 . Exercises were reviewed and Cherri was able to demonstrate them prior to the end of the session. Cherri demonstrated good  understanding of the education provided. See EMR under Patient Instructions for exercises provided during therapy sessions.       Assessment     Pt continues to demonstrate tendency to complete excess volume over prescribed. During treatment prescribed 2 laps of lateral walk, 2 laps of farmers carry. Demonstrated tendency to continue despite educate to limit due to concerns of over loading tissues. Patient does continue to demonstrate high level of motivation but progressing well overall. Will continue to address and improve core control at follow up visits with continued progression of interventions as appropriate.       Cherri Is progressing well towards her goals.   Pt prognosis is Good.     Pt will continue to benefit from skilled outpatient physical therapy to address the deficits listed in the problem list box on initial evaluation, provide pt/family education and to maximize pt's level of independence in the home and community environment.     Pt's spiritual, cultural and educational needs considered and pt agreeable to plan of care and goals.     Anticipated barriers to physical therapy: None currently    Goals:    Short Term Goals (4 Weeks):  1. Pt will be compliant with HEP to " supplement PT in decreasing pain with functional mobility.  2. Pt will perform squat without lumbar extension compensation and neutral spine with good control to demonstrate improved core strength.  3. Pt will report no pain during thoracolumbar ROM to promote functional mobility.  4. Pt will improve impaired LE MMTs to >/=4/5 to improve strength for functional tasks.  Long Term Goals (8 Weeks):   1. Pt will improve FOTO score to </= 40% limited to decrease perceived limitation with maintaining/changing body position.   2. Pt will perform hip hinging lift from floor to waist height without lumbar extension compensation and with good posterior chain control to demonstrate improved core strength.  3. Pt will improve impaired LE MMTs to >/=4+/5 to improve strength for functional tasks.  4. Pt will report pain no greater than 2/10 during ADLs, self-care, and care taking to promote return to PLOF or greater.     Plan     Plan of care Certification: 11/2/2023 to 12/31/2023.     Outpatient Physical Therapy 2 times weekly for 8 weeks to include the following interventions: Gait Training, Manual Therapy, Neuromuscular Re-ed, Patient Education, Self Care, Therapeutic Activities, and Therapeutic Exercise.        Priyank Ventura, PT,

## 2023-11-17 ENCOUNTER — CLINICAL SUPPORT (OUTPATIENT)
Dept: REHABILITATION | Facility: HOSPITAL | Age: 58
End: 2023-11-17
Payer: COMMERCIAL

## 2023-11-17 DIAGNOSIS — M25.69 DECREASED RANGE OF MOTION OF TRUNK AND BACK: Primary | ICD-10-CM

## 2023-11-17 DIAGNOSIS — R29.898 DECREASED STRENGTH OF TRUNK AND BACK: ICD-10-CM

## 2023-11-17 DIAGNOSIS — R29.898 WEAKNESS OF BOTH LOWER EXTREMITIES: ICD-10-CM

## 2023-11-17 PROCEDURE — 97112 NEUROMUSCULAR REEDUCATION: CPT | Mod: PN

## 2023-11-17 PROCEDURE — 97110 THERAPEUTIC EXERCISES: CPT | Mod: PN

## 2023-11-17 NOTE — PROGRESS NOTES
[facility]   Physical Therapy Treatment Note      Name: Cherri Noonan  Clinic Number: [id number]    Therapy Diagnosis:   Encounter Diagnoses   Name Primary?    Decreased range of motion of trunk and back Yes    Decreased strength of trunk and back     Weakness of both lower extremities          Physician: Rahel Woods, *    Visit Date: 11/17/2023    Physician Orders: PT Eval and Treat Lumbar spine fusion  Medical Diagnosis from Referral: Status post lumbar spinal fusion [Z98.1], Back pain, unspecified back location, unspecified back pain laterality, unspecified chronicity [M54.9]  Evaluation Date: 11/2/2023  Authorization Period Expiration: 10/31/2024  Plan of Care Expiration: 12/31/2023  Progress Note Due: 12/1/2023  Date of Surgery: 06/26/2023  Visit # / Visits authorized: 4/ 20 +eval   FOTO: 1/ 3     Precautions: Standard     PTA Visit #: 1/5     Time In: 0910AM (late arrival)  Time Out: 1000AM  Total Billable Time: 50 minutes    Subjective     Pt reports: she is warmed up, been running around this mroning and doing her walking. She notes neck stiffness but no increased pain.    She was compliant with home exercise program.    Response to previous treatment: Noted no increased pain, noted increased confidence with mobility.  Functional change: Significantly increasing function without increased pain.     Pain:  2/10  Location: bilateral back, left leg from lower back to foot on lateral thigh.     Patients goals: Be able to travel without pain, be able to improve as much as possible prior to discharge.     Objective      Objective Measures updated at progress report unless specified.     Treatment     Cherri received the treatments listed below:        therapeutic exercises to develop strength, endurance, ROM, and flexibility for 18 minutes including:    -Upright bike 10' Lv 2 (5' on 11/7)  -RTB lateral walk 3 laps  -RTB Monster walk 3 laps     neuromuscular re-education  "activities to improve: Balance, Coordination, Kinesthetic, and Proprioception for 30 minutes. The following activities were included:    -Seated Pallof press Alphabet     x2 each side RTB  -6" lateral step up (glute med focus)    3 x 10 each   -Supine hip flexor eccentrics     3 x 12 each side  -15# farmer's carry (anti-lateral flexion)    5 laps each  -7.5# KB bent over single arm rows    3 x 12 each side      Bold = Performed    Patient Education and Home Exercises       Education provided:   - POC interview, education regarding impairments and ability to address impairments contributing to pain.     Written Home Exercises Provided:  Day 2 . Exercises were reviewed and Cherri was able to demonstrate them prior to the end of the session. Cherri demonstrated good  understanding of the education provided. See EMR under Patient Instructions for exercises provided during therapy sessions.       Assessment     Pt required frequent cues due to fair to poor form without. She requires cues for improving awareness of lumbopelvic positioning and control. She does continue to lack normalized hip hinging mechanics with lateral step up. She would continue to benefit from skilled physical therapy services to further address at follow up visits. Continue to address anterior core strength, consider integration of bird-dogs to improve lumbopelvic control and anterior core control/strength.      Cherri Is progressing well towards her goals.   Pt prognosis is Good.     Pt will continue to benefit from skilled outpatient physical therapy to address the deficits listed in the problem list box on initial evaluation, provide pt/family education and to maximize pt's level of independence in the home and community environment.     Pt's spiritual, cultural and educational needs considered and pt agreeable to plan of care and goals.     Anticipated barriers to physical therapy: None currently    Goals:    Short Term Goals (4 Weeks):  1. " Pt will be compliant with HEP to supplement PT in decreasing pain with functional mobility.  2. Pt will perform squat without lumbar extension compensation and neutral spine with good control to demonstrate improved core strength.  3. Pt will report no pain during thoracolumbar ROM to promote functional mobility.  4. Pt will improve impaired LE MMTs to >/=4/5 to improve strength for functional tasks.  Long Term Goals (8 Weeks):   1. Pt will improve FOTO score to </= 40% limited to decrease perceived limitation with maintaining/changing body position.   2. Pt will perform hip hinging lift from floor to waist height without lumbar extension compensation and with good posterior chain control to demonstrate improved core strength.  3. Pt will improve impaired LE MMTs to >/=4+/5 to improve strength for functional tasks.  4. Pt will report pain no greater than 2/10 during ADLs, self-care, and care taking to promote return to PLOF or greater.     Plan     Plan of care Certification: 11/2/2023 to 12/31/2023.     Outpatient Physical Therapy 2 times weekly for 8 weeks to include the following interventions: Gait Training, Manual Therapy, Neuromuscular Re-ed, Patient Education, Self Care, Therapeutic Activities, and Therapeutic Exercise.        Priyank Ventura, PT,

## 2023-11-21 ENCOUNTER — CLINICAL SUPPORT (OUTPATIENT)
Dept: REHABILITATION | Facility: HOSPITAL | Age: 58
End: 2023-11-21
Payer: COMMERCIAL

## 2023-11-21 DIAGNOSIS — R29.898 WEAKNESS OF BOTH LOWER EXTREMITIES: ICD-10-CM

## 2023-11-21 DIAGNOSIS — M25.69 DECREASED RANGE OF MOTION OF TRUNK AND BACK: Primary | ICD-10-CM

## 2023-11-21 DIAGNOSIS — R29.898 DECREASED STRENGTH OF TRUNK AND BACK: ICD-10-CM

## 2023-11-21 PROCEDURE — 97112 NEUROMUSCULAR REEDUCATION: CPT | Mod: PN

## 2023-11-21 PROCEDURE — 97110 THERAPEUTIC EXERCISES: CPT | Mod: PN

## 2023-11-21 NOTE — PROGRESS NOTES
[facility]   Physical Therapy Treatment Note      Name: Cherri Noonan  Clinic Number: [id number]    Therapy Diagnosis:   Encounter Diagnoses   Name Primary?    Decreased range of motion of trunk and back Yes    Decreased strength of trunk and back     Weakness of both lower extremities          Physician: Rahel Woods, *    Visit Date: 11/21/2023    Physician Orders: PT Eval and Treat Lumbar spine fusion  Medical Diagnosis from Referral: Status post lumbar spinal fusion [Z98.1], Back pain, unspecified back location, unspecified back pain laterality, unspecified chronicity [M54.9]  Evaluation Date: 11/2/2023  Authorization Period Expiration: 10/31/2024  Plan of Care Expiration: 12/31/2023  Progress Note Due: 12/1/2023  Date of Surgery: 06/26/2023  Visit # / Visits authorized: 4/ 20 +eval   FOTO: 1/ 3     Precautions: Standard     PTA Visit #: 1/5     Time In: 0910AM (late arrival)  Time Out: 1000AM  Total Billable Time: 50 minutes    Subjective     Pt reports: she has no increased pain today. Notes that she was able to go dancing Friday night and the only thing that bothered her were her knees. She notes that her back was sore after previous visit but cleared within 24 hours.     She was compliant with home exercise program.    Response to previous treatment: Noted no increased pain, noted increased confidence with mobility.  Functional change: Significantly increasing function without increased pain.     Pain:  2/10  Location: bilateral back, left leg from lower back to foot on lateral thigh.     Patients goals: Be able to travel without pain, be able to improve as much as possible prior to discharge.     Objective      Objective Measures updated at progress report unless specified.     Treatment     Cherri received the treatments listed below:        therapeutic exercises to develop strength, endurance, ROM, and flexibility for 18 minutes including:    -Upright bike 10' Lv  "2  -RTB lateral walk 3 laps  -RTB Monster walk 3 laps     neuromuscular re-education activities to improve: Balance, Coordination, Kinesthetic, and Proprioception for 40 minutes. The following activities were included:    -Seated Pallof press Alphabet     x2 each side RTB  -6" lateral step up (glute med focus)    3 x 10 each   -Supine hip flexor eccentrics     3 x 12 each side  -Offset farmer's carry (anti-lateral flexion)   5 laps each, 15# KB   -Quadruped ALT UE      2 x Fatigue (constant cues for core control)  -Quadruped ALT LE      1 x Fatigue (constant cues for core control)  -KB RDL        3 x 10, 10#      Bold = Performed    Patient Education and Home Exercises       Education provided:   - POC interview, education regarding impairments and ability to address impairments contributing to pain.     Written Home Exercises Provided:  Day 2 . Exercises were reviewed and Cherri was able to demonstrate them prior to the end of the session. Cherri demonstrated good  understanding of the education provided. See EMR under Patient Instructions for exercises provided during therapy sessions.       Assessment     Patient is progressing well, introduced concept of lower core stabilization due to tendency to initiate hinging movements with lumbar spine extension prior to hip extension. Patient demonstrated significant difficulty with quadruped core stabilization but was improving with coordination. Encouraged to practice until follow up visit with plan to initiate bird dogs as patient presents with improvements in core control. Notably does extend left lumbar spine early compared to right in quad positioning.       Cherri Is progressing well towards her goals.   Pt prognosis is Good.     Pt will continue to benefit from skilled outpatient physical therapy to address the deficits listed in the problem list box on initial evaluation, provide pt/family education and to maximize pt's level of independence in the home and " community environment.     Pt's spiritual, cultural and educational needs considered and pt agreeable to plan of care and goals.     Anticipated barriers to physical therapy: None currently    Goals:    Short Term Goals (4 Weeks):  1. Pt will be compliant with HEP to supplement PT in decreasing pain with functional mobility.  2. Pt will perform squat without lumbar extension compensation and neutral spine with good control to demonstrate improved core strength.  3. Pt will report no pain during thoracolumbar ROM to promote functional mobility.  4. Pt will improve impaired LE MMTs to >/=4/5 to improve strength for functional tasks.  Long Term Goals (8 Weeks):   1. Pt will improve FOTO score to </= 40% limited to decrease perceived limitation with maintaining/changing body position.   2. Pt will perform hip hinging lift from floor to waist height without lumbar extension compensation and with good posterior chain control to demonstrate improved core strength.  3. Pt will improve impaired LE MMTs to >/=4+/5 to improve strength for functional tasks.  4. Pt will report pain no greater than 2/10 during ADLs, self-care, and care taking to promote return to PLOF or greater.     Plan     Plan of care Certification: 11/2/2023 to 12/31/2023.     Outpatient Physical Therapy 2 times weekly for 8 weeks to include the following interventions: Gait Training, Manual Therapy, Neuromuscular Re-ed, Patient Education, Self Care, Therapeutic Activities, and Therapeutic Exercise.        Priyank Ventura, PT,

## 2023-11-24 ENCOUNTER — CLINICAL SUPPORT (OUTPATIENT)
Dept: REHABILITATION | Facility: HOSPITAL | Age: 58
End: 2023-11-24
Payer: COMMERCIAL

## 2023-11-24 DIAGNOSIS — R29.898 WEAKNESS OF BOTH LOWER EXTREMITIES: ICD-10-CM

## 2023-11-24 DIAGNOSIS — M25.69 DECREASED RANGE OF MOTION OF TRUNK AND BACK: Primary | ICD-10-CM

## 2023-11-24 DIAGNOSIS — R29.898 DECREASED STRENGTH OF TRUNK AND BACK: ICD-10-CM

## 2023-11-24 DIAGNOSIS — Z98.1 STATUS POST LUMBAR SPINAL FUSION: ICD-10-CM

## 2023-11-24 PROCEDURE — 97112 NEUROMUSCULAR REEDUCATION: CPT | Mod: PN,CQ

## 2023-11-24 PROCEDURE — 97110 THERAPEUTIC EXERCISES: CPT | Mod: PN,CQ

## 2023-11-24 NOTE — PROGRESS NOTES
[facility]   Physical Therapy Treatment Note      Name: Cherri Noonan  Clinic Number: [id number]    Therapy Diagnosis:   Encounter Diagnoses   Name Primary?    Decreased range of motion of trunk and back Yes    Decreased strength of trunk and back     Weakness of both lower extremities     Status post lumbar spinal fusion          Physician: Rahel Woods, *    Visit Date: 11/24/2023    Physician Orders: PT Eval and Treat Lumbar spine fusion  Medical Diagnosis from Referral: Status post lumbar spinal fusion [Z98.1], Back pain, unspecified back location, unspecified back pain laterality, unspecified chronicity [M54.9]  Evaluation Date: 11/2/2023  Authorization Period Expiration: 10/31/2024  Plan of Care Expiration: 12/31/2023  Progress Note Due: 12/1/2023  Date of Surgery: 06/26/2023  Visit # / Visits authorized: 6/ 20 +eval   FOTO: 1/ 3     Precautions: Standard     PTA Visit #: 1/5     Time In: 0705am  Time Out: 0755am  Total Billable Time: 50 minutes    Subjective     Pt reports: her back is improving. She is pleased of where's she at in her progress in therapy.     She was compliant with home exercise program.    Response to previous treatment: Noted no increased pain, noted increased confidence with mobility.  Functional change: Significantly increasing function without increased pain.     Pain:  2/10  Location: bilateral back, left leg from lower back to foot on lateral thigh.     Patients goals: Be able to travel without pain, be able to improve as much as possible prior to discharge.     Objective      Objective Measures updated at progress report unless specified.     Treatment     Cherri received the treatments listed below:        therapeutic exercises to develop strength, endurance, ROM, and flexibility for 18 minutes including:    -Upright bike 10' Lv 2 - defer today  -Nu-steps 10' Lv 2  -RTB lateral walk 3 laps  -RTB Monster walk 3 laps     neuromuscular  "re-education activities to improve: Balance, Coordination, Kinesthetic, and Proprioception for 32 minutes. The following activities were included:    -Seated Pallof press Alphabet     x2 each side RTB  -6" lateral step up (glute med focus)    3 x 10 each   -Supine hip flexor eccentrics      3 x 12 each side - defer today   -Offset farmer's carry (anti-lateral flexion)   5 laps each, 15# KB   -Quadruped ALT UE      2 x Fatigue (constant cues for core control) - defer today  -Quadruped ALT LE      1 x Fatigue (constant cues for core control) - defer today  -KB RDL        3 x 10, 10#      Bold = Performed    Patient Education and Home Exercises       Education provided:   - POC interview, education regarding impairments and ability to address impairments contributing to pain.     Written Home Exercises Provided:  Day 2 . Exercises were reviewed and Cherri was able to demonstrate them prior to the end of the session. Cherri demonstrated good  understanding of the education provided. See EMR under Patient Instructions for exercises provided during therapy sessions.       Assessment     Patient required mirror feedback for RDL exercise for proper body form. Multiple trials with this for correct body biomechanics. Noticed no hip internal rotation in lateral step ups today. Will continue to work on core stability and being more aware of her posture in exercises to prevent further back pain.     Cherri Is progressing well towards her goals.   Pt prognosis is Good.     Pt will continue to benefit from skilled outpatient physical therapy to address the deficits listed in the problem list box on initial evaluation, provide pt/family education and to maximize pt's level of independence in the home and community environment.     Pt's spiritual, cultural and educational needs considered and pt agreeable to plan of care and goals.     Anticipated barriers to physical therapy: None currently    Goals:    Short Term Goals (4 " Weeks):  1. Pt will be compliant with HEP to supplement PT in decreasing pain with functional mobility.  2. Pt will perform squat without lumbar extension compensation and neutral spine with good control to demonstrate improved core strength.  3. Pt will report no pain during thoracolumbar ROM to promote functional mobility.  4. Pt will improve impaired LE MMTs to >/=4/5 to improve strength for functional tasks.  Long Term Goals (8 Weeks):   1. Pt will improve FOTO score to </= 40% limited to decrease perceived limitation with maintaining/changing body position.   2. Pt will perform hip hinging lift from floor to waist height without lumbar extension compensation and with good posterior chain control to demonstrate improved core strength.  3. Pt will improve impaired LE MMTs to >/=4+/5 to improve strength for functional tasks.  4. Pt will report pain no greater than 2/10 during ADLs, self-care, and care taking to promote return to PLOF or greater.     Plan     Plan of care Certification: 11/2/2023 to 12/31/2023.     Outpatient Physical Therapy 2 times weekly for 8 weeks to include the following interventions: Gait Training, Manual Therapy, Neuromuscular Re-ed, Patient Education, Self Care, Therapeutic Activities, and Therapeutic Exercise.     Sydnee Terrell, PTA

## 2023-11-28 ENCOUNTER — CLINICAL SUPPORT (OUTPATIENT)
Dept: REHABILITATION | Facility: HOSPITAL | Age: 58
End: 2023-11-28
Payer: COMMERCIAL

## 2023-11-28 DIAGNOSIS — M25.69 DECREASED RANGE OF MOTION OF TRUNK AND BACK: Primary | ICD-10-CM

## 2023-11-28 DIAGNOSIS — R29.898 WEAKNESS OF BOTH LOWER EXTREMITIES: ICD-10-CM

## 2023-11-28 DIAGNOSIS — R29.898 DECREASED STRENGTH OF TRUNK AND BACK: ICD-10-CM

## 2023-11-28 PROCEDURE — 97110 THERAPEUTIC EXERCISES: CPT | Mod: PN

## 2023-11-28 PROCEDURE — 97112 NEUROMUSCULAR REEDUCATION: CPT | Mod: PN

## 2023-11-28 NOTE — PROGRESS NOTES
[facility]   Physical Therapy Treatment Note      Name: Cherri Noonan  Clinic Number: [id number]    Therapy Diagnosis:   Encounter Diagnoses   Name Primary?    Decreased range of motion of trunk and back Yes    Decreased strength of trunk and back     Weakness of both lower extremities          Physician: Rahel Woods, *    Visit Date: 11/28/2023    Physician Orders: PT Eval and Treat Lumbar spine fusion  Medical Diagnosis from Referral: Status post lumbar spinal fusion [Z98.1], Back pain, unspecified back location, unspecified back pain laterality, unspecified chronicity [M54.9]  Evaluation Date: 11/2/2023  Authorization Period Expiration: 10/31/2024  Plan of Care Expiration: 12/31/2023  Progress Note Due: 12/1/2023  Date of Surgery: 06/26/2023  Visit # / Visits authorized: 7 / 20 +eval   FOTO: 1/ 3     Precautions: Standard     PTA Visit #: 1/5     Time In: 1000am  Time Out: 1120am  Total Billable Time: 50 minutes 1:1 time    Subjective     Pt reports: that she is doing good though did have a strain in her back with falling at the edge of her bed. Notes that it feels tight on the right but not painful. She notes that she does feel like it is doing ok, feels like it is getting back to normal because she doesn't stop.     She was compliant with home exercise program.    Response to previous treatment: Noted no increased pain, noted increased confidence with mobility.  Functional change: Significantly increasing function without increased pain.     Pain:  2/10  Location: bilateral back, left leg from lower back to foot on lateral thigh.     Patients goals: Be able to travel without pain, be able to improve as much as possible prior to discharge.     Objective      Objective Measures updated at progress report unless specified.     Treatment     Cherri received the treatments listed below:        therapeutic exercises to develop strength, endurance, ROM, and flexibility for 25  "minutes including:    -Upright bike 10' Lv 2  -RTB lateral walk 3 laps  -RTB Monster walk 3 laps  -Standing hip flexor and HS stretch 2' each side      neuromuscular re-education activities to improve: Balance, Coordination, Kinesthetic, and Proprioception for 45 minutes. The following activities were included:    -Seated Pallof press Alphabet on ball   x2 each side RTB  -6" lateral step up (glute med focus)    3 x 10 each   -Offset farmer's carry (anti-lateral flexion)   5 laps each, 15# KB   -Quadruped ALT UE      2 x Fatigue (constant cues for core control) - defer today  -Quadruped ALT LE      1 x Fatigue (constant cues for core control) - defer today  -SL RDL (golfers)      4 x 8 each side     Bold = Performed    Patient Education and Home Exercises       Education provided:   - POC interview, education regarding impairments and ability to address impairments contributing to pain.     Written Home Exercises Provided:  Day 2 . Exercises were reviewed and Cherri was able to demonstrate them prior to the end of the session. Cherri demonstrated good  understanding of the education provided. See EMR under Patient Instructions for exercises provided during therapy sessions.       Assessment     Patient demonstrated good tolerance to interventions with addition of stretching to address muscle tightness complaints. Patient tolerated treatment well overall, demonstrating improving ROM and function without increased pain. Will continue to address anterior core control to maintain neutral core during interventions due to tendency to use APT to compensate for poor core control.     Cherri Is progressing well towards her goals.   Pt prognosis is Good.     Pt will continue to benefit from skilled outpatient physical therapy to address the deficits listed in the problem list box on initial evaluation, provide pt/family education and to maximize pt's level of independence in the home and community environment.     Pt's " spiritual, cultural and educational needs considered and pt agreeable to plan of care and goals.     Anticipated barriers to physical therapy: None currently    Goals:    Short Term Goals (4 Weeks):  1. Pt will be compliant with HEP to supplement PT in decreasing pain with functional mobility.  2. Pt will perform squat without lumbar extension compensation and neutral spine with good control to demonstrate improved core strength.  3. Pt will report no pain during thoracolumbar ROM to promote functional mobility.  4. Pt will improve impaired LE MMTs to >/=4/5 to improve strength for functional tasks.  Long Term Goals (8 Weeks):   1. Pt will improve FOTO score to </= 40% limited to decrease perceived limitation with maintaining/changing body position.   2. Pt will perform hip hinging lift from floor to waist height without lumbar extension compensation and with good posterior chain control to demonstrate improved core strength.  3. Pt will improve impaired LE MMTs to >/=4+/5 to improve strength for functional tasks.  4. Pt will report pain no greater than 2/10 during ADLs, self-care, and care taking to promote return to PLOF or greater.     Plan     Plan of care Certification: 11/2/2023 to 12/31/2023.     Outpatient Physical Therapy 2 times weekly for 8 weeks to include the following interventions: Gait Training, Manual Therapy, Neuromuscular Re-ed, Patient Education, Self Care, Therapeutic Activities, and Therapeutic Exercise.     Priyank Ventura, PT

## 2023-11-30 ENCOUNTER — OFFICE VISIT (OUTPATIENT)
Dept: ORTHOPEDICS | Facility: CLINIC | Age: 58
End: 2023-11-30
Payer: COMMERCIAL

## 2023-11-30 DIAGNOSIS — M25.511 RIGHT SHOULDER PAIN, UNSPECIFIED CHRONICITY: Primary | ICD-10-CM

## 2023-11-30 DIAGNOSIS — M25.511 CHRONIC RIGHT SHOULDER PAIN: Primary | ICD-10-CM

## 2023-11-30 DIAGNOSIS — G89.29 CHRONIC RIGHT SHOULDER PAIN: Primary | ICD-10-CM

## 2023-11-30 PROCEDURE — 99999 PR PBB SHADOW E&M-EST. PATIENT-LVL III: CPT | Mod: PBBFAC,,, | Performed by: ORTHOPAEDIC SURGERY

## 2023-11-30 PROCEDURE — 1160F PR REVIEW ALL MEDS BY PRESCRIBER/CLIN PHARMACIST DOCUMENTED: ICD-10-PCS | Mod: CPTII,S$GLB,, | Performed by: ORTHOPAEDIC SURGERY

## 2023-11-30 PROCEDURE — 99999 PR PBB SHADOW E&M-EST. PATIENT-LVL III: ICD-10-PCS | Mod: PBBFAC,,, | Performed by: ORTHOPAEDIC SURGERY

## 2023-11-30 PROCEDURE — 1159F MED LIST DOCD IN RCRD: CPT | Mod: CPTII,S$GLB,, | Performed by: ORTHOPAEDIC SURGERY

## 2023-11-30 PROCEDURE — 99213 PR OFFICE/OUTPT VISIT, EST, LEVL III, 20-29 MIN: ICD-10-PCS | Mod: S$GLB,,, | Performed by: ORTHOPAEDIC SURGERY

## 2023-11-30 PROCEDURE — 1160F RVW MEDS BY RX/DR IN RCRD: CPT | Mod: CPTII,S$GLB,, | Performed by: ORTHOPAEDIC SURGERY

## 2023-11-30 PROCEDURE — 99213 OFFICE O/P EST LOW 20 MIN: CPT | Mod: S$GLB,,, | Performed by: ORTHOPAEDIC SURGERY

## 2023-11-30 PROCEDURE — 1159F PR MEDICATION LIST DOCUMENTED IN MEDICAL RECORD: ICD-10-PCS | Mod: CPTII,S$GLB,, | Performed by: ORTHOPAEDIC SURGERY

## 2023-11-30 NOTE — PROGRESS NOTES
"Assessment: 58 y.o. female with right cuff tendinitis     I explained my diagnostic impression and the reasoning behind it in detail, using layman's terms.      Plan:   - PT referral  - Heat PRN  - If no improvement will consider MRI  - Return to clinic in 12 weeks    All questions were answered in detail. The patient is in full agreement with the treatment plan and will proceed accordingly.    Chief Complaint   Patient presents with    Right Shoulder - Pain       Initial visit (9/7/23): Cherri Noonan is a 58 y.o. female who presents today complaining of right shoulder pain    Duration of symptoms:  over 1 year   Trauma or new activity: pain started after getting covid and tetanus vaccines in the right shoulder   Pain is constant  Aggravating factors: no known  Relieving factors: rest  Pain is sharp, localized to the subdeltoid fossa. Sometimes at night she notes "shooting pain"  Night pain is present and is disruptive to sleep  Radicular symptoms: no numbness, paresthesias   Associated symptoms:  no limited range of motion.    Prior treatment:   Has tried stretching the shoulder and "moving it around - demonstrated repetitive shrugging of the shoulder). Has pushed through pain to do these things because she was afraid of getting stiff. Has tried massage with temporary relief. No medications, heat or ice, PT, injections  Pain does interfere with sleep and activities of daily living .    Has a cervical spinal cord stimulator     Had lumbar fusion with Dr Santiago in June. Has been able to participate with PT with minimal pain - did involve some upper body strengthening exercises     11/30/23  Here for follow up of R shoulder pain   Not having shooting pain   Still localizes pain tot he subdeltoid fossa    This is the extent of the patient's complaints at this time.     Hand dominance: Right     Occupation: Retired        Review of patient's allergies indicates:   Allergen Reactions    Shingrix (pf) [varicella-zoster " [id number] (pf)]      Got all side effects listed     Physical Exam:   Vitals:    11/30/23 0756   PainSc: 0-No pain   PainLoc: Shoulder       General: Patient is alert, awake and oriented to time, place and person. Mood and affect are appropriate.  Patient does not appear to be in any distress, denies any constitutional symptoms and appears stated age.   HEENT: Pupils are equal and round, sclera are not injected. External examination of ears and nose reveals no abnormalities. Cranial nerves II-X are grossly intact  Neck: examination demonstrates painful limited active range of motion. Spurling's sign is negative  Skin: no rashes, abrasions or open wounds on the affected extremity   Resp: No respiratory distress or audible wheezing   CV: 2+  pulses, all extremities warm and well perfused   Right Shoulder    Shoulder Range of Motion    Right     Left   (Active/Passive)       Forward Elevation     165/165            165/165  External rotation (abduction)   90/90             90/90   External rotation (arm at side)  45/45             45/45   Internal rotation in abduction   70/70                        70/70   Internal rotation behind the back  L5             L5     Range of motion is mildly painful     Acromioclavicular joint is not tender  Crossbody test: negative    Neer's positive - mild pain  Hawkin's positive - mild pain    Kenyatta's positive - mild pain  Drop arm negative  Belly press negative      Cuff Strength     Right     Left   Supraspinatus        5/5    5/5  Infraspinatus     5/5    5/5  Subscapularis     5/5    5/5    Deltoid testing            5/5    5/5    Speeds negative  Yergasons negative    Elbow examination demonstrates no tenderness to palpation and has normal range of motion.     ltsi C5-T1  + epl, io, fds, fdp   2+ RP      Imaging:  no new       This note was created by combination of typed  and M-Modal dictation. Transcription and phonetic errors may be present.  If there are any questions,  please contact me.      Current Outpatient Medications:     acetaminophen (TYLENOL) 500 MG tablet, Take 500 mg by mouth every 6 (six) hours as needed for Pain., Disp: , Rfl:     amoxicillin-clavulanate 875-125mg (AUGMENTIN) 875-125 mg per tablet, Take 1 tablet by mouth every 12 (twelve) hours., Disp: 14 tablet, Rfl: 0    azelastine 205.5 mcg (0.15 %) Spry, azelastine 205.5 mcg (0.15 %) nasal spray, Disp: , Rfl:     ciclopirox (LOPROX) 0.77 % Susp, Apply topically 2 (two) times daily., Disp: , Rfl:     COVID-19 AT-HOME TEST Kit, FOLLOW INSTRUCTIONS INCLUDED WITH THE PACKAGE., Disp: , Rfl:     ergocalciferol (ERGOCALCIFEROL) 50,000 unit Cap, Take 50,000 Units by mouth every 7 days., Disp: , Rfl:     estradioL (ESTRACE) 0.01 % (0.1 mg/gram) vaginal cream, PLACE 0.5 G VAGINALLY TWICE A WEEK. AS NEEDED FOR VAGINAL DRYNESS., Disp: 42.5 g, Rfl: 1    fluticasone propionate (FLONASE) 50 mcg/actuation nasal spray, USE 1 SPRAY (50 MCG TOTAL) IN EACH NOSTRIL ONCE DAILY, Disp: 48 mL, Rfl: 1    meloxicam (MOBIC) 15 MG tablet, Take 1 tablet (15 mg total) by mouth once daily., Disp: 30 tablet, Rfl: 0    methocarbamoL (ROBAXIN) 500 MG Tab, Take 1 tablet (500 mg) by mouth up to every 6 hours as needed for Muscle Spasm., Disp: 40 tablet, Rfl: 1    multivitamin capsule, Take 1 capsule by mouth once daily., Disp: , Rfl:     omeprazole (PRILOSEC) 20 MG capsule, TAKE 1 CAPSULE BY MOUTH EVERY DAY, Disp: 30 capsule, Rfl: 0    ondansetron (ZOFRAN-ODT) 4 MG TbDL, Dissolve 1 tablet (4 mg total) by mouth every 6 (six) hours as needed (nausea)., Disp: 15 tablet, Rfl: 0    oxyCODONE-acetaminophen (PERCOCET)  mg per tablet, Take 1 tablet by mouth every 4 (four) hours as needed for Pain (severe pain only (7-10/10))., Disp: 42 tablet, Rfl: 0    tiZANidine (ZANAFLEX) 4 MG tablet, TAKE 1 TABLET (4 MG TOTAL) BY MOUTH NIGHTLY AS NEEDED FOR MUSCLE SPASM, Disp: 90 tablet, Rfl: 2    zonisamide (ZONEGRAN) 100 MG Cap, TAKE 4 CAPSULES BY MOUTH ONCE  DAILY, Disp: 360 capsule, Rfl: 2    b complex vitamins tablet, Take 1 tablet by mouth once daily., Disp: , Rfl:     ginseng 250 mg Cap, Take by mouth., Disp: , Rfl:     omega-3 fatty acids/fish oil (FISH OIL-OMEGA-3 FATTY ACIDS) 300-1,000 mg capsule, Take 1 capsule by mouth once daily., Disp: , Rfl:     vitamin E 100 UNIT capsule, Take 100 Units by mouth once daily., Disp: , Rfl:     vitamin E, dl,tocopheryl acet, (VITAMIN E, DL, ACETATE,) 45 mg (100 unit) Cap, Take 100 Units by mouth., Disp: , Rfl:     zinc gluconate 50 mg tablet, Take 50 mg by mouth once daily., Disp: , Rfl:     Past Medical History:   Diagnosis Date    Cervical radiculopathy        Active Problem List with Overview Notes    Diagnosis Date Noted    S/P spinal fusion 06/26/2023 6/26/23: Left MIS L4-5 TLIF with Dr. Santiago       Other osteoarthritis of spine 11/17/2022    Spondylosis without myelopathy 11/17/2022    Decreased range of motion of trunk and back 08/12/2022    Decreased strength of trunk and back 08/12/2022    Poor posture 08/12/2022    Weakness of both lower extremities 08/12/2022    Cervical radiculopathy 03/29/2022    Complex regional pain syndrome 03/29/2022    Sleep disorder 03/29/2022    Peripheral neuropathic pain 03/29/2022    Class 1 obesity with body mass index (BMI) of 31.0 to 31.9 in adult 03/29/2022    Chronic rhinitis 12/02/2021    Chest pain, atypical 06/08/2020    Vitamin D deficiency 12/04/2019    Other hyperlipidemia 11/18/2019    Atrophic vaginitis 11/19/2018       Past Surgical History:   Procedure Laterality Date    CERVICAL FUSION  08/2015    C5-C7    HYSTERECTOMY      INJECTION OF ANESTHETIC AGENT AROUND NERVE Bilateral 10/31/2022    Procedure: BLOCK, NERVE, BILATERAL L3-L4-L5 MEDIAL BRANCH;  Surgeon: Archana Knott MD;  Location: [facility];  Service: Pain Management;  Laterality: Bilateral;    INJECTION OF ANESTHETIC AGENT AROUND NERVE Bilateral 11/17/2022    Procedure: BLOCK, NERVE BILATERAL L3,L4,L5 MEDIAL  BRANCH;  Surgeon: Archana Knott MD;  Location: [facility];  Service: Pain Management;  Laterality: Bilateral;    MINIMALLY INVASIVE TRANSFORAMINAL LUMBAR INTERBODY FUSION (TLIF) Left 6/26/2023    Procedure: FUSION, SPINE, LUMBAR, TLIF, MINIMALLY INVASIVE;  Surgeon: Layo Santiago MD;  Location: [facility] OR;  Service: Neurosurgery;  Laterality: Left;  MIS Left L4-5 TLIF   fluoro  Andrew table 4 poster  Neuromonitoring  spinewave  SPINEWAVE LALIT. [phone] TEXTED ТАТЬЯНА ON 6/15/2023 @ 11:29AM. ТАТЬЯНА WILDE RESPONDED ON 6/15/2023 @ 11:35AM-LO  NEUROMONITORING ТАТЬЯНА MANOJ [phone]  RN    RADIOFREQUENCY ABLATION Left 12/8/2022    Procedure: RADIOFREQUENCY ABLATION, LEFT L3,L4,L5 MEDIAL BRANCH ONE OF TWO;  Surgeon: Archana Knott MD;  Location: [facility];  Service: Pain Management;  Laterality: Left;    RADIOFREQUENCY ABLATION Right 12/22/2022    Procedure: RADIOFREQUENCY ABLATION, RIGHT L3,L4,L5 MEDIAL BRANCH TWO OF TWO;  Surgeon: Archana Knott MD;  Location: [facility];  Service: Pain Management;  Laterality: Right;    SPINAL CORD STIMULATOR IMPLANT  10/20/2021    TRANSFORAMINAL EPIDURAL INJECTION OF STEROID Bilateral 3/13/2023    Procedure: INJECTION, STEROID, EPIDURAL, TRANSFORAMINAL APPROACH BILATERAL L5/S1;  Surgeon: Archana Knott MD;  Location: [facility];  Service: Pain Management;  Laterality: Bilateral;    TUBAL LIGATION         Social History     Socioeconomic History    Marital status:    Tobacco Use    Smoking status: Never    Smokeless tobacco: Never   Substance and Sexual Activity    Alcohol use: Never    Drug use: Never    Sexual activity: Not Currently     Partners: Male     Birth control/protection: None     Comment:  38+years

## 2023-12-01 ENCOUNTER — CLINICAL SUPPORT (OUTPATIENT)
Dept: REHABILITATION | Facility: HOSPITAL | Age: 58
End: 2023-12-01
Payer: COMMERCIAL

## 2023-12-01 DIAGNOSIS — M25.69 DECREASED RANGE OF MOTION OF TRUNK AND BACK: Primary | ICD-10-CM

## 2023-12-01 DIAGNOSIS — R29.898 WEAKNESS OF BOTH LOWER EXTREMITIES: ICD-10-CM

## 2023-12-01 DIAGNOSIS — R29.898 DECREASED STRENGTH OF TRUNK AND BACK: ICD-10-CM

## 2023-12-01 PROCEDURE — 97110 THERAPEUTIC EXERCISES: CPT | Mod: PN

## 2023-12-01 PROCEDURE — 97112 NEUROMUSCULAR REEDUCATION: CPT | Mod: PN

## 2023-12-01 NOTE — PROGRESS NOTES
[facility]   Physical Therapy Treatment Note      Name: Cherri Noonan  [facility] Number: [id number]    Therapy Diagnosis:   Encounter Diagnoses   Name Primary?    Decreased range of motion of trunk and back Yes    Decreased strength of trunk and back     Weakness of both lower extremities          Physician: Rahel Woods, *    Visit Date: 12/1/2023    Physician Orders: PT Eval and Treat Lumbar spine fusion  Medical Diagnosis from Referral: Status post lumbar spinal fusion [Z98.1], Back pain, unspecified back location, unspecified back pain laterality, unspecified chronicity [M54.9]  Evaluation Date: 11/2/2023  Authorization Period Expiration: 10/31/2024  Plan of Care Expiration: 12/31/2023  Progress Note Due: 1/1/2024  Date of Surgery: 06/26/2023  Visit # / Visits authorized: 8 / 20 +eval   FOTO: 2/ 3     Precautions: Standard     PTA Visit #: 1/5     Time In: 0900am  Time Out: 1015am  Total Billable Time: 40 minutes 1:1 time    Subjective     Pt reports: that she doesn't need pain meds to control pain anymore. That she takes her prescribed medications because she is supposed to. She notes that she does still have pain overall, but it doesn't limit her. Reports that she was able to walk 5 miles yesterday and it didn't bother her back. Notes that she always notices her pain but doesn't feel like she is limited by it.    She was compliant with home exercise program.    Response to previous treatment: Noted no increased pain, noted increased confidence with mobility.  Functional change: Significantly increasing function without increased pain.     Pain:  Best: 1/10  Current: 1/10  Worst: 3/10    Location: bilateral back  Aggravated with positioning in bed, upon waking, and lifting.   Eased with activity, movement     Patients goals: Be able to travel without pain, be able to improve as much as possible prior to discharge.     Objective      Objective Measures updated at progress  "report unless specified.     Posture:  Mildly increased lumbar lordosis, normalized hip extension in standing.         Functional Movements:   Gait: Mild Trendelenburg L>R.  OH Squat: Significant lumbar hinging, poor posterior chain recruitment.           Standing Thoracolumbar Range of Motion:     % Observation Pain   Flexion 100 WNL, coupling of spine and hips into flexion No   Extension 100 WNL, coupling throughout TL and L-spine No   Right Rotation 100 WNL No   Left Rotation 95 WFL Yes   Right Sidebend 100 +1.5" from TF jt line No   Left Sidebend 100 +1 past TF jt line No         Hip Passive Range of Motion:     Right Left   Flexion 110+ 110+   Extension 12 9   Internal Rotation 15 15   External Rotation 45 55              Lower Extremity Strength:    Right  Left    Hip flexor 5-/5 5-/5   Hip abductor 4+/5 4/5   Hip ER 5/5 5/5   Hip IR 5/5 4+/5   Hip extensor:  4-/5 4-/5   Core flexors 4-/5 4-/5            Neural Tension Testing:   Slump:NT  SLR: Left at 90% of right. WFL and without increased complaints of lumbar or peripheral nerve symptoms.                Intake Outcome Measure for FOTO Lumbar Survey     Therapist reviewed FOTO scores for Cherri Noonan on 11/2/2023.   FOTO report - see Media section or FOTO account episode details.     Intake Score: 44%  Expected score 59% at 15 visits     M-VINH: 34% (Higher Score = Greater Disability)           Treatment     Cherri received the treatments listed below:        therapeutic exercises to develop strength, endurance, ROM, and flexibility for 35 minutes including:    -Upright bike 10' Lv 2  -RTB Monster walk 3 laps  PRN: -Standing hip flexor and HS stretch 2' each side   Time for reassessment of progress       neuromuscular re-education activities to improve: Balance, Coordination, Kinesthetic, and Proprioception for 15 minutes. The following activities were included:    -Seated Pallof press Alphabet on ball   x2 each side RTB  -Offset farmer's carry " (anti-lateral flexion)   5 laps each, 15# KB   -Quadruped ALT UE      2 x Fatigue (constant cues for core control) - defer today  -SL RDL (golfers)      4 x 8 each side   TO ADD: Hip hikes at wall (L>R hiking needed)      Cherri participated in dynamic functional therapeutic activities to improve functional performance for 00  minutes, including:  TO ADD: Supine Staggered stance or SL bridge  TO ADD: KB Hip thrust  TO ADD: Core control squats (wall squat or STS with neutral pelvis)       Bold = Performed    Patient Education and Home Exercises       Education provided:   - POC interview, education regarding impairments and ability to address impairments contributing to pain.     Written Home Exercises Provided:  Day 2 . Exercises were reviewed and Cherri was able to demonstrate them prior to the end of the session. Cherri demonstrated good  understanding of the education provided. See EMR under Patient Instructions for exercises provided during therapy sessions.       Assessment     Cherri presents with significant improvements in ROM in lumbopelvic region. She does present with continued impairments in core control, strength and endurance. Further presents with weakness of bilateral hip abductors and extensors that likely stem from core weakness combined with previous poor strength. She would continue to benefit from skilled physical therapy services to further address and improve impairments and functional limitations to facilitate a functional and efficient recovery.     Cherri Is progressing well towards her goals.   Pt prognosis is Good.     Pt will continue to benefit from skilled outpatient physical therapy to address the deficits listed in the problem list box on initial evaluation, provide pt/family education and to maximize pt's level of independence in the home and community environment.     Pt's spiritual, cultural and educational needs considered and pt agreeable to plan of care and goals.      Anticipated barriers to physical therapy: None currently    Goals:    Short Term Goals (4 Weeks):  1. Pt will be compliant with HEP to supplement PT in decreasing pain with functional mobility. MET  2. Pt will perform squat without lumbar extension compensation and neutral spine with good control to demonstrate improved core strength. IN PROGRESS  3. Pt will report no pain during thoracolumbar ROM to promote functional mobility. IN PROGRESS  4. Pt will improve impaired LE MMTs to >/=4/5 to improve strength for functional tasks. MET  Long Term Goals (8 Weeks): IN PROGRESS  1. Pt will improve FOTO score to </= 40% limited to decrease perceived limitation with maintaining/changing body position.   2. Pt will perform hip hinging lift from floor to waist height without lumbar extension compensation and with good posterior chain control to demonstrate improved core strength.  3. Pt will improve impaired LE MMTs to >/=4+/5 to improve strength for functional tasks.  4. Pt will report pain no greater than 2/10 during ADLs, self-care, and care taking to promote return to PLOF or greater.     Plan     Plan of care Certification: 11/2/2023 to 12/31/2023.     Outpatient Physical Therapy 2 times weekly for 8 weeks to include the following interventions: Gait Training, Manual Therapy, Neuromuscular Re-ed, Patient Education, Self Care, Therapeutic Activities, and Therapeutic Exercise.     Priyank Ventura, PT

## 2023-12-05 ENCOUNTER — CLINICAL SUPPORT (OUTPATIENT)
Dept: REHABILITATION | Facility: HOSPITAL | Age: 58
End: 2023-12-05
Payer: COMMERCIAL

## 2023-12-05 DIAGNOSIS — R29.898 WEAKNESS OF BOTH LOWER EXTREMITIES: ICD-10-CM

## 2023-12-05 DIAGNOSIS — M25.69 DECREASED RANGE OF MOTION OF TRUNK AND BACK: Primary | ICD-10-CM

## 2023-12-05 DIAGNOSIS — R29.898 DECREASED STRENGTH OF TRUNK AND BACK: ICD-10-CM

## 2023-12-05 PROCEDURE — 97530 THERAPEUTIC ACTIVITIES: CPT | Mod: PN

## 2023-12-05 PROCEDURE — 97112 NEUROMUSCULAR REEDUCATION: CPT | Mod: PN

## 2023-12-05 NOTE — PROGRESS NOTES
[facility]   Physical Therapy Treatment Note      Name: Cherri Noonan  Clinic Number: [id number]    Therapy Diagnosis:   Encounter Diagnoses   Name Primary?    Decreased range of motion of trunk and back Yes    Decreased strength of trunk and back     Weakness of both lower extremities          Physician: Rahel Woods, *    Visit Date: 12/5/2023    Physician Orders: PT Eval and Treat Lumbar spine fusion  Medical Diagnosis from Referral: Status post lumbar spinal fusion [Z98.1], Back pain, unspecified back location, unspecified back pain laterality, unspecified chronicity [M54.9]  Evaluation Date: 11/2/2023  Authorization Period Expiration: 10/31/2024  Plan of Care Expiration: 12/31/2023  Progress Note Due: 1/1/2024  Date of Surgery: 06/26/2023  Visit # / Visits authorized: 8 / 20 +eval   FOTO: 2/ 3     Precautions: Standard     PTA Visit #: 1/5     Time In: 0900am  Time Out: 1015am  Total Billable Time: 40 minutes 1:1 time    Subjective     Pt reports: that she is tired and sore today but doing well. Notes she feels like her back is doing well.     She was compliant with home exercise program.    Response to previous treatment: Noted no increased pain, noted increased confidence with mobility.  Functional change: Significantly increasing function without increased pain.     Pain:  Best: 1/10  Current: 1/10  Worst: 3/10    Location: bilateral back  Aggravated with positioning in bed, upon waking, and lifting.   Eased with activity, movement     Patients goals: Be able to travel without pain, be able to improve as much as possible prior to discharge.     Objective      Objective Measures updated at progress report unless specified.        Treatment     Cherri received the treatments listed below:        therapeutic exercises to develop strength, endurance, ROM, and flexibility for 25 minutes including:    -Upright bike 10' Lv 2  -RTB Monster walk 3 laps  PRN: -Standing hip  Total Volume (Ccs): 1 Detail Level: Detailed flexor and HS stretch 2' each side        neuromuscular re-education activities to improve: Balance, Coordination, Kinesthetic, and Proprioception for 15 minutes. The following activities were included:    -Seated Pallof press Alphabet on ball   x2 each side RTB  -Offset farmer's carry (anti-lateral flexion)   5 laps each, 15# KB   -Quadruped ALT UE      2 x Fatigue (constant cues for core control) - defer today  -Hip hikes at wall (L>R hiking needed)   2 x12 each side      Cherri participated in dynamic functional therapeutic activities to improve functional performance for 15  minutes, including:  -Supine Staggered stance     1 x 12 each side    -Reclined Hip thrust      2 x 12  -Core control squats (wall squat)    2 x 12        Bold = Performed    Patient Education and Home Exercises       Education provided:   - POC interview, education regarding impairments and ability to address impairments contributing to pain.     Written Home Exercises Provided:  Day 2 . Exercises were reviewed and Cherri was able to demonstrate them prior to the end of the session. Cherri demonstrated good  understanding of the education provided. See EMR under Patient Instructions for exercises provided during therapy sessions.       Assessment     Cherri demonstrated good tolerance to interventions overall, demonstrating need for cues to address and improve core control. Patient demonstrated need for continued practice and education to improve core control with functional and dynamic use. Patient would benefit from progression of anterior core control interventions.     Cherri Is progressing well towards her goals.   Pt prognosis is Good.     Pt will continue to benefit from skilled outpatient physical therapy to address the deficits listed in the problem list box on initial evaluation, provide pt/family education and to maximize pt's level of independence in the home and community environment.     Pt's spiritual, cultural and  Add Intralesional Injection: No educational needs considered and pt agreeable to plan of care and goals.     Anticipated barriers to physical therapy: None currently    Goals:    Short Term Goals (4 Weeks):  1. Pt will be compliant with HEP to supplement PT in decreasing pain with functional mobility. MET  2. Pt will perform squat without lumbar extension compensation and neutral spine with good control to demonstrate improved core strength. IN PROGRESS  3. Pt will report no pain during thoracolumbar ROM to promote functional mobility. IN PROGRESS  4. Pt will improve impaired LE MMTs to >/=4/5 to improve strength for functional tasks. MET  Long Term Goals (8 Weeks): IN PROGRESS  1. Pt will improve FOTO score to </= 40% limited to decrease perceived limitation with maintaining/changing body position.   2. Pt will perform hip hinging lift from floor to waist height without lumbar extension compensation and with good posterior chain control to demonstrate improved core strength.  3. Pt will improve impaired LE MMTs to >/=4+/5 to improve strength for functional tasks.  4. Pt will report pain no greater than 2/10 during ADLs, self-care, and care taking to promote return to PLOF or greater.     Plan     Plan of care Certification: 11/2/2023 to 12/31/2023.     Outpatient Physical Therapy 2 times weekly for 8 weeks to include the following interventions: Gait Training, Manual Therapy, Neuromuscular Re-ed, Patient Education, Self Care, Therapeutic Activities, and Therapeutic Exercise.     Priyank Ventura, PT       Post-Care Instructions: I reviewed with the patient in detail post-care instructions. Patient is to keep the area dry for 24 hours, and not to engage in any swimming until the area is healed. Should the patient develop any fevers, chills, bleeding, severe pain patient will contact the office immediately. Biopsy Photograph Reviewed: Yes Number Of Curettages: 3 Anesthesia Type: 1% lidocaine with epinephrine Additional Information: (Optional): The wound was cleaned, and a pressure dressing was applied.  The patient received detailed post-op instructions. Size Of Lesion After Curettage: 1.2 Size Of Lesion In Cm: 1.1 What Was Performed First?: Curettage Cautery Type: aluminum chloride and electrodesiccation Bill As A Line Item Or As Units: Line Item Consent was obtained from the patient. The risks, benefits and alternatives to therapy were discussed in detail. Specifically, the risks of infection, scarring, bleeding, prolonged wound healing, nerve injury, incomplete removal, allergy to anesthesia and recurrence were addressed. Alternatives to ED&C, such as: surgical removal and XRT were also discussed.  Prior to the procedure, the treatment site was clearly identified and confirmed by the patient. All components of Universal Protocol/PAUSE Rule completed.

## 2023-12-08 ENCOUNTER — CLINICAL SUPPORT (OUTPATIENT)
Dept: REHABILITATION | Facility: HOSPITAL | Age: 58
End: 2023-12-08
Payer: COMMERCIAL

## 2023-12-08 DIAGNOSIS — M25.69 DECREASED RANGE OF MOTION OF TRUNK AND BACK: Primary | ICD-10-CM

## 2023-12-08 DIAGNOSIS — R29.898 DECREASED STRENGTH OF TRUNK AND BACK: ICD-10-CM

## 2023-12-08 DIAGNOSIS — R29.898 WEAKNESS OF BOTH LOWER EXTREMITIES: ICD-10-CM

## 2023-12-08 PROCEDURE — 97530 THERAPEUTIC ACTIVITIES: CPT | Mod: PN

## 2023-12-08 PROCEDURE — 97112 NEUROMUSCULAR REEDUCATION: CPT | Mod: PN

## 2023-12-08 NOTE — PROGRESS NOTES
[facility]   Physical Therapy Treatment Note      Name: Cherri Noonan  Clinic Number: [id number]    Therapy Diagnosis:   Encounter Diagnoses   Name Primary?    Decreased range of motion of trunk and back Yes    Decreased strength of trunk and back     Weakness of both lower extremities          Physician: Rahel Woods, *    Visit Date: 12/8/2023    Physician Orders: PT Eval and Treat Lumbar spine fusion  Medical Diagnosis from Referral: Status post lumbar spinal fusion [Z98.1], Back pain, unspecified back location, unspecified back pain laterality, unspecified chronicity [M54.9]  Evaluation Date: 11/2/2023  Authorization Period Expiration: 10/31/2024  Plan of Care Expiration: 12/31/2023  Progress Note Due: 1/1/2024  Date of Surgery: 06/26/2023  Visit # / Visits authorized: 8 / 20 +eval   FOTO: 2/ 3     Precautions: Standard     PTA Visit #: 1/5     Time In: 0900am  Time Out: 1015am  Total Billable Time: 40 minutes 1:1 time    Subjective     Pt reports: that she is tired and sore today but doing well. Notes she feels like her back is doing well.     She was compliant with home exercise program.    Response to previous treatment: Noted no increased pain, noted increased confidence with mobility.  Functional change: Significantly increasing function without increased pain.     Pain:  Best: 1/10  Current: 1/10  Worst: 3/10    Location: bilateral back  Aggravated with positioning in bed, upon waking, and lifting.   Eased with activity, movement     Patients goals: Be able to travel without pain, be able to improve as much as possible prior to discharge.     Objective      Objective Measures updated at progress report unless specified.        Treatment     Cherri received the treatments listed below:        therapeutic exercises to develop strength, endurance, ROM, and flexibility for 20 minutes including:    -Upright bike 10' Lv 2  -RTB Monster walk 3 laps  PRN: -Standing hip  flexor and HS stretch 2' each side        neuromuscular re-education activities to improve: Balance, Coordination, Kinesthetic, and Proprioception for 25 minutes. The following activities were included:    -Standing Pallof rotations at freemotion   2 x 15 each side  -Offset farmer's carry (anti-lateral flexion)   5 laps each, 15# KB   -Quadruped ALT UE progressing to bird dogs  2 x Fatigue (constant cues for core control) - defer today  -Hip hikes at wall (L>R hiking needed)   2 x12 each side      Cherri participated in dynamic functional therapeutic activities to improve functional performance for 15  minutes, including:  -Supine Staggered stance     1 x 12 each side    -Reclined Hip thrust      2 x 12       Bold = Performed    Patient Education and Home Exercises       Education provided:   - POC interview, education regarding impairments and ability to address impairments contributing to pain.     Written Home Exercises Provided:  Day 2 . Exercises were reviewed and Cherri was able to demonstrate them prior to the end of the session. Cherri demonstrated good  understanding of the education provided. See EMR under Patient Instructions for exercises provided during therapy sessions.       Assessment     Cherri demonstrated good tolerance to interventions overall, demonstrating need for cues to address and improve core control. Patient demonstrated need for continued practice and education to improve core control with functional and dynamic use. Patient would benefit from progression of anterior core control interventions, unable to progress today due to to fatigue.     Cherri Is progressing well towards her goals.   Pt prognosis is Good.     Pt will continue to benefit from skilled outpatient physical therapy to address the deficits listed in the problem list box on initial evaluation, provide pt/family education and to maximize pt's level of independence in the home and community environment.     Pt's  spiritual, cultural and educational needs considered and pt agreeable to plan of care and goals.     Anticipated barriers to physical therapy: None currently    Goals:    Short Term Goals (4 Weeks):  1. Pt will be compliant with HEP to supplement PT in decreasing pain with functional mobility. MET  2. Pt will perform squat without lumbar extension compensation and neutral spine with good control to demonstrate improved core strength. IN PROGRESS  3. Pt will report no pain during thoracolumbar ROM to promote functional mobility. IN PROGRESS  4. Pt will improve impaired LE MMTs to >/=4/5 to improve strength for functional tasks. MET  Long Term Goals (8 Weeks): IN PROGRESS  1. Pt will improve FOTO score to </= 40% limited to decrease perceived limitation with maintaining/changing body position.   2. Pt will perform hip hinging lift from floor to waist height without lumbar extension compensation and with good posterior chain control to demonstrate improved core strength.  3. Pt will improve impaired LE MMTs to >/=4+/5 to improve strength for functional tasks.  4. Pt will report pain no greater than 2/10 during ADLs, self-care, and care taking to promote return to PLOF or greater.     Plan     Plan of care Certification: 11/2/2023 to 12/31/2023.     Outpatient Physical Therapy 2 times weekly for 8 weeks to include the following interventions: Gait Training, Manual Therapy, Neuromuscular Re-ed, Patient Education, Self Care, Therapeutic Activities, and Therapeutic Exercise.     Priyank Ventura, PT

## 2023-12-11 ENCOUNTER — CLINICAL SUPPORT (OUTPATIENT)
Dept: REHABILITATION | Facility: HOSPITAL | Age: 58
End: 2023-12-11
Payer: COMMERCIAL

## 2023-12-11 DIAGNOSIS — R29.898 DECREASED STRENGTH OF TRUNK AND BACK: ICD-10-CM

## 2023-12-11 DIAGNOSIS — R29.898 WEAKNESS OF BOTH LOWER EXTREMITIES: ICD-10-CM

## 2023-12-11 DIAGNOSIS — M25.69 DECREASED RANGE OF MOTION OF TRUNK AND BACK: Primary | ICD-10-CM

## 2023-12-11 PROCEDURE — 97110 THERAPEUTIC EXERCISES: CPT | Mod: PN

## 2023-12-11 PROCEDURE — 97112 NEUROMUSCULAR REEDUCATION: CPT | Mod: PN

## 2023-12-11 PROCEDURE — 97530 THERAPEUTIC ACTIVITIES: CPT | Mod: PN

## 2023-12-11 NOTE — PROGRESS NOTES
[facility]   Physical Therapy Treatment Note      [facility]  Clinic Number: [id number]    Therapy Diagnosis:   Encounter Diagnoses   Name Primary?    Decreased range of motion of trunk and back Yes    Decreased strength of trunk and back     Weakness of both lower extremities            Physician: Rahel Woods, *    Visit Date: 12/11/2023    Physician Orders: PT Eval and Treat Lumbar spine fusion  Medical Diagnosis from Referral: Status post lumbar spinal fusion [Z98.1], Back pain, unspecified back location, unspecified back pain laterality, unspecified chronicity [M54.9]  Evaluation Date: 11/2/2023  Authorization Period Expiration: 10/31/2024  Plan of Care Expiration: 12/31/2023  Progress Note Due: 1/1/2024  Date of Surgery: 06/26/2023  Visit # / Visits authorized: 11 / 20 +eval   FOTO: 2/ 3     Precautions: Standard     PTA Visit #: 1/5     Time In: 0800am  Time Out: 0900am  Total Billable Time: 60'     Subjective     Pt reports: that she has been moving more, able to clean and organize 2 rooms in her house over the weekend.      She was compliant with home exercise program.    Response to previous treatment: Noted no increased pain, noted increased confidence with mobility.  Functional change: Significantly increasing function without increased pain.     Pain:  Best: 1/10  Current: 1/10  Worst: 3/10    Location: bilateral back  Aggravated with positioning in bed, upon waking, and lifting.   Eased with activity, movement     Patients goals: Be able to travel without pain, be able to improve as much as possible prior to discharge.     Objective      Objective Measures updated at progress report unless specified.        Treatment     Cherri received the treatments listed below:        therapeutic exercises to develop strength, endurance, ROM, and flexibility for 10 minutes including:    -Upright bike 10' Lv 2  -RTB Monster walk 4 laps  PRN: -Standing hip flexor and  HS stretch 2' each side        neuromuscular re-education activities to improve: Balance, Coordination, Kinesthetic, and Proprioception for 35 minutes. The following activities were included:    -Standing Pallof rotations at freemotion   2 x 15 each side  -Offset farmer's carry (anti-lateral flexion)   5 laps each, 15# KB   -Quadruped ALT UE progressing to bird dogs  2 x Fatigue (constant cues for core control)   -Hip hikes at wall (L>R hiking needed)   3 x12 each side      Cherri participated in dynamic functional therapeutic activities to improve functional performance for 15  minutes, including:  -Supine Staggered stance     1 x 12 each side    -Reclined Hip thrust      2 x 12       Bold = Performed    Patient Education and Home Exercises       Education provided:   - POC interview, education regarding impairments and ability to address impairments contributing to pain.     Written Home Exercises Provided:  Day 2 . Exercises were reviewed and Cherri was able to demonstrate them prior to the end of the session. Cherri demonstrated good  understanding of the education provided. See EMR under Patient Instructions for exercises provided during therapy sessions.       Assessment     Cherri presents with good tolerance to interventions overall. She presented with difficulty with maintaining PPT during alt UE but improving with continued practice. She continues to demonstrate greater difficulty on R than L on anterior core control. She would continue to benefit from [facility] physical therapy services to further address and progress anterior core control at follow up visits.     Cherri Is progressing well towards her goals.   Pt prognosis is Good.     Pt will continue to benefit from skilled outpatient physical therapy to address the deficits listed in the problem list box on initial evaluation, provide pt/family education and to maximize pt's level of independence in the home and community environment.     Pt's  spiritual, cultural and educational needs considered and pt agreeable to plan of care and goals.     Anticipated barriers to physical therapy: None currently    Goals:    Short Term Goals (4 Weeks):  1. Pt will be compliant with HEP to supplement PT in decreasing pain with functional mobility. MET  2. Pt will perform squat without lumbar extension compensation and neutral spine with good control to demonstrate improved core strength. IN PROGRESS  3. Pt will report no pain during thoracolumbar ROM to promote functional mobility. IN PROGRESS  4. Pt will improve impaired LE MMTs to >/=4/5 to improve strength for functional tasks. MET  Long Term Goals (8 Weeks): IN PROGRESS  1. Pt will improve FOTO score to </= 40% limited to decrease perceived limitation with maintaining/changing body position.   2. Pt will perform hip hinging lift from floor to waist height without lumbar extension compensation and with good posterior chain control to demonstrate improved core strength.  3. Pt will improve impaired LE MMTs to >/=4+/5 to improve strength for functional tasks.  4. Pt will report pain no greater than 2/10 during ADLs, self-care, and care taking to promote return to PLOF or greater.     Plan     Plan of care Certification: 11/2/2023 to 12/31/2023.     Outpatient Physical Therapy 2 times weekly for 8 weeks to include the following interventions: Gait Training, Manual Therapy, Neuromuscular Re-ed, Patient Education, Self Care, Therapeutic Activities, and Therapeutic Exercise.     Priyank Ventura, PT

## 2023-12-13 ENCOUNTER — CLINICAL SUPPORT (OUTPATIENT)
Dept: REHABILITATION | Facility: HOSPITAL | Age: 58
End: 2023-12-13
Payer: COMMERCIAL

## 2023-12-13 DIAGNOSIS — M25.69 DECREASED RANGE OF MOTION OF TRUNK AND BACK: Primary | ICD-10-CM

## 2023-12-13 DIAGNOSIS — R29.898 DECREASED STRENGTH OF TRUNK AND BACK: ICD-10-CM

## 2023-12-13 DIAGNOSIS — R29.898 WEAKNESS OF BOTH LOWER EXTREMITIES: ICD-10-CM

## 2023-12-13 PROCEDURE — 97112 NEUROMUSCULAR REEDUCATION: CPT | Mod: PN

## 2023-12-13 NOTE — PROGRESS NOTES
[facility]   Physical Therapy Treatment Note      [facility]  Cl[facility] Number: [id number]    Therapy Diagnosis:   Encounter Diagnoses   Name Primary?    Decreased range of motion of trunk and back Yes    Decreased strength of trunk and back     Weakness of both lower extremities            Physician: Rahel Woods, *    Visit Date: 12/13/2023    Physician Orders: PT Eval and Treat Lumbar spine fusion  Medical Diagnosis from Referral: Status post lumbar spinal fusion [Z98.1], Back pain, unspecified back location, unspecified back pain laterality, unspecified chronicity [M54.9]  Evaluation Date: 11/2/2023  Authorization Period Expiration: 10/31/2024  Plan of Care Expiration: 12/31/2023  Progress Note Due: 1/1/2024  Date of Surgery: 06/26/2023  Visit # / Visits authorized: 11 / 20 +eval   FOTO: 2/ 3     Precautions: Standard     PTA Visit #: 1/5     Time In: 0800am  Time Out: 0900am  Total Billable Time: 60'     Subjective     Pt reports: that she has been moving more, able to clean and organize 2 rooms in her house over the weekend.      She was compliant with home exercise program.    Response to previous treatment: Noted no increased pain, noted increased confidence with mobility.  Functional change: Significantly increasing function without increased pain.     Pain:  Best: 1/10  Current: 1/10  Worst: 3/10    Location: bilateral back  Aggravated with positioning in bed, upon waking, and lifting.   Eased with activity, movement     Patients goals: Be able to travel without pain, be able to improve as much as possible prior to discharge.     Objective      Objective Measures updated at progress report unless specified.        Treatment     Cherri received the treatments listed below:        therapeutic exercises to develop strength, endurance, ROM, and flexibility for 20 minutes including:    -Upright bike 10' Lv 2  -RTB Monster walk 4 laps  PRN: -Standing hip flexor and  HS stretch 2' each side        neuromuscular re-education activities to improve: Balance, Coordination, Kinesthetic, and Proprioception for 35 minutes. The following activities were included:    -Standing Pallof rotations at freemotion   2 x 15 each side 3#, staggered stance  -Offset farmer's carry (anti-lateral flexion)   5 laps each, 20# KB   -Quadruped ALT UE       2 x Fatigue (constant cues for core control)   -Quadruped PPT with fire hydrants   2 x 1' each   -Hip hikes at wall (L>R hiking needed)   3 x 12 each side      Cherri participated in dynamic functional therapeutic activities to improve functional performance for 00  minutes, including:  -Supine Staggered stance     1 x 12 each side    -Reclined Hip thrust      2 x 12       Bold = Performed    Patient Education and Home Exercises       Education provided:   - POC interview, education regarding impairments and ability to address impairments contributing to pain.     Written Home Exercises Provided:  Day 2 . Exercises were reviewed and Cherri was able to demonstrate them prior to the end of the session. Cherri demonstrated good  understanding of the education provided. See EMR under Patient Instructions for exercises provided during therapy sessions.       Assessment     Cherri continues to demonstrate improvements in ability to maintain a neutral pelvis with dynamic activity and reducing tendency to compensate for limited anterior core control. Patient does continue to have greater difficulty with using R lower quarter and maintaining neutral pelvis. Will continue to address at follow up visits, focused on ensuring good internalization of cues due to patient tendency to fixate on details of appropriate form.     Cherri Is progressing well towards her goals.   Pt prognosis is Good.     Pt will continue to benefit from skilled outpatient physical therapy to address the deficits listed in the problem list box on initial evaluation, provide pt/family  education and to maximize pt's level of independence in the home and community environment.     Pt's spiritual, cultural and educational needs considered and pt agreeable to plan of care and goals.     Anticipated barriers to physical therapy: None currently    Goals:    Short Term Goals (4 Weeks):  1. Pt will be compliant with HEP to supplement PT in decreasing pain with functional mobility. MET  2. Pt will perform squat without lumbar extension compensation and neutral spine with good control to demonstrate improved core strength. IN PROGRESS  3. Pt will report no pain during thoracolumbar ROM to promote functional mobility. IN PROGRESS  4. Pt will improve impaired LE MMTs to >/=4/5 to improve strength for functional tasks. MET  Long Term Goals (8 Weeks): IN PROGRESS  1. Pt will improve FOTO score to </= 40% limited to decrease perceived limitation with maintaining/changing body position.   2. Pt will perform hip hinging lift from floor to waist height without lumbar extension compensation and with good posterior chain control to demonstrate improved core strength.  3. Pt will improve impaired LE MMTs to >/=4+/5 to improve strength for functional tasks.  4. Pt will report pain no greater than 2/10 during ADLs, self-care, and care taking to promote return to PLOF or greater.     Plan     Plan of care Certification: 11/2/2023 to 12/31/2023.     Outpatient Physical Therapy 2 times weekly for 8 weeks to include the following interventions: Gait Training, Manual Therapy, Neuromuscular Re-ed, Patient Education, Self Care, Therapeutic Activities, and Therapeutic Exercise.     Priyank Ventura, PT

## 2023-12-18 ENCOUNTER — CLINICAL SUPPORT (OUTPATIENT)
Dept: REHABILITATION | Facility: HOSPITAL | Age: 58
End: 2023-12-18
Payer: COMMERCIAL

## 2023-12-18 DIAGNOSIS — R29.898 WEAKNESS OF BOTH LOWER EXTREMITIES: ICD-10-CM

## 2023-12-18 DIAGNOSIS — M25.69 DECREASED RANGE OF MOTION OF TRUNK AND BACK: Primary | ICD-10-CM

## 2023-12-18 DIAGNOSIS — R29.898 DECREASED STRENGTH OF TRUNK AND BACK: ICD-10-CM

## 2023-12-18 PROCEDURE — 97112 NEUROMUSCULAR REEDUCATION: CPT | Mod: PN

## 2023-12-18 PROCEDURE — 97110 THERAPEUTIC EXERCISES: CPT | Mod: PN

## 2023-12-18 NOTE — PROGRESS NOTES
[facility]   Physical Therapy Treatment Note      Name: Cherri Noonan  Clinic Number: [id number]    Therapy Diagnosis:   Encounter Diagnoses   Name Primary?    Decreased range of motion of trunk and back Yes    Decreased strength of trunk and back     Weakness of both lower extremities              Physician: Rahel Woods, *    Visit Date: 12/18/2023    Physician Orders: PT Eval and Treat Lumbar spine fusion  Medical Diagnosis from Referral: Status post lumbar spinal fusion [Z98.1], Back pain, unspecified back location, unspecified back pain laterality, unspecified chronicity [M54.9]  Evaluation Date: 11/2/2023  Authorization Period Expiration: 10/31/2024  Plan of Care Expiration: 12/31/2023  Progress Note Due: 1/1/2024  Date of Surgery: 06/26/2023  Visit # / Visits authorized: 13 / 20 +eval   FOTO: 2/ 3     Precautions: Standard     PTA Visit #: 1/5     Time In: 0700am  Time Out: 0800am  Total Billable Time: 55'     Subjective     Pt reports: that she still has moments when he back tightens up and she has to sit or lay down to relax it. Now it takes prolonged cleaning or dancing to do it.       She was compliant with home exercise program.    Response to previous treatment: Noted no increased pain, noted increased confidence with mobility.  Functional change: Significantly increasing function without increased pain.     Pain:  Best: 1/10  Current: 1/10  Worst: 3/10    Location: bilateral back  Aggravated with positioning in bed, upon waking, and lifting.   Eased with activity, movement     Patients goals: Be able to travel without pain, be able to improve as much as possible prior to discharge.     Objective      Objective Measures updated at progress report unless specified.        Treatment     Cherri received the treatments listed below:        therapeutic exercises to develop strength, endurance, ROM, and flexibility for 15 minutes including:    -Upright bike 10' Lv  2  -RTB Monster walk 4 laps  PRN: -Standing hip flexor and HS stretch 2' each side        neuromuscular re-education activities to improve: Balance, Coordination, Kinesthetic, and Proprioception for 40 minutes. The following activities were included:    -Standing Pallof rotations at freemotion   2 x 15 each side 3#, staggered stance  -Offset farmer's carry (anti-lateral flexion)   5 laps each, 20# KB   -Quadruped ALT UE       5 x Fatigue (constant cues for core control)   -Quadruped PPT with fire hydrants   2 x 1' each   -Seated PPT with march     2 x F  -Dowel hip hinge      X 10   -Hip hikes at wall (L>R hiking needed)   3 x 12 each side (progress to step through)          Bold = Performed    Patient Education and Home Exercises       Education provided:   - POC interview, education regarding impairments and ability to address impairments contributing to pain.     Written Home Exercises Provided:  Day 2 . Exercises were reviewed and Cherri was able to demonstrate them prior to the end of the session. Cherri demonstrated good  understanding of the education provided. See EMR under Patient Instructions for exercises provided during therapy sessions.       Assessment     Cherri presented with improvements in ability to maintain anterior core control and neutral pelvic tilt today. Did complete multiple rounds of interventions including PPT in quadruped and seated with increasing fatigue. Patient verbalized and able to localize musculature that was being contracted during interventions. Does demonstrate right>left endurance and control deficits but improving overall.     Cherri Is progressing well towards her goals.   Pt prognosis is Good.     Pt will continue to benefit from skilled outpatient physical therapy to address the deficits listed in the problem list box on initial evaluation, provide pt/family education and to maximize pt's level of independence in the home and community environment.     Pt's spiritual,  cultural and educational needs considered and pt agreeable to plan of care and goals.     Anticipated barriers to physical therapy: None currently    Goals:    Short Term Goals (4 Weeks):  1. Pt will be compliant with HEP to supplement PT in decreasing pain with functional mobility. MET  2. Pt will perform squat without lumbar extension compensation and neutral spine with good control to demonstrate improved core strength. IN PROGRESS  3. Pt will report no pain during thoracolumbar ROM to promote functional mobility. IN PROGRESS  4. Pt will improve impaired LE MMTs to >/=4/5 to improve strength for functional tasks. MET  Long Term Goals (8 Weeks): IN PROGRESS  1. Pt will improve FOTO score to </= 40% limited to decrease perceived limitation with maintaining/changing body position.   2. Pt will perform hip hinging lift from floor to waist height without lumbar extension compensation and with good posterior chain control to demonstrate improved core strength.  3. Pt will improve impaired LE MMTs to >/=4+/5 to improve strength for functional tasks.  4. Pt will report pain no greater than 2/10 during ADLs, self-care, and care taking to promote return to PLOF or greater.     Plan     Plan of care Certification: 11/2/2023 to 12/31/2023.     Outpatient Physical Therapy 2 times weekly for 8 weeks to include the following interventions: Gait Training, Manual Therapy, Neuromuscular Re-ed, Patient Education, Self Care, Therapeutic Activities, and Therapeutic Exercise.     Priyank Ventura, PT

## 2023-12-20 ENCOUNTER — CLINICAL SUPPORT (OUTPATIENT)
Dept: REHABILITATION | Facility: HOSPITAL | Age: 58
End: 2023-12-20
Payer: COMMERCIAL

## 2023-12-20 DIAGNOSIS — R29.898 WEAKNESS OF BOTH LOWER EXTREMITIES: ICD-10-CM

## 2023-12-20 DIAGNOSIS — M25.69 DECREASED RANGE OF MOTION OF TRUNK AND BACK: Primary | ICD-10-CM

## 2023-12-20 DIAGNOSIS — R29.898 DECREASED STRENGTH OF TRUNK AND BACK: ICD-10-CM

## 2023-12-20 PROCEDURE — 97110 THERAPEUTIC EXERCISES: CPT | Mod: PN

## 2023-12-20 PROCEDURE — 97112 NEUROMUSCULAR REEDUCATION: CPT | Mod: PN

## 2023-12-20 NOTE — PROGRESS NOTES
[facility]   Physical Therapy Treatment Note      Name: Cherri Noonan  Clinic Number: [id number]    Therapy Diagnosis:   Encounter Diagnoses   Name Primary?    Decreased range of motion of trunk and back Yes    Decreased strength of trunk and back     Weakness of both lower extremities          Physician: aRhel Woods, *    Visit Date: 12/20/2023    Physician Orders: PT Eval and Treat Lumbar spine fusion  Medical Diagnosis from Referral: Status post lumbar spinal fusion [Z98.1], Back pain, unspecified back location, unspecified back pain laterality, unspecified chronicity [M54.9]  Evaluation Date: 11/2/2023  Authorization Period Expiration: 10/31/2024  Plan of Care Expiration: 12/31/2023  Progress Note Due: 1/1/2024  Date of Surgery: 06/26/2023  Visit # / Visits authorized: 14 / 20 +eval   FOTO: 2/ 3     Precautions: Standard     PTA Visit #: 1/5     Time In: 0700am  Time Out: 0802am  Total Billable Time: 60'     Subjective     Pt reports: that she took a muscle relaxer last night due to fatigue and soreness. Notes she was up from 2am to 11pm yesterday cleaning and working. She notes that she is not too sore today. Feels like she is doing well overall.        She was compliant with home exercise program.    Response to previous treatment: Noted no increased pain, noted increased confidence with mobility.  Functional change: Significantly increasing function without increased pain.     Pain:  Best: 1/10  Current: 1/10  Worst: 3/10    Location: bilateral back  Aggravated with positioning in bed, upon waking, and lifting.   Eased with activity, movement     Patients goals: Be able to travel without pain, be able to improve as much as possible prior to discharge.     Objective      Objective Measures updated at progress report unless specified.        Treatment     Cherri received the treatments listed below:        therapeutic exercises to develop strength, endurance, ROM, and  flexibility for 15 minutes including:    -Upright bike 10' Lv 2  -RTB Monster walk 4 laps  PRN: -Standing hip flexor and HS stretch 2' each side        neuromuscular re-education activities to improve: Balance, Coordination, Kinesthetic, and Proprioception for 40 minutes. The following activities were included:    -Standing Pallof rotations at freemotion   2 x 15 each side 3#, staggered stance  -Offset farmer's carry (anti-lateral flexion)   5 laps each, 20# KB   -Quadruped ALT UE       5 x Fatigue (constant cues for core control)   -Sup LE march maintaining PPT    5', rest breaks PRN   -Sup alt UE maintaining PPT    5', rest breaks PRN   -Hip hikes at wall (L>R hiking needed)   3 x 12 each side (progress to step through)          Bold = Performed    Patient Education and Home Exercises       Education provided:   - POC interview, education regarding impairments and ability to address impairments contributing to pain.     Written Home Exercises Provided:  Day 2 . Exercises were reviewed and Cherri was able to demonstrate them prior to the end of the session. Cherri demonstrated good  understanding of the education provided. See EMR under Patient Instructions for exercises provided during therapy sessions.       Assessment     Continued to challenge core control and maintaining strong, neutral core during dynamic activities. Patient required frequent verbal and tactile cues for appropriate control and completion. Patient tolerated treatment well overall, continues to lack body awareness and proprioception but overall is progressing each visit and between visits. Will continue to address and improve core control using high volume due to difficulty with learning new skills and techniques due to overthinking.    Cherri Is progressing well towards her goals.   Pt prognosis is Good.     Pt will continue to benefit from skilled outpatient physical therapy to address the deficits listed in the problem list box on initial  evaluation, provide pt/family education and to maximize pt's level of independence in the home and community environment.     Pt's spiritual, cultural and educational needs considered and pt agreeable to plan of care and goals.     Anticipated barriers to physical therapy: None currently    Goals:    Short Term Goals (4 Weeks):  1. Pt will be compliant with HEP to supplement PT in decreasing pain with functional mobility. MET  2. Pt will perform squat without lumbar extension compensation and neutral spine with good control to demonstrate improved core strength. IN PROGRESS  3. Pt will report no pain during thoracolumbar ROM to promote functional mobility. IN PROGRESS  4. Pt will improve impaired LE MMTs to >/=4/5 to improve strength for functional tasks. MET  Long Term Goals (8 Weeks): IN PROGRESS  1. Pt will improve FOTO score to </= 40% limited to decrease perceived limitation with maintaining/changing body position.   2. Pt will perform hip hinging lift from floor to waist height without lumbar extension compensation and with good posterior chain control to demonstrate improved core strength.  3. Pt will improve impaired LE MMTs to >/=4+/5 to improve strength for functional tasks.  4. Pt will report pain no greater than 2/10 during ADLs, self-care, and care taking to promote return to PLOF or greater.     Plan     Plan of care Certification: 11/2/2023 to 12/31/2023.     Outpatient Physical Therapy 2 times weekly for 8 weeks to include the following interventions: Gait Training, Manual Therapy, Neuromuscular Re-ed, Patient Education, Self Care, Therapeutic Activities, and Therapeutic Exercise.     Priyank Ventura, PT

## 2024-01-04 ENCOUNTER — HOSPITAL ENCOUNTER (OUTPATIENT)
Dept: RADIOLOGY | Facility: HOSPITAL | Age: 59
Discharge: HOME | End: 2024-01-04
Attending: NURSE PRACTITIONER
Payer: COMMERCIAL

## 2024-01-04 VITALS — HEIGHT: 64 IN | WEIGHT: 158.06 LBS | BODY MASS INDEX: 26.98 KG/M2

## 2024-01-04 DIAGNOSIS — Z12.31 ENCOUNTER FOR SCREENING MAMMOGRAM FOR BREAST CANCER: ICD-10-CM

## 2024-01-04 PROCEDURE — 77063 BREAST TOMOSYNTHESIS BI: CPT | Mod: 26,,, | Performed by: RADIOLOGY

## 2024-01-04 PROCEDURE — 77067 SCR MAMMO BI INCL CAD: CPT | Mod: 26,,, | Performed by: RADIOLOGY

## 2024-01-04 PROCEDURE — 77067 SCR MAMMO BI INCL CAD: CPT | Mod: TC

## 2024-01-09 ENCOUNTER — IMMUNIZATION (OUTPATIENT)
Dept: FAMILY MEDICINE | Facility: CLINIC | Age: 59
End: 2024-01-09
Payer: COMMERCIAL

## 2024-01-09 DIAGNOSIS — Z23 NEED FOR INFLUENZA VACCINATION: Primary | ICD-10-CM

## 2024-01-09 PROCEDURE — 90471 IMMUNIZATION ADMIN: CPT | Mod: S$GLB,,, | Performed by: FAMILY MEDICINE

## 2024-01-09 PROCEDURE — 90686 IIV4 VACC NO PRSV 0.5 ML IM: CPT | Mod: S$GLB,,, | Performed by: FAMILY MEDICINE

## 2024-02-29 ENCOUNTER — LAB VISIT (OUTPATIENT)
Dept: LAB | Facility: HOSPITAL | Age: 59
End: 2024-02-29
Payer: COMMERCIAL

## 2024-02-29 ENCOUNTER — OFFICE VISIT (OUTPATIENT)
Dept: INTERNAL MEDICINE | Facility: CLINIC | Age: 59
End: 2024-02-29
Payer: COMMERCIAL

## 2024-02-29 VITALS
SYSTOLIC BLOOD PRESSURE: 130 MMHG | WEIGHT: 154.31 LBS | BODY MASS INDEX: 26.34 KG/M2 | OXYGEN SATURATION: 100 % | HEART RATE: 60 BPM | HEIGHT: 64 IN | DIASTOLIC BLOOD PRESSURE: 80 MMHG

## 2024-02-29 DIAGNOSIS — M54.12 CERVICAL RADICULOPATHY: ICD-10-CM

## 2024-02-29 DIAGNOSIS — Z00.00 ANNUAL PHYSICAL EXAM: ICD-10-CM

## 2024-02-29 DIAGNOSIS — M47.819 SPONDYLOSIS WITHOUT MYELOPATHY: ICD-10-CM

## 2024-02-29 DIAGNOSIS — E55.9 VITAMIN D DEFICIENCY: ICD-10-CM

## 2024-02-29 DIAGNOSIS — Z98.1 S/P SPINAL FUSION: ICD-10-CM

## 2024-02-29 DIAGNOSIS — E78.49 OTHER HYPERLIPIDEMIA: ICD-10-CM

## 2024-02-29 DIAGNOSIS — Z00.00 ANNUAL PHYSICAL EXAM: Primary | ICD-10-CM

## 2024-02-29 DIAGNOSIS — E66.9 CLASS 1 OBESITY WITH BODY MASS INDEX (BMI) OF 31.0 TO 31.9 IN ADULT, UNSPECIFIED OBESITY TYPE, UNSPECIFIED WHETHER SERIOUS COMORBIDITY PRESENT: ICD-10-CM

## 2024-02-29 DIAGNOSIS — M79.2 PERIPHERAL NEUROPATHIC PAIN: ICD-10-CM

## 2024-02-29 LAB
25(OH)D3+25(OH)D2 SERPL-MCNC: 36 NG/ML (ref 30–96)
ALBUMIN SERPL BCP-MCNC: 4.4 G/DL (ref 3.5–5.2)
ALP SERPL-CCNC: 133 U/L (ref 55–135)
ALT SERPL W/O P-5'-P-CCNC: 16 U/L (ref 10–44)
ANION GAP SERPL CALC-SCNC: 9 MMOL/L (ref 8–16)
AST SERPL-CCNC: 20 U/L (ref 10–40)
BASOPHILS # BLD AUTO: 0.08 K/UL (ref 0–0.2)
BASOPHILS NFR BLD: 1.3 % (ref 0–1.9)
BILIRUB SERPL-MCNC: 0.5 MG/DL (ref 0.1–1)
BUN SERPL-MCNC: 13 MG/DL (ref 6–20)
CALCIUM SERPL-MCNC: 9.8 MG/DL (ref 8.7–10.5)
CHLORIDE SERPL-SCNC: 107 MMOL/L (ref 95–110)
CHOLEST SERPL-MCNC: 251 MG/DL (ref 120–199)
CHOLEST/HDLC SERPL: 3.3 {RATIO} (ref 2–5)
CO2 SERPL-SCNC: 26 MMOL/L (ref 23–29)
CREAT SERPL-MCNC: 0.8 MG/DL (ref 0.5–1.4)
DIFFERENTIAL METHOD BLD: ABNORMAL
EOSINOPHIL # BLD AUTO: 0.1 K/UL (ref 0–0.5)
EOSINOPHIL NFR BLD: 2.1 % (ref 0–8)
ERYTHROCYTE [DISTWIDTH] IN BLOOD BY AUTOMATED COUNT: 16 % (ref 11.5–14.5)
EST. GFR  (NO RACE VARIABLE): >60 ML/MIN/1.73 M^2
ESTIMATED AVG GLUCOSE: 105 MG/DL (ref 68–131)
GLUCOSE SERPL-MCNC: 75 MG/DL (ref 70–110)
HBA1C MFR BLD: 5.3 % (ref 4–5.6)
HCT VFR BLD AUTO: 43.4 % (ref 37–48.5)
HDLC SERPL-MCNC: 76 MG/DL (ref 40–75)
HDLC SERPL: 30.3 % (ref 20–50)
HGB BLD-MCNC: 13.5 G/DL (ref 12–16)
IMM GRANULOCYTES # BLD AUTO: 0.04 K/UL (ref 0–0.04)
IMM GRANULOCYTES NFR BLD AUTO: 0.7 % (ref 0–0.5)
LDLC SERPL CALC-MCNC: 165 MG/DL (ref 63–159)
LYMPHOCYTES # BLD AUTO: 2.7 K/UL (ref 1–4.8)
LYMPHOCYTES NFR BLD: 45.2 % (ref 18–48)
MCH RBC QN AUTO: 25.3 PG (ref 27–31)
MCHC RBC AUTO-ENTMCNC: 31.1 G/DL (ref 32–36)
MCV RBC AUTO: 81 FL (ref 82–98)
MONOCYTES # BLD AUTO: 0.4 K/UL (ref 0.3–1)
MONOCYTES NFR BLD: 6.6 % (ref 4–15)
NEUTROPHILS # BLD AUTO: 2.7 K/UL (ref 1.8–7.7)
NEUTROPHILS NFR BLD: 44.1 % (ref 38–73)
NONHDLC SERPL-MCNC: 175 MG/DL
NRBC BLD-RTO: 0 /100 WBC
PLATELET # BLD AUTO: 309 K/UL (ref 150–450)
PMV BLD AUTO: 11 FL (ref 9.2–12.9)
POTASSIUM SERPL-SCNC: 3.6 MMOL/L (ref 3.5–5.1)
PROT SERPL-MCNC: 7.6 G/DL (ref 6–8.4)
RBC # BLD AUTO: 5.33 M/UL (ref 4–5.4)
SODIUM SERPL-SCNC: 142 MMOL/L (ref 136–145)
TRIGL SERPL-MCNC: 50 MG/DL (ref 30–150)
TSH SERPL DL<=0.005 MIU/L-ACNC: 1.29 UIU/ML (ref 0.4–4)
WBC # BLD AUTO: 6.06 K/UL (ref 3.9–12.7)

## 2024-02-29 PROCEDURE — 84443 ASSAY THYROID STIM HORMONE: CPT | Performed by: NURSE PRACTITIONER

## 2024-02-29 PROCEDURE — 80053 COMPREHEN METABOLIC PANEL: CPT | Performed by: NURSE PRACTITIONER

## 2024-02-29 PROCEDURE — 3044F HG A1C LEVEL LT 7.0%: CPT | Mod: CPTII,S$GLB,, | Performed by: NURSE PRACTITIONER

## 2024-02-29 PROCEDURE — 36415 COLL VENOUS BLD VENIPUNCTURE: CPT | Performed by: NURSE PRACTITIONER

## 2024-02-29 PROCEDURE — 1159F MED LIST DOCD IN RCRD: CPT | Mod: CPTII,S$GLB,, | Performed by: NURSE PRACTITIONER

## 2024-02-29 PROCEDURE — 83036 HEMOGLOBIN GLYCOSYLATED A1C: CPT | Performed by: NURSE PRACTITIONER

## 2024-02-29 PROCEDURE — 85025 COMPLETE CBC W/AUTO DIFF WBC: CPT | Performed by: NURSE PRACTITIONER

## 2024-02-29 PROCEDURE — 3008F BODY MASS INDEX DOCD: CPT | Mod: CPTII,S$GLB,, | Performed by: NURSE PRACTITIONER

## 2024-02-29 PROCEDURE — 3079F DIAST BP 80-89 MM HG: CPT | Mod: CPTII,S$GLB,, | Performed by: NURSE PRACTITIONER

## 2024-02-29 PROCEDURE — 82306 VITAMIN D 25 HYDROXY: CPT | Performed by: NURSE PRACTITIONER

## 2024-02-29 PROCEDURE — 99396 PREV VISIT EST AGE 40-64: CPT | Mod: S$GLB,,, | Performed by: NURSE PRACTITIONER

## 2024-02-29 PROCEDURE — 99999 PR PBB SHADOW E&M-EST. PATIENT-LVL IV: CPT | Mod: PBBFAC,,, | Performed by: NURSE PRACTITIONER

## 2024-02-29 PROCEDURE — 3075F SYST BP GE 130 - 139MM HG: CPT | Mod: CPTII,S$GLB,, | Performed by: NURSE PRACTITIONER

## 2024-02-29 PROCEDURE — 80061 LIPID PANEL: CPT | Performed by: NURSE PRACTITIONER

## 2024-02-29 RX ORDER — KETOCONAZOLE 20 MG/G
CREAM TOPICAL
COMMUNITY

## 2024-02-29 NOTE — PROGRESS NOTES
Internal Medicine Annual Exam       CHIEF COMPLAINT     The patient, Cherri Noonan, who is a 58 y.o. female presents for an annual exam.    HPI     Here for annual - last annual 3/29/2022    Right shoulder pain- followed by Dr Gallagher - last seen 11/30/2023  PT and f/u in 12 weeks if not improved will get MRI     Cervical spine pain- followed by ortho/pain mgmt - Dr Etienne - was referred by him to an [facility] provider   C5-C7 cervical fusion 8/2015 - [facility]   10/2021- spinal cord stimulator placed   continues to have tingling in the left arm. Tried: Elavil, Cymbalta, gabapentin, hydrocodone, flexiril and lyrica without relief.    Lumbar spondylolisthesis L4-L5 - s/p MIS L4-5 TLIF with Dr Santiago 6/26/2023  Last seen by JOYCE Woods 11/1/2023  Today she reports she continues to have pain to the area   Repeated PT without improvement     H/o atypical chest pain - went to [facility] [place] [place] ED and f/u with cardiology 6/2020  EKG 6/6/20 NSR NSSTT changes   Stress echo 6/11/20  The stress echo portion of this study is negative for myocardial ischemia.  The ECG portion of this study is negative for myocardial ischemia.  The patient's exercise capacity was average.  Normal left ventricular systolic function. The estimated ejection fraction is 60%.  Normal LV diastolic function.  Normal right ventricular systolic function.  No pulmonary hypertension present.  There were no arrhythmias during stress.  The test was stopped because the patient experienced fatigue. The patient reached the end of the protocol.    -  MMG- 1/4/2024  CRCS- 3/2016 repeat in 10 years     Exercise - walks 5-6 days per week - 3 miles per day     Past Medical History:  Past Medical History:   Diagnosis Date    Cervical radiculopathy        Past Surgical History:   Procedure Laterality Date    CERVICAL FUSION  08/2015    C5-C7    HYSTERECTOMY      INJECTION OF ANESTHETIC AGENT AROUND NERVE Bilateral 10/31/2022    Procedure: BLOCK, NERVE, BILATERAL L3-L4-L5 MEDIAL  BRANCH;  Surgeon: Archana Knott MD;  Location: [facility];  Service: Pain Management;  Laterality: Bilateral;    INJECTION OF ANESTHETIC AGENT AROUND NERVE Bilateral 11/17/2022    Procedure: BLOCK, NERVE BILATERAL L3,L4,L5 MEDIAL BRANCH;  Surgeon: Archana Knott MD;  Location: [facility];  Service: Pain Management;  Laterality: Bilateral;    MINIMALLY INVASIVE TRANSFORAMINAL LUMBAR INTERBODY FUSION (TLIF) Left 6/26/2023    Procedure: FUSION, SPINE, LUMBAR, TLIF, MINIMALLY INVASIVE;  Surgeon: Layo Santiago MD;  Location: [facility] OR;  Service: Neurosurgery;  Laterality: Left;  MIS Left L4-5 TLIF   fluoro  Andrew table 4 poster  Neuromonitoring  spinewave  SPINEWAVE LALIT. [phone] TEXTED ТАТЬЯНА ON 6/15/2023 @ 11:29AM. ТАТЬЯНА WILDE RESPONDED ON 6/15/2023 @ 11:35AM-LO  NEUROMONITORING ТАТЬЯНА ARANDA [phone]  RN    RADIOFREQUENCY ABLATION Left 12/8/2022    Procedure: RADIOFREQUENCY ABLATION, LEFT L3,L4,L5 MEDIAL BRANCH ONE OF TWO;  Surgeon: Archana Knott MD;  Location: [facility];  Service: Pain Management;  Laterality: Left;    RADIOFREQUENCY ABLATION Right 12/22/2022    Procedure: RADIOFREQUENCY ABLATION, RIGHT L3,L4,L5 MEDIAL BRANCH TWO OF TWO;  Surgeon: Archana Knott MD;  Location: [facility];  Service: Pain Management;  Laterality: Right;    SPINAL CORD STIMULATOR IMPLANT  10/20/2021    TRANSFORAMINAL EPIDURAL INJECTION OF STEROID Bilateral 3/13/2023    Procedure: INJECTION, STEROID, EPIDURAL, TRANSFORAMINAL APPROACH BILATERAL L5/S1;  Surgeon: Archana Knott MD;  Location: [facility];  Service: Pain Management;  Laterality: Bilateral;    TUBAL LIGATION          Family History   Problem Relation Age of Onset    Hyperlipidemia Mother     Diabetes Mother     Aneurysm Brother     COPD Brother     Heart disease Sister     Juvenile idiopathic arthritis Sister     Breast cancer Neg Hx     Colon cancer Neg Hx         Social History     Socioeconomic History    Marital status:    Tobacco Use     Smoking status: Never    Smokeless tobacco: Never   Substance and Sexual Activity    Alcohol use: Never    Drug use: Never    Sexual activity: Not Currently     Partners: Male     Birth control/protection: None     Comment:  39+years     Social Determinants of Health     Financial Resource Strain: Medium Risk (1/9/2024)    Overall Financial Resource Strain (CARDIA)     Difficulty of Paying Living Expenses: Somewhat hard   Food Insecurity: Patient Declined (1/9/2024)    Hunger Vital Sign     Worried About Running Out of Food in the Last Year: Patient declined     Ran Out of Food in the Last Year: Patient declined   Transportation Needs: No Transportation Needs (1/9/2024)    PRAPARE - Transportation     Lack of Transportation (Medical): No     Lack of Transportation (Non-Medical): No   Physical Activity: Sufficiently Active (1/9/2024)    Exercise Vital Sign     Days of Exercise per Week: 5 days     Minutes of Exercise per Session: 50 min   Stress: No Stress Concern Present (1/9/2024)    [place] [employer] - Occupational Stress Questionnaire     Feeling of Stress : Only a little   Social Connections: Unknown (1/9/2024)    Social Connection and Isolation Panel [NHANES]     Frequency of Communication with Friends and Family: More than three times a week     Frequency of Social Gatherings with Friends and Family: Once a week     Active Member of Clubs or Organizations: Yes     Attends Club or Organization Meetings: More than 4 times per year     Marital Status:    Housing Stability: Low Risk  (1/9/2024)    Housing Stability Vital Sign     Unable to Pay for Housing in the Last Year: No     Number of Places Lived in the Last Year: 1     Unstable Housing in the Last Year: No        Social History     Tobacco Use   Smoking Status Never   Smokeless Tobacco Never        Allergies as of 02/29/2024 - Reviewed 02/29/2024   Allergen Reaction Noted    Shingrix (pf) [varicella-zoster ge-[id number] (pf)]   05/16/2022          Home Medications:  Prior to Admission medications    Medication Sig Start Date End Date Taking? Authorizing Provider   acetaminophen (TYLENOL) 500 MG tablet Take 500 mg by mouth every 6 (six) hours as needed for Pain.   Yes Provider, Historical   azelastine 205.5 mcg (0.15 %) Spry azelastine 205.5 mcg (0.15 %) nasal spray   Yes Provider, Historical   ergocalciferol (ERGOCALCIFEROL) 50,000 unit Cap Take 50,000 Units by mouth every 7 days.   Yes Provider, Historical   fluticasone propionate (FLONASE) 50 mcg/actuation nasal spray USE 1 SPRAY (50 MCG TOTAL) IN EACH NOSTRIL ONCE DAILY 9/4/23  Yes Mahsa Arevalo NP   multivitamin capsule Take 1 capsule by mouth once daily.   Yes Provider, Historical   ondansetron (ZOFRAN-ODT) 4 MG TbDL Dissolve 1 tablet (4 mg total) by mouth every 6 (six) hours as needed (nausea). 6/26/23  Yes Rahel Woods PA-C   oxyCODONE-acetaminophen (PERCOCET)  mg per tablet Take 1 tablet by mouth every 4 (four) hours as needed for Pain (severe pain only (7-10/10)). 6/26/23  Yes Rahel Woods PA-C   tiZANidine (ZANAFLEX) 4 MG tablet TAKE 1 TABLET (4 MG TOTAL) BY MOUTH NIGHTLY AS NEEDED FOR MUSCLE SPASM 6/22/23  Yes Archana Knott MD   zonisamide (ZONEGRAN) 100 MG Cap TAKE 4 CAPSULES BY MOUTH ONCE DAILY 6/22/23  Yes Archana Knott MD   amoxicillin-clavulanate 875-125mg (AUGMENTIN) 875-125 mg per tablet Take 1 tablet by mouth every 12 (twelve) hours.  Patient not taking: Reported on 2/29/2024 9/1/23   Mahsa Arevalo NP   b complex vitamins tablet Take 1 tablet by mouth once daily.    Provider, Historical   ciclopirox (LOPROX) 0.77 % Susp Apply topically 2 (two) times daily.    Provider, Historical   COVID-19 AT-HOME TEST Kit FOLLOW INSTRUCTIONS INCLUDED WITH THE PACKAGE. 4/20/23   Provider, Historical   estradioL (ESTRACE) 0.01 % (0.1 mg/gram) vaginal cream PLACE 0.5 G VAGINALLY TWICE A WEEK. AS NEEDED FOR VAGINAL DRYNESS.  Patient not  taking: Reported on 2/29/2024 10/30/23   Cali Sarmiento MD   ginseng 250 mg Cap Take by mouth.    Provider, Historical   meloxicam (MOBIC) 15 MG tablet Take 1 tablet (15 mg total) by mouth once daily.  Patient not taking: Reported on 2/29/2024 9/7/23   Charity Gallagher MD   methocarbamoL (ROBAXIN) 500 MG Tab Take 1 tablet (500 mg) by mouth up to every 6 hours as needed for Muscle Spasm.  Patient not taking: Reported on 2/29/2024 6/28/23   Gloria Anaya PA-C   omega-3 fatty acids/fish oil (FISH OIL-OMEGA-3 FATTY ACIDS) 300-1,000 mg capsule Take 1 capsule by mouth once daily. 7/1/23   Rahel Woods PA-C   omeprazole (PRILOSEC) 20 MG capsule TAKE 1 CAPSULE BY MOUTH EVERY DAY  Patient not taking: Reported on 2/29/2024 9/25/23   Colette Sarmiento MD   vitamin E 100 UNIT capsule Take 100 Units by mouth once daily.    Provider, Historical   vitamin E, dl,tocopheryl acet, (VITAMIN E, DL, ACETATE,) 45 mg (100 unit) Cap Take 100 Units by mouth.    Provider, Historical   zinc gluconate 50 mg tablet Take 50 mg by mouth once daily.    Provider, Historical       Review of Systems:  Review of Systems   Constitutional:  Negative for chills, fatigue, fever and unexpected weight change.   HENT:  Negative for congestion, hearing loss, rhinorrhea and sinus pressure.    Eyes:  Negative for pain, redness and visual disturbance.   Respiratory:  Negative for cough and shortness of breath.    Cardiovascular:  Negative for chest pain and palpitations.   Gastrointestinal:  Negative for abdominal distention, abdominal pain, constipation, diarrhea, nausea and vomiting.   Endocrine: Negative for polydipsia, polyphagia and polyuria.   Genitourinary:  Negative for dysuria, frequency, urgency and vaginal discharge.   Musculoskeletal:  Positive for arthralgias and back pain. Negative for gait problem and myalgias.   Skin:  Negative for color change and rash.   Allergic/Immunologic: Negative for environmental allergies and  "immunocompromised state.   Neurological:  Negative for dizziness, weakness, light-headedness and headaches.   Hematological:  Negative for adenopathy. Does not bruise/bleed easily.   Psychiatric/Behavioral:  Negative for confusion and sleep disturbance. The patient is not nervous/anxious.        Health Maintainence:   Immunizations:  Health Maintenance         Date Due Completion Date    COVID-19 Vaccine (4 - 2023-24 season) 09/01/2023 8/17/2022    Shingles Vaccine (2 of 2) 02/28/2025 (Originally 5/24/2022) 3/29/2022    Mammogram 01/04/2025 1/4/2024    Hemoglobin A1c (Diabetic Prevention Screening) 03/29/2025 3/29/2022    Colorectal Cancer Screening 10/13/2026 10/13/2016    Lipid Panel 02/09/2028 2/9/2023    TETANUS VACCINE 09/28/2032 9/28/2022             PHYSICAL EXAM     /80 (BP Location: Left arm, Patient Position: Sitting, BP Method: Medium (Manual))   Pulse 60   Ht 5' 4" (1.626 m)   Wt 70 kg (154 lb 5.2 oz)   SpO2 100%   BMI 26.49 kg/m²  Body mass index is 26.49 kg/m².    Physical Exam  Vitals reviewed.   Constitutional:       Appearance: She is well-developed.   HENT:      Head: Normocephalic.      Right Ear: External ear normal.      Left Ear: External ear normal.      Nose: Nose normal.      Mouth/Throat:      Pharynx: No oropharyngeal exudate.   Eyes:      Pupils: Pupils are equal, round, and reactive to light.   Neck:      Thyroid: No thyromegaly.      Vascular: No JVD.      Trachea: No tracheal deviation.   Cardiovascular:      Rate and Rhythm: Normal rate and regular rhythm.      Heart sounds: Normal heart sounds. No murmur heard.     No friction rub. No gallop.   Pulmonary:      Effort: Pulmonary effort is normal. No respiratory distress.      Breath sounds: Normal breath sounds. No wheezing or rales.   Abdominal:      General: Bowel sounds are normal. There is no distension.      Palpations: Abdomen is soft.      Tenderness: There is no abdominal tenderness.   Musculoskeletal:         " General: Normal range of motion.      Cervical back: Neck supple.      Lumbar back: Tenderness present.        Back:    Lymphadenopathy:      Cervical: No cervical adenopathy.   Skin:     General: Skin is warm and dry.      Findings: No rash.   Neurological:      Mental Status: She is alert and oriented to person, place, and time.   Psychiatric:         Behavior: Behavior normal.         LABS     Lab Results   Component Value Date    HGBA1C 5.6 03/29/2022     CMP  Sodium   Date Value Ref Range Status   06/12/2023 140 136 - 145 mmol/L Final     Potassium   Date Value Ref Range Status   06/12/2023 3.7 3.5 - 5.1 mmol/L Final     Chloride   Date Value Ref Range Status   06/12/2023 109 95 - 110 mmol/L Final     CO2   Date Value Ref Range Status   06/12/2023 23 23 - 29 mmol/L Final     Glucose   Date Value Ref Range Status   06/12/2023 75 70 - 110 mg/dL Final     BUN   Date Value Ref Range Status   06/12/2023 14 6 - 20 mg/dL Final     Creatinine   Date Value Ref Range Status   06/12/2023 1.0 0.5 - 1.4 mg/dL Final     Calcium   Date Value Ref Range Status   06/12/2023 9.3 8.7 - 10.5 mg/dL Final     Total Protein   Date Value Ref Range Status   03/29/2022 7.9 6.0 - 8.4 g/dL Final     Albumin   Date Value Ref Range Status   03/29/2022 4.5 3.5 - 5.2 g/dL Final     Total Bilirubin   Date Value Ref Range Status   03/29/2022 0.5 0.1 - 1.0 mg/dL Final     Comment:     For infants and newborns, interpretation of results should be based  on gestational age, weight and in agreement with clinical  observations.    Premature Infant recommended reference ranges:  Up to 24 hours.............<8.0 mg/dL  Up to 48 hours............<12.0 mg/dL  3-5 days..................<15.0 mg/dL  6-29 days.................<15.0 mg/dL       Alkaline Phosphatase   Date Value Ref Range Status   03/29/2022 100 55 - 135 U/L Final     AST   Date Value Ref Range Status   03/29/2022 17 10 - 40 U/L Final     ALT   Date Value Ref Range Status   03/29/2022 23 10 -  44 U/L Final     Anion Gap   Date Value Ref Range Status   06/12/2023 8 8 - 16 mmol/L Final     eGFR if    Date Value Ref Range Status   03/29/2022 >60.0 >60 mL/min/1.73 m^2 Final     eGFR if non    Date Value Ref Range Status   03/29/2022 >60.0 >60 mL/min/1.73 m^2 Final     Comment:     Calculation used to obtain the estimated glomerular filtration  rate (eGFR) is the CKD-EPI equation.        Lab Results   Component Value Date    WBC 5.38 06/12/2023    HGB 12.4 06/12/2023    HCT 38.6 06/12/2023    MCV 78 (L) 06/12/2023     06/12/2023     Lab Results   Component Value Date    CHOL 230 (H) 03/29/2022     Lab Results   Component Value Date    HDL 62 03/29/2022     Lab Results   Component Value Date    LDLCALC 152.0 03/29/2022     Lab Results   Component Value Date    TRIG 80 03/29/2022     Lab Results   Component Value Date    CHOLHDL 27.0 03/29/2022     Lab Results   Component Value Date    TSH 0.862 03/29/2022       ASSESSMENT/PLAN     Cherri Noonan is a 58 y.o. female    Annual physical exam- All age and gender related screenings discussed   -     CBC Auto Differential; Future; Expected date: 02/29/2024  -     Comprehensive Metabolic Panel; Future; Expected date: 02/29/2024  -     Hemoglobin A1C; Future; Expected date: 02/29/2024  -     Lipid Panel; Future; Expected date: 02/29/2024  -     TSH; Future; Expected date: 02/29/2024  -     Vitamin D; Future; Expected date: 02/29/2024    Spondylosis without myelopathy- stable. Will cont HEP and f/u with neurosurgery     S/P spinal fusion- stable. Will cont HEP and f/u with neurosurgery     Peripheral neuropathic pain- stable. Will cont HEP and f/u with neurosurgery     Cervical radiculopathy- stable. Will cont HEP and f/u with neurosurgery     Other hyperlipidemia- stable. Will cont exercise and diet. Repeat FLP   -     Lipid Panel; Future; Expected date: 02/29/2024    Vitamin D deficiency  -     Vitamin D; Future; Expected date:  02/29/2024    Class 1 obesity with body mass index (BMI) of 31.0 to 31.9 in adult, unspecified obesity type, unspecified whether serious comorbidity present           Follow up with PCP    ANALISA Jackson, SEANP-c   Department of Internal Medicine - [facility] maureen  2:17 PM

## 2024-03-11 DIAGNOSIS — M79.2 PERIPHERAL NEUROPATHIC PAIN: ICD-10-CM

## 2024-03-11 DIAGNOSIS — M54.12 CERVICAL RADICULOPATHY: ICD-10-CM

## 2024-03-11 RX ORDER — ZONISAMIDE 100 MG/1
CAPSULE ORAL
Qty: 360 CAPSULE | Refills: 2 | Status: SHIPPED | OUTPATIENT
Start: 2024-03-11

## 2024-03-11 RX ORDER — TIZANIDINE 4 MG/1
TABLET ORAL
Qty: 90 TABLET | Refills: 2 | Status: SHIPPED | OUTPATIENT
Start: 2024-03-11

## 2024-08-07 ENCOUNTER — TELEPHONE (OUTPATIENT)
Dept: NEUROSURGERY | Facility: CLINIC | Age: 59
End: 2024-08-07
Payer: COMMERCIAL

## 2024-08-07 DIAGNOSIS — M43.27 FUSION OF SPINE, LUMBOSACRAL REGION: Primary | ICD-10-CM

## 2024-08-14 ENCOUNTER — OFFICE VISIT (OUTPATIENT)
Dept: NEUROSURGERY | Facility: CLINIC | Age: 59
End: 2024-08-14
Payer: COMMERCIAL

## 2024-08-14 ENCOUNTER — HOSPITAL ENCOUNTER (OUTPATIENT)
Dept: RADIOLOGY | Facility: HOSPITAL | Age: 59
Discharge: HOME | End: 2024-08-14
Attending: NEUROLOGICAL SURGERY
Payer: COMMERCIAL

## 2024-08-14 DIAGNOSIS — M43.16 SPONDYLOLISTHESIS AT L4-L5 LEVEL: ICD-10-CM

## 2024-08-14 DIAGNOSIS — Z98.1 STATUS POST LUMBAR SPINAL FUSION: ICD-10-CM

## 2024-08-14 DIAGNOSIS — M43.27 FUSION OF SPINE, LUMBOSACRAL REGION: Primary | ICD-10-CM

## 2024-08-14 DIAGNOSIS — M43.27 FUSION OF SPINE, LUMBOSACRAL REGION: ICD-10-CM

## 2024-08-14 PROCEDURE — 3044F HG A1C LEVEL LT 7.0%: CPT | Mod: CPTII,S$GLB,, | Performed by: NEUROLOGICAL SURGERY

## 2024-08-14 PROCEDURE — 99214 OFFICE O/P EST MOD 30 MIN: CPT | Mod: S$GLB,,, | Performed by: NEUROLOGICAL SURGERY

## 2024-08-14 PROCEDURE — 72131 CT LUMBAR SPINE W/O DYE: CPT | Mod: TC

## 2024-08-14 PROCEDURE — 1160F RVW MEDS BY RX/DR IN RCRD: CPT | Mod: CPTII,S$GLB,, | Performed by: NEUROLOGICAL SURGERY

## 2024-08-14 PROCEDURE — 1159F MED LIST DOCD IN RCRD: CPT | Mod: CPTII,S$GLB,, | Performed by: NEUROLOGICAL SURGERY

## 2024-08-14 PROCEDURE — 72131 CT LUMBAR SPINE W/O DYE: CPT | Mod: 26,,, | Performed by: RADIOLOGY

## 2024-08-14 NOTE — PATIENT INSTRUCTIONS
I have personally reviewed the CT Lumbar Spine Without Contrast with the pt which shows no hardware failure complication seen. L4-L5 demonstrates left-sided neural foraminal stenosis. Right renal calculi without obstruction. Right renal cyst. Remaining lumbar levels are unremarkable.    I will refer the pt to pain management specialist, Dr. Kim for B/L facet blocks at L5/S1.     I will schedule the patient for a f/u visit 10 days after the visit with Dr. Kim.

## 2024-08-14 NOTE — H&P (VIEW-ONLY)
Subjective:   I, Armani Moe , attest that this documentation has been prepared under the direction and in the presence of Layo Santiago MD.     Patient ID: Cherri Noonan is a 58 y.o. female     Chief Complaint: No chief complaint on file.      HPI  MsLovely Noonan is a 58 y.o. woman with lumbar radiculopathy and spondylolisthesis who presents today for  1 year f/u s/p MIS L4-5 TLIF done on 6/26/2023. This is a patient who presented to me with chronic low back pain for the last 9 years. The patient has a long h/o spinal interventions including a C5-7 ACDF in 2015 by Dr. Peña, cervical spinal cord stimulator, multiple cervical FANNY and trigger point injections, and physical therapy.  Pt continues to remain symptomatic despite conservative treatment. She reports her low back pain radiates down the LLE. Denies b/b dysfunction and SA.  Her primary concern today is of chronic b/l low back pain.  Her pain radiates into her b/l buttocks.  Since her most recent FANNY, she is had new radiation down her right leg, both anterior and posterior, generally stopping at the ankle.  Very occasionally she has radiation into her b/l feet.  She has tried physical therapy for her low back as well, and found it to be somewhat helpful.  She is currently attending the healthy back program and feels that her back is much stronger, but pain persists.  It is 8/10 today in clinic.  She denies saddle anesthesia and b/b dysfunction.  She further denies numbness and weakness in her legs.  She has no significant limitations with ambulation.  She can walk approximately 7 miles without stopping.  MRI Lumbar Spine WO Contrast (1/30/2023): Lumbar degenerative changes most pronounced at L4-L5 noting grade 1 anterolisthesis, bilateral ligamentum flavum buckling and bilateral facet arthropathy contributing to severe spinal canal and lateral recess stenosis and mild-to-moderate neural foraminal narrowing.  I performed an MIS Left L4-5 TLIF on  6/26/24. At the time of our last clinic visit on 8/9/24, the pt reports she is not currently ambulating or partaking in physical activity. She is doing well in recovery and only c/o mild myalgias around the incision site. She is compliant with wearing a back brace. Pt also c/o significant head pressure 2/2 a sinus infection.     Today the pt reports she is still continuing to experience pain despite going to physical therapy.  She states that her hips are now hurting bilaterally with radiation to her legs.  She also endorses sharp pain in her medial aspect of her right thigh. She states that she felt a relief of her symptoms soon after surgery however she is concerned that the symptoms have returned. She states that the pain is so severe that it wakes her up from sleep.      Review of Systems   Constitutional:  Negative for activity change, appetite change, fatigue, fever and unexpected weight change.   HENT:  Negative for facial swelling.    Eyes: Negative.    Respiratory: Negative.     Cardiovascular: Negative.    Gastrointestinal:  Negative for diarrhea, nausea and vomiting.   Endocrine: Negative.    Genitourinary: Negative.    Musculoskeletal:  Positive for back pain. Negative for joint swelling, myalgias and neck pain.   Neurological:  Negative for dizziness, seizures, weakness, numbness and headaches.   Psychiatric/Behavioral: Negative.          Past Medical History:   Diagnosis Date    Cervical radiculopathy        Objective:    There were no vitals filed for this visit.   Physical Exam  Constitutional:       General: She is not in acute distress.     Appearance: Normal appearance.   HENT:      Head: Normocephalic and atraumatic.   Pulmonary:      Effort: Pulmonary effort is normal.   Musculoskeletal:      Cervical back: Neck supple.      Comments: Unable to distinguish tenderness to palpation on lower back at midline.     Neurological:      Mental Status: She is alert and oriented to person, place, and time.       GCS: GCS eye subscore is 4. GCS verbal subscore is 5. GCS motor subscore is 6.      Cranial Nerves: No cranial nerve deficit.       IMAGING:  CT Lumbar Spine Without Contrast 8/14/24:  No hardware failure complication seen. L4-L5 demonstrates left-sided neural foraminal stenosis. Right renal calculi without obstruction. Right renal cyst. Remaining lumbar levels are unremarkable.    I have personally reviewed the images with the pt.      I, Dr. Layo Santiago, personally performed the services described in this documentation. All medical record entries made by the scribe, Armani Moe, were at my direction and in my presence.  I have reviewed the chart and agree that the record reflects my personal performance and is accurate and complete. Layo Santiago MD. 08/14/2024    Assessment:       Spondylolisthesis.  S/p Lumbar fusion.     Plan:   I have personally reviewed the CT Lumbar Spine Without Contrast with the pt which shows no hardware failure complication seen. L4-L5 demonstrates left-sided neural foraminal stenosis. Right renal calculi without obstruction. Right renal cyst. Remaining lumbar levels are unremarkable.    I will refer the pt to pain management specialist, Dr. Kim for B/L facet blocks at L5/S1.     I will schedule the patient for a f/u visit 10 days after the visit with Dr. Kim.

## 2024-08-15 ENCOUNTER — TELEPHONE (OUTPATIENT)
Dept: PAIN MEDICINE | Facility: CLINIC | Age: 59
End: 2024-08-15
Payer: COMMERCIAL

## 2024-08-15 DIAGNOSIS — M47.816 LUMBAR SPONDYLOSIS: Primary | ICD-10-CM

## 2024-08-15 NOTE — TELEPHONE ENCOUNTER
Ms. Harley would like to schedule the bilat L5/S1 facet MBB on 8/21/2024- provider updated.     Reviewed pre-procedure instructions with her, as well as provided arrival time of 11:15a for 8/21/2024.  All questions answered- verbalized understanding.

## 2024-08-15 NOTE — TELEPHONE ENCOUNTER
----- Message from Cornelio Kim Jr., MD sent at 2024  1:04 PM CDT -----  Regarding: RE: Order for LAZARO ALVES  OK to schedule for b/l L5/S1 facet medial branch blocks. Thanks.  ----- Message -----  From: Bebe Sterling, RN  Sent: 2024  12:52 PM CDT  To: Cornelio Kim Jr., MD  Subject: FW: Order for LAZARO ALVES                     ----- Message -----  From: Jenniffer Walls, RN  Sent: 2024  12:49 PM CDT  To: [facility] Pain Management Schedulers  Subject: Order for LAZARO ALVES                         Patient Name: LAZARO ALVES([id number])  Sex: Female  : 1965      PCP: LINSEY JOSEPH    Center: [facility]     Types of orders made on 2024: Procedure Request    Order Date:2024  Ordering User:JENNIFFER WALLS [[id number]]  Encounter Provider:Jenniffer Walls, RN [[id number]]  Au  thorizing Provider: Layo Santiago MD [[id number]]  Department:[facility] NEUROSURGERY 8TH FLOOR[[id number]]    Common Order Information  Procedure -> Facet Injection (Specify level and laterality) Cmt: bilateral L5/S1    Order Specific Information  Order: Procedure Order to Pain Management [Custom: [id number]]  Order #:          [id number]: 1 FUTURE    Priority: Routine  Class: Clinic Performed    Future Order Inf  ormation      Expires on:2025            Expected by:2024                   Associated Diagnoses      M43.27 Fusion of spine, lumbosacral region      Physician -> [facility]         Facility Name: -> [facility]           Priority: Routine  Class: Clinic Performed    Future Order Information      Expires on:2025            Expected by:2024                   Associated Diagnoses        M43.27 Fusion of spine, lumbosacral region      Procedure -> Facet Injection (Specify level and laterality) Cmt: bilateral                 L5/S1        Physician -> Judy         Facility Name: -> [facility]

## 2024-08-15 NOTE — TELEPHONE ENCOUNTER
KORYM and sent [facility] msg asking pt to contact clinic to discuss scheduling options for the bilat L5/S1 facet MBB ref: Ware- phone number included.

## 2024-08-20 ENCOUNTER — OFFICE VISIT (OUTPATIENT)
Dept: OBSTETRICS AND GYNECOLOGY | Facility: CLINIC | Age: 59
End: 2024-08-20
Payer: COMMERCIAL

## 2024-08-20 VITALS — WEIGHT: 153 LBS | DIASTOLIC BLOOD PRESSURE: 80 MMHG | BODY MASS INDEX: 26.26 KG/M2 | SYSTOLIC BLOOD PRESSURE: 122 MMHG

## 2024-08-20 DIAGNOSIS — Z01.419 ROUTINE GYNECOLOGICAL EXAMINATION: Primary | ICD-10-CM

## 2024-08-20 DIAGNOSIS — N95.1 VAGINAL DRYNESS, MENOPAUSAL: ICD-10-CM

## 2024-08-20 DIAGNOSIS — Z90.710 HISTORY OF TOTAL HYSTERECTOMY: ICD-10-CM

## 2024-08-20 PROCEDURE — 99999 PR PBB SHADOW E&M-EST. PATIENT-LVL III: CPT | Mod: PBBFAC,,, | Performed by: OBSTETRICS & GYNECOLOGY

## 2024-08-20 PROCEDURE — 3044F HG A1C LEVEL LT 7.0%: CPT | Mod: CPTII,S$GLB,, | Performed by: OBSTETRICS & GYNECOLOGY

## 2024-08-20 PROCEDURE — 1159F MED LIST DOCD IN RCRD: CPT | Mod: CPTII,S$GLB,, | Performed by: OBSTETRICS & GYNECOLOGY

## 2024-08-20 PROCEDURE — 3008F BODY MASS INDEX DOCD: CPT | Mod: CPTII,S$GLB,, | Performed by: OBSTETRICS & GYNECOLOGY

## 2024-08-20 PROCEDURE — 3074F SYST BP LT 130 MM HG: CPT | Mod: CPTII,S$GLB,, | Performed by: OBSTETRICS & GYNECOLOGY

## 2024-08-20 PROCEDURE — 99459 PELVIC EXAMINATION: CPT | Mod: S$GLB,,, | Performed by: OBSTETRICS & GYNECOLOGY

## 2024-08-20 PROCEDURE — 3079F DIAST BP 80-89 MM HG: CPT | Mod: CPTII,S$GLB,, | Performed by: OBSTETRICS & GYNECOLOGY

## 2024-08-20 PROCEDURE — 99396 PREV VISIT EST AGE 40-64: CPT | Mod: S$GLB,,, | Performed by: OBSTETRICS & GYNECOLOGY

## 2024-08-20 RX ORDER — ESTRADIOL 10 UG/1
1 INSERT VAGINAL
Qty: 24 TABLET | Refills: 3 | Status: SHIPPED | OUTPATIENT
Start: 2024-08-22 | End: 2025-08-22

## 2024-08-20 NOTE — PROGRESS NOTES
Subjective     Patient ID: Cherri Noonan is a 58 y.o. female.    Chief Complaint:  Annual Exam      History of Present Illness  HPI  Annual Exam-Premenopausal  Patient presents for annual exam. The patient has no complaints today. The patient is sexually active. GYN screening history:  pt had total hyst for benign indications > 15 yrs ago . The patient wears seatbelts: yes. The patient participates in regular exercise: no. Has the patient ever been transfused or tattooed?: no. The patient reports that there is not domestic violence in her life.    Pt using estrogen cream for vaginal dryness.  Pt still uses lubricant with sexual activity at well.    GYN & OB History  No LMP recorded. Patient has had a hysterectomy.   Date of Last Pap: No result found    OB History    Para Term  AB Living   3 3 3         SAB IAB Ectopic Multiple Live Births                  # Outcome Date GA Lbr Teo/2nd Weight Sex Type Anes PTL Lv   3 Term            2 Term            1 Term                Review of Systems  Review of Systems   Constitutional: Negative.    Respiratory: Negative.     Cardiovascular: Negative.    Gastrointestinal: Negative.    Endocrine: Negative.    Genitourinary: Negative.    Musculoskeletal:  Positive for back pain and myalgias.   Hematological: Negative.    Psychiatric/Behavioral: Negative.     Breast: negative.           Objective   Physical Exam:   Constitutional: She is oriented to person, place, and time. She appears well-developed and well-nourished. No distress.    HENT:   Head: Normocephalic and atraumatic.     Neck: No thyromegaly present.     Pulmonary/Chest: Effort normal. No respiratory distress. Right breast exhibits no inverted nipple, no mass, no nipple discharge, no skin change, no tenderness, presence, no bleeding, no swelling, no mastectomy, no augmentation and no lumpectomy. Left breast exhibits no inverted nipple, no mass, no nipple discharge, no skin change, no tenderness,  presence, no bleeding, no swelling, no mastectomy, no augmentation and no lumpectomy. Breasts are symmetrical.        Abdominal: Soft. She exhibits no distension and no mass. There is no abdominal tenderness. There is no rebound and no guarding.     Genitourinary:    Urethra, bladder, vagina, right adnexa and left adnexa normal.      Pelvic exam was performed with patient in the lithotomy position.   The external female genitalia was normal.   No external genitalia lesions identified,Genitalia hair distrobution normal .     Labial bartholins normal.There is no rash, tenderness, lesion or injury on the right labia. There is no rash, tenderness, lesion or injury on the left labia. No no adexnal prolapse or no masses or organomegaly. Right adnexum displays no mass, no tenderness and no fullness. Left adnexum displays no mass, no tenderness and no fullness. Vagina exhibits no lesion. No erythema, vaginal discharge, tenderness, bleeding, rectocele, cystocele or prolapse of vaginal walls in the vagina.    No foreign body in the vagina.      No signs of injury in the vagina.   Vagina was moist.Cervix is absent.Uterus is absent. Normal urethral meatus.Urethral Meatus exhibits: urethral lesionUrethra findings: no urethral mass, no tenderness, no urethral scarring and prolapsedBladder findings: no bladder distention and no bladder tenderness          Musculoskeletal: Normal range of motion and moves all extremeties.       Neurological: She is alert and oriented to person, place, and time. No cranial nerve deficit. Coordination normal.    Skin: She is not diaphoretic.    Psychiatric: She has a normal mood and affect. Her behavior is normal. Judgment and thought content normal.      Female chaperone, Shara Lindquist,  present for entire exam          Assessment and Plan     1. Routine gynecological examination    2. History of total hysterectomy    3. Vaginal dryness, menopausal            Plan:   Pap smears no longer  indicated  Pt up to date with [facility]  Pt to contact GI's office to see when next colonoscopy due  Rx vagifem vag suppositories, twice weekly

## 2024-08-20 NOTE — DISCHARGE INSTRUCTIONS
Lumbar/Cervical/Joint Medial Branch Block Pain Diary    Patient Name:  :    Pre-Procedure Pain Score: _____/10    Post-Procedure Pain Score:_____/10    Please fill out the chart below to the best of your ability. We need to know how much percentage of relief in your pain that you have while the numbing medication is active. Please be mindful that this is a diagnostic test and may not last for a long time. If you are asleep during one of the times listed below, you do not have to record that time.     Time After the   Procedure % of pain relief   achieved Pain Score (0-10)   1 hour post-procedure     2 hours post-procedure     3 hours post-procedure     4 hours post-procedure     5 hours post-procedure     6 hours post-procedure     12 hours post-procedure     24 hours post-procedure         The staff will be calling the day following your procedure to discuss your pain score post procedure and to answer any questions or concerns.    If you have any questions or concerns please call-  [phone]    2. If you have any questions about completing this diary, please call us at [phone]    Home Care Instructions Pain Management:    1. DIET:   You may resume your normal diet today.   2. BATHING:   You may shower with luke warm water.  3. DRESSING:   You may remove your bandage today.   4. ACTIVITY LEVEL:   You may resume your normal activities 24 hrs after your procedure.  5. MEDICATIONS:   You may resume your normal medications today.   6. SPECIAL INSTRUCTIONS:   No heat to the injection site for 24 hrs including, bath or shower, heating pad, moist heat, or hot tubs.    Use ice pack to injection site for any pain or discomfort.  Apply ice packs for 20 minute intervals as needed.   If you have received any sedatives by mouth today you may not drive for 12 hours.    If you have received any sedation through your IV, you may not drive for 24 hrs.     PLEASE CALL YOUR DOCTOR IF:  1. Redness or swelling around the  injection site.  2. Fever of 101 degrees  3. Drainage (pus) from the injection site.  4. For any continuous bleeding (some dried blood over the incision is normal.)    FOR EMERGENCIES:   If any unusual problems or difficulties occur during clinic hours, call [phone] or [phone].

## 2024-08-21 ENCOUNTER — HOSPITAL ENCOUNTER (OUTPATIENT)
Facility: HOSPITAL | Age: 59
Discharge: HOME | End: 2024-08-21
Attending: PAIN MEDICINE | Admitting: PAIN MEDICINE
Payer: COMMERCIAL

## 2024-08-21 VITALS
TEMPERATURE: 99 F | OXYGEN SATURATION: 100 % | SYSTOLIC BLOOD PRESSURE: 155 MMHG | RESPIRATION RATE: 16 BRPM | DIASTOLIC BLOOD PRESSURE: 74 MMHG | HEART RATE: 51 BPM

## 2024-08-21 DIAGNOSIS — M47.816 LUMBAR SPONDYLOSIS: Primary | ICD-10-CM

## 2024-08-21 PROCEDURE — 64493 INJ PARAVERT F JNT L/S 1 LEV: CPT | Mod: 50 | Performed by: PAIN MEDICINE

## 2024-08-21 PROCEDURE — 63600175 PHARM REV CODE 636 W HCPCS: Mod: JZ,JG | Performed by: PAIN MEDICINE

## 2024-08-21 PROCEDURE — 64493 INJ PARAVERT F JNT L/S 1 LEV: CPT | Mod: 50,,, | Performed by: PAIN MEDICINE

## 2024-08-21 PROCEDURE — 25000003 PHARM REV CODE 250: Performed by: PAIN MEDICINE

## 2024-08-21 RX ORDER — LIDOCAINE HYDROCHLORIDE 10 MG/ML
20 INJECTION, SOLUTION INFILTRATION; PERINEURAL ONCE
Status: COMPLETED | OUTPATIENT
Start: 2024-08-21 | End: 2024-08-21

## 2024-08-21 RX ORDER — BUPIVACAINE HYDROCHLORIDE 2.5 MG/ML
10 INJECTION, SOLUTION EPIDURAL; INFILTRATION; INTRACAUDAL ONCE
Status: COMPLETED | OUTPATIENT
Start: 2024-08-21 | End: 2024-08-21

## 2024-08-21 RX ORDER — BUPIVACAINE HYDROCHLORIDE 2.5 MG/ML
INJECTION, SOLUTION EPIDURAL; INFILTRATION; INTRACAUDAL
Status: DISCONTINUED
Start: 2024-08-21 | End: 2024-08-21 | Stop reason: HOSPADM

## 2024-08-21 RX ORDER — LIDOCAINE HYDROCHLORIDE 10 MG/ML
INJECTION, SOLUTION INFILTRATION; PERINEURAL
Status: DISCONTINUED
Start: 2024-08-21 | End: 2024-08-21 | Stop reason: HOSPADM

## 2024-08-21 NOTE — OP NOTE
LUMBAR Medial Branch Block Under Fluoroscopy  Time-out taken to identify patient and procedure side prior to starting the procedure.   I attest that I have reviewed the patient's home medications prior to the procedure and no contraindication have been identified. I  re-evaluated the patient after the patient was positioned for the procedure in the procedure room immediately before the procedural time-out. The vital signs are current and represent the current state of the patient which has not significantly changed since the preprocedure assessment.            Date of Service: 08/21/2024    PCP: Mahsa Arevalo NP          Referring Physician:                                                           PROCEDURE:  Bilateral L5/S1 facet joint medial branch block    REASON FOR PROCEDURE: Lumbar Spondylosis    PHYSICIAN: Cornelio Kim MD    ASSISTANTS: None    MEDICATIONS INJECTED: Bupivicaine 0.25% 1.5ml at each level    LOCAL ANESTHETIC USED: Xylocaine 1% 3ml each site.    SEDATION MEDICATIONS: None    ESTIMATED BLOOD LOSS:  None.    COMPLICATIONS:  None.    TECHNIQUE: Laying in a prone position, the patient was prepped and draped in the usual sterile fashion using ChloraPrep and fenestrated drape.  The level was determined under fluoroscopic guidance.  Local anesthetic was given by going down to the hub of the 27-gauge 1.25in needle and raising a wheal.  A 26-gauge 3.5 inch needle was introduced to the anatomic site of the medial branch at the lateral mass utilizing live fluoroscopy. Medication was then injected slowly at each level as stated above. The patient tolerated the procedure well.       The patient was monitored after the procedure.  Patient was given post procedure and discharge instructions to follow at home.  We will see the patient back in two weeks or the patient may call to inform of status. The patient was discharged in a stable condition

## 2024-08-21 NOTE — DISCHARGE SUMMARY
[facility] - Pain Management  Discharge Note  Short Stay    Procedure(s) (LRB):  Bilateral L5/S1 Facet Medial Branch Blocks #1 (Bilateral)      OUTCOME: Patient tolerated treatment/procedure well without complication and is now ready for discharge.    DISPOSITION: Home or Self Care    FINAL DIAGNOSIS:  <principal problem not specified>    FOLLOWUP: In clinic    DISCHARGE INSTRUCTIONS:  No discharge procedures on file.       Discharge Diagnosis:Lumbar spondylosis [M47.816]  Condition on Discharge: Stable.  Diet on Discharge: Same as before.  Activity: as per instruction sheet.  Discharge to: Home with a responsible adult.  Follow up: as per Discharge instructions

## 2024-08-22 ENCOUNTER — TELEPHONE (OUTPATIENT)
Dept: PAIN MEDICINE | Facility: CLINIC | Age: 59
End: 2024-08-22
Payer: COMMERCIAL

## 2024-08-22 DIAGNOSIS — M47.816 LUMBAR SPONDYLOSIS: Primary | ICD-10-CM

## 2024-08-22 NOTE — TELEPHONE ENCOUNTER
Ms. Harley reports 90% reduction in pain and improvement in ADLs following the bilat L5/S1 MBB #1 performed yesterday.  Pt reports pain 1/10 during the post-procedure time period. Her pain has since returned to baseline 10/10.  She would like to proceed with the 2nd MBB on 9/4/2024- provider updated.

## 2024-08-30 ENCOUNTER — TELEPHONE (OUTPATIENT)
Dept: PAIN MEDICINE | Facility: CLINIC | Age: 59
End: 2024-08-30
Payer: COMMERCIAL

## 2024-08-30 NOTE — TELEPHONE ENCOUNTER
Reviewed pre-procedure instructions with Lovely Cherri, as well as provided arrival time of 10:45a for 9/4/2024.  All questions answered- verbalized understanding.

## 2024-09-04 ENCOUNTER — HOSPITAL ENCOUNTER (OUTPATIENT)
Facility: HOSPITAL | Age: 59
Discharge: HOME | End: 2024-09-04
Attending: PAIN MEDICINE | Admitting: PAIN MEDICINE
Payer: COMMERCIAL

## 2024-09-04 VITALS
RESPIRATION RATE: 18 BRPM | DIASTOLIC BLOOD PRESSURE: 83 MMHG | HEART RATE: 53 BPM | TEMPERATURE: 98 F | SYSTOLIC BLOOD PRESSURE: 153 MMHG | OXYGEN SATURATION: 99 %

## 2024-09-04 DIAGNOSIS — M47.816 LUMBAR SPONDYLOSIS: Primary | ICD-10-CM

## 2024-09-04 PROCEDURE — 63600175 PHARM REV CODE 636 W HCPCS: Mod: JZ,JG | Performed by: PAIN MEDICINE

## 2024-09-04 PROCEDURE — 64493 INJ PARAVERT F JNT L/S 1 LEV: CPT | Mod: 50 | Performed by: PAIN MEDICINE

## 2024-09-04 PROCEDURE — 64493 INJ PARAVERT F JNT L/S 1 LEV: CPT | Mod: 50,,, | Performed by: PAIN MEDICINE

## 2024-09-04 PROCEDURE — 25000003 PHARM REV CODE 250: Performed by: PAIN MEDICINE

## 2024-09-04 RX ORDER — LIDOCAINE HYDROCHLORIDE 10 MG/ML
20 INJECTION, SOLUTION INFILTRATION; PERINEURAL ONCE
Status: COMPLETED | OUTPATIENT
Start: 2024-09-04 | End: 2024-09-04

## 2024-09-04 RX ORDER — LIDOCAINE HYDROCHLORIDE 10 MG/ML
INJECTION, SOLUTION INFILTRATION; PERINEURAL
Status: DISCONTINUED
Start: 2024-09-04 | End: 2024-09-04 | Stop reason: HOSPADM

## 2024-09-04 RX ORDER — BUPIVACAINE HYDROCHLORIDE 2.5 MG/ML
10 INJECTION, SOLUTION EPIDURAL; INFILTRATION; INTRACAUDAL ONCE
Status: COMPLETED | OUTPATIENT
Start: 2024-09-04 | End: 2024-09-04

## 2024-09-04 RX ORDER — BUPIVACAINE HYDROCHLORIDE 2.5 MG/ML
INJECTION, SOLUTION EPIDURAL; INFILTRATION; INTRACAUDAL
Status: DISCONTINUED
Start: 2024-09-04 | End: 2024-09-04 | Stop reason: HOSPADM

## 2024-09-04 NOTE — DISCHARGE INSTRUCTIONS
Lumbar/Cervical/Joint Medial Branch Block Pain Diary    Patient Name:  :    Pre-Procedure Pain Score: _____/10    Post-Procedure Pain Score:_____/10    Please fill out the chart below to the best of your ability. We need to know how much percentage of relief in your pain that you have while the numbing medication is active. Please be mindful that this is a diagnostic test and may not last for a long time. If you are asleep during one of the times listed below, you do not have to record that time.     Time After the   Procedure % of pain relief   achieved Pain Score (0-10)   1 hour post-procedure     2 hours post-procedure     3 hours post-procedure     4 hours post-procedure     5 hours post-procedure     6 hours post-procedure     12 hours post-procedure     24 hours post-procedure         The staff will be calling the day following your procedure to discuss your pain score post procedure and to answer any questions or concerns.    If you have any questions or concerns please call-  [phone]    2. If you have any questions about completing this diary, please call us at [phone]    Home Care Instructions Pain Management:    1. DIET:   You may resume your normal diet today.   2. BATHING:   You may shower with luke warm water.  3. DRESSING:   You may remove your bandage today.   4. ACTIVITY LEVEL:   You may resume your normal activities 24 hrs after your procedure.  5. MEDICATIONS:   You may resume your normal medications today.   6. SPECIAL INSTRUCTIONS:   No heat to the injection site for 24 hrs including, bath or shower, heating pad, moist heat, or hot tubs.    Use ice pack to injection site for any pain or discomfort.  Apply ice packs for 20 minute intervals as needed.   If you have received any sedatives by mouth today you may not drive for 12 hours.    If you have received any sedation through your IV, you may not drive for 24 hrs.     PLEASE CALL YOUR DOCTOR IF:  1. Redness or swelling around the  injection site.  2. Fever of 101 degrees  3. Drainage (pus) from the injection site.  4. For any continuous bleeding (some dried blood over the incision is normal.)    FOR EMERGENCIES:   If any unusual problems or difficulties occur during clinic hours, call [phone] or [phone].

## 2024-09-04 NOTE — OP NOTE
LUMBAR Medial Branch Block Under Fluoroscopy  Time-out taken to identify patient and procedure side prior to starting the procedure.   I attest that I have reviewed the patient's home medications prior to the procedure and no contraindication have been identified. I  re-evaluated the patient after the patient was positioned for the procedure in the procedure room immediately before the procedural time-out. The vital signs are current and represent the current state of the patient which has not significantly changed since the preprocedure assessment.            Date of Service: 09/04/2024    PCP: Mahsa Arevalo NP          Referring Physician:                                                           PROCEDURE:  Bilateral  L5/S1 facet joint medial branch block    REASON FOR PROCEDURE: Lumbar Spondylosis    PHYSICIAN: Cornelio Kim MD    ASSISTANTS: None    MEDICATIONS INJECTED: Bupivicaine 0.25% 1.5ml at each level    LOCAL ANESTHETIC USED: Xylocaine 1% 3ml each site.    SEDATION MEDICATIONS: None    ESTIMATED BLOOD LOSS:  None.    COMPLICATIONS:  None.    TECHNIQUE: Laying in a prone position, the patient was prepped and draped in the usual sterile fashion using ChloraPrep and fenestrated drape.  The level was determined under fluoroscopic guidance.  Local anesthetic was given by going down to the hub of the 27-gauge 1.25in needle and raising a wheal.  A 26-gauge 3.5 inch needle was introduced to the anatomic site of the medial branch at the lateral mass utilizing live fluoroscopy. Medication was then injected slowly at each level as stated above. The patient tolerated the procedure well.       The patient was monitored after the procedure.  Patient was given post procedure and discharge instructions to follow at home.  We will see the patient back in two weeks or the patient may call to inform of status. The patient was discharged in a stable condition

## 2024-09-04 NOTE — DISCHARGE SUMMARY
[facility] [place] [facility]  Discharge Note  Short Stay    Procedure(s) (LRB):  Bilateral L5/S1 Facet Medial Branch Blocks #2 (ref: Pamela) (Bilateral)      OUTCOME: Patient tolerated treatment/procedure well without complication and is now ready for discharge.    DISPOSITION: Home or Self Care    FINAL DIAGNOSIS:  <principal problem not specified>    FOLLOWUP: In clinic    DISCHARGE INSTRUCTIONS:  No discharge procedures on file.       Discharge Diagnosis:Lumbar spondylosis [M47.816]  Condition on Discharge: Stable.  Diet on Discharge: Same as before.  Activity: as per instruction sheet.  Discharge to: Home with a responsible adult.  Follow up: as per Discharge instructions

## 2024-09-05 ENCOUNTER — TELEPHONE (OUTPATIENT)
Dept: PAIN MEDICINE | Facility: CLINIC | Age: 59
End: 2024-09-05
Payer: COMMERCIAL

## 2024-09-05 DIAGNOSIS — M47.816 LUMBAR SPONDYLOSIS: Primary | ICD-10-CM

## 2024-09-05 NOTE — TELEPHONE ENCOUNTER
Mrs. Harley reports 90% reduction in pain and improvement in ADLs following the bilat L5/S1 MBB #2 performed yesterday.  Pt reports pain 1/10 during the post-procedure time period. Her pain has since returned to baseline 9/10.  She would like to proceed with the RFA on 9/18/2024- provider updated.

## 2024-09-12 ENCOUNTER — TELEPHONE (OUTPATIENT)
Dept: PAIN MEDICINE | Facility: CLINIC | Age: 59
End: 2024-09-12
Payer: COMMERCIAL

## 2024-09-12 NOTE — TELEPHONE ENCOUNTER
Message left with review of pre-procedure instructions, as well as provided arrival time of 7:15a.  Information was also sent via [employer]  Asked that patient call clinic to confirm- phone number included.

## 2024-09-13 ENCOUNTER — TELEPHONE (OUTPATIENT)
Dept: PAIN MEDICINE | Facility: CLINIC | Age: 59
End: 2024-09-13
Payer: COMMERCIAL

## 2024-09-13 NOTE — TELEPHONE ENCOUNTER
Reviewed pre-procedure instructions with Lvoely Cherri, as well as provided arrival time of 7:15a for 9/18/2024.  All questions answered- verbalized understanding.

## 2024-09-17 ENCOUNTER — HOSPITAL ENCOUNTER (EMERGENCY)
Facility: HOSPITAL | Age: 59
Discharge: HOME | End: 2024-09-17
Attending: EMERGENCY MEDICINE
Payer: COMMERCIAL

## 2024-09-17 ENCOUNTER — TELEPHONE (OUTPATIENT)
Dept: PAIN MEDICINE | Facility: CLINIC | Age: 59
End: 2024-09-17
Payer: COMMERCIAL

## 2024-09-17 ENCOUNTER — NURSE TRIAGE (OUTPATIENT)
Dept: ADMINISTRATIVE | Facility: CLINIC | Age: 59
End: 2024-09-17
Payer: COMMERCIAL

## 2024-09-17 ENCOUNTER — PATIENT OUTREACH (OUTPATIENT)
Dept: ADMINISTRATIVE | Facility: OTHER | Age: 59
End: 2024-09-17
Payer: COMMERCIAL

## 2024-09-17 VITALS
RESPIRATION RATE: 18 BRPM | OXYGEN SATURATION: 100 % | DIASTOLIC BLOOD PRESSURE: 69 MMHG | BODY MASS INDEX: 25.78 KG/M2 | SYSTOLIC BLOOD PRESSURE: 139 MMHG | HEART RATE: 70 BPM | TEMPERATURE: 99 F | WEIGHT: 151 LBS | HEIGHT: 64 IN

## 2024-09-17 DIAGNOSIS — B09 NONSPECIFIC EXANTHEMATOUS VIRAL INFECTION: Primary | ICD-10-CM

## 2024-09-17 DIAGNOSIS — R60.9 SWELLING: ICD-10-CM

## 2024-09-17 DIAGNOSIS — R52 PAIN: ICD-10-CM

## 2024-09-17 PROBLEM — R50.9 FEVER: Status: ACTIVE | Noted: 2024-09-17

## 2024-09-17 LAB
ALBUMIN SERPL BCP-MCNC: 4 G/DL (ref 3.5–5.2)
ALP SERPL-CCNC: 93 U/L (ref 55–135)
ALT SERPL W/O P-5'-P-CCNC: 15 U/L (ref 10–44)
ANION GAP SERPL CALC-SCNC: 10 MMOL/L (ref 8–16)
AST SERPL-CCNC: 19 U/L (ref 10–40)
BASOPHILS # BLD AUTO: 0.02 K/UL (ref 0–0.2)
BASOPHILS NFR BLD: 0.5 % (ref 0–1.9)
BILIRUB SERPL-MCNC: 0.5 MG/DL (ref 0.1–1)
BILIRUB UR QL STRIP: NEGATIVE
BUN SERPL-MCNC: 12 MG/DL (ref 6–20)
CALCIUM SERPL-MCNC: 8.9 MG/DL (ref 8.7–10.5)
CHLORIDE SERPL-SCNC: 105 MMOL/L (ref 95–110)
CK SERPL-CCNC: 101 U/L (ref 20–180)
CLARITY UR: CLEAR
CO2 SERPL-SCNC: 22 MMOL/L (ref 23–29)
COLOR UR: YELLOW
CREAT SERPL-MCNC: 0.9 MG/DL (ref 0.5–1.4)
CTP QC/QA: YES
CTP QC/QA: YES
DIFFERENTIAL METHOD BLD: ABNORMAL
EOSINOPHIL # BLD AUTO: 0.1 K/UL (ref 0–0.5)
EOSINOPHIL NFR BLD: 1.8 % (ref 0–8)
ERYTHROCYTE [DISTWIDTH] IN BLOOD BY AUTOMATED COUNT: 15.1 % (ref 11.5–14.5)
ERYTHROCYTE [SEDIMENTATION RATE] IN BLOOD BY PHOTOMETRIC METHOD: 25 MM/HR (ref 0–36)
EST. GFR  (NO RACE VARIABLE): >60 ML/MIN/1.73 M^2
GLUCOSE SERPL-MCNC: 97 MG/DL (ref 70–110)
GLUCOSE UR QL STRIP: NEGATIVE
HCT VFR BLD AUTO: 40.5 % (ref 37–48.5)
HGB BLD-MCNC: 12.5 G/DL (ref 12–16)
HGB UR QL STRIP: NEGATIVE
IMM GRANULOCYTES # BLD AUTO: 0.01 K/UL (ref 0–0.04)
IMM GRANULOCYTES NFR BLD AUTO: 0.3 % (ref 0–0.5)
KETONES UR QL STRIP: NEGATIVE
LACTATE SERPL-SCNC: 0.9 MMOL/L (ref 0.5–2.2)
LACTATE SERPL-SCNC: 1.2 MMOL/L (ref 0.5–2.2)
LEUKOCYTE ESTERASE UR QL STRIP: NEGATIVE
LYMPHOCYTES # BLD AUTO: 0.7 K/UL (ref 1–4.8)
LYMPHOCYTES NFR BLD: 17 % (ref 18–48)
MCH RBC QN AUTO: 25 PG (ref 27–31)
MCHC RBC AUTO-ENTMCNC: 30.9 G/DL (ref 32–36)
MCV RBC AUTO: 81 FL (ref 82–98)
MONOCYTES # BLD AUTO: 0.2 K/UL (ref 0.3–1)
MONOCYTES NFR BLD: 6.1 % (ref 4–15)
NEUTROPHILS # BLD AUTO: 2.9 K/UL (ref 1.8–7.7)
NEUTROPHILS NFR BLD: 74.3 % (ref 38–73)
NITRITE UR QL STRIP: NEGATIVE
NRBC BLD-RTO: 0 /100 WBC
PH UR STRIP: 8 [PH] (ref 5–8)
PLATELET # BLD AUTO: 200 K/UL (ref 150–450)
PMV BLD AUTO: 10.2 FL (ref 9.2–12.9)
POC MOLECULAR INFLUENZA A AGN: NEGATIVE
POC MOLECULAR INFLUENZA B AGN: NEGATIVE
POTASSIUM SERPL-SCNC: 4.3 MMOL/L (ref 3.5–5.1)
PROCALCITONIN SERPL IA-MCNC: 0.16 NG/ML
PROT SERPL-MCNC: 7.3 G/DL (ref 6–8.4)
PROT UR QL STRIP: NEGATIVE
RBC # BLD AUTO: 5 M/UL (ref 4–5.4)
SARS-COV-2 RDRP RESP QL NAA+PROBE: NEGATIVE
SODIUM SERPL-SCNC: 137 MMOL/L (ref 136–145)
SP GR UR STRIP: 1.01 (ref 1–1.03)
TREPONEMA PALLIDUM IGG+IGM AB [PRESENCE] IN SERUM OR PLASMA BY IMMUNOASSAY: NONREACTIVE
URN SPEC COLLECT METH UR: NORMAL
UROBILINOGEN UR STRIP-ACNC: NEGATIVE EU/DL
WBC # BLD AUTO: 3.93 K/UL (ref 3.9–12.7)

## 2024-09-17 PROCEDURE — 93010 ELECTROCARDIOGRAM REPORT: CPT | Mod: ,,, | Performed by: INTERNAL MEDICINE

## 2024-09-17 PROCEDURE — 84145 PROCALCITONIN (PCT): CPT

## 2024-09-17 PROCEDURE — 83605 ASSAY OF LACTIC ACID: CPT

## 2024-09-17 PROCEDURE — 85025 COMPLETE CBC W/AUTO DIFF WBC: CPT

## 2024-09-17 PROCEDURE — 81003 URINALYSIS AUTO W/O SCOPE: CPT

## 2024-09-17 PROCEDURE — 82550 ASSAY OF CK (CPK): CPT

## 2024-09-17 PROCEDURE — 87502 INFLUENZA DNA AMP PROBE: CPT

## 2024-09-17 PROCEDURE — 87635 SARS-COV-2 COVID-19 AMP PRB: CPT

## 2024-09-17 PROCEDURE — 86593 SYPHILIS TEST NON-TREP QUANT: CPT

## 2024-09-17 PROCEDURE — 80053 COMPREHEN METABOLIC PANEL: CPT

## 2024-09-17 PROCEDURE — 85652 RBC SED RATE AUTOMATED: CPT

## 2024-09-17 PROCEDURE — 86789 WEST NILE VIRUS ANTIBODY: CPT

## 2024-09-17 PROCEDURE — 63600175 PHARM REV CODE 636 W HCPCS: Performed by: EMERGENCY MEDICINE

## 2024-09-17 PROCEDURE — 96361 HYDRATE IV INFUSION ADD-ON: CPT

## 2024-09-17 PROCEDURE — 25000003 PHARM REV CODE 250

## 2024-09-17 PROCEDURE — 93005 ELECTROCARDIOGRAM TRACING: CPT

## 2024-09-17 PROCEDURE — 96374 THER/PROPH/DIAG INJ IV PUSH: CPT

## 2024-09-17 PROCEDURE — 99285 EMERGENCY DEPT VISIT HI MDM: CPT | Mod: 25

## 2024-09-17 PROCEDURE — 99205 OFFICE O/P NEW HI 60 MIN: CPT | Mod: ,,, | Performed by: INTERNAL MEDICINE

## 2024-09-17 PROCEDURE — 87040 BLOOD CULTURE FOR BACTERIA: CPT

## 2024-09-17 RX ORDER — KETOROLAC TROMETHAMINE 30 MG/ML
15 INJECTION, SOLUTION INTRAMUSCULAR; INTRAVENOUS
Status: COMPLETED | OUTPATIENT
Start: 2024-09-17 | End: 2024-09-17

## 2024-09-17 RX ORDER — HYDROCORTISONE 25 MG/G
CREAM TOPICAL 2 TIMES DAILY
Qty: 28 G | Refills: 0 | Status: SHIPPED | OUTPATIENT
Start: 2024-09-17

## 2024-09-17 RX ORDER — KETOROLAC TROMETHAMINE 30 MG/ML
15 INJECTION, SOLUTION INTRAMUSCULAR; INTRAVENOUS
Status: DISCONTINUED | OUTPATIENT
Start: 2024-09-17 | End: 2024-09-17

## 2024-09-17 RX ORDER — HYDROCORTISONE 25 MG/G
CREAM TOPICAL 2 TIMES DAILY
Qty: 20 G | Refills: 0 | Status: SHIPPED | OUTPATIENT
Start: 2024-09-17 | End: 2024-09-17

## 2024-09-17 RX ORDER — HYDROCODONE BITARTRATE AND ACETAMINOPHEN 5; 325 MG/1; MG/1
1 TABLET ORAL EVERY 6 HOURS PRN
Qty: 12 TABLET | Refills: 0 | Status: SHIPPED | OUTPATIENT
Start: 2024-09-17

## 2024-09-17 RX ORDER — ACETAMINOPHEN 500 MG
1000 TABLET ORAL EVERY 6 HOURS PRN
Qty: 12 TABLET | Refills: 0 | Status: SHIPPED | OUTPATIENT
Start: 2024-09-17 | End: 2024-09-17

## 2024-09-17 RX ORDER — HYDROCODONE BITARTRATE AND ACETAMINOPHEN 5; 325 MG/1; MG/1
1 TABLET ORAL EVERY 6 HOURS PRN
Qty: 12 TABLET | Refills: 0 | Status: SHIPPED | OUTPATIENT
Start: 2024-09-17 | End: 2024-09-17

## 2024-09-17 RX ORDER — ACETAMINOPHEN 500 MG
1000 TABLET ORAL EVERY 6 HOURS PRN
Qty: 12 TABLET | Refills: 0 | Status: SHIPPED | OUTPATIENT
Start: 2024-09-17

## 2024-09-17 RX ADMIN — KETOROLAC TROMETHAMINE 15 MG: 30 INJECTION, SOLUTION INTRAMUSCULAR at 08:09

## 2024-09-17 RX ADMIN — SODIUM CHLORIDE 2055 ML: 9 INJECTION, SOLUTION INTRAVENOUS at 09:09

## 2024-09-17 NOTE — TELEPHONE ENCOUNTER
Pt stated stretching gone extremely wrong. Both knees and left ankle are hurting really bad. She can barely walk. Right ring finger is also with pain. This happened on Friday. She tried muscle relaxer and epsom salt bath. Constant severe pain. Pain is 10/10 in each area per pt. Rt knee look slightly swollen. Chills. Temp 100 before the call. Inside of both knees are red streaks. Triage nurse had pt to recheck temp during the call and it is 101.1. Care advice recommend pt go to Er. Pt verbalized understanding.   Reason for Disposition   [1] Swollen joint AND [2] fever    Additional Information   Negative: Sounds like a life-threatening emergency to the triager    Protocols used: Knee Pain-A-AH

## 2024-09-17 NOTE — ED NOTES
IVf continue to infuse without difficulty. Comfort measures initiated. Care plan discussed with pt. Call light with reach of pt. Will continue to monitor.

## 2024-09-17 NOTE — PROGRESS NOTES
CHW - Initial Contact    This Community Health Worker completed the Social Determinant of Health questionnaire with patient  at bedside  today.    Pt identified barriers of most importance are: has food insecurities.   Referrals to community agencies completed with patient/caregiver consent outside of [facility] include: yes: [email or web address].  Referrals were put through [facility] - no: none at this time.  Support and Services: has support with spouse/caregiver.  Other information discussed the patient needs / wants help with: Completed SDOH with patient at bedside today, Pt has food insecurities. Will follow up in one week to check status of referrals and pt's future needs.    Follow up required: yes.  Follow-up Outreach - Due: 9/23/2024

## 2024-09-17 NOTE — HPI
Ms. Noonan is a 57y/o F with hx of chronic back pain  s/p MIS L4-5 TLIF   6/26/2023 who presented to the ED for rash with associated arthralgias and fever.    In the ED pt was febrile and normotensive. Labs unremarkable. Xray L ankle w/o fracture or effusion. CXR clear. Tested neg for flu and covid. ESR and CPK wnl.      ID consulted for: Viral exanthem with polyarthralgias fever 102, no other symptoms     Pt reports she started having myalgias in Saturday and chills/ fever this morning. Noticed a macular rash this morning on arms and legs. She is a regular walker and spends a lot of time outdoors. She may have been bitten by mosquitoes, but is not sure. Denies recent travel outside of [place]. Lives with . Was recently with her grandchild who was not sick. Has 2 dogs ar home.

## 2024-09-17 NOTE — DISCHARGE INSTRUCTIONS
You were seen in the [facility] ED for concerns related to skin changes of the lower extremities at which time a deep investigation including blood work, x-rays and EKG was obtained all of which came back as reassuring.  Remaining tests and will need follow up with your primary care doctor include West Nile virus antibodies.  While we do not see any reason for hospital admission or additional tests at this time we encourage you to return to the ED if symptoms worsen and follow up with your primary care doctor regarding your stay.  You will be given anti-inflammatory and pain medication to take at home please use as directed.  Thank you for letting us take care of you

## 2024-09-17 NOTE — ED PROVIDER NOTES
"Encounter Date: 9/17/2024       History     Chief Complaint   Patient presents with    Knee Pain     Pt c/o bilateral knee pain, left ankle pain and pain to right 5th finger. Denies any injury, trauma and falls. Pt able to bear weight. Pt states "she asked me if I had red marks and yes I have red marks" pt also reports fever. No medications for fever PTA      Cherri Noonan is a 58-year-old female presenting with skin changes with arthralgia and fever though for the past 2 days. PMHx of c4-5 fusion and spinal stimulator placed years ago for chronic post surgical pain though no other records or self-endorsed additional hx.  she states that the rash started yesterday and the only changes to daily routine was an Epson salt bath late last night. she has pain in both knees and left ankle but is weight baring. on exam, the rash is 1-2 cm spaced viral exanthem without pustule formation and non-blanching.  There is no pain to range of motion of the joints or other concerning findings of septic arthritis.  Skin changes are non-pruritic and encompasses lower extremities and torso without spread to upper extremities face or trunk.  They deny open skin breaks.  Patient is otherwise well apart from joint pain. no known allergies. Fever and chills for the same time course 100.1F at home.       The history is provided by the patient, medical records and the spouse. No  was used.     Review of patient's allergies indicates:   Allergen Reactions    Shingrix (pf) [varicella-zoster [id number] (pf)]      Got all side effects listed     Past Medical History:   Diagnosis Date    Cervical radiculopathy      Past Surgical History:   Procedure Laterality Date    CERVICAL FUSION  08/2015    C5-C7    HYSTERECTOMY      INJECTION OF ANESTHETIC AGENT AROUND MEDIAL BRANCH NERVES INNERVATING LUMBAR FACET JOINT Bilateral 8/21/2024    Procedure: Bilateral L5/S1 Facet Medial Branch Blocks #1;  Surgeon: Cornelio Kim Jr., " MD;  Location: [facility];  Service: Pain Management;  Laterality: Bilateral;  @1115  No ATC or DM  Needs Consent    INJECTION OF ANESTHETIC AGENT AROUND MEDIAL BRANCH NERVES INNERVATING LUMBAR FACET JOINT Bilateral 9/4/2024    Procedure: Bilateral L5/S1 Facet Medial Branch Blocks #2 (ref: Ware);  Surgeon: Cornelio Kim Jr., MD;  Location: [facility];  Service: Pain Management;  Laterality: Bilateral;  @1045  No ATC or DM  Needs Consent    INJECTION OF ANESTHETIC AGENT AROUND NERVE Bilateral 10/31/2022    Procedure: BLOCK, NERVE, BILATERAL L3-L4-L5 MEDIAL BRANCH;  Surgeon: Archana Knott MD;  Location: [facility];  Service: Pain Management;  Laterality: Bilateral;    INJECTION OF ANESTHETIC AGENT AROUND NERVE Bilateral 11/17/2022    Procedure: BLOCK, NERVE BILATERAL L3,L4,L5 MEDIAL BRANCH;  Surgeon: Archana Knott MD;  Location: [facility];  Service: Pain Management;  Laterality: Bilateral;    MINIMALLY INVASIVE TRANSFORAMINAL LUMBAR INTERBODY FUSION (TLIF) Left 6/26/2023    Procedure: FUSION, SPINE, LUMBAR, TLIF, MINIMALLY INVASIVE;  Surgeon: Layo Santiago MD;  Location: [facility] OR;  Service: Neurosurgery;  Laterality: Left;  MIS Left L4-5 TLIF   fluoro  Andrew table 4 poster  Neuromonitoring  spinewave  SPINEWAVE ТАТЬЯНАANDRY [phone] TEXTED ТАТЬЯНА ON 6/15/2023 @ 11:29AM. ТАТЬЯНА WILDE RESPONDED ON 6/15/2023 @ 11:35AM-LO  NEUROMONITORING ТАТЬЯНА ARANDA [phone]  RN    RADIOFREQUENCY ABLATION Left 12/8/2022    Procedure: RADIOFREQUENCY ABLATION, LEFT L3,L4,L5 MEDIAL BRANCH ONE OF TWO;  Surgeon: Archana Knott MD;  Location: [facility];  Service: Pain Management;  Laterality: Left;    RADIOFREQUENCY ABLATION Right 12/22/2022    Procedure: RADIOFREQUENCY ABLATION, RIGHT L3,L4,L5 MEDIAL BRANCH TWO OF TWO;  Surgeon: Archana Knott MD;  Location: [facility];  Service: Pain Management;  Laterality: Right;    SPINAL CORD STIMULATOR IMPLANT  10/20/2021    TRANSFORAMINAL EPIDURAL INJECTION  OF STEROID Bilateral 3/13/2023    Procedure: INJECTION, STEROID, EPIDURAL, TRANSFORAMINAL APPROACH BILATERAL L5/S1;  Surgeon: Archana Knott MD;  Location: [facility];  Service: Pain Management;  Laterality: Bilateral;    TUBAL LIGATION       Family History   Problem Relation Name Age of Onset    Hyperlipidemia Mother      Diabetes Mother      Aneurysm Brother      COPD Brother      Heart disease Sister      Juvenile idiopathic arthritis Sister brien     Breast cancer Neg Hx      Colon cancer Neg Hx       Social History     Tobacco Use    Smoking status: Never    Smokeless tobacco: Never   Substance Use Topics    Alcohol use: Never    Drug use: Never     Review of Systems   All other systems reviewed and are negative.      Physical Exam     Initial Vitals [09/17/24 0750]   BP Pulse Resp Temp SpO2   118/72 86 18 (!) 102 °F (38.9 °C) 97 %      MAP       --         Physical Exam    Nursing note and vitals reviewed.  Constitutional: She appears well-developed and well-nourished. She is not diaphoretic. No distress.   HENT:   Head: Normocephalic and atraumatic.   Right Ear: External ear normal.   Left Ear: External ear normal.   Mouth/Throat: Oropharynx is clear and moist.   Eyes: Conjunctivae and EOM are normal. Pupils are equal, round, and reactive to light.   Neck: Neck supple.   Normal range of motion.  Cardiovascular:  Normal rate, regular rhythm, normal heart sounds and intact distal pulses.           Pulmonary/Chest: Breath sounds normal.   Abdominal: Abdomen is soft.   Musculoskeletal:         General: Normal range of motion.      Cervical back: Normal range of motion and neck supple.      Comments: No crepitus, non ballotable patellas bilaterally.  PROM/AROM preserved with some stiffness but no pain.  Left fibular prominence of lateral ankle with some overlying erythema a proximally 4 cm circumferentially but no underlying effusion or induration.     Neurological: She is alert and oriented to person, place,  and time. She has normal strength. GCS score is 15. GCS eye subscore is 4. GCS verbal subscore is 5. GCS motor subscore is 6.   Skin: Skin is warm and dry. Capillary refill takes less than 2 seconds. Rash noted. No abscess noted. No erythema. No pallor.   Viral exanthem encompassing 4 extremities bilaterally.  Palms and soles spared.  There was no obvious spread to trunk, back face or upper extremities.  Nonpruritic, nonblanching and no evidence of comedones   Psychiatric: She has a normal mood and affect. Her behavior is normal. Judgment and thought content normal.         ED Course   Procedures  Labs Reviewed   CBC W/ AUTO DIFFERENTIAL - Abnormal       Result Value    WBC 3.93      RBC 5.00      Hemoglobin 12.5      Hematocrit 40.5      MCV 81 (*)     MCH 25.0 (*)     MCHC 30.9 (*)     RDW 15.1 (*)     Platelets 200      MPV 10.2      Immature Granulocytes 0.3      Gran # (ANC) 2.9      Immature Grans (Abs) 0.01      Lymph # 0.7 (*)     Mono # 0.2 (*)     Eos # 0.1      Baso # 0.02      nRBC 0      Gran % 74.3 (*)     Lymph % 17.0 (*)     Mono % 6.1      Eosinophil % 1.8      Basophil % 0.5      Differential Method Automated     COMPREHENSIVE METABOLIC PANEL - Abnormal    Sodium 137      Potassium 4.3      Chloride 105      CO2 22 (*)     Glucose 97      BUN 12      Creatinine 0.9      Calcium 8.9      Total Protein 7.3      Albumin 4.0      Total Bilirubin 0.5      Alkaline Phosphatase 93      AST 19      ALT 15      eGFR >60      Anion Gap 10     CULTURE, BLOOD   CULTURE, BLOOD   SEDIMENTATION RATE    Sed Rate 25     URINALYSIS, REFLEX TO URINE CULTURE    Specimen UA Urine, Clean Catch      Color, UA Yellow      Appearance, UA Clear      pH, UA 8.0      Specific Gravity, UA 1.010      Protein, UA Negative      Glucose, UA Negative      Ketones, UA Negative      Bilirubin (UA) Negative      Occult Blood UA Negative      Nitrite, UA Negative      Urobilinogen, UA Negative      Leukocytes, UA Negative       Narrative:     Specimen Source->Urine   LACTIC ACID, PLASMA    Lactate (Lactic Acid) 1.2     TREPONEMA PALLIDIUM ANTIBODIES IGG, IGM   PROCALCITONIN   CK   PROCALCITONIN    Procalcitonin 0.16     CK         LACTIC ACID, PLASMA    Lactate (Lactic Acid) 0.9     TREPONEMA PALLIDIUM ANTIBODIES IGG, IGM   WEST NILE ANTIBODIES, IGG AND IGM   POCT INFLUENZA A/B MOLECULAR    POC Molecular Influenza A Ag Negative      POC Molecular Influenza B Ag Negative       Acceptable Yes     SARS-COV-2 RDRP GENE    POC Rapid COVID Negative       Acceptable Yes       EKG Readings: (Independently Interpreted)   Initial Reading: No STEMI. Rhythm: Normal Sinus Rhythm. Ectopy: No Ectopy. Conduction: Normal. ST Segments: Normal ST Segments. T Waves: Normal. Clinical Impression: Normal Sinus Rhythm       Imaging Results              X-Ray Ankle Complete Left (Final result)  Result time 09/17/24 09:36:43      Final result by Topher Domínguez MD (09/17/24 09:36:43)                   Impression:      No convincing evidence of acute fracture or dislocation.      Electronically signed by: Topher Domínguez  Date:    09/17/2024  Time:    09:36               Narrative:    EXAMINATION:  XR ANKLE COMPLETE 3 VIEW LEFT    CLINICAL HISTORY:  Edema, unspecified    TECHNIQUE:  AP, lateral and oblique views of the left ankle were performed.    COMPARISON:  None    FINDINGS:  No definite evidence of acute fracture or dislocation.  Talar dome intact.  Ankle mortise symmetric.  No evidence of syndesmotic widening.    No large joint effusion.  Minor enthesopathic change at the calcaneus.  No radiopaque foreign body.                                       X-Ray Chest AP Portable (Final result)  Result time 09/17/24 09:35:42      Final result by Topher Domínguez MD (09/17/24 09:35:42)                   Impression:      No convincing evidence of acute cardiopulmonary disease.      Electronically signed by: Topher  Catrachita  Date:    09/17/2024  Time:    09:35               Narrative:    EXAMINATION:  XR CHEST AP PORTABLE    CLINICAL HISTORY:  Sepsis;    TECHNIQUE:  Single frontal view of the chest was performed.    COMPARISON:  Chest radiograph performed 06/12/2023, 12:31 hours.    FINDINGS:  Monitoring leads overlie the chest.    Grossly unchanged cardiac contours.    See related device again noted.  Status post ACDF    Lungs essentially clear.    No definite pneumothorax or large volume pleural effusion.    No acute findings in the visualized abdomen.    Osseous and soft tissue structures appear without definite acute change.                                    X-Rays:   Independently Interpreted Readings:   Chest X-Ray: Normal heart size.  No infiltrates.  No acute abnormalities.   Other Readings:  Plain film series of both knees bilaterally and ankle without evidence of effusion, underlying infection, subcu emphysema, fractures or dislocations or lytic lesions.    Medications   ketorolac injection 15 mg (15 mg Intravenous Given 9/17/24 0837)   sodium chloride 0.9% bolus 2,055 mL 2,055 mL (0 mLs Intravenous Stopped 9/17/24 1001)     Medical Decision Making  58-year-old female as described above presenting with skin changes with arthralgia and fever though for the past 2 days. PMHx of c4-5 fusion and spinal stimulator placed years ago for chronic post surgical pain though no other records or self-endorsed additional hx.  she states that the rash started yesterday and the only changes to daily routine was an Epson salt bath late last night. she has pain in both knees and left ankle but is weight baring. on exam, the rash is 1-2 cm spaced viral exanthem without pustule formation and non-blanching.  There is no pain to range of motion of the joints or other concerning findings of septic arthritis.  Skin changes are non-pruritic and encompasses lower extremities and torso without spread to upper extremities face or trunk.  They  deny open skin breaks.  Patient is otherwise well apart from joint pain. no known allergies.  workup so far includes septic labs, CRP, RPR (rule out syphilis, though no known exposures).  Pain well controlled with ketorolac and Percocet.      Update/re-evaluation:  Patient's infectious workup and inflammatory process largely normal without evidence of systemic infection or cellulitis or septic arthritis.  Infectious disease found no other laboratory studies to recommend other than West Nile antibodies.  Potential diagnoses to reconsider, Pseudomonas folliculitis, versus West Nile infection versus skin changes related to syphilis infection though low suspicion due to lack of known exposure.  Patient was updated on results and encouraged to follow up with PCP outpatient.  Prescriptions written for anti-inflammatories, pain control and hydrocortisone ointment.  VSS/HDS and afebrile on re-evaluation.    Amount and/or Complexity of Data Reviewed  Labs: ordered. Decision-making details documented in ED Course.  Radiology: ordered. Decision-making details documented in ED Course.  ECG/medicine tests:  Decision-making details documented in ED Course.  Discussion of management or test interpretation with external provider(s): I discussed the case with Infectious Disease who agreed to evaluate patient.  Appreciate recs of West now antibody titer    Risk  OTC drugs.  Prescription drug management.  Parenteral controlled substances.               ED Course as of 09/17/24 1404   Tue Sep 17, 2024   1007 EKG 12-lead  EKG independently interpreted by me.    Performed: 09/17/2024   Rate/Rhythm/Axis: 71 bpm, sinus rhythm, normal axis  QRS 74 ms  Qtc 439 ms  Impression:  Normal Sinus Rhythm.  EKG without evidence of Na channel blockade, K channel blockade, there is no prolonged QTc or digoxin effect.  There is no STEMI or signs of ischemia. [JL]   1013 POC Molecular Influenza A Ag: Negative [JL]   1013 SARS-CoV-2 RNA, Amplification,  Qual: Negative [JL]   1013 Procalcitonin: 0.16  Normal protocol [JL]   1013 Lactic Acid Level: 1.2  Normal lactate [JL]   1013 CPK: 101  Normal CK [JL]   1014 CBC auto differential(!)  No leukocytosis or prominent left shift, stable H&H no anemia. [JL]   1014 Comprehensive metabolic panel(!)  No metabolic or electrolyte derangements [JL]   1014 Sed Rate: 25  Normal sed rate, we will less likely septic arthritis [JL]   1015 X-Ray Chest AP Portable  As per my own interpretation, no evidence of acute pulmonary or cardiac findings as per plain radiography [JL]   1015 X-Ray Ankle Complete Left  As per my interpretation of plain film radiography of the left ankle, no acute changes, dislocations or occult fractures.  No signs of osteomyelitis. [JL]   1016 Temp: 99.5 °F (37.5 °C)  Fever improved with antipyretics. [JL]   1057 Urinalysis, Reflex to Urine Culture Urine, Clean Catch  Negative UA [JL]      ED Course User Index  [JL] Terence Romero MD            Resident supervising staff physician attestation note: This is doctor Barnes dictating.  I examined this patient.  The patient has a spotty pink rash on the lower extremities.  The lesions are less than a cm in diameter.  The patient has pain with range of motion of both knees and left ankle.  The pain with range of motion is very mild.  There is no effusion identified.  The patient does have a high fever.  She has no symptoms of a viral URI respiratory infection and no dysuria.  I do not know the cause of the rash fever and arthralgias.  I am confident this is not a septic joint.  The procalcitonin is negative.  I actually doubt bacterial disease.  Infectious disease was consulted.  They will come and see the patient in the emergency room.  I agree with the resident documentation diagnostic and treatment plan.  I am concerned that this is a viral syndrome with joint inflammation and rash.  This is doctor Barnes dictating.                 Clinical  Impression:  Final diagnoses:  [R60.9] Swelling  [R52] Pain  [B09] Nonspecific exanthematous viral infection (Primary)          ED Disposition Condition    Discharge Stable          ED Prescriptions       Medication Sig Dispense Start Date End Date Auth. Provider    HYDROcodone-acetaminophen (NORCO) 5-325 mg per tablet  (Status: Discontinued) Take 1 tablet by mouth every 6 (six) hours as needed for Pain. 12 tablet 9/17/2024 9/17/2024 Terence Romero MD    acetaminophen (TYLENOL) 500 MG tablet  (Status: Discontinued) Take 2 tablets (1,000 mg total) by mouth every 6 (six) hours as needed for Pain. 12 tablet 9/17/2024 9/17/2024 Terence Romero MD    hydrocortisone 2.5 % cream  (Status: Discontinued) Apply topically 2 (two) times daily. 20 g 9/17/2024 9/17/2024 Terence Romero MD    acetaminophen (TYLENOL) 500 MG tablet Take 2 tablets (1,000 mg total) by mouth every 6 (six) hours as needed for Pain. 12 tablet 9/17/2024 -- Terence Romero MD    HYDROcodone-acetaminophen (NORCO) 5-325 mg per tablet Take 1 tablet by mouth every 6 (six) hours as needed for Pain. 12 tablet 9/17/2024 -- Terence Romero MD    hydrocortisone 2.5 % cream Apply topically 2 (two) times daily. 28 g 9/17/2024 -- Terence Romero MD          Follow-up Information       Follow up With Specialties Details Why Contact Info    Dharmesh-Mahsa Blancas NP Family Medicine Schedule an appointment as soon as possible for a visit in 3 days  [address] [facility] [address]  [phone]      [facility] Emergency Dept Emergency Medicine Go to  As needed [address] [facility] [place] [address] [phone]             Terence Romero MD  Resident  09/17/24 1407

## 2024-09-17 NOTE — ED NOTES
Pt presents to ED with c/o pain to bilateral ankles and knees x 2 days. Pt reports difficulty walking secondary to pain. Pt denies injury, trauma, or new activity. Denies new medications. Reports taking epsom salt bath. Pt noticed rash to bilateral lower extremities an trunk today. Rash is red, raised, and non blanchable.

## 2024-09-17 NOTE — ED NOTES
Pt advised that [facility] in unable to fill the Rxs sent to the pharmacy and request change to [facility]. Md notified.

## 2024-09-17 NOTE — TELEPHONE ENCOUNTER
Ms. Harley reports currently being in OR for evaluation of lower ext pain 10/10 and she would like to r/s tomorrow's RFA to another date. Pt tentatively r/s to 9/25/2024.

## 2024-09-17 NOTE — SUBJECTIVE & OBJECTIVE
Past Medical History:   Diagnosis Date    Cervical radiculopathy        Past Surgical History:   Procedure Laterality Date    CERVICAL FUSION  08/2015    C5-C7    HYSTERECTOMY      INJECTION OF ANESTHETIC AGENT AROUND MEDIAL BRANCH NERVES INNERVATING LUMBAR FACET JOINT Bilateral 8/21/2024    Procedure: Bilateral L5/S1 Facet Medial Branch Blocks #1;  Surgeon: Cornelio Kim Jr., MD;  Location: [facility] PAIN MANAGEMENT;  Service: Pain Management;  Laterality: Bilateral;  @1115  No ATC or DM  Needs Consent    INJECTION OF ANESTHETIC AGENT AROUND MEDIAL BRANCH NERVES INNERVATING LUMBAR FACET JOINT Bilateral 9/4/2024    Procedure: Bilateral L5/S1 Facet Medial Branch Blocks #2 (ref: Ware);  Surgeon: Cornelio Kim Jr., MD;  Location: [facility] PAIN MANAGEMENT;  Service: Pain Management;  Laterality: Bilateral;  @1045  No ATC or DM  Needs Consent    INJECTION OF ANESTHETIC AGENT AROUND NERVE Bilateral 10/31/2022    Procedure: BLOCK, NERVE, BILATERAL L3-L4-L5 MEDIAL BRANCH;  Surgeon: Archana Knott MD;  Location: [facility]IN [facility]GT;  Service: Pain Management;  Laterality: Bilateral;    INJECTION OF ANESTHETIC AGENT AROUND NERVE Bilateral 11/17/2022    Procedure: BLOCK, NERVE BILATERAL L3,L4,L5 MEDIAL BRANCH;  Surgeon: Archana Knott MD;  Location: [facility] PAIN [facility]GT;  Service: Pain Management;  Laterality: Bilateral;    MINIMALLY INVASIVE TRANSFORAMINAL LUMBAR INTERBODY FUSION (TLIF) Left 6/26/2023    Procedure: FUSION, SPINE, LUMBAR, TLIF, MINIMALLY INVASIVE;  Surgeon: Layo Santiago MD;  Location: [facility] OR;  Service: Neurosurgery;  Laterality: Left;  MIS Left L4-5 TLIF   fluoro  Andrew table 4 poster  Neuromonitoring  spinewave  SPINEWAVE LALIT. [phone] TEXTED ТАТЬЯНА ON 6/15/2023 @ 11:29AM. ТАТЬЯНА WILDE RESPONDED ON 6/15/2023 @ 11:35AM-LO  NEUROMONITORING ТАТЬЯНА ARANDA [phone]  RN    RADIOFREQUENCY ABLATION Left 12/8/2022    Procedure: RADIOFREQUENCY ABLATION, LEFT L3,L4,L5 MEDIAL BRANCH ONE OF TWO;  Surgeon: Archana  MD Nikos;  Location: [facility] PAIN MGT;  Service: Pain Management;  Laterality: Left;    RADIOFREQUENCY ABLATION Right 12/22/2022    Procedure: RADIOFREQUENCY ABLATION, RIGHT L3,L4,L5 MEDIAL BRANCH TWO OF TWO;  Surgeon: Archana Kontt MD;  Location: [facility] PAIN MGT;  Service: Pain Management;  Laterality: Right;    SPINAL CORD STIMULATOR IMPLANT  10/20/2021    TRANSFORAMINAL EPIDURAL INJECTION OF STEROID Bilateral 3/13/2023    Procedure: INJECTION, STEROID, EPIDURAL, TRANSFORAMINAL APPROACH BILATERAL L5/S1;  Surgeon: Archana Knott MD;  Location: [facility] PAIN MGT;  Service: Pain Management;  Laterality: Bilateral;    TUBAL LIGATION         Review of patient's allergies indicates:   Allergen Reactions    Shingrix (pf) [varicella-zoster ge-as01b (pf)]      Got all side effects listed       Medications:  (Not in a hospital admission)    Antibiotics (From admission, onward)      None          Antifungals (From admission, onward)      None          Antivirals (From admission, onward)      None             Immunization History   Administered Date(s) Administered    COVID-19, MRNA, LN-S, PF (Pfizer) (Gray Cap) 08/17/2022    COVID-19, vector-nr, rS-Ad26, PF (ChaoWIFI) 03/12/2021    Influenza (FLUBLOK) - Quadrivalent - Recombinant - PF *Preferred* (egg allergy) 11/18/2019, 11/19/2020    Influenza - Quadrivalent - PF *Preferred* (6 months and older) 09/28/2022, 01/09/2024    Td - PF (ADULT) 09/28/2022    Zoster Recombinant 03/29/2022       Family History       Problem Relation (Age of Onset)    Aneurysm Brother    COPD Brother    Diabetes Mother    Heart disease Sister    Hyperlipidemia Mother    Juvenile idiopathic arthritis Sister          Social History     Socioeconomic History    Marital status:    Tobacco Use    Smoking status: Never    Smokeless tobacco: Never   Substance and Sexual Activity    Alcohol use: Never    Drug use: Never    Sexual activity: Not Currently     Partners: Male     Birth control/protection: None      Comment:  39+years     Social Determinants of Health     Financial Resource Strain: High Risk (9/17/2024)    Overall Financial Resource Strain (CARDIA)     Difficulty of Paying Living Expenses: Hard   Food Insecurity: Food Insecurity Present (9/17/2024)    Hunger Vital Sign     Worried About Running Out of Food in the Last Year: Often true     Ran Out of Food in the Last Year: Often true   Transportation Needs: No Transportation Needs (9/17/2024)    PRAPARE - Transportation     Lack of Transportation (Medical): No     Lack of Transportation (Non-Medical): No   Physical Activity: Insufficiently Active (9/17/2024)    Exercise Vital Sign     Days of Exercise per Week: 5 days     Minutes of Exercise per Session: 20 min   Stress: No Stress Concern Present (9/17/2024)    [employer] - Occupational Stress Questionnaire     Feeling of Stress : Not at all   Housing Stability: Low Risk  (9/17/2024)    Housing Stability Vital Sign     Unable to Pay for Housing in the Last Year: No     Homeless in the Last Year: No     Review of Systems   Constitutional:  Positive for chills, fatigue and fever.   HENT:  Negative for congestion and rhinorrhea.    Eyes:  Negative for photophobia and visual disturbance.   Respiratory:  Negative for cough and shortness of breath.    Cardiovascular:  Positive for leg swelling (rt knee). Negative for chest pain.   Gastrointestinal:  Negative for diarrhea and nausea.   Genitourinary:  Negative for dysuria and frequency.   Musculoskeletal:  Positive for arthralgias, gait problem, joint swelling and myalgias. Negative for neck pain and neck stiffness.   Skin:  Positive for rash.   Allergic/Immunologic: Negative for immunocompromised state.   Neurological:  Negative for dizziness and headaches.     Objective:     Vital Signs (Most Recent):  Temp: 99.3 °F (37.4 °C) (09/17/24 1222)  Pulse: 70 (09/17/24 1309)  Resp: 18 (09/17/24 1222)  BP: 139/69 (09/17/24 1309)  SpO2:  100 % (09/17/24 1309) Vital Signs (24h Range):  Temp:  [98.5 °F (36.9 °C)-102 °F (38.9 °C)] 99.3 °F (37.4 °C)  Pulse:  [65-86] 70  Resp:  [18-21] 18  SpO2:  [97 %-100 %] 100 %  BP: (118-148)/(69-79) 139/69     Weight: 68.5 kg (151 lb)  Body mass index is 25.92 kg/m².    Estimated Creatinine Clearance: 64 mL/min (based on SCr of 0.9 mg/dL).     Physical Exam  Vitals reviewed.   Constitutional:       General: She is not in acute distress.     Appearance: She is well-developed.   HENT:      Head: Normocephalic and atraumatic.      Mouth/Throat:      Pharynx: Oropharynx is clear.   Eyes:      Conjunctiva/sclera: Conjunctivae normal.      Pupils: Pupils are equal, round, and reactive to light.   Cardiovascular:      Rate and Rhythm: Normal rate and regular rhythm.      Heart sounds: Normal heart sounds.   Pulmonary:      Effort: Pulmonary effort is normal. No respiratory distress.      Breath sounds: Normal breath sounds.   Abdominal:      General: Bowel sounds are normal. There is no distension.      Palpations: Abdomen is soft.   Musculoskeletal:         General: Normal range of motion.      Cervical back: Normal range of motion and neck supple. No rigidity.      Comments: R knee tenderness, edema. ROM intact.    Skin:     General: Skin is warm and dry.      Findings: Rash (macular rash on arms, legs, back. largest lesion on l ankle. see image.) present.   Neurological:      General: No focal deficit present.      Mental Status: She is alert and oriented to person, place, and time.      Cranial Nerves: No cranial nerve deficit.   Psychiatric:         Mood and Affect: Mood normal.         Behavior: Behavior normal.          Significant Labs: Blood Culture:   Recent Labs   Lab 09/17/24  0820 09/17/24  0845   LABBLOO No Growth to date No Growth to date     All pertinent labs within the past 24 hours have been reviewed.    Significant Imaging: I have reviewed all pertinent imaging results/findings within the past 24  hours.

## 2024-09-17 NOTE — ASSESSMENT & PLAN NOTE
59y/o F with hx of chronic back pain  s/p MIS L4-5 TLIF   6/26/2023 who presented to the ED for rash with associated arthralgias and fever. Notes myalgias started sat, fever and rash this morning. Additionally, reports b/l knee pain and L ankle pain.     In the ED pt was febrile and normotensive. Labs unremarkable. Xray L ankle w/o fracture or effusion. CXR clear. Tested neg for flu and covid. ESR and CPK wnl.      ID consulted for: Viral exanthem with polyarthralgias fever 102, no other symptoms     Agree pt likely vas a viral syndrome such as enterovirus, adenovirus, [place] and less likely dengue (normal plts), chikungunya w/o travel hx.    --continue supportive care  --if worsening R knee pain will need to consider imaging and ortho eval (ESR wnl today)  --f/u bcx, [place], treponema ab  --strict ed return precautions provided

## 2024-09-17 NOTE — TELEPHONE ENCOUNTER
----- Message from Patricia Mo MA sent at 9/17/2024  7:00 AM CDT -----  Regarding: Procedure reschedule    ----- Message -----  From: Frank Vasques  Sent: 9/17/2024   6:39 AM CDT  To: Judy Grimes Staff    Name Of Caller: Cherri        Provider Name: Cornelio Kim        Does patient feel the need to be seen today? no        Relationship to the Pt?: patient        Contact Preference?:  [phone]        What is the nature of the call?:Patient has a procedure scheduled for Wednesday 9- at [facility], she states that she needs to get this procedure cancelled and rescheduled.

## 2024-09-17 NOTE — CONSULTS
[facility] - Emergency Dept  Infectious Disease  Consult Note    Patient Name: Cherri Noonan  MRN: [id number]  Admission Date: 9/17/2024  Hospital Length of Stay: 0 days  Attending Physician: No att. providers found  Primary Care Provider: Mahsa Arevalo NP     Isolation Status: No active isolations    Patient information was obtained from patient and ER records.      Consults  Assessment/Plan:     Other  Fever  57y/o F with hx of chronic back pain  s/p MIS L4-5 IF   6/26/2023 who presented to the ED for rash with associated arthralgias and fever. Notes myalgias started sat, fever and rash this morning. Additionally, reports b/l knee pain and L ankle pain.     In the ED pt was febrile and normotensive. Labs unremarkable. Xray L ankle w/o fracture or effusion. CXR clear. Tested neg for flu and covid. ESR and CPK wnl.      ID consulted for: Viral exanthem with polyarthralgias fever 102, no other symptoms     Agree pt likely vas a viral syndrome such as enterovirus, adenovirus, [place] and less likely dengue (normal plts), chikungunya w/o travel hx.    --continue supportive care  --if worsening R knee pain will need to consider imaging and ortho eval (ESR wnl today)  --f/u bcx, [place], treponema ab  --strict ed return precautions provided        Thank you for your consult. I will sign off. Please contact us if you have any additional questions.    Archana Cespedes MD  Infectious Disease  [facility] - Emergency Dept    Subjective:     Principal Problem: <principal problem not specified>    HPI: Ms. Noonan is a 57y/o F with hx of chronic back pain  s/p MIS L4-5 TLIF   6/26/2023 who presented to the ED for rash with associated arthralgias and fever.    In the ED pt was febrile and normotensive. Labs unremarkable. Xray L ankle w/o fracture or effusion. CXR clear. Tested neg for flu and covid. ESR and CPK wnl.      ID consulted for: Viral exanthem with polyarthralgias fever 102, no other symptoms      Pt reports she started having myalgias in Saturday and chills/ fever this morning. Noticed a macular rash this morning on arms and legs. She is a regular walker and spends a lot of time outdoors. She may have been bitten by mosquitoes, but is not sure. Denies recent travel outside of [place]. Lives with . Was recently with her grandchild who was not sick. Has 2 dogs ar home.     Past Medical History:   Diagnosis Date    Cervical radiculopathy        Past Surgical History:   Procedure Laterality Date    CERVICAL FUSION  08/2015    C5-C7    HYSTERECTOMY      INJECTION OF ANESTHETIC AGENT AROUND MEDIAL BRANCH NERVES INNERVATING LUMBAR FACET JOINT Bilateral 8/21/2024    Procedure: Bilateral L5/S1 Facet Medial Branch Blocks #1;  Surgeon: Cornelio Kim Jr., MD;  Location: [facility] PAIN MANAGEMENT;  Service: Pain Management;  Laterality: Bilateral;  @1115  No ATC or DM  Needs Consent    INJECTION OF ANESTHETIC AGENT AROUND MEDIAL BRANCH NERVES INNERVATING LUMBAR FACET JOINT Bilateral 9/4/2024    Procedure: Bilateral L5/S1 Facet Medial Branch Blocks #2 (ref: Ware);  Surgeon: Cornelio Kim Jr., MD;  Location: [facility] PAIN MANAGEMENT;  Service: Pain Management;  Laterality: Bilateral;  @1045  No ATC or DM  Needs Consent    INJECTION OF ANESTHETIC AGENT AROUND NERVE Bilateral 10/31/2022    Procedure: BLOCK, NERVE, BILATERAL L3-L4-L5 MEDIAL BRANCH;  Surgeon: Archana Knott MD;  Location: [facility] PAIN MGT;  Service: Pain Management;  Laterality: Bilateral;    INJECTION OF ANESTHETIC AGENT AROUND NERVE Bilateral 11/17/2022    Procedure: BLOCK, NERVE BILATERAL L3,L4,L5 MEDIAL BRANCH;  Surgeon: Archana Knott MD;  Location: [facility] PAIN [facility]GT;  Service: Pain Management;  Laterality: Bilateral;    MINIMALLY INVASIVE TRANSFORAMINAL LUMBAR INTERBODY FUSION (TLIF) Left 6/26/2023    Procedure: FUSION, SPINE, LUMBAR, TLIF, MINIMALLY INVASIVE;  Surgeon: Layo Santiago MD;  Location: [facility] OR;  Service: Neurosurgery;  Laterality: Left;   MIS Left L4-5 TLIF   fluoro  Andrew table 4 poster  Neuromonitoring  spinewave  SPINEWAVE SUSHANT [phone] TEXTED ТАТЬЯНА ON 6/15/2023 @ 11:29AM. ТАТЬЯНА WILDE RESPONDED ON 6/15/2023 @ 11:35AM-LO  NEUROMONITORING ТАТЬЯНА RAANDA [phone]  RN    RADIOFREQUENCY ABLATION Left 12/8/2022    Procedure: RADIOFREQUENCY ABLATION, LEFT L3,L4,L5 MEDIAL BRANCH ONE OF TWO;  Surgeon: Archana Knott MD;  Location: [facility] PAIN MGT;  Service: Pain Management;  Laterality: Left;    RADIOFREQUENCY ABLATION Right 12/22/2022    Procedure: RADIOFREQUENCY ABLATION, RIGHT L3,L4,L5 MEDIAL BRANCH TWO OF TWO;  Surgeon: Archana Knott MD;  Location: [facility];  Service: Pain Management;  Laterality: Right;    SPINAL CORD STIMULATOR IMPLANT  10/20/2021    TRANSFORAMINAL EPIDURAL INJECTION OF STEROID Bilateral 3/13/2023    Procedure: INJECTION, STEROID, EPIDURAL, TRANSFORAMINAL APPROACH BILATERAL L5/S1;  Surgeon: Archana Knott MD;  Location: [facility] PAIN [facility]GT;  Service: Pain Management;  Laterality: Bilateral;    TUBAL LIGATION         Review of patient's allergies indicates:   Allergen Reactions    Shingrix (pf) [varicella-zoster ge-as01b (pf)]      Got all side effects listed       Medications:  (Not in a hospital admission)    Antibiotics (From admission, onward)      None          Antifungals (From admission, onward)      None          Antivirals (From admission, onward)      None             Immunization History   Administered Date(s) Administered    COVID-19, MRNA, LN-S, PF (Pfizer) (Gray Cap) 08/17/2022    COVID-19, vector-nr, rS-Ad26, PF ([place] [employer]) 03/12/2021    Influenza (FLUBLOK) - Quadrivalent - Recombinant - PF *Preferred* (egg allergy) 11/18/2019, 11/19/2020    Influenza - Quadrivalent - PF *Preferred* (6 months and older) 09/28/2022, 01/09/2024    Td - PF (ADULT) 09/28/2022    Zoster Recombinant 03/29/2022       Family History       Problem Relation (Age of Onset)    Aneurysm Brother    COPD Brother    Diabetes Mother    Heart  disease Sister    Hyperlipidemia Mother    Juvenile idiopathic arthritis Sister          Social History     Socioeconomic History    Marital status:    Tobacco Use    Smoking status: Never    Smokeless tobacco: Never   Substance and Sexual Activity    Alcohol use: Never    Drug use: Never    Sexual activity: Not Currently     Partners: Male     Birth control/protection: None     Comment:  39+years     Social Determinants of Health     Financial Resource Strain: High Risk (9/17/2024)    Overall Financial Resource Strain (CARDIA)     Difficulty of Paying Living Expenses: Hard   Food Insecurity: Food Insecurity Present (9/17/2024)    Hunger Vital Sign     Worried About Running Out of Food in the Last Year: Often true     Ran Out of Food in the Last Year: Often true   Transportation Needs: No Transportation Needs (9/17/2024)    PRAPARE - Transportation     Lack of Transportation (Medical): No     Lack of Transportation (Non-Medical): No   Physical Activity: Insufficiently Active (9/17/2024)    Exercise Vital Sign     Days of Exercise per Week: 5 days     Minutes of Exercise per Session: 20 min   Stress: No Stress Concern Present (9/17/2024)    [employer] - Occupational Stress Questionnaire     Feeling of Stress : Not at all   Housing Stability: Low Risk  (9/17/2024)    Housing Stability Vital Sign     Unable to Pay for Housing in the Last Year: No     Homeless in the Last Year: No     Review of Systems   Constitutional:  Positive for chills, fatigue and fever.   HENT:  Negative for congestion and rhinorrhea.    Eyes:  Negative for photophobia and visual disturbance.   Respiratory:  Negative for cough and shortness of breath.    Cardiovascular:  Positive for leg swelling (rt knee). Negative for chest pain.   Gastrointestinal:  Negative for diarrhea and nausea.   Genitourinary:  Negative for dysuria and frequency.   Musculoskeletal:  Positive for arthralgias, gait problem, joint  swelling and myalgias. Negative for neck pain and neck stiffness.   Skin:  Positive for rash.   Allergic/Immunologic: Negative for immunocompromised state.   Neurological:  Negative for dizziness and headaches.     Objective:     Vital Signs (Most Recent):  Temp: 99.3 °F (37.4 °C) (09/17/24 1222)  Pulse: 70 (09/17/24 1309)  Resp: 18 (09/17/24 1222)  BP: 139/69 (09/17/24 1309)  SpO2: 100 % (09/17/24 1309) Vital Signs (24h Range):  Temp:  [98.5 °F (36.9 °C)-102 °F (38.9 °C)] 99.3 °F (37.4 °C)  Pulse:  [65-86] 70  Resp:  [18-21] 18  SpO2:  [97 %-100 %] 100 %  BP: (118-148)/(69-79) 139/69     Weight: 68.5 kg (151 lb)  Body mass index is 25.92 kg/m².    Estimated Creatinine Clearance: 64 mL/min (based on SCr of 0.9 mg/dL).     Physical Exam  Vitals reviewed.   Constitutional:       General: She is not in acute distress.     Appearance: She is well-developed.   HENT:      Head: Normocephalic and atraumatic.      Mouth/Throat:      Pharynx: Oropharynx is clear.   Eyes:      Conjunctiva/sclera: Conjunctivae normal.      Pupils: Pupils are equal, round, and reactive to light.   Cardiovascular:      Rate and Rhythm: Normal rate and regular rhythm.      Heart sounds: Normal heart sounds.   Pulmonary:      Effort: Pulmonary effort is normal. No respiratory distress.      Breath sounds: Normal breath sounds.   Abdominal:      General: Bowel sounds are normal. There is no distension.      Palpations: Abdomen is soft.   Musculoskeletal:         General: Normal range of motion.      Cervical back: Normal range of motion and neck supple. No rigidity.      Comments: R knee tenderness, edema. ROM intact.    Skin:     General: Skin is warm and dry.      Findings: Rash (macular rash on arms, legs, back. largest lesion on l ankle. see image.) present.   Neurological:      General: No focal deficit present.      Mental Status: She is alert and oriented to person, place, and time.      Cranial Nerves: No cranial nerve deficit.    Psychiatric:         Mood and Affect: Mood normal.         Behavior: Behavior normal.                        Significant Labs: Blood Culture:   Recent Labs   Lab 09/17/24  0820 09/17/24  0845   LABBLOO No Growth to date No Growth to date     All pertinent labs within the past 24 hours have been reviewed.    Significant Imaging: I have reviewed all pertinent imaging results/findings within the past 24 hours.

## 2024-09-18 LAB
OHS QRS DURATION: 74 MS
OHS QTC CALCULATION: 439 MS

## 2024-09-19 ENCOUNTER — PATIENT MESSAGE (OUTPATIENT)
Dept: INTERNAL MEDICINE | Facility: CLINIC | Age: 59
End: 2024-09-19
Payer: COMMERCIAL

## 2024-09-19 ENCOUNTER — PATIENT MESSAGE (OUTPATIENT)
Dept: PAIN MEDICINE | Facility: CLINIC | Age: 59
End: 2024-09-19
Payer: COMMERCIAL

## 2024-09-19 NOTE — TELEPHONE ENCOUNTER
Pt had intermittent fevers and is taking Tylenol 2-500 mg tabs q6h prn for pain. ( Explained the maximum dosage is 1 gm q8h) last fever was 101.1 near 1am this morning.   Fever is gone now.   She is still experiencing R/knee pain rating 8 and swelling. L/ankle is swollen also.  Pt had a viral rash is still evident on the back of your arms, back and legs.  It does not itch.

## 2024-09-20 ENCOUNTER — OFFICE VISIT (OUTPATIENT)
Dept: INTERNAL MEDICINE | Facility: CLINIC | Age: 59
End: 2024-09-20
Payer: COMMERCIAL

## 2024-09-20 ENCOUNTER — LAB VISIT (OUTPATIENT)
Dept: LAB | Facility: HOSPITAL | Age: 59
End: 2024-09-20
Payer: COMMERCIAL

## 2024-09-20 VITALS
OXYGEN SATURATION: 96 % | HEIGHT: 64 IN | SYSTOLIC BLOOD PRESSURE: 114 MMHG | HEART RATE: 90 BPM | BODY MASS INDEX: 26.57 KG/M2 | WEIGHT: 155.63 LBS | DIASTOLIC BLOOD PRESSURE: 60 MMHG

## 2024-09-20 DIAGNOSIS — M89.8X9 BONE PAIN: ICD-10-CM

## 2024-09-20 DIAGNOSIS — R50.9 FEVER, UNSPECIFIED FEVER CAUSE: ICD-10-CM

## 2024-09-20 DIAGNOSIS — B09 VIRAL EXANTHEM: ICD-10-CM

## 2024-09-20 DIAGNOSIS — B09 VIRAL EXANTHEM: Primary | ICD-10-CM

## 2024-09-20 DIAGNOSIS — J31.0 CHRONIC RHINITIS: ICD-10-CM

## 2024-09-20 LAB
CRP SERPL-MCNC: 58.5 MG/L (ref 0–8.2)
WEST NILE VIRUS INTERPRETATION: NORMAL
WNV IGG SER QL IA: NEGATIVE
WNV IGM SER QL IA: NEGATIVE

## 2024-09-20 PROCEDURE — 86235 NUCLEAR ANTIGEN ANTIBODY: CPT | Mod: 59 | Performed by: NURSE PRACTITIONER

## 2024-09-20 PROCEDURE — 86039 ANTINUCLEAR ANTIBODIES (ANA): CPT | Performed by: NURSE PRACTITIONER

## 2024-09-20 PROCEDURE — 86140 C-REACTIVE PROTEIN: CPT | Performed by: NURSE PRACTITIONER

## 2024-09-20 PROCEDURE — 86790 VIRUS ANTIBODY NOS: CPT | Mod: 91 | Performed by: NURSE PRACTITIONER

## 2024-09-20 PROCEDURE — 36415 COLL VENOUS BLD VENIPUNCTURE: CPT | Performed by: NURSE PRACTITIONER

## 2024-09-20 PROCEDURE — 86747 PARVOVIRUS ANTIBODY: CPT | Mod: 91 | Performed by: NURSE PRACTITIONER

## 2024-09-20 PROCEDURE — 86038 ANTINUCLEAR ANTIBODIES: CPT | Performed by: NURSE PRACTITIONER

## 2024-09-20 PROCEDURE — 99999 PR PBB SHADOW E&M-EST. PATIENT-LVL IV: CPT | Mod: PBBFAC,,, | Performed by: NURSE PRACTITIONER

## 2024-09-20 PROCEDURE — 86225 DNA ANTIBODY NATIVE: CPT | Performed by: NURSE PRACTITIONER

## 2024-09-20 RX ORDER — FLUTICASONE PROPIONATE 50 MCG
2 SPRAY, SUSPENSION (ML) NASAL DAILY
Qty: 48 ML | Refills: 1 | Status: SHIPPED | OUTPATIENT
Start: 2024-09-20 | End: 2024-09-20 | Stop reason: SDUPTHER

## 2024-09-20 RX ORDER — FLUTICASONE PROPIONATE 50 MCG
2 SPRAY, SUSPENSION (ML) NASAL DAILY
Qty: 48 ML | Refills: 1 | Status: SHIPPED | OUTPATIENT
Start: 2024-09-20

## 2024-09-20 NOTE — PROGRESS NOTES
"INTERNAL MEDICINE PROGRESS NOTE    CHIEF COMPLAINT     Chief Complaint   Patient presents with    Follow-up       HPI     Cherri Noonan is a 58 y.o. female with cervical radiculopathy, OA of the spine, spondylosis, HLD, vit d def, and class 1 obesity  who presents for an ER follow up visit today.    Pt presented to the ED 9/17/2024 with fever, rash and joint pain     "Skin changes with arthralgia and fever though for the past 2 days. PMHx of c4-5 fusion and spinal stimulator placed years ago for chronic post surgical pain though no other records or self-endorsed additional hx. she states that the rash started yesterday and the only changes to daily routine was an Epson salt bath late last night. she has pain in both knees and left ankle but is weight baring. on exam, the rash is 1-2 cm spaced viral exanthem without pustule formation and non-blanching. There is no pain to range of motion of the joints or other concerning findings of septic arthritis. Skin changes are non-pruritic and encompasses lower extremities and torso without spread to upper extremities face or trunk. They deny open skin breaks. Patient is otherwise well apart from joint pain. no known allergies. Fever and chills for the same time course 100.1F at home."    Reviewed labs from ED -   Blood cultures negative so far   CBC- gran % elevated and lymph% decreased   [place] antibodies in process    Today she reports she started with bilateral knee pain and bone pain earlier this week. Then noted rash and fever. continued rash and fever T-max 102      Past Medical History:  Past Medical History:   Diagnosis Date    Cervical radiculopathy        Home Medications:  Prior to Admission medications    Medication Sig Start Date End Date Taking? Authorizing Provider   acetaminophen (TYLENOL) 500 MG tablet Take 500 mg by mouth every 6 (six) hours as needed for Pain.   Yes Provider, Historical   acetaminophen (TYLENOL) 500 MG tablet Take 2 tablets (1,000 mg " total) by mouth every 6 (six) hours as needed for Pain. 9/17/24  Yes Terence Romero MD   azelastine 205.5 mcg (0.15 %) Spry azelastine 205.5 mcg (0.15 %) nasal spray   Yes Provider, Historical   ciclopirox (LOPROX) 0.77 % Susp Apply topically 2 (two) times daily.   Yes Provider, Historical   ergocalciferol (ERGOCALCIFEROL) 50,000 unit Cap Take 50,000 Units by mouth every 7 days.   Yes Provider, Historical   estradioL (VAGIFEM) 10 mcg Tab Place 1 tablet (10 mcg total) vaginally twice a week. 8/22/24 8/22/25 Yes Elio Tejada MD   fluticasone propionate (FLONASE) 50 mcg/actuation nasal spray USE 1 SPRAY (50 MCG TOTAL) IN EACH NOSTRIL ONCE DAILY 9/4/23  Yes Mahsa Arevalo NP   HYDROcodone-acetaminophen (NORCO) 5-325 mg per tablet Take 1 tablet by mouth every 6 (six) hours as needed for Pain. 9/17/24  Yes Terence Romero MD   hydrocortisone 2.5 % cream Apply topically 2 (two) times daily. 9/17/24  Yes Terence Romero MD   ketoconazole (NIZORAL) 2 % cream    Yes Provider, Historical   meloxicam (MOBIC) 15 MG tablet Take 1 tablet (15 mg total) by mouth once daily. 9/7/23  Yes Charity Gallagher MD   methocarbamoL (ROBAXIN) 500 MG Tab Take 1 tablet (500 mg) by mouth up to every 6 hours as needed for Muscle Spasm. 6/28/23  Yes Gloria Anaya PA-C   multivitamin capsule Take 1 capsule by mouth once daily.   Yes Provider, Historical   omeprazole (PRILOSEC) 20 MG capsule TAKE 1 CAPSULE BY MOUTH EVERY DAY 9/25/23  Yes Colette Sarmiento MD   ondansetron (ZOFRAN-ODT) 4 MG TbDL Dissolve 1 tablet (4 mg total) by mouth every 6 (six) hours as needed (nausea). 6/26/23  Yes Rahel Woods PA-C   oxyCODONE-acetaminophen (PERCOCET)  mg per tablet Take 1 tablet by mouth every 4 (four) hours as needed for Pain (severe pain only (7-10/10)). 6/26/23  Yes Rahel Woods PA-C   tiZANidine (ZANAFLEX) 4 MG tablet TAKE 1 TABLET BY MOUTH NIGHTLY AS NEEDED FOR MUSCLE SPASM 3/11/24  Yes Nikos  "MD Archana   zonisamide (ZONEGRAN) 100 MG Cap TAKE 4 CAPSULES BY MOUTH ONCE DAILY 3/11/24  Yes Archana Knott MD       Review of Systems:  Review of Systems   Constitutional:  Positive for chills, fatigue and fever.   HENT:  Positive for sore throat. Negative for congestion, sinus pressure and sneezing.    Respiratory:  Negative for cough, shortness of breath and wheezing.    Gastrointestinal:  Negative for abdominal pain, blood in stool, constipation, diarrhea, nausea and vomiting.   Musculoskeletal:  Positive for arthralgias, joint swelling and myalgias.   Neurological:  Positive for headaches. Negative for dizziness and light-headedness.       Health Maintainence:   Immunizations:  Health Maintenance         Date Due Completion Date    Influenza Vaccine (1) 09/01/2024 1/9/2024    COVID-19 Vaccine (4 - 2023-24 season) 09/01/2024 8/17/2022    Shingles Vaccine (2 of 2) 02/28/2025 (Originally 5/24/2022) 3/29/2022    Mammogram 01/04/2025 1/4/2024    Colorectal Cancer Screening 10/13/2026 10/13/2016    Hemoglobin A1c (Diabetic Prevention Screening) 02/28/2027 2/29/2024    Lipid Panel 02/28/2029 2/29/2024    TETANUS VACCINE 09/28/2032 9/28/2022             PHYSICAL EXAM     /60 (BP Location: Right arm, Patient Position: Sitting)   Pulse 90   Ht 5' 4" (1.626 m)   Wt 70.6 kg (155 lb 10.3 oz)   SpO2 96%   BMI 26.72 kg/m²     Physical Exam  Vitals reviewed.   Constitutional:       Appearance: She is well-developed.   HENT:      Head: Normocephalic.      Right Ear: External ear normal.      Left Ear: External ear normal.      Nose: Nose normal.      Mouth/Throat:      Pharynx: No oropharyngeal exudate.   Eyes:      Pupils: Pupils are equal, round, and reactive to light.   Neck:      Thyroid: No thyromegaly.      Vascular: No JVD.      Trachea: No tracheal deviation.   Cardiovascular:      Rate and Rhythm: Normal rate and regular rhythm.      Heart sounds: Normal heart sounds. No murmur heard.     No friction rub. No " gallop.   Pulmonary:      Effort: Pulmonary effort is normal. No respiratory distress.      Breath sounds: Normal breath sounds. No wheezing or rales.   Abdominal:      General: Bowel sounds are normal. There is no distension.      Palpations: Abdomen is soft.      Tenderness: There is no abdominal tenderness.   Musculoskeletal:         General: No tenderness. Normal range of motion.      Cervical back: Neck supple.      Right knee: Swelling present.      Left knee: Swelling present.        Legs:    Lymphadenopathy:      Cervical: No cervical adenopathy.   Skin:     General: Skin is warm and dry.      Findings: Rash present. Rash is macular and papular.   Neurological:      Mental Status: She is alert and oriented to person, place, and time.   Psychiatric:         Behavior: Behavior normal.         LABS     Lab Results   Component Value Date    HGBA1C 5.3 02/29/2024     CMP  Sodium   Date Value Ref Range Status   09/17/2024 137 136 - 145 mmol/L Final     Potassium   Date Value Ref Range Status   09/17/2024 4.3 3.5 - 5.1 mmol/L Final     Chloride   Date Value Ref Range Status   09/17/2024 105 95 - 110 mmol/L Final     CO2   Date Value Ref Range Status   09/17/2024 22 (L) 23 - 29 mmol/L Final     Glucose   Date Value Ref Range Status   09/17/2024 97 70 - 110 mg/dL Final     BUN   Date Value Ref Range Status   09/17/2024 12 6 - 20 mg/dL Final     Creatinine   Date Value Ref Range Status   09/17/2024 0.9 0.5 - 1.4 mg/dL Final     Calcium   Date Value Ref Range Status   09/17/2024 8.9 8.7 - 10.5 mg/dL Final     Total Protein   Date Value Ref Range Status   09/17/2024 7.3 6.0 - 8.4 g/dL Final     Albumin   Date Value Ref Range Status   09/17/2024 4.0 3.5 - 5.2 g/dL Final     Total Bilirubin   Date Value Ref Range Status   09/17/2024 0.5 0.1 - 1.0 mg/dL Final     Comment:     For infants and newborns, interpretation of results should be based  on gestational age, weight and in agreement with  clinical  observations.    Premature Infant recommended reference ranges:  Up to 24 hours.............<8.0 mg/dL  Up to 48 hours............<12.0 mg/dL  3-5 days..................<15.0 mg/dL  6-29 days.................<15.0 mg/dL       Alkaline Phosphatase   Date Value Ref Range Status   09/17/2024 93 55 - 135 U/L Final     AST   Date Value Ref Range Status   09/17/2024 19 10 - 40 U/L Final     ALT   Date Value Ref Range Status   09/17/2024 15 10 - 44 U/L Final     Anion Gap   Date Value Ref Range Status   09/17/2024 10 8 - 16 mmol/L Final     eGFR if    Date Value Ref Range Status   03/29/2022 >60.0 >60 mL/min/1.73 m^2 Final     eGFR if non    Date Value Ref Range Status   03/29/2022 >60.0 >60 mL/min/1.73 m^2 Final     Comment:     Calculation used to obtain the estimated glomerular filtration  rate (eGFR) is the CKD-EPI equation.        Lab Results   Component Value Date    WBC 3.93 09/17/2024    HGB 12.5 09/17/2024    HCT 40.5 09/17/2024    MCV 81 (L) 09/17/2024     09/17/2024     Lab Results   Component Value Date    CHOL 251 (H) 02/29/2024    CHOL 230 (H) 03/29/2022     Lab Results   Component Value Date    HDL 76 (H) 02/29/2024    HDL 62 03/29/2022     Lab Results   Component Value Date    LDLCALC 165.0 (H) 02/29/2024    LDLCALC 152.0 03/29/2022     Lab Results   Component Value Date    TRIG 50 02/29/2024    TRIG 80 03/29/2022     Lab Results   Component Value Date    CHOLHDL 30.3 02/29/2024    CHOLHDL 27.0 03/29/2022     Lab Results   Component Value Date    TSH 1.295 02/29/2024       ASSESSMENT/PLAN     Cherri Noonan is a 58 y.o. female     Viral exanthem- will add dengue and parvovirus antibodies to work up. Will also check autoimmune markers. Will cont steroid lotion, tylenol and rest/hydration   -     DENGUE FEVER ABS, IGG & IGM; Future; Expected date: 09/20/2024  -     PARVOVIRUS B19 ANTIBODY, IGG AND IGM; Future; Expected date: 09/20/2024  -     GAEL Screen  w/Reflex; Future; Expected date: 09/20/2024  -     C-Reactive Protein; Future; Expected date: 09/20/2024    Fever, unspecified fever cause- will add dengue and parvovirus antibodies to work up. Will also check autoimmune markers. Will cont steroid lotion, tylenol and rest/hydration   -     DENGUE FEVER ABS, IGG & IGM; Future; Expected date: 09/20/2024  -     PARVOVIRUS B19 ANTIBODY, IGG AND IGM; Future; Expected date: 09/20/2024  -     GAEL Screen w/Reflex; Future; Expected date: 09/20/2024  -     C-Reactive Protein; Future; Expected date: 09/20/2024    Bone pain- will add dengue and parvovirus antibodies to work up. Will also check autoimmune markers. Will cont steroid lotion, tylenol and rest/hydration   -     DENGUE FEVER ABS, IGG & IGM; Future; Expected date: 09/20/2024  -     PARVOVIRUS B19 ANTIBODY, IGG AND IGM; Future; Expected date: 09/20/2024  -     GAEL Screen w/Reflex; Future; Expected date: 09/20/2024  -     C-Reactive Protein; Future; Expected date: 09/20/2024    Chronic rhinitis  -     Discontinue: fluticasone propionate (FLONASE) 50 mcg/actuation nasal spray; 2 sprays (100 mcg total) by Each Nostril route once daily.  Dispense: 48 mL; Refill: 1  -     fluticasone propionate (FLONASE) 50 mcg/actuation nasal spray; 2 sprays (100 mcg total) by Each Nostril route once daily.  Dispense: 48 mL; Refill: 1           Follow up with PCP     Patient education provided from [facility]. Patient was counseled on when and how to seek emergent care.       Mahsa Arevalo MN, APRN, FNP-c   Department of Internal Medicine - [facility]  10:14 AM

## 2024-09-21 LAB
BACTERIA BLD CULT: NORMAL
BACTERIA BLD CULT: NORMAL

## 2024-09-23 LAB
ANA PATTERN 1: NORMAL
ANA PATTERN 2: NORMAL
ANA SER QL IF: POSITIVE
ANA TITER 2: NORMAL
ANA TITR SER IF: NORMAL {TITER}
PARVOVIRUS B19 ABS IGG & IGM: ABNORMAL
PARVOVIRUS B19 IGG ANTIBODY: POSITIVE
PARVOVIRUS B19 IGM ANTIBODY: NEGATIVE

## 2024-09-24 LAB
DENGUE FEVER INTERPRETATION: NORMAL
DENGUE VIRUS IGG: NEGATIVE
DENGUE VIRUS IGM: NEGATIVE

## 2024-09-26 LAB
ANTI SM ANTIBODY: 0.13 RATIO (ref 0–0.99)
ANTI SM/RNP ANTIBODY: 0.13 RATIO (ref 0–0.99)
ANTI-SM INTERPRETATION: NEGATIVE
ANTI-SM/RNP INTERPRETATION: NEGATIVE
ANTI-SSA ANTIBODY: 0.07 RATIO (ref 0–0.99)
ANTI-SSA INTERPRETATION: NEGATIVE
ANTI-SSB ANTIBODY: 0.06 RATIO (ref 0–0.99)
ANTI-SSB INTERPRETATION: NEGATIVE
DSDNA AB SER-ACNC: NORMAL [IU]/ML

## 2024-09-27 ENCOUNTER — E-CONSULT (OUTPATIENT)
Dept: INFECTIOUS DISEASES | Facility: HOSPITAL | Age: 59
End: 2024-09-27
Payer: COMMERCIAL

## 2024-09-27 ENCOUNTER — TELEPHONE (OUTPATIENT)
Dept: INFECTIOUS DISEASES | Facility: CLINIC | Age: 59
End: 2024-09-27

## 2024-09-27 DIAGNOSIS — B34.9 VIRAL SYNDROME: Primary | ICD-10-CM

## 2024-09-27 NOTE — CONSULTS
[facility] INFECTIOUS DISEASE  Response for E-Consult     Patient Name: Cherri Noonan  MRN: [id number]  Primary Care Provider: Mahsa Arevalo NP   Requesting Provider: Mahsa Arevalo,*  E-Consult to Infectious Disease  Consult performed by: Renee Covarrubias DO  Consult ordered by: Mahsa Arevalo NP        Unfortunately, this eConsult has been declined due to: Unclear Question - requires in person evaluation.    Unclear Question:  This eConsult submission cannot be completed at this time period. The question provided is unclear and our specialty is unsure how to assist. Please consider providing more information, or a more specific question and resubmitting your eConsult, or contacting our specialty through the usual channels.        Thank you for this eConsult referral.     Renee Covarrubias DO  [facility] INFECTIOUS DISEASE

## 2024-10-01 ENCOUNTER — TELEPHONE (OUTPATIENT)
Dept: INFECTIOUS DISEASES | Facility: CLINIC | Age: 59
End: 2024-10-01
Payer: COMMERCIAL

## 2024-10-02 ENCOUNTER — PATIENT MESSAGE (OUTPATIENT)
Dept: PAIN MEDICINE | Facility: CLINIC | Age: 59
End: 2024-10-02
Payer: COMMERCIAL

## 2024-10-09 ENCOUNTER — HOSPITAL ENCOUNTER (OUTPATIENT)
Facility: HOSPITAL | Age: 59
Discharge: HOME | End: 2024-10-09
Attending: PAIN MEDICINE | Admitting: PAIN MEDICINE
Payer: COMMERCIAL

## 2024-10-09 VITALS
HEART RATE: 70 BPM | RESPIRATION RATE: 16 BRPM | OXYGEN SATURATION: 99 % | SYSTOLIC BLOOD PRESSURE: 154 MMHG | DIASTOLIC BLOOD PRESSURE: 68 MMHG | TEMPERATURE: 98 F

## 2024-10-09 DIAGNOSIS — M47.816 LUMBAR SPONDYLOSIS: Primary | ICD-10-CM

## 2024-10-09 DIAGNOSIS — M54.16 LUMBAR RADICULOPATHY: ICD-10-CM

## 2024-10-09 PROCEDURE — 64635 DESTROY LUMB/SAC FACET JNT: CPT | Mod: 50 | Performed by: PAIN MEDICINE

## 2024-10-09 PROCEDURE — 63600175 PHARM REV CODE 636 W HCPCS: Performed by: PAIN MEDICINE

## 2024-10-09 PROCEDURE — 64635 DESTROY LUMB/SAC FACET JNT: CPT | Mod: 50,,, | Performed by: PAIN MEDICINE

## 2024-10-09 RX ORDER — BUPIVACAINE HYDROCHLORIDE 2.5 MG/ML
INJECTION, SOLUTION EPIDURAL; INFILTRATION; INTRACAUDAL
Status: DISCONTINUED
Start: 2024-10-09 | End: 2024-10-09 | Stop reason: HOSPADM

## 2024-10-09 RX ORDER — SODIUM CHLORIDE 9 MG/ML
INJECTION, SOLUTION INTRAVENOUS CONTINUOUS
Status: DISCONTINUED | OUTPATIENT
Start: 2024-10-09 | End: 2024-10-09 | Stop reason: HOSPADM

## 2024-10-09 RX ORDER — MIDAZOLAM HYDROCHLORIDE 2 MG/2ML
INJECTION, SOLUTION INTRAMUSCULAR; INTRAVENOUS
Status: DISCONTINUED
Start: 2024-10-09 | End: 2024-10-09 | Stop reason: HOSPADM

## 2024-10-09 RX ORDER — BUPIVACAINE HYDROCHLORIDE 2.5 MG/ML
10 INJECTION, SOLUTION EPIDURAL; INFILTRATION; INTRACAUDAL ONCE
Status: COMPLETED | OUTPATIENT
Start: 2024-10-09 | End: 2024-10-09

## 2024-10-09 RX ORDER — LIDOCAINE HYDROCHLORIDE 10 MG/ML
20 INJECTION, SOLUTION INFILTRATION; PERINEURAL ONCE
Status: COMPLETED | OUTPATIENT
Start: 2024-10-09 | End: 2024-10-09

## 2024-10-09 RX ORDER — FENTANYL CITRATE 50 UG/ML
INJECTION, SOLUTION INTRAMUSCULAR; INTRAVENOUS
Status: DISCONTINUED
Start: 2024-10-09 | End: 2024-10-09 | Stop reason: HOSPADM

## 2024-10-09 RX ORDER — LIDOCAINE HYDROCHLORIDE 10 MG/ML
INJECTION, SOLUTION INFILTRATION; PERINEURAL
Status: DISCONTINUED
Start: 2024-10-09 | End: 2024-10-09 | Stop reason: HOSPADM

## 2024-10-09 RX ORDER — TRIAMCINOLONE ACETONIDE 40 MG/ML
INJECTION, SUSPENSION INTRA-ARTICULAR; INTRAMUSCULAR
Status: DISCONTINUED
Start: 2024-10-09 | End: 2024-10-09 | Stop reason: HOSPADM

## 2024-10-09 RX ORDER — TRIAMCINOLONE ACETONIDE 40 MG/ML
40 INJECTION, SUSPENSION INTRA-ARTICULAR; INTRAMUSCULAR ONCE
Status: COMPLETED | OUTPATIENT
Start: 2024-10-09 | End: 2024-10-09

## 2024-10-09 RX ORDER — MIDAZOLAM HYDROCHLORIDE 2 MG/2ML
5 INJECTION, SOLUTION INTRAMUSCULAR; INTRAVENOUS
Status: DISCONTINUED | OUTPATIENT
Start: 2024-10-09 | End: 2024-10-09 | Stop reason: HOSPADM

## 2024-10-09 RX ORDER — FENTANYL CITRATE 50 UG/ML
100 INJECTION, SOLUTION INTRAMUSCULAR; INTRAVENOUS
Status: DISCONTINUED | OUTPATIENT
Start: 2024-10-09 | End: 2024-10-09 | Stop reason: HOSPADM

## 2024-10-09 NOTE — H&P
Subjective:     Patient ID: Cherir Noonan is a 59 y.o. female    Chief Complaint: Low back pain    Referred by: Cornelio Kim      HPI:    Cherri Noonan is a 59 y.o. female who presents today for bilateral L5/S1 RFA. She denies any changes in the quality or location of the pain.        Review of Systems   Constitutional:  Negative for activity change, appetite change, chills, fatigue, fever and unexpected weight change.   HENT:  Negative for hearing loss.    Eyes:  Negative for visual disturbance.   Respiratory:  Negative for chest tightness and shortness of breath.    Cardiovascular:  Negative for chest pain.   Gastrointestinal:  Negative for abdominal pain, constipation, diarrhea, nausea and vomiting.   Genitourinary:  Negative for difficulty urinating.   Musculoskeletal:  Positive for back pain, gait problem and myalgias. Negative for neck pain.   Skin:  Positive for color change and rash.   Neurological:  Negative for dizziness, weakness, light-headedness, numbness and headaches.   Psychiatric/Behavioral:  Positive for sleep disturbance. Negative for hallucinations and suicidal ideas. The patient is not nervous/anxious.        Past Medical History:   Diagnosis Date    Cervical radiculopathy        Past Surgical History:   Procedure Laterality Date    CERVICAL FUSION  08/2015    C5-C7    HYSTERECTOMY      INJECTION OF ANESTHETIC AGENT AROUND MEDIAL BRANCH NERVES INNERVATING LUMBAR FACET JOINT Bilateral 8/21/2024    Procedure: Bilateral L5/S1 Facet Medial Branch Blocks #1;  Surgeon: Cornelio Kim Jr., MD;  Location: [facility] PAIN MANAGEMENT;  Service: Pain Management;  Laterality: Bilateral;  @1115  No ATC or DM  Needs Consent    INJECTION OF ANESTHETIC AGENT AROUND MEDIAL BRANCH NERVES INNERVATING LUMBAR FACET JOINT Bilateral 9/4/2024    Procedure: Bilateral L5/S1 Facet Medial Branch Blocks #2 (ref: Ware);  Surgeon: Cornelio Kim Jr., MD;  Location: [facility] PAIN MANAGEMENT;  Service: Pain  Management;  Laterality: Bilateral;  @1045  No ATC or DM  Needs Consent    INJECTION OF ANESTHETIC AGENT AROUND NERVE Bilateral 10/31/2022    Procedure: BLOCK, NERVE, BILATERAL L3-L4-L5 MEDIAL BRANCH;  Surgeon: Archana Knott MD;  Location: [facility] PAIN MGT;  Service: Pain Management;  Laterality: Bilateral;    INJECTION OF ANESTHETIC AGENT AROUND NERVE Bilateral 11/17/2022    Procedure: BLOCK, NERVE BILATERAL L3,L4,L5 MEDIAL BRANCH;  Surgeon: Archana Knott MD;  Location: [facility] PAIN [facility]GT;  Service: Pain Management;  Laterality: Bilateral;    MINIMALLY INVASIVE TRANSFORAMINAL LUMBAR INTERBODY FUSION (TLIF) Left 6/26/2023    Procedure: FUSION, SPINE, LUMBAR, TLIF, MINIMALLY INVASIVE;  Surgeon: Layo Santiago MD;  Location: [facility] OR;  Service: Neurosurgery;  Laterality: Left;  MIS Left L4-5 TLIF   fluoro  Andrew table 4 poster  Neuromonitoring  spinewave  SPINEWAVE SHENGSANDRY [phone] TEXTED ТАТЬЯНА ON 6/15/2023 @ 11:29AM. ТАТЬЯНА WILDE RESPONDED ON 6/15/2023 @ 11:35AM-LO  NEUROMONITORING ТАТЬЯНА ARANDA [phone]  RN    RADIOFREQUENCY ABLATION Left 12/8/2022    Procedure: RADIOFREQUENCY ABLATION, LEFT L3,L4,L5 MEDIAL BRANCH ONE OF TWO;  Surgeon: Archana Knott MD;  Location: [facility]IN [facility]GT;  Service: Pain Management;  Laterality: Left;    RADIOFREQUENCY ABLATION Right 12/22/2022    Procedure: RADIOFREQUENCY ABLATION, RIGHT L3,L4,L5 MEDIAL BRANCH TWO OF TWO;  Surgeon: Archana Knott MD;  Location: [facility] PAIN [facility]GT;  Service: Pain Management;  Laterality: Right;    SPINAL CORD STIMULATOR IMPLANT  10/20/2021    TRANSFORAMINAL EPIDURAL INJECTION OF STEROID Bilateral 3/13/2023    Procedure: INJECTION, STEROID, EPIDURAL, TRANSFORAMINAL APPROACH BILATERAL L5/S1;  Surgeon: Archana Knott MD;  Location: [facility]IN [facility]GT;  Service: Pain Management;  Laterality: Bilateral;    TUBAL LIGATION         Social History     Socioeconomic History    Marital status:    Tobacco Use    Smoking status: Never    Smokeless tobacco: Never   Substance and  Sexual Activity    Alcohol use: Never    Drug use: Never    Sexual activity: Not Currently     Partners: Male     Birth control/protection: None     Comment:  39+years     Social Drivers of Health     Financial Resource Strain: High Risk (9/17/2024)    Overall Financial Resource Strain (CARDIA)     Difficulty of Paying Living Expenses: Hard   Food Insecurity: Food Insecurity Present (9/17/2024)    Hunger Vital Sign     Worried About Running Out of Food in the Last Year: Often true     Ran Out of Food in the Last Year: Often true   Transportation Needs: No Transportation Needs (9/17/2024)    PRAPARE - Transportation     Lack of Transportation (Medical): No     Lack of Transportation (Non-Medical): No   Physical Activity: Insufficiently Active (9/17/2024)    Exercise Vital Sign     Days of Exercise per Week: 5 days     Minutes of Exercise per Session: 20 min   Stress: No Stress Concern Present (9/17/2024)    [employer] [place] [employer] - Occupational Stress Questionnaire     Feeling of Stress : Not at all   Housing Stability: Low Risk  (9/17/2024)    Housing Stability Vital Sign     Unable to Pay for Housing in the Last Year: No     Homeless in the Last Year: No       Review of patient's allergies indicates:   Allergen Reactions    Shingrix (pf) [varicella-zoster [id number] (pf)]      Got all side effects listed       No current facility-administered medications on file prior to encounter.     Current Outpatient Medications on File Prior to Encounter   Medication Sig Dispense Refill    acetaminophen (TYLENOL) 500 MG tablet Take 500 mg by mouth every 6 (six) hours as needed for Pain.      azelastine 205.5 mcg (0.15 %) Spry azelastine 205.5 mcg (0.15 %) nasal spray      ciclopirox (LOPROX) 0.77 % Susp Apply topically 2 (two) times daily.      ergocalciferol (ERGOCALCIFEROL) 50,000 unit Cap Take 50,000 Units by mouth every 7 days.      estradioL (VAGIFEM) 10 mcg Tab Place 1 tablet (10 mcg total)  vaginally twice a week. 24 tablet 3    ketoconazole (NIZORAL) 2 % cream       meloxicam (MOBIC) 15 MG tablet Take 1 tablet (15 mg total) by mouth once daily. 30 tablet 0    methocarbamoL (ROBAXIN) 500 MG Tab Take 1 tablet (500 mg) by mouth up to every 6 hours as needed for Muscle Spasm. 40 tablet 1    multivitamin capsule Take 1 capsule by mouth once daily.      omeprazole (PRILOSEC) 20 MG capsule TAKE 1 CAPSULE BY MOUTH EVERY DAY 30 capsule 0    ondansetron (ZOFRAN-ODT) 4 MG TbDL Dissolve 1 tablet (4 mg total) by mouth every 6 (six) hours as needed (nausea). 15 tablet 0    oxyCODONE-acetaminophen (PERCOCET)  mg per tablet Take 1 tablet by mouth every 4 (four) hours as needed for Pain (severe pain only (7-10/10)). 42 tablet 0    tiZANidine (ZANAFLEX) 4 MG tablet TAKE 1 TABLET BY MOUTH NIGHTLY AS NEEDED FOR MUSCLE SPASM 90 tablet 2    zonisamide (ZONEGRAN) 100 MG Cap TAKE 4 CAPSULES BY MOUTH ONCE DAILY 360 capsule 2       Objective:      There were no vitals taken for this visit.    Exam:  AAOx3 NAD  Mood and affect normal  Memory and language intact  RRR  CTAB        Assessment:       Lumbar spondylosis      Plan:         Cherri Noonan is a 59 y.o. female with lumbar facet pain.    Proceed with RFA as planned.

## 2024-10-09 NOTE — DISCHARGE INSTRUCTIONS
Home Care Instructions Pain Management:    1. DIET:   You may resume your normal diet today.   2. BATHING:   You may shower with luke warm water.  3. DRESSING:   You may remove your bandage today.   4. ACTIVITY LEVEL:   You may resume your normal activities 24 hrs after your procedure.  5. MEDICATIONS:   You may resume your normal medications today.   6. SPECIAL INSTRUCTIONS:   No heat to the injection site for 24 hrs including, bath or shower, heating pad, moist heat, or hot tubs.    Use ice pack to injection site for any pain or discomfort.  Apply ice packs for 20 minute intervals as needed.   If you have received any sedatives by mouth today you may not drive for 12 hours.    If you have received any sedation through your IV, you may not drive for 24 hrs.     PLEASE CALL YOUR DOCTOR IF:  1. Redness or swelling around the injection site.  2. Fever of 101 degrees  3. Drainage (pus) from the injection site.  4. For any continuous bleeding (some dried blood over the incision is normal.)    FOR EMERGENCIES:   If any unusual problems or difficulties occur during clinic hours, call [phone] or 454.     Adult Procedural Sedation Instructions    Recovery After Procedural Sedation (Adult)  You have been given medicine by vein to make you sleep during your surgery. This may have included both a pain medicine and sleeping medicine. Most of the effects have worn off. But you may still have some drowsiness for the next 6 to 8 hours.  Home care  Follow these guidelines when you get home:  For the next 8 hours, you should be watched by a responsible adult. This person should make sure your condition is not getting worse.  Don't drink any alcohol for the next 24 hours.  Don't drive, operate dangerous machinery, or make important business or personal decisions during the next 24 hours.  Note: Your healthcare provider may tell you not to take any medicine by mouth for pain or sleep in the next 4 hours. These medicines may  react with the medicines you were given in the hospital. This could cause a much stronger response than usual.  Follow-up care  Follow up with your healthcare provider if you are not alert and back to your usual level of activity within 12 hours.  When to seek medical advice  Call your healthcare provider right away if any of these occur:  Drowsiness gets worse  Weakness or dizziness gets worse  Repeated vomiting  You can't be awakened   Date Last Reviewed: 10/18/2016  © 0644-4860 [employer]. [address] [place] [address], [place] [address]. All rights reserved. This information is not intended as a substitute for professional medical care. Always follow your healthcare professional's instructions.

## 2024-10-09 NOTE — DISCHARGE SUMMARY
[facility] [place] - Pain Management  Discharge Note  Short Stay    Procedure(s) (LRB):  Bilateral L5/S1 Radiofrequency Thermocoagulation of Medial Branches (Bilateral)      OUTCOME: Patient tolerated treatment/procedure well without complication and is now ready for discharge.    DISPOSITION: Home or Self Care    FINAL DIAGNOSIS:  <principal problem not specified>    FOLLOWUP: In clinic    DISCHARGE INSTRUCTIONS:  No discharge procedures on file.       Discharge Diagnosis:Lumbar spondylosis [M47.816]  Condition on Discharge: Stable.  Diet on Discharge: Same as before.  Activity: as per instruction sheet.  Discharge to: Home with a responsible adult.  Follow up: as per Discharge instructions

## 2024-10-09 NOTE — OP NOTE
LUMBAR Medial Branch Radiofrequency Ablation Under Fluoroscopy  Time-out taken to identify patient and procedure side prior to starting the procedure.   I attest that I have reviewed the patient's home medications prior to the procedure and no contraindication have been identified. I  re-evaluated the patient after the patient was positioned for the procedure in the procedure room immediately before the procedural time-out. The vital signs are current and represent the current state of the patient which has not significantly changed since the preprocedure assessment.                         Date of Service: 10/09/2024    PCP: Mahsa Arevalo NP          Referring Physician:                                                PROCEDURE:  bilateral L5/S1 facet joint medial branch radiofrequency ablation    REASON FOR PROCEDURE: Lumbar spondylosis    INDICATIONS:  Patient has completed 2 separate medial branch blocks at the specified levels.  Patient reported at least 80% relief of pain/symptoms for the expected duration of local anesthetic used.    PHYSICIAN: Cornelio Kim MD    ASSISTANTS: None    MEDICATIONS INJECTED:  0.5 ml of Kenalog 40mg/ml, and Bupivacaine 0.25% 10ml. Of that, 1.5-3ml injected per level before & after the ablation.    LOCAL ANESTHETIC USED: Xylocaine 1% 3ml each site.    SEDATION MEDICATIONS: Versed 1 mg and fentanyl 75 mcg IV.  Conscious sedation provided by MD and monitored by RN. Total sedation time:  21 minutes.  (See nurse documentation and case log for sedation time)    ESTIMATED BLOOD LOSS:  None.    COMPLICATIONS:  None.    TECHNIQUE:   Laying in a prone position, the patient was prepped and draped in the usual sterile fashion using ChloraPrep and fenestrated drape.  The level was determined under fluoroscopic guidance.  Local anesthetic was given by going down to the hub of the 27-gauge 1.25in needle and raising a wheal.  The 20-gauge needle was introduced to the anatomic local  of the median branch at the lateral mass utilizing live fluoroscopy at each level stated above. Motor stimulation done to confirm no motor nerve ablation takes place up to 2 Volt 2Hz..  Sensory stimulation done to detect similarities in pain location 1.5 Volts 50Hz.. Medication was then injected slowly.  Ablation then done per level utilizing Halyard radiofrequency generator 80°C for 90 seconds. The patient tolerated the procedure well.       The patient was monitored after the procedure.  Patient was given post procedure and discharge instructions to follow at home.  We will see the patient back in two weeks or the patient may call to inform of status. The patient was discharged in a stable condition

## 2024-10-13 ENCOUNTER — PATIENT MESSAGE (OUTPATIENT)
Dept: INTERNAL MEDICINE | Facility: CLINIC | Age: 59
End: 2024-10-13
Payer: COMMERCIAL

## 2024-10-17 ENCOUNTER — PATIENT OUTREACH (OUTPATIENT)
Dept: ADMINISTRATIVE | Facility: OTHER | Age: 59
End: 2024-10-17
Payer: COMMERCIAL

## 2024-10-17 NOTE — PROGRESS NOTES
CHW - Case Closure    This Community Health Worker spoke to patient via telephone today.   Pt reported: Patient states she is over the income limit, she has no needs, nor request assistance at this time. Pt has graduated and agreed to case closure.   Pt denied any additional needs at this time and agrees with episode closure at this time.  Provided patient with Community Health Worker's contact information and encouraged her to contact this Community Health Worker if additional needs arise.

## 2024-10-31 ENCOUNTER — LAB VISIT (OUTPATIENT)
Dept: LAB | Facility: HOSPITAL | Age: 59
End: 2024-10-31
Payer: COMMERCIAL

## 2024-10-31 ENCOUNTER — OFFICE VISIT (OUTPATIENT)
Dept: INFECTIOUS DISEASES | Facility: CLINIC | Age: 59
End: 2024-10-31
Payer: COMMERCIAL

## 2024-10-31 VITALS
DIASTOLIC BLOOD PRESSURE: 88 MMHG | SYSTOLIC BLOOD PRESSURE: 143 MMHG | HEART RATE: 60 BPM | BODY MASS INDEX: 26.68 KG/M2 | TEMPERATURE: 98 F | WEIGHT: 155.44 LBS

## 2024-10-31 DIAGNOSIS — R50.9 FEBRILE ILLNESS: Primary | ICD-10-CM

## 2024-10-31 DIAGNOSIS — R50.9 FEBRILE ILLNESS: ICD-10-CM

## 2024-10-31 DIAGNOSIS — B34.9 VIRAL SYNDROME: ICD-10-CM

## 2024-10-31 LAB — HIV 1+2 AB+HIV1 P24 AG SERPL QL IA: NORMAL

## 2024-10-31 PROCEDURE — 87389 HIV-1 AG W/HIV-1&-2 AB AG IA: CPT | Performed by: STUDENT IN AN ORGANIZED HEALTH CARE EDUCATION/TRAINING PROGRAM

## 2024-10-31 PROCEDURE — 36415 COLL VENOUS BLD VENIPUNCTURE: CPT | Performed by: STUDENT IN AN ORGANIZED HEALTH CARE EDUCATION/TRAINING PROGRAM

## 2024-10-31 PROCEDURE — 99999 PR PBB SHADOW E&M-EST. PATIENT-LVL IV: CPT | Mod: PBBFAC,,, | Performed by: STUDENT IN AN ORGANIZED HEALTH CARE EDUCATION/TRAINING PROGRAM

## 2024-11-22 ENCOUNTER — PATIENT MESSAGE (OUTPATIENT)
Dept: RESEARCH | Facility: HOSPITAL | Age: 59
End: 2024-11-22
Payer: COMMERCIAL

## 2024-11-27 ENCOUNTER — PATIENT MESSAGE (OUTPATIENT)
Dept: RESEARCH | Facility: HOSPITAL | Age: 59
End: 2024-11-27
Payer: COMMERCIAL

## 2024-12-02 ENCOUNTER — OFFICE VISIT (OUTPATIENT)
Dept: RHEUMATOLOGY | Facility: CLINIC | Age: 59
End: 2024-12-02
Payer: COMMERCIAL

## 2024-12-02 ENCOUNTER — LAB VISIT (OUTPATIENT)
Dept: LAB | Facility: HOSPITAL | Age: 59
End: 2024-12-02
Attending: INTERNAL MEDICINE
Payer: COMMERCIAL

## 2024-12-02 VITALS
HEART RATE: 66 BPM | SYSTOLIC BLOOD PRESSURE: 132 MMHG | DIASTOLIC BLOOD PRESSURE: 88 MMHG | WEIGHT: 161.63 LBS | BODY MASS INDEX: 27.74 KG/M2

## 2024-12-02 DIAGNOSIS — R76.8 POSITIVE ANA (ANTINUCLEAR ANTIBODY): Primary | ICD-10-CM

## 2024-12-02 DIAGNOSIS — R50.9 FEBRILE ILLNESS: ICD-10-CM

## 2024-12-02 DIAGNOSIS — R76.8 POSITIVE ANA (ANTINUCLEAR ANTIBODY): ICD-10-CM

## 2024-12-02 DIAGNOSIS — M47.899 OTHER OSTEOARTHRITIS OF SPINE, UNSPECIFIED SPINAL REGION: ICD-10-CM

## 2024-12-02 LAB
BASOPHILS # BLD AUTO: 0.05 K/UL (ref 0–0.2)
BASOPHILS NFR BLD: 1.1 % (ref 0–1.9)
C3 SERPL-MCNC: 126 MG/DL (ref 50–180)
C4 SERPL-MCNC: 30 MG/DL (ref 11–44)
CRP SERPL-MCNC: 0.9 MG/L (ref 0–8.2)
DIFFERENTIAL METHOD BLD: ABNORMAL
EOSINOPHIL # BLD AUTO: 0.1 K/UL (ref 0–0.5)
EOSINOPHIL NFR BLD: 1.7 % (ref 0–8)
ERYTHROCYTE [DISTWIDTH] IN BLOOD BY AUTOMATED COUNT: 16.3 % (ref 11.5–14.5)
ERYTHROCYTE [SEDIMENTATION RATE] IN BLOOD BY PHOTOMETRIC METHOD: 5 MM/HR (ref 0–36)
HCT VFR BLD AUTO: 42.7 % (ref 37–48.5)
HGB BLD-MCNC: 13 G/DL (ref 12–16)
IMM GRANULOCYTES # BLD AUTO: 0.01 K/UL (ref 0–0.04)
IMM GRANULOCYTES NFR BLD AUTO: 0.2 % (ref 0–0.5)
LYMPHOCYTES # BLD AUTO: 2.2 K/UL (ref 1–4.8)
LYMPHOCYTES NFR BLD: 46.3 % (ref 18–48)
MCH RBC QN AUTO: 24.6 PG (ref 27–31)
MCHC RBC AUTO-ENTMCNC: 30.4 G/DL (ref 32–36)
MCV RBC AUTO: 81 FL (ref 82–98)
MONOCYTES # BLD AUTO: 0.5 K/UL (ref 0.3–1)
MONOCYTES NFR BLD: 10.9 % (ref 4–15)
NEUTROPHILS # BLD AUTO: 1.9 K/UL (ref 1.8–7.7)
NEUTROPHILS NFR BLD: 39.8 % (ref 38–73)
NRBC BLD-RTO: 0 /100 WBC
PLATELET # BLD AUTO: 267 K/UL (ref 150–450)
PMV BLD AUTO: 11 FL (ref 9.2–12.9)
RBC # BLD AUTO: 5.29 M/UL (ref 4–5.4)
WBC # BLD AUTO: 4.69 K/UL (ref 3.9–12.7)

## 2024-12-02 PROCEDURE — 3008F BODY MASS INDEX DOCD: CPT | Mod: CPTII,S$GLB,, | Performed by: INTERNAL MEDICINE

## 2024-12-02 PROCEDURE — 99999 PR PBB SHADOW E&M-EST. PATIENT-LVL IV: CPT | Mod: PBBFAC,,, | Performed by: INTERNAL MEDICINE

## 2024-12-02 PROCEDURE — 1159F MED LIST DOCD IN RCRD: CPT | Mod: CPTII,S$GLB,, | Performed by: INTERNAL MEDICINE

## 2024-12-02 PROCEDURE — 36415 COLL VENOUS BLD VENIPUNCTURE: CPT | Performed by: INTERNAL MEDICINE

## 2024-12-02 PROCEDURE — 86160 COMPLEMENT ANTIGEN: CPT | Performed by: INTERNAL MEDICINE

## 2024-12-02 PROCEDURE — 86140 C-REACTIVE PROTEIN: CPT | Performed by: INTERNAL MEDICINE

## 2024-12-02 PROCEDURE — 85652 RBC SED RATE AUTOMATED: CPT | Performed by: INTERNAL MEDICINE

## 2024-12-02 PROCEDURE — 3044F HG A1C LEVEL LT 7.0%: CPT | Mod: CPTII,S$GLB,, | Performed by: INTERNAL MEDICINE

## 2024-12-02 PROCEDURE — 3079F DIAST BP 80-89 MM HG: CPT | Mod: CPTII,S$GLB,, | Performed by: INTERNAL MEDICINE

## 2024-12-02 PROCEDURE — 86160 COMPLEMENT ANTIGEN: CPT | Mod: 59 | Performed by: INTERNAL MEDICINE

## 2024-12-02 PROCEDURE — 99204 OFFICE O/P NEW MOD 45 MIN: CPT | Mod: S$GLB,,, | Performed by: INTERNAL MEDICINE

## 2024-12-02 PROCEDURE — 1160F RVW MEDS BY RX/DR IN RCRD: CPT | Mod: CPTII,S$GLB,, | Performed by: INTERNAL MEDICINE

## 2024-12-02 PROCEDURE — 85025 COMPLETE CBC W/AUTO DIFF WBC: CPT | Performed by: INTERNAL MEDICINE

## 2024-12-02 PROCEDURE — 3075F SYST BP GE 130 - 139MM HG: CPT | Mod: CPTII,S$GLB,, | Performed by: INTERNAL MEDICINE

## 2024-12-02 NOTE — PROGRESS NOTES
INITIAL VISIT    Subjective:       Patient ID: Cherri Noonan is a 59 y.o. female with PMHx of chronic back pain s/p L4-L5 TLIF (transforaminal lumbar interbody fusion) on 06/26/2023, cervical radiculopathy  who presents today for evaluation of +GAEL, and elevated CRP. She initially developed a macular rash back in September and started with fevers (tmax: 102). She was seen at [facility] and labs were all unremarkable except for +GAEL, lymphopenia, and elevated CRP.     Chief Complaint:  Establish Care      History of Present Illness:   Cherri Noonan is a 59 y.o. female who presents for initial visit. She reports resolution of rash within 2 weeks of onset. Her fevers resolved after 2-3 days. She denies any join pain, sob, fevers, chills, oral ulcers, epistaxis, jaw claudication, dry eyes, dry mouth, hx of dental caries or loss of teeth, chest pain, cough, hemoptysis, genital ulcers, genital discharge, photosensitivity, raynaud's phenomenon, nail changes. She is in her usual state of health today with some complains on the medial aspect of knee which she describes as tingling in nature and has been present chronically. She does have a hx of 2 miscarriages (1 in first trimester, and 1 in early second trimester), denies any hx of DVT/PE.     ROS:   General: No fever, no malaise, no unintentional weight loss. Reports no fatigue.  HEENT: No inflammation of eyes( scleritis/ Uveitis/ Iritis),no ear infection, no sinusitis, no recurrent oral ulcers, no epistaxis, no poor dentition or jaw claudication. Reports no dry eyes or dry mouth.  CV: No chest pain, no dyspnea, no pedal edema. No history of pleural or pericardial effusion.  RS: No pleuritic chest pain, no cough, no hemoptysis  GI: No reflux, no abdominal pain, no dysphagia, no N/V, no diarrhea/ constipation, no BRBPR  : No genital ulcers, no genital discharge, no hematuria, no UTI  Hemo: No gum bleeding/bruising, no recurrent infections, no history of   DVT/PE  Neuro: No seizures, no headaches, no weakness or numbness  Psyc: No Poor sleep or  mood disturbance  Derm: No photosensitivity or Raynaud's phenomenon or nodules or tophi. No nail changes.  No history of Uveitis/ dactylitis, enthesitis.     Review of patient's allergies indicates:   Allergen Reactions    Shingrix (pf) [varicella-zoster [id number] (pf)]      Got all side effects listed       Current Outpatient Medications on File Prior to Visit   Medication Sig    acetaminophen (TYLENOL) 500 MG tablet Take 500 mg by mouth every 6 (six) hours as needed for Pain.    acetaminophen (TYLENOL) 500 MG tablet Take 2 tablets (1,000 mg total) by mouth every 6 (six) hours as needed for Pain.    azelastine 205.5 mcg (0.15 %) Spry azelastine 205.5 mcg (0.15 %) nasal spray    ergocalciferol (ERGOCALCIFEROL) 50,000 unit Cap Take 50,000 Units by mouth every 7 days.    estradioL (VAGIFEM) 10 mcg Tab Place 1 tablet (10 mcg total) vaginally twice a week.    fluticasone propionate (FLONASE) 50 mcg/actuation nasal spray 2 sprays (100 mcg total) by Each Nostril route once daily.    ondansetron (ZOFRAN-ODT) 4 MG TbDL Dissolve 1 tablet (4 mg total) by mouth every 6 (six) hours as needed (nausea).    tiZANidine (ZANAFLEX) 4 MG tablet TAKE 1 TABLET BY MOUTH NIGHTLY AS NEEDED FOR MUSCLE SPASM    triamcinolone acetonide 0.1% (KENALOG) 0.1 % Lotn Apply topically 2 (two) times daily.    ciclopirox (LOPROX) 0.77 % Susp Apply topically 2 (two) times daily. (Patient not taking: Reported on 12/2/2024)    HYDROcodone-acetaminophen (NORCO) 5-325 mg per tablet Take 1 tablet by mouth every 24 hours as needed for Pain.    hydrocortisone 2.5 % cream Apply topically 2 (two) times daily.    ketoconazole (NIZORAL) 2 % cream  (Patient not taking: Reported on 12/2/2024)    meloxicam (MOBIC) 15 MG tablet Take 1 tablet (15 mg total) by mouth once daily.    methocarbamoL (ROBAXIN) 500 MG Tab Take 1 tablet (500 mg) by mouth up to every 6 hours as needed for  Muscle Spasm.    multivitamin capsule Take 1 capsule by mouth once daily.    omeprazole (PRILOSEC) 20 MG capsule TAKE 1 CAPSULE BY MOUTH EVERY DAY    oxyCODONE-acetaminophen (PERCOCET)  mg per tablet Take 1 tablet by mouth every 4 (four) hours as needed for Pain (severe pain only (7-10/10)).    zonisamide (ZONEGRAN) 100 MG Cap TAKE 4 CAPSULES BY MOUTH ONCE DAILY     No current facility-administered medications on file prior to visit.       Past Medical History:   Diagnosis Date    Cervical radiculopathy          Past Surgical History:   Procedure Laterality Date    CERVICAL FUSION  08/2015    C5-C7    HYSTERECTOMY      INJECTION OF ANESTHETIC AGENT AROUND MEDIAL BRANCH NERVES INNERVATING LUMBAR FACET JOINT Bilateral 8/21/2024    Procedure: Bilateral L5/S1 Facet Medial Branch Blocks #1;  Surgeon: Cornelio Kim Jr., MD;  Location: [facility];  Service: Pain Management;  Laterality: Bilateral;  @1115  No ATC or DM  Needs Consent    INJECTION OF ANESTHETIC AGENT AROUND MEDIAL BRANCH NERVES INNERVATING LUMBAR FACET JOINT Bilateral 9/4/2024    Procedure: Bilateral L5/S1 Facet Medial Branch Blocks #2 (ref: Ware);  Surgeon: Cornelio Kim Jr., MD;  Location: [facility];  Service: Pain Management;  Laterality: Bilateral;  @1045  No ATC or DM  Needs Consent    INJECTION OF ANESTHETIC AGENT AROUND NERVE Bilateral 10/31/2022    Procedure: BLOCK, NERVE, BILATERAL L3-L4-L5 MEDIAL BRANCH;  Surgeon: Archana Knott MD;  Location: [facility];  Service: Pain Management;  Laterality: Bilateral;    INJECTION OF ANESTHETIC AGENT AROUND NERVE Bilateral 11/17/2022    Procedure: BLOCK, NERVE BILATERAL L3,L4,L5 MEDIAL BRANCH;  Surgeon: Archana Knott MD;  Location: [facility];  Service: Pain Management;  Laterality: Bilateral;    MINIMALLY INVASIVE TRANSFORAMINAL LUMBAR INTERBODY FUSION (TLIF) Left 6/26/2023    Procedure: FUSION, SPINE, LUMBAR, TLIF, MINIMALLY INVASIVE;  Surgeon: Layo Santiago MD;   Location: [facility] OR;  Service: Neurosurgery;  Laterality: Left;  MIS Left L4-5 TLIF   fluoro  Andrew table 4 poster  Neuromonitoring  spinewave  SPINEWAVE SUSHANT [phone] TEXTED ТАТЬЯНА ON 6/15/2023 @ 11:29AM. ТАТЬЯНА WILDE RESPONDED ON 6/15/2023 @ 11:35AM-LO  NEUROMONITORING ТАТЬЯНА ARANDA [phone]  RN    RADIOFREQUENCY ABLATION Left 12/8/2022    Procedure: RADIOFREQUENCY ABLATION, LEFT L3,L4,L5 MEDIAL BRANCH ONE OF TWO;  Surgeon: Archana Knott MD;  Location: [facility];  Service: Pain Management;  Laterality: Left;    RADIOFREQUENCY ABLATION Right 12/22/2022    Procedure: RADIOFREQUENCY ABLATION, RIGHT L3,L4,L5 MEDIAL BRANCH TWO OF TWO;  Surgeon: Archana Knott MD;  Location: [facility];  Service: Pain Management;  Laterality: Right;    RADIOFREQUENCY ABLATION OF LUMBAR MEDIAL BRANCH NERVE AT SINGLE LEVEL Bilateral 10/9/2024    Procedure: Bilateral L5/S1 Radiofrequency Thermocoagulation of Medial Branches;  Surgeon: Cornelio Kim Jr., MD;  Location: [facility];  Service: Pain Management;  Laterality: Bilateral;  @1230  No ATC or DM  Needs Consent    SPINAL CORD STIMULATOR IMPLANT  10/20/2021    TRANSFORAMINAL EPIDURAL INJECTION OF STEROID Bilateral 3/13/2023    Procedure: INJECTION, STEROID, EPIDURAL, TRANSFORAMINAL APPROACH BILATERAL L5/S1;  Surgeon: Archana Knott MD;  Location: [facility];  Service: Pain Management;  Laterality: Bilateral;    TUBAL LIGATION         Family History   Problem Relation Name Age of Onset    Hyperlipidemia Mother      Diabetes Mother      Aneurysm Brother      COPD Brother      Heart disease Sister      Juvenile idiopathic arthritis Sister brien     Breast cancer Neg Hx      Colon cancer Neg Hx         Social History     Tobacco Use    Smoking status: Never    Smokeless tobacco: Never   Substance Use Topics    Alcohol use: Never    Drug use: Never           Objective:   /88 (Patient Position: Sitting)   Pulse 66   Wt 73.3 kg (161 lb 9.6 oz)   BMI  27.74 kg/m²   Physical Examination:  General: Well developed, in no apparent distress  Skin: Skin Color, texture, turgor normal, no malar rash,  no rashes or lesions, no rheumatoid nodules or tophi, no nail pits or onycholysis  SQ/Lymph: No cervical adenopathy  HEENT: Normocephalic, atraumatic, no oral ulcers, no ear discharge, no midline facial lesions  Chest  Lungs:  Clear to ausculation, bilaterally and normal respiratory effort  Heart: Regular rate and rhythm, S1, S2 normal, no murmurs   MSK:   NECK: Lateral deviation (right), able to flex and extend neck fully.   Hands: FROM; no synovitis;   Nails: no changes;  Wrists: FROM; no synovitis;    Elbows: FROM; no synovitis; no tophi or nodules  Shoulders: FROM ; no synovitis; unable to turn   Knees: FROM; no synovitis; no instability;  Foot: FROM: no synovitis, MTP Squeeze negative  Neuro:  No focal weakness  Psyc: Normal content and context of speech    Labs as follows:     Results for orders placed or performed in visit on 10/31/24   HIV 1/2 Ag/Ab (4th Gen)    Collection Time: 10/31/24 10:48 AM   Result Value Ref Range    HIV 1/2 Ag/Ab Non-reactive Non-reactive       Assessment/Plan:         Will order C3,C4, CBC, CRP and await results at this time. Low concern for SLE at this time, she is not exhibiting any symptoms. GAEL may have been positive due to post-viral illness.She is also not on any medications known to be associated with positive GAEL.     1. Positive GAEL (antinuclear antibody)  -     CBC Auto Differential; Future; Expected date: 12/02/2024  -     C-Reactive Protein; Future; Expected date: 12/02/2024  -     C3 Complement; Future; Expected date: 12/02/2024  -     C4 Complement; Future; Expected date: 12/02/2024  -     Sedimentation rate; Future; Expected date: 12/02/2024    3. Other osteoarthritis of spine, unspecified spinal region      Oumou Benavides MD-PGYII  Department of Rheumatology    I have personally taken the history and examined the patient  and concur with the resident's note as above.  She had an acute febrile illness with rash, which was most likely a viral infection.  This has resolved.  She does have a positive GAEL.  Clinically she has no evidence of underlying connective tissue disease we will do further laboratory studies.  We did not give her regular return appointment.         Answers submitted by the patient for this visit:  Rheumatology Questionnaire (Submitted on 12/1/2024)  fever: No  eye redness: No  mouth sores: No  headaches: No  shortness of breath: No  chest pain: No  trouble swallowing: No  diarrhea: No  constipation: No  unexpected weight change: No  genital sore: No  dysuria: No  During the last 3 days, have you had a skin rash?: No  Bruises or bleeds easily: No  cough: No

## 2024-12-19 ENCOUNTER — PATIENT MESSAGE (OUTPATIENT)
Dept: INTERNAL MEDICINE | Facility: CLINIC | Age: 59
End: 2024-12-19
Payer: COMMERCIAL

## 2024-12-19 RX ORDER — DICLOFENAC SODIUM 10 MG/G
2 GEL TOPICAL 4 TIMES DAILY
Qty: 650 G | Refills: 2 | Status: SHIPPED | OUTPATIENT
Start: 2024-12-19

## 2025-03-17 DIAGNOSIS — J31.0 CHRONIC RHINITIS: ICD-10-CM

## 2025-03-17 RX ORDER — FLUTICASONE PROPIONATE 50 MCG
2 SPRAY, SUSPENSION (ML) NASAL
Qty: 48 ML | Refills: 1 | Status: SHIPPED | OUTPATIENT
Start: 2025-03-17

## (undated) DEVICE — DRAPE C-ARM FOR MOBILE XRAY

## (undated) DEVICE — DRESSING SURGICAL 1/2X1/2

## (undated) DEVICE — CUSHION PRONE VIEW SMALL

## (undated) DEVICE — DRESSING AQUACEL FOAM 5 X 5

## (undated) DEVICE — DRESSING ABSRBNT ISLAND 3.6X8

## (undated) DEVICE — CORD FOR BIPOLAR FORCEPS 12

## (undated) DEVICE — SUT VICRYL+ 2-0 SH 18IN

## (undated) DEVICE — DRESSING LEUKOPLAST FLEX 1X3IN

## (undated) DEVICE — KIT SPINAL PATIENT CARE JACK

## (undated) DEVICE — SYR IRRIGATION BULB STER 60ML

## (undated) DEVICE — DRAPE STERI INSTRUMENT 1018

## (undated) DEVICE — NDL 18GA X1 1/2 REG BEVEL

## (undated) DEVICE — POSITIONER IV ARMBOARD FOAM

## (undated) DEVICE — UNDERGLOVE BIOGEL PI SZ 6.5 LF

## (undated) DEVICE — ELECTRODE REM PLYHSV RETURN 9

## (undated) DEVICE — GLOVE BIOGEL PI MICRO SZ 7.5

## (undated) DEVICE — SEE MEDLINE ITEM 156905

## (undated) DEVICE — DRESSING MEPILEX BORDER 4 X 4

## (undated) DEVICE — STAPLER ACCESS 55 ARTICULATING

## (undated) DEVICE — DRAPE OPMI STERILE

## (undated) DEVICE — SEE MEDLINE ITEM 157194

## (undated) DEVICE — SUT MCRYL PLUS 4-0 PS2 27IN

## (undated) DEVICE — BUR BONE CUT MICRO TPS 3X3.8MM

## (undated) DEVICE — CARTRIDGE OIL

## (undated) DEVICE — SOL NACL IRR 1000ML BTL

## (undated) DEVICE — COVER OVERHEAD SURG LT BLUE

## (undated) DEVICE — TUBE FRAZIER 5MM 2FT SOFT TIP

## (undated) DEVICE — NDL HYPO REG 25G X 1 1/2

## (undated) DEVICE — DURAPREP SURG SCRUB 26ML

## (undated) DEVICE — SEE MEDLINE ITEM 157110

## (undated) DEVICE — PACK ENDOSCOPY GENERAL

## (undated) DEVICE — SEE MEDLINE ITEM 157150

## (undated) DEVICE — SKINMARKER & RULER REGULAR X-F

## (undated) DEVICE — BLADE ELECTRO EDGE INSULATED

## (undated) DEVICE — DIFFUSER

## (undated) DEVICE — BURR MIS CURVED 3.0MM

## (undated) DEVICE — SYR ONLY LUER LOCK 20CC

## (undated) DEVICE — UNDERGLOVES BIOGEL PI SIZE 8

## (undated) DEVICE — DRESSING ADH ISLAND 2.5 X 3